# Patient Record
Sex: FEMALE | Race: WHITE | NOT HISPANIC OR LATINO | Employment: OTHER | URBAN - METROPOLITAN AREA
[De-identification: names, ages, dates, MRNs, and addresses within clinical notes are randomized per-mention and may not be internally consistent; named-entity substitution may affect disease eponyms.]

---

## 2017-01-06 ENCOUNTER — OFFICE VISIT (OUTPATIENT)
Dept: FAMILY MEDICINE | Facility: CLINIC | Age: 77
End: 2017-01-06
Payer: COMMERCIAL

## 2017-01-06 VITALS
BODY MASS INDEX: 27.14 KG/M2 | HEART RATE: 75 BPM | OXYGEN SATURATION: 97 % | DIASTOLIC BLOOD PRESSURE: 62 MMHG | TEMPERATURE: 97.6 F | HEIGHT: 67 IN | WEIGHT: 172.9 LBS | SYSTOLIC BLOOD PRESSURE: 128 MMHG

## 2017-01-06 DIAGNOSIS — R53.83 OTHER FATIGUE: ICD-10-CM

## 2017-01-06 DIAGNOSIS — M25.512 BILATERAL SHOULDER PAIN, UNSPECIFIED CHRONICITY: Primary | ICD-10-CM

## 2017-01-06 DIAGNOSIS — Z23 NEED FOR PROPHYLACTIC VACCINATION AND INOCULATION AGAINST INFLUENZA: ICD-10-CM

## 2017-01-06 DIAGNOSIS — M25.511 BILATERAL SHOULDER PAIN, UNSPECIFIED CHRONICITY: Primary | ICD-10-CM

## 2017-01-06 DIAGNOSIS — D64.9 ANEMIA, UNSPECIFIED TYPE: ICD-10-CM

## 2017-01-06 LAB
ALBUMIN SERPL-MCNC: 3.6 G/DL (ref 3.4–5)
ALP SERPL-CCNC: 139 U/L (ref 40–150)
ALT SERPL W P-5'-P-CCNC: 22 U/L (ref 0–50)
ANION GAP SERPL CALCULATED.3IONS-SCNC: 8 MMOL/L (ref 3–14)
AST SERPL W P-5'-P-CCNC: 15 U/L (ref 0–45)
BASOPHILS # BLD AUTO: 0.1 10E9/L (ref 0–0.2)
BASOPHILS NFR BLD AUTO: 0.7 %
BILIRUB SERPL-MCNC: 0.4 MG/DL (ref 0.2–1.3)
BUN SERPL-MCNC: 18 MG/DL (ref 7–30)
CALCIUM SERPL-MCNC: 9.1 MG/DL (ref 8.5–10.1)
CHLORIDE SERPL-SCNC: 107 MMOL/L (ref 94–109)
CO2 SERPL-SCNC: 29 MMOL/L (ref 20–32)
CREAT SERPL-MCNC: 0.72 MG/DL (ref 0.52–1.04)
CRP SERPL-MCNC: 7.3 MG/L (ref 0–8)
DIFFERENTIAL METHOD BLD: NORMAL
EOSINOPHIL # BLD AUTO: 0.2 10E9/L (ref 0–0.7)
EOSINOPHIL NFR BLD AUTO: 3.2 %
ERYTHROCYTE [DISTWIDTH] IN BLOOD BY AUTOMATED COUNT: 13.8 % (ref 10–15)
ERYTHROCYTE [SEDIMENTATION RATE] IN BLOOD BY WESTERGREN METHOD: 13 MM/H (ref 0–30)
GFR SERPL CREATININE-BSD FRML MDRD: 79 ML/MIN/1.7M2
GLUCOSE SERPL-MCNC: 73 MG/DL (ref 70–99)
HCT VFR BLD AUTO: 42.2 % (ref 35–47)
HGB BLD-MCNC: 13.3 G/DL (ref 11.7–15.7)
LYMPHOCYTES # BLD AUTO: 1.4 10E9/L (ref 0.8–5.3)
LYMPHOCYTES NFR BLD AUTO: 20.6 %
MCH RBC QN AUTO: 29.9 PG (ref 26.5–33)
MCHC RBC AUTO-ENTMCNC: 31.5 G/DL (ref 31.5–36.5)
MCV RBC AUTO: 95 FL (ref 78–100)
MONOCYTES # BLD AUTO: 0.7 10E9/L (ref 0–1.3)
MONOCYTES NFR BLD AUTO: 9.9 %
NEUTROPHILS # BLD AUTO: 4.5 10E9/L (ref 1.6–8.3)
NEUTROPHILS NFR BLD AUTO: 65.6 %
PLATELET # BLD AUTO: 281 10E9/L (ref 150–450)
POTASSIUM SERPL-SCNC: 4.3 MMOL/L (ref 3.4–5.3)
PROT SERPL-MCNC: 7.5 G/DL (ref 6.8–8.8)
RBC # BLD AUTO: 4.45 10E12/L (ref 3.8–5.2)
SODIUM SERPL-SCNC: 144 MMOL/L (ref 133–144)
TSH SERPL DL<=0.005 MIU/L-ACNC: 1.89 MU/L (ref 0.4–4)
WBC # BLD AUTO: 6.9 10E9/L (ref 4–11)

## 2017-01-06 PROCEDURE — 85025 COMPLETE CBC W/AUTO DIFF WBC: CPT | Performed by: INTERNAL MEDICINE

## 2017-01-06 PROCEDURE — 99214 OFFICE O/P EST MOD 30 MIN: CPT | Mod: 25 | Performed by: INTERNAL MEDICINE

## 2017-01-06 PROCEDURE — 80053 COMPREHEN METABOLIC PANEL: CPT | Performed by: INTERNAL MEDICINE

## 2017-01-06 PROCEDURE — 84443 ASSAY THYROID STIM HORMONE: CPT | Performed by: INTERNAL MEDICINE

## 2017-01-06 PROCEDURE — 86140 C-REACTIVE PROTEIN: CPT | Mod: 90 | Performed by: INTERNAL MEDICINE

## 2017-01-06 PROCEDURE — 85652 RBC SED RATE AUTOMATED: CPT | Performed by: INTERNAL MEDICINE

## 2017-01-06 PROCEDURE — 36415 COLL VENOUS BLD VENIPUNCTURE: CPT | Performed by: INTERNAL MEDICINE

## 2017-01-06 PROCEDURE — 99000 SPECIMEN HANDLING OFFICE-LAB: CPT | Performed by: INTERNAL MEDICINE

## 2017-01-06 PROCEDURE — 90662 IIV NO PRSV INCREASED AG IM: CPT | Performed by: INTERNAL MEDICINE

## 2017-01-06 PROCEDURE — G0008 ADMIN INFLUENZA VIRUS VAC: HCPCS | Performed by: INTERNAL MEDICINE

## 2017-01-06 NOTE — MR AVS SNAPSHOT
After Visit Summary   1/6/2017    Lizzette Chanel    MRN: [id number]           Patient Information     Date Of Birth          1940        Visit Information        Provider Department      1/6/2017 9:00 AM Guzman Bruner MD [facility]        Today's Diagnoses     Bilateral shoulder pain, unspecified chronicity    -  1     Anemia, unspecified type         Other fatigue           Care Instructions    I will let you the labs once back.      Understanding Polymyalgia Rheumatica  Polymyalgia rheumatica (PMR) is an inflammatory condition that can cause aching and stiffness. It tends to affect the neck, shoulders, and hips. The aching and stiffness are usually worse in the morning.  PMR can come on suddenly. For some it seems to occur overnight. For others it can take days or weeks to develop. PMR affects only older adults. It becomes more common with age. PMR occurs most often between the ages of 70 and 80. It is more common in women than in men, and it seems to run in some families.  What causes polymyalgia rheumatica?  Researchers are working to understand the causes of PMR. Because it can happen quickly and tends to occur at certain times of year, some think that an infection may cause it. Genes may be part of the cause. PMR can run in some families.  Symptoms of polymyalgia rheumatica  The main symptoms of PMR are aching and stiffness of the shoulders, neck, and hips. The aching can extend to the upper arms and thighs. PMR tends to affect both sides of the body equally. Symptoms are often worse in the morning or after long periods of no activity. Movement can make the pain worse.  The symptoms of PMR usually affect the shoulders the most. You may have trouble raising your arms above the level of your shoulders. This can make it hard to get dressed. You may have trouble rolling over in bed, getting out of bed, and getting up from sitting. You may also have trouble sleeping because  of your symptoms.  Other symptoms can occur, such as:    Swelling of the hands, wrists, feet, and ankles    Numbness, tingling, or pain in the hand, wrist, or forearm    Feeling of weakness    General feeling of being unwell    Feeling tired    Loss of appetite    Weight loss    Low-grade fever  Diagnosing polymyalgia rheumatica  Your healthcare provider will ask about your health history and your symptoms. He or she will give you a physical exam. The exam will check your range of motion, strength, and painful areas.  Diagnosing PMR can be difficult. Your healthcare provider will need to make sure you have PMR. Many conditions can cause aching and stiffness. These include rheumatoid arthritis and fibromyalgia. You may need tests, such as:    Blood tests to look for signs of inflammation, blood count problems, and muscle damage    Muscle biopsy to check for damage    Biopsy of a blood vessel in your temple    X-rays to look at your joints    MRI for detailed pictures of your joints and tissues    Ultrasound to look most closely at the soft tissues around your joints  Your healthcare provider may also diagnose you by giving you medicine. PMR often responds quickly to steroid medicine. This can help show if you have PMR. You may also be referred to a rheumatologist for diagnosis.    6926-2167 [employer]. [address] [place] [address]. All rights reserved. This information is not intended as a substitute for professional medical care. Always follow your healthcare professional's instructions.              Follow-ups after your visit        Who to contact     If you have questions or need follow up information about today's clinic visit or your schedule please contact [facility] directly at [phone].  Normal or non-critical lab and imaging results will be communicated to you by MyChart, letter or phone within 4 business days after the clinic has received the results. If you do  "not hear from us within 7 days, please contact the clinic through [employer] or phone. If you have a critical or abnormal lab result, we will notify you by phone as soon as possible.  Submit refill requests through [employer] or call your pharmacy and they will forward the refill request to us. Please allow 3 business days for your refill to be completed.          Additional Information About Your Visit        [employer] Information     [employer] lets you send messages to your doctor, view your test results, renew your prescriptions, schedule appointments and more. To sign up, go to [email or web address] . Click on \"Log in\" on the left side of the screen, which will take you to the Welcome page. Then click on \"Sign up Now\" on the right side of the page.     You will be asked to enter the access code listed below, as well as some personal information. Please follow the directions to create your username and password.     Your access code is: [email or web address]  Expires: 2017  9:41 AM     Your access code will  in 90 days. If you need help or a new code, please call your [place] clinic or [phone].        Care EveryWhere ID     This is your Care EveryWhere ID. This could be used by other organizations to access your [place] medical records  [id number]        Your Vitals Were     Pulse Temperature Height BMI (Body Mass Index) Pulse Oximetry Breastfeeding?    75 97.6  F (36.4  C) (Oral) 5' 7\" (1.702 m) 27.07 kg/m2 97% No       Blood Pressure from Last 3 Encounters:   17 128/62   04/15/16 137/80   01/15/16 132/64    Weight from Last 3 Encounters:   17 172 lb 14.4 oz (78.427 kg)   04/15/16 172 lb (78.019 kg)   16 167 lb 11.2 oz (76.068 kg)              We Performed the Following     CBC with platelets differential     Comprehensive metabolic panel     CRP, inflammation     ESR: Erythrocyte sedimentation rate     JUST IN CASE     TSH with free T4 reflex          Today's Medication Changes        "   These changes are accurate as of: 1/6/17  9:41 AM.  If you have any questions, ask your nurse or doctor.               Stop taking these medicines if you haven't already. Please contact your care team if you have questions.     methylphenidate 5 MG tablet   Commonly known as:  RITALIN   Stopped by:  Guzman Bruner MD                    Primary Care Provider Office Phone # Fax #    Guzman Bruner [phone]       [facility] [address] [facility] [address] [facility] [address]  [facility] [address]        Thank you!     Thank you for choosing [facility]  for your care. Our goal is always to provide you with excellent care. Hearing back from our patients is one way we can continue to improve our services. Please take a few minutes to complete the written survey that you may receive in the mail after your visit with us. Thank you!             Your Updated Medication List - Protect others around you: Learn how to safely use, store and throw away your medicines at www.disposemymeds.org.          This list is accurate as of: 1/6/17  9:41 AM.  Always use your most recent med list.                   Brand Name Dispense Instructions for use    CALCIUM PO      Take by mouth daily Takes liquid form       COQ10 PO      Take 100 mg by mouth.       JUICE PLUS FIBRE PO      Take by mouth 2 times daily       order for DME      Use your CPAP device as directed by your provider.       red yeast rice 600 MG Caps      Take 600 mg by mouth 2 times daily

## 2017-01-06 NOTE — PROGRESS NOTES
Lizzette Chanel is a 76 year old female who presents for few symptoms.      Atraumatic left shoulder pain as of 4/16, then to rnp at ortho and injected, did not help, then physical therapy but did not help, then seen by Dr. Mckinney and ?due to post polio.  Cont discomfort, then as of last few weeks some on right, not as bad, no trauma.  Some rest pain, no weakness, but also having some neck pain, not rad, no hip pain, no f,c,s or weight loss, some occasionally jaw discomfort 3 weeks, no jabier.  No ha or neuro changes, no h/o pmr.      Tried to donate blood and hemoglobin 11 or so, no recent donations, no h/o anemia.    She has had some fatigue.    She notes no chest pain or shortness of breath, no gi c/o, no gu c/o, no f,cs.  No other significant jt pains or other heent c/o.    Past Medical History   Diagnosis Date     Intermittent asthma 1995     Chest pain 2003     neg est thallium     Hx of colonoscopy 2003     incomplete, be nl     Peripheral neuropathy (H)      Dr. Jeffery     Abdominal pain 2011     ct abd and pelvis uterine fiborid, renal cysts and tics     Hx of colonoscopy [place] 3, 2011     nl     Hypercholesteremia 2011     aches with zocor     CAITLYN (obstructive sleep apnea) 12/12     done [place], using dental device, added cpap 2014      Osteopenia 2013     Dr. Brandon Sparks 1956     Statin intolerance      zocor and one other caused aches     Subdural hematoma (H) 1/16     traumatic, fu ct 3/16 resolved     Chest pain 4/16     nl est echo and cxr     Past Surgical History   Procedure Laterality Date     Shoulder surgery  1990's     twice     Tonsillectomy  child     Appendectomy  60's     Phacoemulsification clear cornea with standard intraocular lens implant  3/21/2012     Procedure:PHACOEMULSIFICATION CLEAR CORNEA WITH STANDARD INTRAOCULAR LENS IMPLANT; RIGHT PHACOEMULSIFICATION CLEAR CORNEA WITH STANDARD INTRAOCULAR LENS IMPLANT ; Surgeon:CHANDRAKANT HERNANDEZ; Location:[facility]     Phacoemulsification clear  "cornea with standard intraocular lens implant  3/28/2012     Procedure:PHACOEMULSIFICATION CLEAR CORNEA WITH STANDARD INTRAOCULAR LENS IMPLANT; LEFT PHACOEMULSIFICATION CLEAR CORNEA WITH STANDARD INTRAOCULAR LENS IMPLANT ; Surgeon:CHANDRAKANT HERNANDEZ; Location:[facility]     Social History     Social History     Marital Status: Single     Spouse Name: N/A     Number of Children: 0     Years of Education: N/A     Occupational History     physical therapy Retired     Social History Main Topics     Smoking status: Former Smoker -- 1.50 packs/day for 12 years     Types: Cigarettes     Quit date: 03/17/1974     Smokeless tobacco: Never Used     Alcohol Use: 0.0 oz/week     0 Standard drinks or equivalent per week      Comment: 1-2/day     Drug Use: No     Sexual Activity: No     Other Topics Concern     Not on file     Social History Narrative     Current Outpatient Prescriptions   Medication Sig Dispense Refill     CALCIUM PO Take by mouth daily Takes liquid form       red yeast rice 600 MG CAPS Take 600 mg by mouth 2 times daily       Coenzyme Q10 (COQ10 PO) Take 100 mg by mouth.       ORDER FOR DME Use your CPAP device as directed by your provider.       Nutritional Supplements (JUICE PLUS FIBRE PO) Take by mouth 2 times daily        No Known Allergies  FAMILY HISTORY NOTED AND REVIEWED    REVIEW OF SYSTEMS: above    PHYSICAL EXAM    /62 mmHg  Pulse 75  Temp(Src) 97.6  F (36.4  C) (Oral)  Ht 5' 7\" (1.702 m)  Wt 172 lb 14.4 oz (78.427 kg)  BMI 27.07 kg/m2  SpO2 97%  Breastfeeding? No    Patient appears non toxic  Mouth - tongue midline and within normal limits, mucous membranes and posterior pharynx within normal limits, no lesions seen.  Neck - no masses, lesions or tenderness  Nodes - no supraclavicular, cervical or axially adenopathy .  Lungs - clear, normal flow  Cardiovascular - regular rate and rhythm, no murmer, rub or gallop, no jvp or edema, carotids within normal limits, no bruits.  Abdomen - normal " active bowel sounds, soft, non tender, no masses, guarding or rebound, no hepatosplenomegaly  No temporal artery tend or masses  Hands, digits no warm, tender    Labs sent    ASSESSMENT:  1. Shoulder pain bilat, may be mechanical but need to consider pmr, doubt infectious, vascular, other cause  2. Anemia, ?real, ?related to shoulders, neg exam, will start withlabs  3. Fatigue, ?due to above    PLAN:  Labs today  Need to consider pmr, but also need to consider separate causes of anemia and shoulder pains.  Will check labs and go from there  Flu shot today    Guzman Bruner M.D.

## 2017-01-06 NOTE — Clinical Note
[address]  [place] [address]  [phone]                                                                                               Date: 1/9/2017    Lizzette Chanel                                                                               [address]  [place] [address]              Dear Lizzette,    Your labs are all completely normal including the inflammation tests and your hemoglobin and thyroid.  I do to believe you have PMR, more likely mechanical joint issues and I would consider either physical therapy or seeing Dr. Mckinney for a possible injection.  Enclosed is a copy of your results.      It was a pleasure to see you at your last appointment. If you have any questions, please feel free to call myself or my nurse at [phone].          Sincerely,    Guzman Bruner MD/ [facility]N CMA  Results for orders placed or performed in visit on 01/06/17   CBC with platelets differential   Result Value Ref Range    WBC 6.9 4.0 - 11.0 10e9/L    RBC Count 4.45 3.8 - 5.2 10e12/L    Hemoglobin 13.3 11.7 - 15.7 g/dL    Hematocrit 42.2 35.0 - 47.0 %    MCV 95 78 - 100 fl    MCH 29.9 26.5 - 33.0 pg    MCHC 31.5 31.5 - 36.5 g/dL    RDW 13.8 10.0 - 15.0 %    Platelet Count 281 150 - 450 10e9/L    Diff Method Automated Method     % Neutrophils 65.6 %    % Lymphocytes 20.6 %    % Monocytes 9.9 %    % Eosinophils 3.2 %    % Basophils 0.7 %    Absolute Neutrophil 4.5 1.6 - 8.3 10e9/L    Absolute Lymphocytes 1.4 0.8 - 5.3 10e9/L    Absolute Monocytes 0.7 0.0 - 1.3 10e9/L    Absolute Eosinophils 0.2 0.0 - 0.7 10e9/L    Absolute Basophils 0.1 0.0 - 0.2 10e9/L   TSH with free T4 reflex   Result Value Ref Range    TSH 1.89 0.40 - 4.00 mU/L   Comprehensive metabolic panel   Result Value Ref Range    Sodium 144 133 - 144 mmol/L    Potassium 4.3 3.4 - 5.3 mmol/L    Chloride 107 94 - 109 mmol/L    Carbon Dioxide 29 20 - 32 mmol/L    Anion Gap 8 3 - 14 mmol/L    Glucose 73 70 -  99 mg/dL    Urea Nitrogen 18 7 - 30 mg/dL    Creatinine 0.72 0.52 - 1.04 mg/dL    GFR Estimate 79 >60 mL/min/1.7m2    GFR Estimate If Black >90   GFR Calc   >60 mL/min/1.7m2    Calcium 9.1 8.5 - 10.1 mg/dL    Bilirubin Total 0.4 0.2 - 1.3 mg/dL    Albumin 3.6 3.4 - 5.0 g/dL    Protein Total 7.5 6.8 - 8.8 g/dL    Alkaline Phosphatase 139 40 - 150 U/L    ALT 22 0 - 50 U/L    AST 15 0 - 45 U/L   ESR: Erythrocyte sedimentation rate   Result Value Ref Range    Sed Rate 13 0 - 30 mm/h   CRP, inflammation   Result Value Ref Range    CRP Inflammation 7.3 0.0 - 8.0 mg/L     '

## 2017-01-06 NOTE — PROGRESS NOTES
Injectable Influenza Immunization Documentation    1.  Is the person to be vaccinated sick today?  No    2. Does the person to be vaccinated have an allergy to eggs or to a component of the vaccine?  No    3. Has the person to be vaccinated today ever had a serious reaction to influenza vaccine in the past?  No    4. Has the person to be vaccinated ever had Guillain-Kincaid syndrome?  No     Form completed by Faina Ness CMA

## 2017-01-06 NOTE — NURSING NOTE
"Chief Complaint   Patient presents with     RECHECK     low hgb     Shoulder Pain     started in April, surgery on both in the past, had injection, pt, Dr. Alonzo Mckinney       Initial /77 mmHg  Pulse 75  Temp(Src) 97.6  F (36.4  C) (Oral)  Ht 5' 7\" (1.702 m)  Wt 172 lb 14.4 oz (78.427 kg)  BMI 27.07 kg/m2  SpO2 97%  Breastfeeding? No Estimated body mass index is 27.07 kg/(m^2) as calculated from the following:    Height as of this encounter: 5' 7\" (1.702 m).    Weight as of this encounter: 172 lb 14.4 oz (78.427 kg).  BP completed using cuff size: regular, right arm  Faina Ness CMA    "

## 2017-01-06 NOTE — PATIENT INSTRUCTIONS
I will let you the labs once back.      Understanding Polymyalgia Rheumatica  Polymyalgia rheumatica (PMR) is an inflammatory condition that can cause aching and stiffness. It tends to affect the neck, shoulders, and hips. The aching and stiffness are usually worse in the morning.  PMR can come on suddenly. For some it seems to occur overnight. For others it can take days or weeks to develop. PMR affects only older adults. It becomes more common with age. PMR occurs most often between the ages of 70 and 80. It is more common in women than in men, and it seems to run in some families.  What causes polymyalgia rheumatica?  Researchers are working to understand the causes of PMR. Because it can happen quickly and tends to occur at certain times of year, some think that an infection may cause it. Genes may be part of the cause. PMR can run in some families.  Symptoms of polymyalgia rheumatica  The main symptoms of PMR are aching and stiffness of the shoulders, neck, and hips. The aching can extend to the upper arms and thighs. PMR tends to affect both sides of the body equally. Symptoms are often worse in the morning or after long periods of no activity. Movement can make the pain worse.  The symptoms of PMR usually affect the shoulders the most. You may have trouble raising your arms above the level of your shoulders. This can make it hard to get dressed. You may have trouble rolling over in bed, getting out of bed, and getting up from sitting. You may also have trouble sleeping because of your symptoms.  Other symptoms can occur, such as:    Swelling of the hands, wrists, feet, and ankles    Numbness, tingling, or pain in the hand, wrist, or forearm    Feeling of weakness    General feeling of being unwell    Feeling tired    Loss of appetite    Weight loss    Low-grade fever  Diagnosing polymyalgia rheumatica  Your healthcare provider will ask about your health history and your symptoms. He or she will give you a  physical exam. The exam will check your range of motion, strength, and painful areas.  Diagnosing PMR can be difficult. Your healthcare provider will need to make sure you have PMR. Many conditions can cause aching and stiffness. These include rheumatoid arthritis and fibromyalgia. You may need tests, such as:    Blood tests to look for signs of inflammation, blood count problems, and muscle damage    Muscle biopsy to check for damage    Biopsy of a blood vessel in your temple    X-rays to look at your joints    MRI for detailed pictures of your joints and tissues    Ultrasound to look most closely at the soft tissues around your joints  Your healthcare provider may also diagnose you by giving you medicine. PMR often responds quickly to steroid medicine. This can help show if you have PMR. You may also be referred to a rheumatologist for diagnosis.    [id number] [employer]. [address] [place] [address], [place] [address]. All rights reserved. This information is not intended as a substitute for professional medical care. Always follow your healthcare professional's instructions.

## 2017-01-07 ASSESSMENT — ASTHMA QUESTIONNAIRES: ACT_TOTALSCORE: 25

## 2017-01-08 ENCOUNTER — TELEPHONE (OUTPATIENT)
Dept: FAMILY MEDICINE | Facility: CLINIC | Age: 77
End: 2017-01-08

## 2017-01-08 NOTE — TELEPHONE ENCOUNTER
Please call patient, make sure she got my message that labs all normal and let me know if questions.    Thanks    Guzman Bruner M.D.

## 2017-03-21 ENCOUNTER — TRANSFERRED RECORDS (OUTPATIENT)
Dept: HEALTH INFORMATION MANAGEMENT | Facility: CLINIC | Age: 77
End: 2017-03-21

## 2017-03-29 ENCOUNTER — HOSPITAL ENCOUNTER (OUTPATIENT)
Dept: MAMMOGRAPHY | Facility: CLINIC | Age: 77
Discharge: HOME | End: 2017-03-29
Attending: OBSTETRICS & GYNECOLOGY | Admitting: OBSTETRICS & GYNECOLOGY
Payer: MEDICARE

## 2017-03-29 DIAGNOSIS — Z12.31 VISIT FOR SCREENING MAMMOGRAM: ICD-10-CM

## 2017-03-29 PROCEDURE — 77063 BREAST TOMOSYNTHESIS BI: CPT

## 2017-04-04 ENCOUNTER — TRANSFERRED RECORDS (OUTPATIENT)
Dept: HEALTH INFORMATION MANAGEMENT | Facility: CLINIC | Age: 77
End: 2017-04-04

## 2017-04-10 ENCOUNTER — THERAPY VISIT (OUTPATIENT)
Dept: PHYSICAL THERAPY | Facility: CLINIC | Age: 77
End: 2017-04-10
Payer: COMMERCIAL

## 2017-04-10 DIAGNOSIS — M48.02 SPINAL STENOSIS IN CERVICAL REGION: Primary | ICD-10-CM

## 2017-04-10 PROCEDURE — 97161 PT EVAL LOW COMPLEX 20 MIN: CPT | Mod: GP | Performed by: PHYSICAL THERAPIST

## 2017-04-10 PROCEDURE — 97110 THERAPEUTIC EXERCISES: CPT | Mod: GP | Performed by: PHYSICAL THERAPIST

## 2017-04-10 NOTE — PROGRESS NOTES
Subjective:    HPI Comments: C/C:  Intermittent stiffness pain in bilateral neck that will radiate into head (2/10).  Has been trying to stretch neck and is noting significant cracks and creaking.  HA is dayily.  Sleep is OK.  Pain is worse with manily rotation.  Better with rest/heat.    Hx:  April, 2016-started to note bilateral upper arm aching and feeling that arms were getting weaker, L>R.  Saw Dr. Mckinney who had done RCR to both shoulder in the past.  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  Was referred for strengthening to shoulders.  When problem persisted, patient went to see Dr. Browne who ordered an MRI that revealed significant changes in cervical spine.  Underwent an epidural that has brought significant relief within 2 days of epidural.    PMH:  Polio in 1956.  Hx of bilateral RCR.  Did suffer a concussion in a fall 1/2016. Slipped on ice and fell on back of head.  Suffered a brain bleed. Uses a CPAP.   General health-Good->excellent.  Pt is a retired PT.  Very active-likes to sail, golf, do yard work, walks dog daily.                      Objective:    Standing Alignment:    Cervical/Thoracic:  Forward head and thoracic kyphosis increased  Shoulder/UE:  Rounded shoulders                                  Cervical/Thoracic Evaluation    AROM:  AROM Cervical:    Flexion:            WNL  Extension:       70% (+/-)  Rotation:         Left: 70% (+ for right upper pain)     Right: 60% (+ for left upper cervical pain)  Side Bend:      Left: 25%     Right:  50%      Headaches: cervical  Cervical Myotomes:  Cervical myotomes: Left thumb weakness due to old polio involvement.      C4 (shrug):  Left: 5    Right: 5  C5 (Deltoid):  Left: 5    Right: 5  C6 (Biceps):  Left: 5    Right: 5  C7 (Triceps):  Left: 5-    Right: 5  C8 (Thumb Ext): Left: 4-    Right: 5  T1 (Intrinsics): Left: 5    Right: 5  DTR's:    C5  (Biceps):  Left:  2  Right:  2     C6 (Brachioradialis):  Left:  2  Right:  2     C7 (Triceps):  Left:  2  Right:  2    Cervical Dermatomes:  normal                            Cord Sign:      Cord sign negative for:  Dozier left; Dozier right; Scapularthoracic left or Scapularthoracic right                                                                           HENT:   Head:               ROS    Assessment/Plan:      Patient is a 76 year old female with cervical complaints.    Patient has the following significant findings with corresponding treatment plan.                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    Comorbidity factors that impact the plan of care are:      Asthma, Cancer, Concussion, Migraines/headaches, Osteoarthritis and Sleep disorder/apnea.     Medications impacting care: None.  2) Examination of Body Systems comprised of:   Body structures and functions that impact the plan of care:      Cervical spine.   Activity limitations that impact the plan of care are:      Driving.  3) Clinical presentation characteristics are:   Stable/Uncomplicated.  4) Decision-Making    Low complexity using standardized patient assessment instrument and/or measureable assessment of functional outcome.  Cumulative Therapy Evaluation is: Low complexity.    Previous and current functional limitations:  (See Goal Flow Sheet for this information)    Short term and Long term goals: (See Goal Flow Sheet for  this information)     Communication ability:  Patient appears to be able to clearly communicate and understand verbal and written communication and follow directions correctly.  Treatment Explanation - The following has been discussed with the patient:   RX ordered/plan of care  Anticipated outcomes  Possible risks and side effects  This patient would benefit from PT intervention to resume normal activities.   Rehab potential is excellent.    Frequency:  1 X week, once daily  Duration:  for 8 weeks  Discharge Plan:  Achieve all LTG.  Independent in home treatment program.  Reach maximal therapeutic benefit.    Please refer to the daily flowsheet for treatment today, total treatment time and time spent performing 1:1 timed codes.

## 2017-04-10 NOTE — LETTER
[facility] ATHLETIC [facility] [place] PHYSICAL THERAPY  [address] [place] [address] [address]  [facility] [address]  [phone]    2017  Re: Lizzette Chanel   :   1940  MRN:  [id number]   REFERRING PHYSICIAN:   Felxi Browne    [facility] ATHLETIC [facility] [place] PHYSICAL [facility]  Date of Initial Evaluation:  4/10/2017  Visits: 1 Rxs Used: 1  Reason for Referral:  Spinal stenosis in cervical region    [facility] Initial Evaluation  Subjective:  HPI Comments: C/C:  Intermittent stiffness pain in bilateral neck that will radiate into head (2/10).  Has been trying to stretch neck and is noting significant cracks and creaking.  HA is dayily.  Sleep is OK.  Pain is worse with manily rotation.  Better with rest/heat.    Hx:  -started to note bilateral upper arm aching and feeling that arms were getting weaker, L>R.  Saw Dr. Mckinney who had done RCR to both shoulder in the past.  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  Was referred for strengthening to shoulders.  When problem persisted, patient went to see Dr. Browne who ordered an MRI that revealed significant changes in cervical spine.  Underwent an epidural that has brought significant relief within 2 days of epidural.    PMH:  Polio in 1956.  Hx of bilateral RCR.  Did suffer a concussion in a fall 2016. Slipped on ice and fell on back of head.  Suffered a brain bleed. Uses a CPAP.   General health-Good->excellent.  Pt is a retired PT.  Very active-likes to sail, golf, do yard work, walks dog daily. Pertinent medical history includes:  Osteoarthritis, cancer, asthma, smoking, sleep disorder/apnea, migraines and other (Polio).  Medical allergies: no.  Other surgeries include:  Cancer surgery and orthopedic surgery (Basal cell removed, B rotator cuff).  Current medications:  None as reported by patient.  Current  occupation is retired.    Primary job tasks include:  Lifting and other (pushing/pulling).                Objective:  Standing Alignment:    Cervical/Thoracic:  Forward head and thoracic kyphosis increased  Shoulder/UE:  Rounded shoulders  Cervical/Thoracic Evaluation  AROM:  AROM Cervical:  Flexion:            WNL  Extension:       70% (+/-)  Rotation:         Left: 70% (+ for right upper pain)     Right: 60% (+ for left upper cervical pain)  Side Bend:      Left: 25%     Right:  50%  Headaches: cervical  Re: Lizzette Chanel   :   1940    Cervical Myotomes:  Cervical myotomes: Left thumb weakness due to old polio involvement.  C4 (shrug):  Left: 5    Right: 5  C5 (Deltoid):  Left: 5    Right: 5  C6 (Biceps):  Left: 5    Right: 5  C7 (Triceps):  Left: 5-    Right: 5  C8 (Thumb Ext): Left: 4-    Right: 5  T1 (Intrinsics): Left: 5    Right: 5  DTR's:    C5 (Biceps):  Left:  2  Right:  2     C6 (Brachioradialis):  Left:  2  Right:  2     C7 (Triceps):  Left:  2  Right:  2  Cervical Dermatomes:  normal  Cord Sign:    Cord sign negative for:  Dozier left; Dozier right; Scapularthoracic left or Scapularthoracic right                        HENT:   Head:             Assessment/Plan:    Patient is a 76 year old female with cervical complaints.    Patient has the following significant findings with corresponding treatment plan.                Diagnosis 1:  Cervical C5-6 stenosis  Pain -  manual therapy, self management, education and home program  Decreased ROM/flexibility - manual therapy and therapeutic exercise  Decreased joint mobility - manual therapy and therapeutic exercise  Decreased strength - therapeutic exercise and therapeutic activities  Impaired muscle performance - neuro re-education  Decreased function - therapeutic activities  Impaired posture - neuro re-education  Therapy Evaluation Codes:   1) History comprised of:  Re: Lizzette Chanel   :   1940      Personal factors that impact the plan  of care:      None.    Comorbidity factors that impact the plan of care are:      Asthma, Cancer, Concussion, Migraines/headaches, Osteoarthritis and Sleep disorder/apnea.     Medications impacting care: None.  2) Examination of Body Systems comprised of:   Body structures and functions that impact the plan of care:      Cervical spine.   Activity limitations that impact the plan of care are:      Driving.  3) Clinical presentation characteristics are:   Stable/Uncomplicated.  4) Decision-Making    Low complexity using standardized patient assessment instrument and/or measureable assessment of functional outcome.  Cumulative Therapy Evaluation is: Low complexity.  Previous and current functional limitations:  (See Goal Flow Sheet for this information)    Short term and Long term goals: (See Goal Flow Sheet for this information)   Communication ability:  Patient appears to be able to clearly communicate and understand verbal and written communication and follow directions correctly.  Treatment Explanation - The following has been discussed with the patient:   RX ordered/plan of care  Anticipated outcomes  Possible risks and side effects  This patient would benefit from PT intervention to resume normal activities.   Rehab potential is excellent.  Frequency:  1 X week, once daily  Duration:  for 8 weeks  Discharge Plan:  Achieve all LTG.  Independent in home treatment program.  Reach maximal therapeutic benefit.      Thank you for your referral.    INQUIRIES  Therapist: Gretta Diaz, PT, ScD, [facility]  [facility] [place] PH[facility]ICAL [facility]ERAP[facility]  [address] [place] [address] [facility]  [facility] [address]  Phone: [phone]  Fax: [phone]

## 2017-04-11 NOTE — PROGRESS NOTES
Subjective:                                       Pertinent medical history includes:  Osteoarthritis, cancer, asthma, smoking, sleep disorder/apnea, migraines and other (Polio).  Medical allergies: no.  Other surgeries include:  Cancer surgery and orthopedic surgery (Basal cell removed, B rotator cuff).  Current medications:  None as reported by patient.  Current occupation is retired.    Primary job tasks include:  Lifting and other (pushing/pulling).                                Objective:    System    Physical Exam    General     ROS    Assessment/Plan:

## 2017-04-13 ENCOUNTER — THERAPY VISIT (OUTPATIENT)
Dept: PHYSICAL THERAPY | Facility: CLINIC | Age: 77
End: 2017-04-13
Payer: COMMERCIAL

## 2017-04-13 DIAGNOSIS — M48.02 SPINAL STENOSIS IN CERVICAL REGION: ICD-10-CM

## 2017-04-13 PROCEDURE — 97110 THERAPEUTIC EXERCISES: CPT | Mod: GP | Performed by: PHYSICAL THERAPIST

## 2017-04-13 PROCEDURE — 97112 NEUROMUSCULAR REEDUCATION: CPT | Mod: GP | Performed by: PHYSICAL THERAPIST

## 2017-04-17 ENCOUNTER — THERAPY VISIT (OUTPATIENT)
Dept: PHYSICAL THERAPY | Facility: CLINIC | Age: 77
End: 2017-04-17
Payer: COMMERCIAL

## 2017-04-17 DIAGNOSIS — M48.02 SPINAL STENOSIS IN CERVICAL REGION: ICD-10-CM

## 2017-04-17 PROCEDURE — 97140 MANUAL THERAPY 1/> REGIONS: CPT | Mod: GP | Performed by: PHYSICAL THERAPIST

## 2017-04-17 PROCEDURE — 97110 THERAPEUTIC EXERCISES: CPT | Mod: GP | Performed by: PHYSICAL THERAPIST

## 2017-04-17 PROCEDURE — 97112 NEUROMUSCULAR REEDUCATION: CPT | Mod: GP | Performed by: PHYSICAL THERAPIST

## 2017-04-21 ENCOUNTER — THERAPY VISIT (OUTPATIENT)
Dept: PHYSICAL THERAPY | Facility: CLINIC | Age: 77
End: 2017-04-21
Payer: COMMERCIAL

## 2017-04-21 DIAGNOSIS — M48.02 SPINAL STENOSIS IN CERVICAL REGION: ICD-10-CM

## 2017-04-21 PROCEDURE — 97110 THERAPEUTIC EXERCISES: CPT | Mod: GP | Performed by: PHYSICAL THERAPIST

## 2017-04-21 PROCEDURE — 97140 MANUAL THERAPY 1/> REGIONS: CPT | Mod: GP | Performed by: PHYSICAL THERAPIST

## 2017-04-27 ENCOUNTER — OFFICE VISIT (OUTPATIENT)
Dept: SLEEP MEDICINE | Facility: CLINIC | Age: 77
End: 2017-04-27
Attending: INTERNAL MEDICINE
Payer: MEDICARE

## 2017-04-27 VITALS
HEIGHT: 67 IN | BODY MASS INDEX: 26.98 KG/M2 | SYSTOLIC BLOOD PRESSURE: 145 MMHG | OXYGEN SATURATION: 97 % | HEART RATE: 73 BPM | DIASTOLIC BLOOD PRESSURE: 69 MMHG | WEIGHT: 171.9 LBS | RESPIRATION RATE: 20 BRPM

## 2017-04-27 DIAGNOSIS — G47.33 OSA (OBSTRUCTIVE SLEEP APNEA): Primary | ICD-10-CM

## 2017-04-27 DIAGNOSIS — F51.04 PSYCHOPHYSIOLOGICAL INSOMNIA: ICD-10-CM

## 2017-04-27 DIAGNOSIS — Z87.891 HISTORY OF SMOKING 10-25 PACK YEARS: ICD-10-CM

## 2017-04-27 DIAGNOSIS — G47.10 HYPERSOMNIA: ICD-10-CM

## 2017-04-27 PROCEDURE — 99211 OFF/OP EST MAY X REQ PHY/QHP: CPT | Mod: ZF

## 2017-04-27 NOTE — PATIENT INSTRUCTIONS
Try to get p at the same time every morning 7 AM  Do not go to bed until 11-11:30 AND are sleep.  No naps  Bright light in AM  Avoid electronics before bed    [facility]      Treating Insomnia  Good sleeping habits are a key part of treatment. If needed, some medications may help you sleep better at first. Making healthy lifestyle changes and learning to relax can improve your sleep. Treating insomnia takes commitment, but trust that your efforts will pay off. Talk to your doctor before taking any medication.    Healthy Lifestyle  Your lifestyle affects your health and your sleep. Here are some healthy habits:    Keep a regular sleep schedule. Go to bed and get up at the same time each day.    Exercise regularly. It may help you reduce stress. Avoid strenuous exercise for two to four hours before bedtime.    Avoid or limit naps.    Use your bed only for sleep and sex.    Don t spend too much time in bed trying to fall asleep. If you can t fall asleep, get up and do something until you become tired and drowsy.    Avoid or limit caffeine and nicotine. They can keep you awake at night. Also avoid alcohol. It may help you fall asleep at first, but your sleep will not be restful.    Before Bedtime  To sleep better every night, try these tips:    Have a bedtime routine to let your body and mind know when it s time to sleep.    Going to bed should be relaxing so try to do only relaxing things around bedtime. Sleep will come sooner.    If your worries don t let you sleep, write them down in a diary. Then close it, and go to bed.    Make sure the room is not too hot or too cold. If it s not dark enough, an eye mask can help. If it s noisy, try using earplugs.    Learn to Relax  Stress, anxiety, and body tension may keep you awake at night. To unwind before bedtime, try reading a book, meditation, or yoga. Also, try the following:    Deep breathing. Sit or lie back in a chair. Take a slow, deep breath. Hold it  for 5 counts. Then breathe out slowly through your mouth. Keep doing this until you feel relaxed.    Imagery. Think of the last fun trip you took. In your mind, walk through the trip from start to finish. Put as much detail into the memory as you can remember. It will help you relax.    Cognitive Behavioral Treatment (CBT)  CBT is the most effective treatment for long-term insomnia. It tries to address the underlying causes of your sleep problems, including your habits and how you think about sleep.      Individual Therapy   Zaid Ramirez         Online Programs     www.[facility] (pronounced shut eye). There is a fee for this program. Enter the code  [place]  if you decide to enroll in this program.      www.sleepIO.com (pronounced sleep ee oh). There is a fee for this program. Enter the code  [place]  if you decide to enroll in this program.     Suggested Resources  Insomnia Treatment Books     Overcoming Insomnia by Isacc Marquez and Manda Guillermo (2008)    No More Sleepless Nights by Naveed Muñiz and Chitra Medina (1996)    Say Steven to Insomnia by Adis Myers (2009)    The Insomnia Workbook by Nakita Rodriguez and Ronny Broussard (2009)    The Insomnia Answer by Guzman Telles and Chandra Sneed (2006)      Stress Management and Relaxation Books    The Relaxation and Stress Reduction Workbook by Ann Horn, Amy Roman and Leif Cooper (2008)    Stress Management Workbook: Techniques and Self-Assessment Procedures by Faina Gale and Maldonado Freeman (1997)    A Mindfulness-Based Stress Reduction Workbook by Orlando Briggs and Orly Euceda (2010)    The Complete Stress Management Workbook by J Luis Estrada and Rian Mckay (1996)    Assert Yourself by Leia Torres and Shahzad Torres (1977)    Relaxation Resources for Computer Download   These websites offer resources to help you relax. This list is for information only. [facility] is not responsible  for the quality of services or the actions of any person or organization.  Progressive Muscle Relaxation (PMR):     http://www.[employer]ic[employer]ic International/progressive-muscle-relaxation-exercise.html     http://studentsupport.[facility]/counseling/resources/self-help/relaxation-and-stress-management/   Deep Breathing Exercises:    http://www.Dig[employer] Media.Kovio/breathing-awareness.html     Meditation:     wwwBusiness Monitor International    www.Liligo.comguidedLookmashmeditation-site.Kovio You may have to pay for some of these resources.    Guided Imagery:    http://www.Mediclinic International/guided-imagery-scripts.html     http://Tyto Life/library/jimgawzpvr-axirhk-rjyxwoo/     Counseling / Behavioral Health  [facility]  Visit [email or web address] or call [phone] to find a clinic close to you.      This is not a prescription and these resources are optional. You must pay for any costs when using these resources. Please ask your insurance carrier if you can be reimbursed for these resources. If so, you are responsible for sending the needed details to your insurance carrier. These resources may also be tax deductible as medical expenses. Check with your .     These programs and publications are not affiliated in any way with [place].

## 2017-04-27 NOTE — MR AVS SNAPSHOT
After Visit Summary   4/27/2017    Lizzette Chanel    MRN: [id number]           Patient Information     Date Of Birth          1940        Visit Information        Provider Department      4/27/2017 11:30 AM Rhonda Perez MD [facility] [place], Sleep Study        Today's Diagnoses     CAITLYN (obstructive sleep apnea)  CPAP pressures set at:  LPL of 5 and UPL of 14    -  1    Psychophysiological insomnia        Hypersomnia          Care Instructions    Try to get p at the same time every morning 7 AM  Do not go to bed until 11-11:30 AND are sleep.  No naps  Bright light in AM  Avoid electronics before bed    [facility]omnia [facility]      Treating Insomnia  Good sleeping habits are a key part of treatment. If needed, some medications may help you sleep better at first. Making healthy lifestyle changes and learning to relax can improve your sleep. Treating insomnia takes commitment, but trust that your efforts will pay off. Talk to your doctor before taking any medication.    Healthy Lifestyle  Your lifestyle affects your health and your sleep. Here are some healthy habits:    Keep a regular sleep schedule. Go to bed and get up at the same time each day.    Exercise regularly. It may help you reduce stress. Avoid strenuous exercise for two to four hours before bedtime.    Avoid or limit naps.    Use your bed only for sleep and sex.    Don t spend too much time in bed trying to fall asleep. If you can t fall asleep, get up and do something until you become tired and drowsy.    Avoid or limit caffeine and nicotine. They can keep you awake at night. Also avoid alcohol. It may help you fall asleep at first, but your sleep will not be restful.    Before Bedtime  To sleep better every night, try these tips:    Have a bedtime routine to let your body and mind know when it s time to sleep.    Going to bed should be relaxing so try to do only relaxing things around bedtime. Sleep will come sooner.    If  your worries don t let you sleep, write them down in a diary. Then close it, and go to bed.    Make sure the room is not too hot or too cold. If it s not dark enough, an eye mask can help. If it s noisy, try using earplugs.    Learn to Relax  Stress, anxiety, and body tension may keep you awake at night. To unwind before bedtime, try reading a book, meditation, or yoga. Also, try the following:    Deep breathing. Sit or lie back in a chair. Take a slow, deep breath. Hold it for 5 counts. Then breathe out slowly through your mouth. Keep doing this until you feel relaxed.    Imagery. Think of the last fun trip you took. In your mind, walk through the trip from start to finish. Put as much detail into the memory as you can remember. It will help you relax.    Cognitive Behavioral Treatment (CBT)  CBT is the most effective treatment for long-term insomnia. It tries to address the underlying causes of your sleep problems, including your habits and how you think about sleep.      Individual Therapy   Zaid Ramirez         Online Programs     www.dELiAs (pronounced shut eye). There is a fee for this program. Enter the code  [place]  if you decide to enroll in this program.      www.sleepIO.com (pronounced sleep ee oh). There is a fee for this program. Enter the code  [place]  if you decide to enroll in this program.     Suggested Resources  Insomnia Treatment Books     Overcoming Insomnia by Isacc Marquez and Manda Guillermo (2008)    No More Sleepless Nights by Naveed Muñiz and Chitra Medina (1996)    Say Steven to Insomnia by Adis Myers (2009)    The Insomnia Workbook by Nakita Rodriguez and Ronny Broussard (2009)    The Insomnia Answer by Guzman Telles and Chandra Sneed (2006)      Stress Management and Relaxation Books    The Relaxation and Stress Reduction Workbook by Ann Horn, Amy Roman and Leif Cooper (2008)    Stress Management Workbook: Techniques and Self-Assessment  Procedures by Faina Gale and Maldonado Freeman (1997)    A Mindfulness-Based Stress Reduction Workbook by Orlando Briggs and Orly Euceda (2010)    The Complete Stress Management Workbook by Darvin Rick, J Luis Blanchard and Rian Mckay (1996)    Assert Yourself by Leia Torres and Shahzad Torres (1977)    Relaxation Resources for Computer Download   These websites offer resources to help you relax. This list is for information only. [facility] is not responsible for the quality of services or the actions of any person or organization.  Progressive Muscle Relaxation (PMR):     http://www.Hidden Radio/progressive-muscle-relaxation-exercise.html     http://studentsupport.[facility]/counseling/resources/self-help/relaxation-and-stress-management/   Deep Breathing Exercises:    http://www.Hidden Radio/breathing-awareness.html     Meditation:     wwwSalorix    www.New Planet TechnologiesguidedNEXTA Mediameditation-site.com You may have to pay for some of these resources.    Guided Imagery:    http://www.Hidden Radio/guided-imagery-scripts.html     http://Zuppler/library/cdkuvmbuus-whgtoh-jlbocrr/     Counseling / Behavioral Health  [facility]  Visit [email or web address] or call [phone] to find a clinic close to you.      This is not a prescription and these resources are optional. You must pay for any costs when using these resources. Please ask your insurance carrier if you can be reimbursed for these resources. If so, you are responsible for sending the needed details to your insurance carrier. These resources may also be tax deductible as medical expenses. Check with your .     These programs and publications are not affiliated in any way with [facility].        Follow-ups after your visit        Additional Services     SLEEP PSYCHOLOGY REFERRAL       Referral Urgency:non urgent    Dr. Zaid Ramirez is at the following clinics on the  "following days:  [facility] - [address] [facility], [place]        Thursday and Friday (PLEASE CALL [phone] to schedule an appointment).  [facility] - [address] [place]       Wednesdays   (PLEASE CALL [phone] to schedule an appointment).     Please be aware that coverage of these services is subject to the terms and limitations of your health insurance plan. Call member services at your health plan with any benefit or coverage questions.     Please bring the following to your appointment:  >> List of current medications   >> This referral request   >> Any documents/labs given to you for this referral                  Who to contact     If you have questions or need follow up information about today's clinic visit or your schedule please contact [facility] directly at [phone].  Normal or non-critical lab and imaging results will be communicated to you by MyChart, letter or phone within 4 business days after the clinic has received the results. If you do not hear from us within 7 days, please contact the clinic through TripChamphart or phone. If you have a critical or abnormal lab result, we will notify you by phone as soon as possible.  Submit refill requests through ZarthCode or call your pharmacy and they will forward the refill request to us. Please allow 3 business days for your refill to be completed.          Additional Information About Your Visit        TripChampharPavlov Media Information     ZarthCode lets you send messages to your doctor, view your test results, renew your prescriptions, schedule appointments and more. To sign up, go to [email or web address] . Click on \"Log in\" on the left side of the screen, which will take you to the Welcome page. Then click on \"Sign up Now\" on the right side of the page.     You will be asked to enter the access code listed below, as well as some personal information. Please follow the directions to create your username and " "password.     Your access code is: [email or web address]  Expires: 2017 12:57 PM     Your access code will  in 90 days. If you need help or a new code, please call your [facility] clinic or [phone].        Care EveryWhere ID     This is your Care EveryWhere ID. This could be used by other organizations to access your [place] medical records  [id number]        Your Vitals Were     Pulse Respirations Height Pulse Oximetry BMI (Body Mass Index)       73 20 1.702 m (5' 7\") 97% 26.92 kg/m2        Blood Pressure from Last 3 Encounters:   17 145/69   17 128/62   04/15/16 137/80    Weight from Last 3 Encounters:   17 78 kg (171 lb 14.4 oz)   17 78.4 kg (172 lb 14.4 oz)   04/15/16 78 kg (172 lb)              We Performed the Following     Comprehensive DME     SLEEP PSYCHOLOGY REFERRAL        Primary Care Provider Office Phone # Fax #    Guzman Bruner [phone]       [facility] [address]  [facility] [address]        Thank you!     Thank you for choosing [facility]  for your care. Our goal is always to provide you with excellent care. Hearing back from our patients is one way we can continue to improve our services. Please take a few minutes to complete the written survey that you may receive in the mail after your visit with us. Thank you!             Your Updated Medication List - Protect others around you: Learn how to safely use, store and throw away your medicines at www.disposemymeds.org.          This list is accurate as of: 17 12:57 PM.  Always use your most recent med list.                   Brand Name Dispense Instructions for use    CALCIUM PO      Take by mouth daily Takes liquid form       COQ10 PO      Take 100 mg by mouth.       JUICE PLUS FIBRE PO      Take by mouth 2 times daily       order for DME      Use your CPAP device as directed by your provider.       red yeast rice 600 MG Caps      Take 600 mg by " mouth 2 times daily

## 2017-04-28 NOTE — PROGRESS NOTES
DATE OF SERVICE:  04/27/2017.      CHIEF COMPLAINT:  Followup of sleep apnea and CPAP therapy.      HISTORY OF PRESENT ILLNESS:  Lizzette Chanel was diagnosed with obstructive sleep apnea in 2012 at [facility] when she presented with symptoms of sleepiness.  She reported sleepiness behind the wheel at that time.  She had a sleep study done on 12/02/2012, which revealed REM predominant obstructive sleep apnea.  Overall apnea-hypopnea index was 7.3, however, REM apnea-hypopnea index was 24.7.  She had oxygen desaturations to 70%.  Total time with oxygen saturations at or below 88% was not reported, however, hypoxemia was related to REM.  She used CPAP for awhile, then had oximetry performed with the dental device in place and still showed some hypoxemia in REM.  The patient is currently on CPAP.  She still feels somewhat tired.  She reports issues with insomnia.  Her [place], however, is normal at approximately 4.  She is not sure if CPAP adds a lot to her sleep quality compared with the dental appliance.  She feels restricted that she needs to sleep on her back, more in order to decrease leak issues.  She is using a nasal mask without any supplemental oxygen.  She has concerns about whether her sleep apnea is adequately treated, particularly the hypoxemia.  She states that her mother had dementia and she thinks that hypoxemia may have contributed to her mother's dementia.  She uses the humidifier in her machine.  She feels that it is working well.  She denies problems tolerating the pressure.  Typical bedtime is between 11:00 and 11:30, rise time around 7:00.  Sometimes she tries to go to bed earlier.  She is not taking any sleep aids.  She is not taking intentional naps.  She drinks 0-1 caffeinated beverages a day and 1-3 glasses of wine in the evening.      Download of data from her device from the last 30 days shows excellent compliance with percent of days used greater than 4 hours of 97%, average use 7 hours  and 15 minutes.  Min pressure is 13, max is 17.  The 95th percentile for pressure is 16.3.  Overall AHI is optimally treated at 1.3.  There does appear to be some leak on the individual detail graphs.  She reports today that she has an appointment with the DME provider to check mask fit.      PAST MEDICAL HISTORY:   1.  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   2.  History of intermittent asthma with no current symptoms.   3.  History of tobacco use, probably less than 15-pack-year history of smoking.   4.  History of traumatic subdural hematoma, resolved.   5.  Peripheral neuropathy.   6.  Osteopenia.   7.  Hypercholesterolemia.   8.  She had leg cramps with statin.      ALLERGIES AND MEDICATIONS:  Were reviewed.      PHYSICAL EXAMINATION:   GENERAL:  Pleasant female in no distress.   VITAL SIGNS:  Blood pressure elevated at 145/69, pulse is 73, respirations 20, and O2 saturation is 97%.  Weight is 171 pounds for a body mass index of 26.9.      Reviewed previous oximetries that were performed.  Most recent was done on CPAP and showed no significant hypoxemia.  This coincided with a download that showed optimally treated sleep apnea.  Previous oximetries were done with the oral appliance in place and showed persistent desaturations and hypoxemia in a pattern consistent with REM.  There is no pulmonary function testing in the system.  Most recent chest x-ray was from 2016, is personally reviewed and is essentially normal.      ASSESSMENT:  This is a 76-year-old female with overall mild but severe REM predominant obstructive sleep apnea with associated hypoxemia.  No history of significant lung disease, no symptoms during the day.  Hypoxemia may simply be associated with REM, however, cannot completely exclude some mild emphysema given her history of smoking.  However, most recent oximetry and downloads strongly suggest ongoing excellent treatment.  However, leak issues persist, also with difficulties  initiating and maintaining sleep, longstanding issue.  The patient has not formally addressed this with cognitive behavioral therapy for insomnia and is interested in this.      PLAN:  Script generated for her to keep her supplies up-to-date.  We had an extensive discussion regarding whether or not to pursue further evaluation of her hypoxemia.  I do not think she needs confirmation testing with oximetry at this time, given the most recent one was showing adequately treated sleep apnea.  However, patient has a lot of worries.  The patient is going to hold off and will reconsider at a future date.  If she does reconsider, would probably pursue pulmonary function testing given her smoking history and postpolio, however, again patient seems rather asymptomatic.  Also a referral generated for cognitive behavioral therapy for insomnia.  The patient already is doing a reasonably good job with sleep restriction.  I think she could benefit for specific treatment for the insomnia.      Over 40 minutes spent with the patient, greater than 50% was spent in counseling and coordinating care.         HOANG HAGAN MD             D: 2017 14:24   T: 2017 03:02   MT: cheo      Name:     LATHA SAAVEDRA   MRN:      [id number]        Account:      [id number]   :      1940           Visit Date:   2017      Document: [id number]

## 2017-05-30 ENCOUNTER — TRANSFERRED RECORDS (OUTPATIENT)
Dept: HEALTH INFORMATION MANAGEMENT | Facility: CLINIC | Age: 77
End: 2017-05-30

## 2017-05-31 ENCOUNTER — TRANSFERRED RECORDS (OUTPATIENT)
Dept: FAMILY MEDICINE | Facility: CLINIC | Age: 77
End: 2017-05-31

## 2017-07-26 ENCOUNTER — TRANSFERRED RECORDS (OUTPATIENT)
Dept: HEALTH INFORMATION MANAGEMENT | Facility: CLINIC | Age: 77
End: 2017-07-26

## 2017-08-22 ENCOUNTER — TRANSFERRED RECORDS (OUTPATIENT)
Dept: HEALTH INFORMATION MANAGEMENT | Facility: CLINIC | Age: 77
End: 2017-08-22

## 2017-08-22 ENCOUNTER — DOCUMENTATION ONLY (OUTPATIENT)
Dept: SLEEP MEDICINE | Facility: CLINIC | Age: 77
End: 2017-08-22

## 2017-08-22 NOTE — PROGRESS NOTES
LATHA CAME TO [facility] TO PURCHASE SO CLEAN MACHINE. GARCÍA YEN AND INSTRUCTED PATIENT ON HOW TO PROPERLY USE SO CLEAN.  LATHA ASLO TO CHECK THE FIR OF HER MASK/HEADGEAR. AFTER TRYING ON AIRFIT N20 HER WAS NOT EXPERIENCING ANY LEAKS, THE HEADGEAR WAS NOT STRETCHED OUT AND FITTING HER PROPERLY.  I WENT OVER SUPPLY REPLACEMENT SCHEDULE WITH LATHA AND SHE SAID SHE WILL PURCHASE SUPPLIES WHEN SHE FEELS THEY ARE NEEDED.

## 2018-01-18 ENCOUNTER — OFFICE VISIT (OUTPATIENT)
Dept: FAMILY MEDICINE | Facility: CLINIC | Age: 78
End: 2018-01-18
Payer: COMMERCIAL

## 2018-01-18 VITALS
HEART RATE: 100 BPM | HEIGHT: 67 IN | SYSTOLIC BLOOD PRESSURE: 127 MMHG | TEMPERATURE: 98 F | BODY MASS INDEX: 25.31 KG/M2 | DIASTOLIC BLOOD PRESSURE: 76 MMHG | OXYGEN SATURATION: 95 % | WEIGHT: 161.3 LBS

## 2018-01-18 DIAGNOSIS — I87.2 VENOUS (PERIPHERAL) INSUFFICIENCY: ICD-10-CM

## 2018-01-18 DIAGNOSIS — G14 POST-POLIO SYNDROME (H): Primary | ICD-10-CM

## 2018-01-18 DIAGNOSIS — J30.0 CHRONIC VASOMOTOR RHINITIS: ICD-10-CM

## 2018-01-18 DIAGNOSIS — E78.5 HYPERLIPIDEMIA LDL GOAL <130: ICD-10-CM

## 2018-01-18 DIAGNOSIS — Z23 NEED FOR PROPHYLACTIC VACCINATION AND INOCULATION AGAINST INFLUENZA: ICD-10-CM

## 2018-01-18 DIAGNOSIS — R63.4 UNINTENDED WEIGHT LOSS: ICD-10-CM

## 2018-01-18 PROCEDURE — G0008 ADMIN INFLUENZA VIRUS VAC: HCPCS | Performed by: PHYSICIAN ASSISTANT

## 2018-01-18 PROCEDURE — 99214 OFFICE O/P EST MOD 30 MIN: CPT | Mod: 25 | Performed by: PHYSICIAN ASSISTANT

## 2018-01-18 PROCEDURE — 90662 IIV NO PRSV INCREASED AG IM: CPT | Performed by: PHYSICIAN ASSISTANT

## 2018-01-18 RX ORDER — FLUTICASONE PROPIONATE 50 MCG
1-2 SPRAY, SUSPENSION (ML) NASAL DAILY
Qty: 1 BOTTLE | Refills: 11 | Status: SHIPPED | OUTPATIENT
Start: 2018-01-18 | End: 2019-05-23

## 2018-01-18 NOTE — PROGRESS NOTES
HPI: 78 yo female here to give us some updates regarding her shoulder pain  She did see Dr. Lyles in neuro in August and had an EMG and felt her sxs related to post polio  She is hoping to see a specialist in that area  Has f/u with Dr. Lyles on 2/6/18  She is fatigued and frustrated by not being able to be as active as before  She has lost about 10lbs and not sure why but thinks losing muscle  Appetite is okay  She has some discomfort gopi in the mornings when she wakes up  She is taking tylenol and finds that does help  She was able to go [place] [employer] last year but had trouble paddling and couldn't portage    She is taking red yeast rice and Co Q10 for lipids and would like her chol checked today    [place]  When she sneezes she sneezes several times in a row  Mucinex does help her, but still has some random episodes of runny nose and congestion  This occurs once or twice per day this time of year.  She wears oxygen at night.  She has hx of asthma when she was younger so wonders if this due to that or allergies.    L leg brownish and swollen at time so wants that checked.      Past Medical History:   Diagnosis Date     Abdominal pain 2011    ct abd and pelvis uterine fiborid, renal cysts and tics     Chest pain 2003    neg est thallium     Chest pain 4/16    nl est echo and cxr     Hx of colonoscopy 2003    incomplete, be nl     Hx of colonoscopy [facility] 3, 2011    nl     Hypercholesteremia 2011    aches with zocor     Intermittent asthma 1995     CAITLYN (obstructive sleep apnea) 12/12    done [place], using dental device, added cpap 2014      Osteopenia 2013    Dr. Taylor     Peripheral neuropathy     Dr. Latia Sparks 1956     Statin intolerance     zocor and one other caused aches     Subdural hematoma (H) 1/16    traumatic, fu ct 3/16 resolved     Past Surgical History:   Procedure Laterality Date     APPENDECTOMY  60's      "PHACOEMULSIFICATION CLEAR CORNEA WITH STANDARD INTRAOCULAR LENS IMPLANT  3/21/2012    Procedure:PHACOEMULSIFICATION CLEAR CORNEA WITH STANDARD INTRAOCULAR LENS IMPLANT; RIGHT PHACOEMULSIFICATION CLEAR CORNEA WITH STANDARD INTRAOCULAR LENS IMPLANT ; Surgeon:CHANDRAKANT HERNANDEZ; Location:[facility]     PHACOEMULSIFICATION CLEAR CORNEA WITH STANDARD INTRAOCULAR LENS IMPLANT  3/28/2012    Procedure:PHACOEMULSIFICATION CLEAR CORNEA WITH STANDARD INTRAOCULAR LENS IMPLANT; LEFT PHACOEMULSIFICATION CLEAR CORNEA WITH STANDARD INTRAOCULAR LENS IMPLANT ; Surgeon:CHANDRAKANT HERNANDEZ; Location:[facility]     SHOULDER SURGERY  1990's    twice     TONSILLECTOMY  child     Social History   Substance Use Topics     Smoking status: Former Smoker     Packs/day: 1.50     Years: 12.00     Types: Cigarettes     Quit date: 3/17/1974     Smokeless tobacco: Never Used     Alcohol use 0.0 oz/week     0 Standard drinks or equivalent per week      Comment: 1-2/day     Current Outpatient Prescriptions   Medication Sig Dispense Refill     CALCIUM PO Take by mouth daily Takes liquid form       red yeast rice 600 MG CAPS Take 600 mg by mouth 2 times daily       Coenzyme Q10 (COQ10 PO) Take 100 mg by mouth.       ORDER FOR DME Use your CPAP device as directed by your provider.       Nutritional Supplements (JUICE PLUS FIBRE PO) Take by mouth 2 times daily        No Known Allergies  FAMILY HISTORY NOTED AND REVIEWED    PHYSICAL EXAM:    /76  Pulse 100  Temp 98  F (36.7  C) (Oral)  Ht 5' 7\" (1.702 m)  Wt 161 lb 4.8 oz (73.2 kg)  SpO2 95%  Breastfeeding? No  BMI 25.26 kg/m2    Patient appears non toxic  L leg with varicosities and hyperpigmentation related to venous insuff  Trace edema.  No weeping or ulceration    Assessment and Plan:     (G14) Post-polio syndrome  (primary encounter diagnosis)  Comment:   Plan: she was diagnosed by Dr. Lyles in neurology and has f/u appt with him next month.    (R63.4) Unintended weight loss  Comment:   Plan: TSH " with free T4 reflex            (E78.5) Hyperlipidemia LDL goal <130  Comment:   Plan: Lipid Profile            (I87.2) Venous (peripheral) insufficiency  Comment:   Plan: reassurance.  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      Jennifer Jose PA-C

## 2018-01-18 NOTE — NURSING NOTE
"Chief Complaint   Patient presents with     Follow Up For       Initial /76  Pulse 100  Temp 98  F (36.7  C) (Oral)  Ht 5' 7\" (1.702 m)  Wt 161 lb 4.8 oz (73.2 kg)  SpO2 95%  Breastfeeding? No  BMI 25.26 kg/m2 Estimated body mass index is 26.92 kg/(m^2) as calculated from the following:    Height as of 4/27/17: 5' 7\" (1.702 m).    Weight as of 4/27/17: 171 lb 14.4 oz (78 kg).  Medication Reconciliation: complete    "

## 2018-01-18 NOTE — PROGRESS NOTES
"  SUBJECTIVE:   Lizzette Chanel is a 77 year old female who presents to clinic today for the following health issues:  {Provider please address medication reconciliation discrepancies--rooming staff please delete if no med/rec issues}    {Superlists:840578}    {additional problems for provider to add:733983}    Problem list and histories reviewed & adjusted, as indicated.  Additional history: {NONE - AS DOCUMENTED:003160::\"as documented\"}    {HIST REVIEW/ LINKS 2:183749}    Reviewed and updated as needed this visit by clinical staff       Reviewed and updated as needed this visit by Provider         {PROVIDER CHARTING PREFERENCE:197613}    "

## 2018-01-18 NOTE — MR AVS SNAPSHOT
"              After Visit Summary   1/18/2018    Lizzette Chanel    MRN: [id number]           Patient Information     Date Of Birth          1940        Visit Information        Provider Department      1/18/2018 10:30 AM Jennifer Jose PA-C [facility]        Today's Diagnoses     Post-polio syndrome    -  1    Unintended weight loss        Hyperlipidemia LDL goal <130        Venous (peripheral) insufficiency        Chronic vasomotor rhinitis           Follow-ups after your visit        Future tests that were ordered for you today     Open Future Orders        Priority Expected Expires Ordered    TSH with free T4 reflex Routine 1/19/2018 1/18/2019 1/18/2018    Lipid Profile Routine 1/19/2018 1/18/2019 1/18/2018            Who to contact     If you have questions or need follow up information about today's clinic visit or your schedule please contact [facility] directly at [phone].  Normal or non-critical lab and imaging results will be communicated to you by MyChart, letter or phone within 4 business days after the clinic has received the results. If you do not hear from us within 7 days, please contact the clinic through cooala - your brandshart or phone. If you have a critical or abnormal lab result, we will notify you by phone as soon as possible.  Submit refill requests through [employer] or call your pharmacy and they will forward the refill request to us. Please allow 3 business days for your refill to be completed.          Additional Information About Your Visit        MyChart Information     [employer] lets you send messages to your doctor, view your test results, renew your prescriptions, schedule appointments and more. To sign up, go to [email or web address] . Click on \"Log in\" on the left side of the screen, which will take you to the Welcome page. Then click on \"Sign up Now\" on the right side of the page.     You will be asked to enter the access code listed below, as well as some " "personal information. Please follow the directions to create your username and password.     Your access code is: [email or web address]  Expires: 2018 11:16 AM     Your access code will  in 90 days. If you need help or a new code, please call your [facility] clinic or [phone].        Care EveryWhere ID     This is your Care EveryWhere ID. This could be used by other organizations to access your [facility] medical records  [id number]        Your Vitals Were     Pulse Temperature Height Pulse Oximetry Breastfeeding? BMI (Body Mass Index)    100 98  F (36.7  C) (Oral) 5' 7\" (1.702 m) 95% No 25.26 kg/m2       Blood Pressure from Last 3 Encounters:   18 127/76   17 145/69   17 128/62    Weight from Last 3 Encounters:   18 161 lb 4.8 oz (73.2 kg)   17 171 lb 14.4 oz (78 kg)   17 172 lb 14.4 oz (78.4 kg)                 Today's Medication Changes          These changes are accurate as of: 18 11:16 AM.  If you have any questions, ask your nurse or doctor.               Start taking these medicines.        Dose/Directions    fluticasone 50 MCG/ACT spray   Commonly known as:  FLONASE   Used for:  Chronic vasomotor rhinitis   Started by:  Jennifer Jose PA-C        Dose:  1-2 spray   Spray 1-2 sprays into both nostrils daily   Quantity:  1 Bottle   Refills:  11            Where to get your medicines      These medications were sent to [facility] - [address]  [address], [place] [address]     Phone:  [phone]     fluticasone 50 MCG/ACT spray                Primary Care Provider Office Phone # Fax #    Guzman Bruner [phone] [address]  [place] [address]        Equal Access to Services     RONEY HALL AH: Hadii aad fazal Puentes, waaxda luqadaha, qaybta kaaldoron rich. [facility] [phone].    ATENCIÓN: Si iraj abdi a erazo " disposición servicios gratuitos de asistencia lingüística. Patti swan [phone].    We comply with applicable federal civil rights laws and [place] laws. We do not discriminate on the basis of race, color, national origin, age, disability, sex, sexual orientation, or gender identity.            Thank you!     Thank you for choosing [facility]  for your care. Our goal is always to provide you with excellent care. Hearing back from our patients is one way we can continue to improve our services. Please take a few minutes to complete the written survey that you may receive in the mail after your visit with us. Thank you!             Your Updated Medication List - Protect others around you: Learn how to safely use, store and throw away your medicines at www.disposemymeds.org.          This list is accurate as of: 1/18/18 11:16 AM.  Always use your most recent med list.                   Brand Name Dispense Instructions for use Diagnosis    CALCIUM PO      Take by mouth daily Takes liquid form        COQ10 PO      Take 100 mg by mouth.        fluticasone 50 MCG/ACT spray    FLONASE    1 Bottle    Spray 1-2 sprays into both nostrils daily    Chronic vasomotor rhinitis       JUICE PLUS FIBRE PO      Take by mouth 2 times daily        order for DME      Use your CPAP device as directed by your provider.        red yeast rice 600 MG Caps      Take 600 mg by mouth 2 times daily

## 2018-01-18 NOTE — NURSING NOTE
Lizzette Chanel      1.  Has the patient received the information for the influenza vaccine? YES    2.  Does the patient have any of the following contraindications?     Allergy to eggs? No     Allergic reaction to previous influenza vaccines? No     Any other problems to previous influenza vaccines? No     Paralyzed by Guillain-Vadito syndrome? No     Currently pregnant? NO     Current moderate or severe illness? No     Allergy to contact lens solution? No    3.  The vaccine has been administered in the usual fashion and the patient was instructed to wait 20 minutes before leaving the building in the event of an allergic reaction: YES    Vaccination given by ,Galen Contreras CMA.  Recorded by Aroldo Contreras    Prior to injection verified patient identity using patient's name and date of birth.  Due to injection administration, patient instructed to remain in clinic for 15 minutes  afterwards, and to report any adverse reaction to me immediately.

## 2018-01-22 DIAGNOSIS — R63.4 UNINTENDED WEIGHT LOSS: ICD-10-CM

## 2018-01-22 DIAGNOSIS — E78.5 HYPERLIPIDEMIA LDL GOAL <130: ICD-10-CM

## 2018-01-22 LAB
CHOLEST SERPL-MCNC: 213 MG/DL
HDLC SERPL-MCNC: 71 MG/DL
LDLC SERPL CALC-MCNC: 124 MG/DL
NONHDLC SERPL-MCNC: 142 MG/DL
TRIGL SERPL-MCNC: 91 MG/DL
TSH SERPL DL<=0.005 MIU/L-ACNC: 2.04 MU/L (ref 0.4–4)

## 2018-01-22 PROCEDURE — 80061 LIPID PANEL: CPT | Performed by: PHYSICIAN ASSISTANT

## 2018-01-22 PROCEDURE — 36415 COLL VENOUS BLD VENIPUNCTURE: CPT | Performed by: PHYSICIAN ASSISTANT

## 2018-01-22 PROCEDURE — 84443 ASSAY THYROID STIM HORMONE: CPT | Performed by: PHYSICIAN ASSISTANT

## 2018-01-22 NOTE — LETTER
"[facility]  [address] [facility]  [address]  [place]  [address]  Tel: [phone]    January 23, 2018    Lizzette Chanel  [address]  [place] [address]        Dear Ms. Chanel,    Your TSH (thyroid test) was normal. We checked this due to your weight loss.  Your cholesterol profile has improved compared to 2 years ago so please continue the red yeast rice  The total cholesterol went from 235 to 213.   The LDL or \"bad\" cholesterol went from 145 to 124.      Please let me know if you have any questions.        Sincerely,    Jennifer Jose PA-C/ELZBIETA          Enclosure: Lab Results  Results for orders placed or performed in visit on 01/22/18   TSH with free T4 reflex   Result Value Ref Range    TSH 2.04 0.40 - 4.00 mU/L   Lipid Profile   Result Value Ref Range    Cholesterol 213 (H) <200 mg/dL    Triglycerides 91 <150 mg/dL    HDL Cholesterol 71 >49 mg/dL    LDL Cholesterol Calculated 124 (H) <100 mg/dL    Non HDL Cholesterol 142 (H) <130 mg/dL         "

## 2018-01-23 NOTE — PROGRESS NOTES
"Jessika,    Your TSH (thyroid test) was normal. We checked this due to your weight loss.  Your cholesterol profile has improved compared to 2 years ago so please continue the red yeast rice  The total cholesterol went from 235 to 213.   The LDL or \"bad\" cholesterol went from 145 to 124.      Please let me know if you have any questions.    Jennifer Jose PA-C  "

## 2018-01-31 ENCOUNTER — HOSPITAL ENCOUNTER (EMERGENCY)
Facility: CLINIC | Age: 78
Discharge: HOME | End: 2018-02-01
Attending: EMERGENCY MEDICINE | Admitting: EMERGENCY MEDICINE
Payer: MEDICARE

## 2018-01-31 ENCOUNTER — APPOINTMENT (OUTPATIENT)
Dept: CT IMAGING | Facility: CLINIC | Age: 78
End: 2018-01-31
Attending: EMERGENCY MEDICINE
Payer: MEDICARE

## 2018-01-31 VITALS
SYSTOLIC BLOOD PRESSURE: 148 MMHG | DIASTOLIC BLOOD PRESSURE: 80 MMHG | TEMPERATURE: 98.4 F | HEIGHT: 68 IN | WEIGHT: 161 LBS | OXYGEN SATURATION: 95 % | BODY MASS INDEX: 24.4 KG/M2 | RESPIRATION RATE: 14 BRPM

## 2018-01-31 DIAGNOSIS — S06.0X0A CONCUSSION WITHOUT LOSS OF CONSCIOUSNESS, INITIAL ENCOUNTER: ICD-10-CM

## 2018-01-31 DIAGNOSIS — S01.01XA LACERATION OF SCALP, INITIAL ENCOUNTER: ICD-10-CM

## 2018-01-31 PROCEDURE — 25000132 ZZH RX MED GY IP 250 OP 250 PS 637: Performed by: EMERGENCY MEDICINE

## 2018-01-31 PROCEDURE — 12002 RPR S/N/AX/GEN/TRNK2.6-7.5CM: CPT

## 2018-01-31 PROCEDURE — 99284 EMERGENCY DEPT VISIT MOD MDM: CPT | Mod: 25

## 2018-01-31 PROCEDURE — 70450 CT HEAD/BRAIN W/O DYE: CPT

## 2018-01-31 RX ORDER — ACETAMINOPHEN 325 MG/1
975 TABLET ORAL ONCE
Status: COMPLETED | OUTPATIENT
Start: 2018-01-31 | End: 2018-01-31

## 2018-01-31 RX ADMIN — ACETAMINOPHEN 975 MG: 325 TABLET, FILM COATED ORAL at 21:06

## 2018-01-31 ASSESSMENT — ENCOUNTER SYMPTOMS
WOUND: 1
VOMITING: 0
NAUSEA: 0
NECK PAIN: 0

## 2018-01-31 NOTE — ED AVS SNAPSHOT
Emergency Department    [address] [facility] [address]    Phone:  [phone]    Fax:  [phone]                                       Lizzette Chanel   MRN: [id number]    Department:   Emergency Department   Date of Visit:  1/31/2018           Patient Information     Date Of Birth          1940        Your diagnoses for this visit were:     Laceration of scalp, initial encounter     Concussion without loss of consciousness, initial encounter        You were seen by Bushra Tate MD.      Follow-up Information     Follow up with Guzman Bruner MD.    Specialty:  Internal Medicine    Contact information:    [address] [facility] [address]  [facility] [id number] [phone]          Follow up with  Emergency Department.    Specialty:  EMERGENCY MEDICINE    Why:  As needed, If symptoms worsen    Contact information:    [address] [facility] [id number] [phone]        Discharge Instructions       Discharge Instructions  Head Injury    You have been seen today for a head injury. You were checked for serious problems, like bleeding on the brain, but these problems cannot always be found right away.  Due to this risk, you should not be alone for 24 hours after your injury.  Follow up with your regular physician in 1-2 days. If you are taking a blood thinner, such as aspirin, Pradaxa  (dabigatran), Coumadin  (warfarin), or Plavix  (clopidogrel), you are at especially high risk for immediate or delayed bleeding, and need to re-check with a physician in 24 hours, or sooner if any of the symptoms below happen.     Return to the Emergency Department if:    You are confused, have amnesia, or you are not acting right.    Your headache gets worse or you start to have a really bad headache even with your recommended treatment plan.    You vomit more than once.    You have a convulsion or seizure.    You have trouble walking.    You have weakness or paralysis in an arm or a  leg.    You have blood or fluid coming from your ears or nose.    You have new symptoms or anything that worries you.    Sleeping:  It is okay for you to sleep, but someone should wake you up as instructed by your doctor, and someone should check on you at your usual time to wake up.     Activity:    Do not drive for at least 24 hours.    Do not drive if you have dizzy spells or trouble concentrating, or remembering things.    Do not return to any contact sports until cleared by your regular doctor.     Follow-up:  It is very important that you make an appointment with your clinic and go to the appointment.  If you do not follow-up with your regular doctor, it may result in missing an important development which could result in permanent injury or disability and/or lasting pain.  If there is any problem keeping your appointment, call your doctor or return to the Emergency Department.    MORE INFORMATION:    Concussion:  A concussion is a minor head injury that may cause temporary problems with the way your brain works.  Some symptoms include:  confusion, amnesia, nausea and vomiting, dizziness, fatigue, memory or concentration problems, irritability and sleep problems.    CT Scans: Your evaluation today may have included a CT scan (CAT scan) to look for things like bleeding or a skull fracture (break).  CT scans involve radiation and too many CT scans can cause serious health problems like cancer, especially in children.  Because of this, your doctor may not have ordered a CT scan today if they think you are at low risk for a serious or life threatening problem.    If you were given a prescription for medicine here today, be sure to read all of the information (including the package insert) that comes with your prescription.  This will include important information about the medicine, its side effects, and any warnings that you need to know about.  The pharmacist who fills the prescription can provide more  information and answer questions you may have about the medicine.  If you have questions or concerns that the pharmacist cannot address, please call or return to the Emergency Department.     Opioid Medication Information    Pain medications are among the most commonly prescribed medicines, so we are including this information for all our patients. If you did not receive pain medication or get a prescription for pain medicine, you can ignore it.     You may have been given a prescription for an opioid (narcotic) pain medicine and/or have received a pain medicine while here in the Emergency Department. These medicines can make you drowsy or impaired. You must not drive, operate dangerous equipment, or engage in any other dangerous activities while taking these medications. If you drive while taking these medications, you could be arrested for DUI, or driving under the influence. Do not drink any alcohol while you are taking these medications.     Opioid pain medications can cause addiction. If you have a history of chemical dependency of any type, you are at a higher risk of becoming addicted to pain medications.  Only take these prescribed medications to treat your pain when all other options have been tried. Take it for as short a time and as few doses as possible. Store your pain pills in a secure place, as they are frequently stolen and provide a dangerous opportunity for children or visitors in your house to start abusing these powerful medications. We will not replace any lost or stolen medicine.  As soon as your pain is better, you should flush all your remaining medication.     Many prescription pain medications contain Tylenol  (acetaminophen), including Vicodin , Tylenol #3 , Norco , Lortab , and Percocet .  You should not take any extra pills of Tylenol  if you are using these prescription medications or you can get very sick.  Do not ever take more than 3000 mg of acetaminophen in any 24 hour  period.    All opioids tend to cause constipation. Drink plenty of water and eat foods that have a lot of fiber, such as fruits, vegetables, prune juice, apple juice and high fiber cereal.  Take a laxative if you don t move your bowels at least every other day. Miralax , Milk of Magnesia, Colace , or Senna  can be used to keep you regular.      Remember that you can always come back to the Emergency Department if you are not able to see your regular doctor in the amount of time listed above, if you get any new symptoms, or if there is anything that worries you.        Discharge Instructions  Laceration (Cut)    You were seen today for a laceration (cut).  Your doctor examined your laceration for any problems such a buried foreign body (like glass, a splinter, or gravel), or injury to blood vessels, tendons, and nerves.  Your doctor may have also rinsed and/or scrubbed your laceration to help prevent an infection.  Your laceration may have been closed with glue, staples or sutures (stitches).      It may not be possible to find all problems with your laceration on the first visit, and we can't always prevent infections.  Antibiotics are only given when the benefit is more than the risk, and don't prevent all infections. Some lacerations are too high risk to close, and are left open to heal.  All lacerations, no matter how expertly repaired, will cause scarring.    Return to the Emergency Department right away if:    You have more redness, swelling, pain, drainage (pus), a bad smell, or red streaking from your laceration.      You have a fever of 101oF or more.    You have bleeding that you can t stop at home. If your cut starts to bleed, hold pressure on the bleeding area with a clean cloth or put pressure over the bandage.  If the bleeding doesn t stop after using constant pressure for 30 minutes, you should return to the Emergency Department for further treatment.    An area past the laceration is cool, pale, or  blue compared with the other side, or has a slower return of color when squeezed.    Your dressing seems too tight or starts to get uncomfortable or painful.    You have loss of normal function or use of an area, such as being unable to straighten or bend a finger normally.    You have a numb area past the laceration.    Return to the Emergency Department or see your regular doctor if:    The laceration starts to come open.     You have something coming out of the cut or a feeling that there is something in the laceration.    Your wound will not heal, or keeps breaking open. There can always be glass, wood, dirt or other things in any wound.  They won t always show up, even on x-rays.  If a wound doesn t heal, this may be why, and it is important to follow-up with your regular doctor.    Home Care:    Take your dressing off in 12 hours, or as instructed by your doctor, to check your laceration. Remove the dressing sooner if it seems too tight or painful, or if it is getting numb, tingly, or pale past the dressing.    Gently wash your laceration 2 times a day with clean cloth and soap.     It is okay to shower, but do not let the laceration soak in water.      If your laceration was closed with wound adhesive or strips: pat it dry and leave it open to the air.     For all other repairs: after you wash your laceration, or at least 2 times a day, apply bacitracin or other antibiotic ointment to the laceration, then cover it with a Band-Aid  or gauze.    Keep the laceration clean. Wear gloves or other protective clothing if you are around dirt.    Follow-up:    You need to follow-up with your regular doctor in 1-3 days.    Your sutures or staples need to be removed in 12 days. Schedule an appointment with your regular doctor to have this done.    Scars:  To help minimize scarring:    Wear sunscreen over the healed laceration when out in the sun.    Massage the area regularly.    You may use Vitamin E oil.    Wait a  "year.  Most scars will start to fade within a year.    Probiotics: If you have been given an antibiotic, you may want to also take a probiotic pill or eat yogurt with live cultures. Probiotics have \"good bacteria\" to help your intestines stay healthy. Studies have shown that probiotics help prevent diarrhea and other intestine problems (including C. diff infection) when you take antibiotics. You can buy these without a prescription in the pharmacy section of the store.     If you were given a prescription for medicine here today, be sure to read all of the information (including the package insert) that comes with your prescription.  This will include important information about the medicine, its side effects, and any warnings that you need to know about.  The pharmacist who fills the prescription can provide more information and answer questions you may have about the medicine.  If you have questions or concerns that the pharmacist cannot address, please call or return to the Emergency Department.     Opioid Medication Information    Pain medications are among the most commonly prescribed medicines, so we are including this information for all our patients. If you did not receive pain medication or get a prescription for pain medicine, you can ignore it.     You may have been given a prescription for an opioid (narcotic) pain medicine and/or have received a pain medicine while here in the Emergency Department. These medicines can make you drowsy or impaired. You must not drive, operate dangerous equipment, or engage in any other dangerous activities while taking these medications. If you drive while taking these medications, you could be arrested for DUI, or driving under the influence. Do not drink any alcohol while you are taking these medications.     Opioid pain medications can cause addiction. If you have a history of chemical dependency of any type, you are at a higher risk of becoming addicted to pain " medications.  Only take these prescribed medications to treat your pain when all other options have been tried. Take it for as short a time and as few doses as possible. Store your pain pills in a secure place, as they are frequently stolen and provide a dangerous opportunity for children or visitors in your house to start abusing these powerful medications. We will not replace any lost or stolen medicine.  As soon as your pain is better, you should flush all your remaining medication.     Many prescription pain medications contain Tylenol  (acetaminophen), including Vicodin , Tylenol #3 , Norco , Lortab , and Percocet .  You should not take any extra pills of Tylenol  if you are using these prescription medications or you can get very sick.  Do not ever take more than 3000 mg of acetaminophen in any 24 hour period.    All opioids tend to cause constipation. Drink plenty of water and eat foods that have a lot of fiber, such as fruits, vegetables, prune juice, apple juice and high fiber cereal.  Take a laxative if you don t move your bowels at least every other day. Miralax , Milk of Magnesia, Colace , or Senna  can be used to keep you regular.      Remember that you can always come back to the Emergency Department if you are not able to see your regular doctor in the amount of time listed above, if you get any new symptoms, or if there is anything that worries you.          24 Hour Appointment Hotline       To make an appointment at any [facility], call [phone] ([phone]). If you don't have a family doctor or clinic, we will help you find one. [facility] are conveniently located to serve the needs of you and your family.             Review of your medicines      Our records show that you are taking the medicines listed below. If these are incorrect, please call your family doctor or clinic.        Dose / Directions Last dose taken    CALCIUM PO        Take by mouth daily Takes liquid form    Refills:  0        COQ10 PO   Dose:  100 mg        Take 100 mg by mouth.   Refills:  0        fluticasone 50 MCG/ACT spray   Commonly known as:  FLONASE   Dose:  1-2 spray   Quantity:  1 Bottle        Spray 1-2 sprays into both nostrils daily   Refills:  11        JUICE PLUS FIBRE PO        Take by mouth 2 times daily   Refills:  0        order for DME        Use your CPAP device as directed by your provider.   Refills:  0        red yeast rice 600 MG Caps   Dose:  600 mg        Take 600 mg by mouth 2 times daily   Refills:  0                Procedures and tests performed during your visit     Head CT w/o contrast      Orders Needing Specimen Collection     None      Pending Results     No orders found for last 3 day(s).            Pending Culture Results     No orders found for last 3 day(s).            Pending Results Instructions     If you had any lab results that were not finalized at the time of your Discharge, you can call the ED Lab Result RN at [phone]. You will be contacted by this team for any positive Lab results or changes in treatment. The nurses are available 7 days a week from 10A to 6:30P.  You can leave a message 24 hours per day and they will return your call.        Test Results From Your Hospital Stay        1/31/2018 11:01 PM      Narrative     CT SCAN OF THE HEAD WITHOUT CONTRAST   1/31/2018 10:48 PM     HISTORY: Fall.     TECHNIQUE:  Axial images of the head and coronal reformations without  IV contrast material. Radiation dose for this scan was reduced using  automated exposure control, adjustment of the mA and/or kV according  to patient size, or iterative reconstruction technique.    COMPARISON: Head CT 2/2/2016.    FINDINGS: There is no evidence of intracranial hemorrhage, mass, acute  infarct or anomaly. There is generalized atrophy of the brain. There  is low attenuation in the white matter of the cerebral hemispheres  consistent with sequelae of small vessel ischemic disease.      The visualized portions of the sinuses and mastoids appear normal. The  bony calvarium and bones of the skull base appear intact. Bilateral  pseudophakia.        Impression     IMPRESSION:     1. No evidence of acute intracranial hemorrhage, mass, or herniation.  2. There is generalized atrophy of the brain. White matter changes are  present in the cerebral hemispheres that are consistent with small  vessel ischemic disease in this age patient.      HARRY MARSHALL MD                Clinical Quality Measure: Blood Pressure Screening     Your blood pressure was checked while you were in the emergency department today. The last reading we obtained was  BP: 148/80 . Please read the guidelines below about what these numbers mean and what you should do about them.  If your systolic blood pressure (the top number) is less than 120 and your diastolic blood pressure (the bottom number) is less than 80, then your blood pressure is normal. There is nothing more that you need to do about it.  If your systolic blood pressure (the top number) is 120-139 or your diastolic blood pressure (the bottom number) is 80-89, your blood pressure may be higher than it should be. You should have your blood pressure rechecked within a year by a primary care provider.  If your systolic blood pressure (the top number) is 140 or greater or your diastolic blood pressure (the bottom number) is 90 or greater, you may have high blood pressure. High blood pressure is treatable, but if left untreated over time it can put you at risk for heart attack, stroke, or kidney failure. You should have your blood pressure rechecked by a primary care provider within the next 4 weeks.  If your provider in the emergency department today gave you specific instructions to follow-up with your doctor or provider even sooner than that, you should follow that instruction and not wait for up to 4 weeks for your follow-up visit.        Thank you for choosing [facility]   "     Thank you for choosing [facility] for your care. Our goal is always to provide you with excellent care. Hearing back from our patients is one way we can continue to improve our services. Please take a few minutes to complete the written survey that you may receive in the mail after you visit with us. Thank you!        [employer] Information     [facility] lets you send messages to your doctor, view your test results, renew your prescriptions, schedule appointments and more. To sign up, go to [email or web address] . Click on \"Log in\" on the left side of the screen, which will take you to the Welcome page. Then click on \"Sign up Now\" on the right side of the page.     You will be asked to enter the access code listed below, as well as some personal information. Please follow the directions to create your username and password.     Your access code is: [email or web address]  Expires: 2018 11:16 AM     Your access code will  in 90 days. If you need help or a new code, please call your [facility] clinic or [phone].        Care EveryWhere ID     This is your Care EveryWhere ID. This could be used by other organizations to access your [facility] medical records  [id number]        Equal Access to Services     JACKELYN HALL : Milvia Puentes, wacheri singh, qaybta kaalmada taras, doron gonzalez. So [facility] [phone].    ATENCIÓN: Si habla español, tiene a erazo disposición servicios gratuitos de asistencia lingüística. Llame al [phone].    We comply with applicable federal civil rights laws and [place] laws. We do not discriminate on the basis of race, color, national origin, age, disability, sex, sexual orientation, or gender identity.            After Visit Summary       This is your record. Keep this with you and show to your community pharmacist(s) and doctor(s) at your next visit.                  "

## 2018-01-31 NOTE — ED AVS SNAPSHOT
Emergency Department    [address] [place] [id number]    Phone:  [phone]    Fax:  [phone]                                       Lizzette Chanel   MRN: [id number]    Department:   Emergency Department   Date of Visit:  1/31/2018           After Visit Summary Signature Page     I have received my discharge instructions, and my questions have been answered. I have discussed any challenges I see with this plan with the nurse or doctor.    ..........................................................................................................................................  Patient/Patient Representative Signature      ..........................................................................................................................................  Patient Representative Print Name and Relationship to Patient    ..................................................               ................................................  Date                                            Time    ..........................................................................................................................................  Reviewed by Signature/Title    ...................................................              ..............................................  Date                                                            Time

## 2018-02-01 PROCEDURE — 25000132 ZZH RX MED GY IP 250 OP 250 PS 637: Mod: GY | Performed by: EMERGENCY MEDICINE

## 2018-02-01 PROCEDURE — A9270 NON-COVERED ITEM OR SERVICE: HCPCS | Mod: GY | Performed by: EMERGENCY MEDICINE

## 2018-02-01 RX ORDER — HYDROCODONE BITARTRATE AND ACETAMINOPHEN 5; 325 MG/1; MG/1
1 TABLET ORAL ONCE
Status: COMPLETED | OUTPATIENT
Start: 2018-02-01 | End: 2018-02-01

## 2018-02-01 RX ADMIN — HYDROCODONE BITARTRATE AND ACETAMINOPHEN 1 TABLET: 5; 325 TABLET ORAL at 00:28

## 2018-02-01 NOTE — ED PROVIDER NOTES
"  History     Chief Complaint:  Fall and Head Injury    HPI   Lizzette Chanel is a 77 year old female with a history of hyperlipidemia and subdural hematoma who presents to the ED for evaluation of a head injury. The patient reports that she was walking her dog and slipped on the ice, falling backwards and hitting her head. The patient obtained a laceration to the back of her head secondary to the fall. She denies any loss of consciousness, neck pain, nausea, or vomiting.    Allergies:  NKDA    Medications:    Flonase    Past Medical History:    Hyperlipidemia  Asthma  CAITLYN  Osteopenia  Peripheral neuropathy  Polio  Statin intolerance  Subdural hematoma  Spinal stenosis    Past Surgical History:    Appendectomy  Phacoemulsification  Shoulder surgery  Tonsillectomy    Family History:    CAD  Heart disease  Alzheimer's disease    Social History:  Marital Status:  Single [1]  Former Smoker  Alcohol use: 1-2 per day    Review of Systems   Gastrointestinal: Negative for nausea and vomiting.   Musculoskeletal: Negative for neck pain.   Skin: Positive for wound.     Physical Exam     Patient Vitals for the past 24 hrs:   BP Temp Temp src Heart Rate Resp SpO2 Height Weight   01/31/18 2208 148/80 - - 85 14 95 % - -   01/31/18 2101 (!) 207/100 98.4  F (36.9  C) Temporal 100 16 96 % 1.727 m (5' 8\") 73 kg (161 lb)     Physical Exam  General: Resting on the gurney, appears comfortable  Head:  The scalp, face, and head appear normal  Neck:  No midline tenderness to palpation. Minimal right sided neck tenderness.  Mouth/Throat: Mucus membranes are moist  CV:  Regular rate    Normal S1 and S2  No pathological murmur   Resp:  Breath sounds clear and equal bilaterally    Non-labored, no retractions or accessory muscle use    No coarseness    No wheezing   GI:  Abdomen is soft, no rigidity    No tenderness to palpation    Palpable hematoma to the posterior scalp. 6 cm laceration to the posterior scalp.  MS:  Normal motor assessment of " all extremities.    Good capillary refill noted.  Skin:  No rash or lesions noted.  Neuro:  Speech is normal and fluent. No apparent deficit. Cranial nerves 2-12 intact. Sensation intact X4. Strength intact X4.  Psych: Awake. Alert.  Normal affect.      Appropriate interactions.    Emergency Department Course     Imaging:  Radiographic findings were communicated with the patient who voiced understanding of the findings.  CT Head without contrast:   1. No evidence of acute intracranial hemorrhage, mass, or herniation.  2. There is generalized atrophy of the brain. White matter changes are present in the cerebral hemispheres that are consistent with small vessel ischemic disease in this age patient, as per radiology.    Procedures:    Narrative: Procedure: Laceration Repair        LACERATION:  A simple clean 6 cm laceration.      LOCATION:  Posterior scalp      ANESTHESIA:  Local using lidocaine      PREPARATION:  Irrigation and Scrubbing with Normal Saline and Shur Clens      DEBRIDEMENT:  no debridement      CLOSURE:  Wound was closed with Staples    Interventions:  2106 Acetaminophen 975 mg PO  0028 Norco 5-325 mg per tablet, 1 tablet PO    Emergency Department Course:  Nursing notes and vitals reviewed. (2210) I performed an exam of the patient as documented above.     Medicine administered as documented above.    The patient was sent for a Head CT while in the emergency department, findings above.     (2323) I rechecked the patient and discussed the results of her workup thus far. I performed a laceration repair, as noted above. There were no complications.    Findings and plan explained to the Patient. Patient discharged home with instructions regarding supportive care, medications, and reasons to return. The importance of close follow-up was reviewed.    I personally reviewed the laboratory results with the Patient and answered all related questions prior to discharge.     Impression & Plan      Medical  Decision Making:  Lizzette Chanel is a 77 year old female who presents for evaluation following a fall.  The fall was clearly mechanical in nature based on description, and no workup was undertaken for etiology, based on the patient's history.    Full examination revealed a large posterior scalp laceration and hematoma.Head CT without acute intracranial pathology.  Wounds were treated as above.    The patient was given return precautions and follow up instructions, they state understanding of these and ability to comply.    With reasonable clinical certainty, I believe the patient is safe for discharge and can be safely managed as an outpatient.        Diagnosis:    ICD-10-CM   1. Laceration of scalp, initial encounter S01.01XA   2. Concussion without loss of consciousness, initial encounter S06.0X0A     Disposition:  discharged to home    Scribe Disclosure:  I, Flora Encinas, am serving as a scribe on 1/31/2018 at 11:15 PM to personally document services performed by Bushra Tate MD based on my observations and the provider's statements to me.     Flora Encinas  1/31/2018    EMERGENCY DEPARTMENT       Bushra Tate MD  02/03/18 [id number]

## 2018-02-01 NOTE — ED NOTES
Pt was walking her dog slipped on the ice fell back and hit her head. Pt has lac to back of head. No LOC.  Denies neck pain

## 2018-02-01 NOTE — DISCHARGE INSTRUCTIONS
Discharge Instructions  Head Injury    You have been seen today for a head injury. You were checked for serious problems, like bleeding on the brain, but these problems cannot always be found right away.  Due to this risk, you should not be alone for 24 hours after your injury.  Follow up with your regular physician in 1-2 days. If you are taking a blood thinner, such as aspirin, Pradaxa  (dabigatran), Coumadin  (warfarin), or Plavix  (clopidogrel), you are at especially high risk for immediate or delayed bleeding, and need to re-check with a physician in 24 hours, or sooner if any of the symptoms below happen.     Return to the Emergency Department if:    You are confused, have amnesia, or you are not acting right.    Your headache gets worse or you start to have a really bad headache even with your recommended treatment plan.    You vomit more than once.    You have a convulsion or seizure.    You have trouble walking.    You have weakness or paralysis in an arm or a leg.    You have blood or fluid coming from your ears or nose.    You have new symptoms or anything that worries you.    Sleeping:  It is okay for you to sleep, but someone should wake you up as instructed by your doctor, and someone should check on you at your usual time to wake up.     Activity:    Do not drive for at least 24 hours.    Do not drive if you have dizzy spells or trouble concentrating, or remembering things.    Do not return to any contact sports until cleared by your regular doctor.     Follow-up:  It is very important that you make an appointment with your clinic and go to the appointment.  If you do not follow-up with your regular doctor, it may result in missing an important development which could result in permanent injury or disability and/or lasting pain.  If there is any problem keeping your appointment, call your doctor or return to the Emergency Department.    MORE INFORMATION:    Concussion:  A concussion is a minor head  injury that may cause temporary problems with the way your brain works.  Some symptoms include:  confusion, amnesia, nausea and vomiting, dizziness, fatigue, memory or concentration problems, irritability and sleep problems.    CT Scans: Your evaluation today may have included a CT scan (CAT scan) to look for things like bleeding or a skull fracture (break).  CT scans involve radiation and too many CT scans can cause serious health problems like cancer, especially in children.  Because of this, your doctor may not have ordered a CT scan today if they think you are at low risk for a serious or life threatening problem.    If you were given a prescription for medicine here today, be sure to read all of the information (including the package insert) that comes with your prescription.  This will include important information about the medicine, its side effects, and any warnings that you need to know about.  The pharmacist who fills the prescription can provide more information and answer questions you may have about the medicine.  If you have questions or concerns that the pharmacist cannot address, please call or return to the Emergency Department.     Opioid Medication Information    Pain medications are among the most commonly prescribed medicines, so we are including this information for all our patients. If you did not receive pain medication or get a prescription for pain medicine, you can ignore it.     You may have been given a prescription for an opioid (narcotic) pain medicine and/or have received a pain medicine while here in the Emergency Department. These medicines can make you drowsy or impaired. You must not drive, operate dangerous equipment, or engage in any other dangerous activities while taking these medications. If you drive while taking these medications, you could be arrested for DUI, or driving under the influence. Do not drink any alcohol while you are taking these medications.     Opioid pain  medications can cause addiction. If you have a history of chemical dependency of any type, you are at a higher risk of becoming addicted to pain medications.  Only take these prescribed medications to treat your pain when all other options have been tried. Take it for as short a time and as few doses as possible. Store your pain pills in a secure place, as they are frequently stolen and provide a dangerous opportunity for children or visitors in your house to start abusing these powerful medications. We will not replace any lost or stolen medicine.  As soon as your pain is better, you should flush all your remaining medication.     Many prescription pain medications contain Tylenol  (acetaminophen), including Vicodin , Tylenol #3 , Norco , Lortab , and Percocet .  You should not take any extra pills of Tylenol  if you are using these prescription medications or you can get very sick.  Do not ever take more than 3000 mg of acetaminophen in any 24 hour period.    All opioids tend to cause constipation. Drink plenty of water and eat foods that have a lot of fiber, such as fruits, vegetables, prune juice, apple juice and high fiber cereal.  Take a laxative if you don t move your bowels at least every other day. Miralax , Milk of Magnesia, Colace , or Senna  can be used to keep you regular.      Remember that you can always come back to the Emergency Department if you are not able to see your regular doctor in the amount of time listed above, if you get any new symptoms, or if there is anything that worries you.        Discharge Instructions  Laceration (Cut)    You were seen today for a laceration (cut).  Your doctor examined your laceration for any problems such a buried foreign body (like glass, a splinter, or gravel), or injury to blood vessels, tendons, and nerves.  Your doctor may have also rinsed and/or scrubbed your laceration to help prevent an infection.  Your laceration may have been closed with glue, staples  or sutures (stitches).      It may not be possible to find all problems with your laceration on the first visit, and we can't always prevent infections.  Antibiotics are only given when the benefit is more than the risk, and don't prevent all infections. Some lacerations are too high risk to close, and are left open to heal.  All lacerations, no matter how expertly repaired, will cause scarring.    Return to the Emergency Department right away if:    You have more redness, swelling, pain, drainage (pus), a bad smell, or red streaking from your laceration.      You have a fever of 101oF or more.    You have bleeding that you can t stop at home. If your cut starts to bleed, hold pressure on the bleeding area with a clean cloth or put pressure over the bandage.  If the bleeding doesn t stop after using constant pressure for 30 minutes, you should return to the Emergency Department for further treatment.    An area past the laceration is cool, pale, or blue compared with the other side, or has a slower return of color when squeezed.    Your dressing seems too tight or starts to get uncomfortable or painful.    You have loss of normal function or use of an area, such as being unable to straighten or bend a finger normally.    You have a numb area past the laceration.    Return to the Emergency Department or see your regular doctor if:    The laceration starts to come open.     You have something coming out of the cut or a feeling that there is something in the laceration.    Your wound will not heal, or keeps breaking open. There can always be glass, wood, dirt or other things in any wound.  They won t always show up, even on x-rays.  If a wound doesn t heal, this may be why, and it is important to follow-up with your regular doctor.    Home Care:    Take your dressing off in 12 hours, or as instructed by your doctor, to check your laceration. Remove the dressing sooner if it seems too tight or painful, or if it is  "getting numb, tingly, or pale past the dressing.    Gently wash your laceration 2 times a day with clean cloth and soap.     It is okay to shower, but do not let the laceration soak in water.      If your laceration was closed with wound adhesive or strips: pat it dry and leave it open to the air.     For all other repairs: after you wash your laceration, or at least 2 times a day, apply bacitracin or other antibiotic ointment to the laceration, then cover it with a Band-Aid  or gauze.    Keep the laceration clean. Wear gloves or other protective clothing if you are around dirt.    Follow-up:    You need to follow-up with your regular doctor in 1-3 days.    Your sutures or staples need to be removed in 12 days. Schedule an appointment with your regular doctor to have this done.    Scars:  To help minimize scarring:    Wear sunscreen over the healed laceration when out in the sun.    Massage the area regularly.    You may use Vitamin E oil.    Wait a year.  Most scars will start to fade within a year.    Probiotics: If you have been given an antibiotic, you may want to also take a probiotic pill or eat yogurt with live cultures. Probiotics have \"good bacteria\" to help your intestines stay healthy. Studies have shown that probiotics help prevent diarrhea and other intestine problems (including C. diff infection) when you take antibiotics. You can buy these without a prescription in the pharmacy section of the store.     If you were given a prescription for medicine here today, be sure to read all of the information (including the package insert) that comes with your prescription.  This will include important information about the medicine, its side effects, and any warnings that you need to know about.  The pharmacist who fills the prescription can provide more information and answer questions you may have about the medicine.  If you have questions or concerns that the pharmacist cannot address, please call or return " to the Emergency Department.     Opioid Medication Information    Pain medications are among the most commonly prescribed medicines, so we are including this information for all our patients. If you did not receive pain medication or get a prescription for pain medicine, you can ignore it.     You may have been given a prescription for an opioid (narcotic) pain medicine and/or have received a pain medicine while here in the Emergency Department. These medicines can make you drowsy or impaired. You must not drive, operate dangerous equipment, or engage in any other dangerous activities while taking these medications. If you drive while taking these medications, you could be arrested for DUI, or driving under the influence. Do not drink any alcohol while you are taking these medications.     Opioid pain medications can cause addiction. If you have a history of chemical dependency of any type, you are at a higher risk of becoming addicted to pain medications.  Only take these prescribed medications to treat your pain when all other options have been tried. Take it for as short a time and as few doses as possible. Store your pain pills in a secure place, as they are frequently stolen and provide a dangerous opportunity for children or visitors in your house to start abusing these powerful medications. We will not replace any lost or stolen medicine.  As soon as your pain is better, you should flush all your remaining medication.     Many prescription pain medications contain Tylenol  (acetaminophen), including Vicodin , Tylenol #3 , Norco , Lortab , and Percocet .  You should not take any extra pills of Tylenol  if you are using these prescription medications or you can get very sick.  Do not ever take more than 3000 mg of acetaminophen in any 24 hour period.    All opioids tend to cause constipation. Drink plenty of water and eat foods that have a lot of fiber, such as fruits, vegetables, prune juice, apple juice and  high fiber cereal.  Take a laxative if you don t move your bowels at least every other day. Miralax , Milk of Magnesia, Colace , or Senna  can be used to keep you regular.      Remember that you can always come back to the Emergency Department if you are not able to see your regular doctor in the amount of time listed above, if you get any new symptoms, or if there is anything that worries you.

## 2018-02-02 ENCOUNTER — OFFICE VISIT (OUTPATIENT)
Dept: FAMILY MEDICINE | Facility: CLINIC | Age: 78
End: 2018-02-02
Payer: COMMERCIAL

## 2018-02-02 VITALS
SYSTOLIC BLOOD PRESSURE: 124 MMHG | OXYGEN SATURATION: 95 % | TEMPERATURE: 98 F | WEIGHT: 162.4 LBS | HEART RATE: 105 BPM | BODY MASS INDEX: 24.61 KG/M2 | DIASTOLIC BLOOD PRESSURE: 56 MMHG | HEIGHT: 68 IN

## 2018-02-02 DIAGNOSIS — S09.90XA CLOSED HEAD INJURY, INITIAL ENCOUNTER: Primary | ICD-10-CM

## 2018-02-02 DIAGNOSIS — S01.01XA LACERATION OF SCALP WITHOUT FOREIGN BODY, INITIAL ENCOUNTER: ICD-10-CM

## 2018-02-02 PROCEDURE — 99213 OFFICE O/P EST LOW 20 MIN: CPT | Performed by: NURSE PRACTITIONER

## 2018-02-02 NOTE — MR AVS SNAPSHOT
"              After Visit Summary   2/2/2018    Lizzette Chanel    MRN: [id number]           Patient Information     Date Of Birth          1940        Visit Information        Provider Department      2/2/2018 10:30 AM Virginia Vallecillo APRN CNP [facility]        Today's Diagnoses     Closed head injury, initial encounter    -  1    Laceration of scalp without foreign body, initial encounter           Follow-ups after your visit        Your next 10 appointments already scheduled     Feb 12, 2018  1:30 PM CST   Nurse Only with CS RA[facility]   [facility]    [address]  [place] [id number] [phone]              Who to contact     If you have questions or need follow up information about today's clinic visit or your schedule please contact [facility] directly at [phone].  Normal or non-critical lab and imaging results will be communicated to you by MyChart, letter or phone within 4 business days after the clinic has received the results. If you do not hear from us within 7 days, please contact the clinic through MyChart or phone. If you have a critical or abnormal lab result, we will notify you by phone as soon as possible.  Submit refill requests through [employer] or call your pharmacy and they will forward the refill request to us. Please allow 3 business days for your refill to be completed.          Additional Information About Your Visit        MyChart Information     [employer] lets you send messages to your doctor, view your test results, renew your prescriptions, schedule appointments and more. To sign up, go to [email or web address] . Click on \"Log in\" on the left side of the screen, which will take you to the Welcome page. Then click on \"Sign up Now\" on the right side of the page.     You will be asked to enter the access code listed below, as well as some personal information. Please follow the directions to create your " "username and password.     Your access code is: [email or web address]  Expires: 2018 11:16 AM     Your access code will  in 90 days. If you need help or a new code, please call your [facility] clinic or [phone].        Care EveryWhere ID     This is your Care EveryWhere ID. This could be used by other organizations to access your [facility] medical records  [id number]        Your Vitals Were     Pulse Temperature Height Pulse Oximetry Breastfeeding? BMI (Body Mass Index)    105 98  F (36.7  C) (Oral) 5' 8\" (1.727 m) 95% No 24.69 kg/m2       Blood Pressure from Last 3 Encounters:   18 124/56   18 148/80   18 127/76    Weight from Last 3 Encounters:   18 162 lb 6.4 oz (73.7 kg)   18 161 lb (73 kg)   18 161 lb 4.8 oz (73.2 kg)              Today, you had the following     No orders found for display       Primary Care Provider Office Phone # Fax #    Guzman Bruner [phone] [address] [facility] [address]  [facility] [address]        Equal Access to Services     [facility] : Hadii aad ku hadasho Soomaali, waaxda luqadaha, qaybta kaalmada adeegyada, waxay idiin hayenedelia bonilla . So [facility] [phone].    ATENCIÓN: Si habla español, tiene a erazo disposición servicios gratuitos de asistencia lingüística. Lltopher al [phone].    We comply with applicable federal civil rights laws and [place] laws. We do not discriminate on the basis of race, color, national origin, age, disability, sex, sexual orientation, or gender identity.            Thank you!     Thank you for choosing [facility]  for your care. Our goal is always to provide you with excellent care. Hearing back from our patients is one way we can continue to improve our services. Please take a few minutes to complete the written survey that you may receive in the mail after your visit with us. Thank you!             Your Updated Medication List - Protect others around you: Learn " how to safely use, store and throw away your medicines at www.disposemymeds.org.          This list is accurate as of 2/2/18 11:03 AM.  Always use your most recent med list.                   Brand Name Dispense Instructions for use Diagnosis    CALCIUM PO      Take by mouth daily Takes liquid form        COQ10 PO      Take 100 mg by mouth.        fluticasone 50 MCG/ACT spray    FLONASE    1 Bottle    Spray 1-2 sprays into both nostrils daily    Chronic vasomotor rhinitis       JUICE PLUS FIBRE PO      Take by mouth 2 times daily        order for DME      Use your CPAP device as directed by your provider.        red yeast rice 600 MG Caps      Take 600 mg by mouth 2 times daily

## 2018-02-02 NOTE — PROGRESS NOTES
HPI    SUBJECTIVE:   Lizzette Chanel is a 77 year old female who presents to clinic today for the following health issues:      ED/UC Followup:    Facility:  [facility]  Date of visit: 1/31/18  Reason for visit: head injury w laceration from fall on icy sidewalk  Current Status: stable, concussion protocol, laceration stapled for 12d       Seen in ED 3 days ago resulting in lac and staples   No nausea, dizzy, sleeping difficulty  Head is tender but otherwise no HA    Feels her balance isn't perfect but not interfering with her walking   She overall is feeling well       Past Medical History:   Diagnosis Date     Abdominal pain 2011    ct abd and pelvis uterine fiborid, renal cysts and tics     Chest pain 2003    neg est thallium     Chest pain 4/16    nl est echo and cxr     Hx of colonoscopy 2003    incomplete, be nl     Hx of colonoscopy Francheska 3, 2011    nl     Hypercholesteremia 2011    aches with zocor     Intermittent asthma 1995     CAITLYN (obstructive sleep apnea) 12/12    done [place], using dental device, added cpap 2014      Osteopenia 2013    Dr. Taylor     Peripheral neuropathy     Dr. Latia Sparks 1956     Statin intolerance     zocor and one other caused aches     Subdural hematoma (H) 1/16    traumatic, fu ct 3/16 resolved     Past Surgical History:   Procedure Laterality Date     APPENDECTOMY  60's     PHACOEMULSIFICATION CLEAR CORNEA WITH STANDARD INTRAOCULAR LENS IMPLANT  3/21/2012    Procedure:PHACOEMULSIFICATION CLEAR CORNEA WITH STANDARD INTRAOCULAR LENS IMPLANT; RIGHT PHACOEMULSIFICATION CLEAR CORNEA WITH STANDARD INTRAOCULAR LENS IMPLANT ; Surgeon:CHANDRAKANT HERNANDEZ; Location:[facility]     PHACOEMULSIFICATION CLEAR CORNEA WITH STANDARD INTRAOCULAR LENS IMPLANT  3/28/2012    Procedure:PHACOEMULSIFICATION CLEAR CORNEA WITH STANDARD INTRAOCULAR LENS IMPLANT; LEFT PHACOEMULSIFICATION CLEAR CORNEA WITH STANDARD INTRAOCULAR LENS IMPLANT ; Surgeon:CHANDRAKANT HERNANDEZ; Location:[facility]     SHOULDER SURGERY  1990's  "   twice     TONSILLECTOMY  child     Social History   Substance Use Topics     Smoking status: Former Smoker     Packs/day: 1.50     Years: 12.00     Types: Cigarettes     Quit date: 3/17/1974     Smokeless tobacco: Never Used     Alcohol use 0.0 oz/week     0 Standard drinks or equivalent per week      Comment: 1-2/day     Current Outpatient Prescriptions   Medication Sig Dispense Refill     fluticasone (FLONASE) 50 MCG/ACT spray Spray 1-2 sprays into both nostrils daily 1 Bottle 11     CALCIUM PO Take by mouth daily Takes liquid form       red yeast rice 600 MG CAPS Take 600 mg by mouth 2 times daily       Coenzyme Q10 (COQ10 PO) Take 100 mg by mouth.       ORDER FOR DME Use your CPAP device as directed by your provider.       Nutritional Supplements (JUICE PLUS FIBRE PO) Take by mouth 2 times daily        No Known Allergies    Reviewed and updated as needed this visit by clinical staff and provider      ROS  Detailed as above       /56  Pulse 105  Temp 98  F (36.7  C) (Oral)  Ht 5' 8\" (1.727 m)  Wt 162 lb 6.4 oz (73.7 kg)  SpO2 95%  Breastfeeding? No  BMI 24.69 kg/m2    Physical Exam   Constitutional: She is well-developed, well-nourished, and in no distress.   HENT:   Head: Normocephalic.   8 staples back of head with well-approximated lac. Scant amt of surrounding bruising    Eyes: Conjunctivae are normal.   Neck: Normal range of motion.   Pulmonary/Chest: Effort normal.   Neurological: She is alert.   Skin: Skin is warm and dry.   Psychiatric: Mood and affect normal.   Vitals reviewed.      Assessment and Plan:       ICD-10-CM    1. Closed head injury, initial encounter S09.90XA    2. Laceration of scalp without foreign body, initial encounter S01.01XA        Doing well since fall 3 days ago  Staples in place on head and look good   No signs of concussion today or concern for ICH   We discussed reasons to f/u       Virginia Vallecillo APRN, CNP  [facility]      "

## 2018-02-07 ENCOUNTER — TRANSFERRED RECORDS (OUTPATIENT)
Dept: HEALTH INFORMATION MANAGEMENT | Facility: CLINIC | Age: 78
End: 2018-02-07

## 2018-02-09 ENCOUNTER — TRANSFERRED RECORDS (OUTPATIENT)
Dept: HEALTH INFORMATION MANAGEMENT | Facility: CLINIC | Age: 78
End: 2018-02-09

## 2018-02-12 ENCOUNTER — ALLIED HEALTH/NURSE VISIT (OUTPATIENT)
Dept: NURSING | Facility: CLINIC | Age: 78
End: 2018-02-12
Payer: COMMERCIAL

## 2018-02-12 ENCOUNTER — TRANSFERRED RECORDS (OUTPATIENT)
Dept: HEALTH INFORMATION MANAGEMENT | Facility: CLINIC | Age: 78
End: 2018-02-12

## 2018-02-12 DIAGNOSIS — Z48.02 REMOVAL OF STAPLES: Primary | ICD-10-CM

## 2018-02-12 PROCEDURE — 99207 ZZC NO CHARGE NURSE ONLY: CPT

## 2018-02-12 NOTE — MR AVS SNAPSHOT
"              After Visit Summary   2018    Lizzette Chanel    MRN: [id number]           Patient Information     Date Of Birth          1940        Visit Information        Provider Department      2018 1:00 PM JACK ACEVEDO NURSE [facility]        Today's Diagnoses     Removal of staples    -  1       Follow-ups after your visit        Who to contact     If you have questions or need follow up information about today's clinic visit or your schedule please contact [facility] directly at [phone].  Normal or non-critical lab and imaging results will be communicated to you by [employer]ive[employer], letter or phone within 4 business days after the clinic has received the results. If you do not hear from us within 7 days, please contact the clinic through [employer] or phone. If you have a critical or abnormal lab result, we will notify you by phone as soon as possible.  Submit refill requests through [employer] or call your pharmacy and they will forward the refill request to us. Please allow 3 business days for your refill to be completed.          Additional Information About Your Visit        MyChart Information     [employer]ia lets you send messages to your doctor, view your test results, renew your prescriptions, schedule appointments and more. To sign up, go to [email or web address] . Click on \"Log in\" on the left side of the screen, which will take you to the Welcome page. Then click on \"Sign up Now\" on the right side of the page.     You will be asked to enter the access code listed below, as well as some personal information. Please follow the directions to create your username and password.     Your access code is: [email or web address]  Expires: 2018 11:16 AM     Your access code will  in 90 days. If you need help or a new code, please call your [facility] or [phone].        Care EveryWhere ID     This is your Care EveryWhere ID. This could be used by other organizations to " access your [place] medical records  [id number]         Blood Pressure from Last 3 Encounters:   02/02/18 124/56   01/31/18 148/80   01/18/18 127/76    Weight from Last 3 Encounters:   02/02/18 162 lb 6.4 oz (73.7 kg)   01/31/18 161 lb (73 kg)   01/18/18 161 lb 4.8 oz (73.2 kg)              Today, you had the following     No orders found for display       Primary Care Provider Office Phone # Fax #    Guzman Bruner [phone] [address] [facility] [address]  [facility] [address]        Equal Access to Services     [facility] HospitalCHRIS : Hadii memo Puentes, wacheri singh, arias kaalmada taras, doron gonzalez. So [facility] [phone].    ATENCIÓN: Si habla español, tiene a erazo disposición servicios gratuitos de asistencia lingüística. Llame al [phone].    We comply with applicable federal civil rights laws and [place] laws. We do not discriminate on the basis of race, color, national origin, age, disability, sex, sexual orientation, or gender identity.            Thank you!     Thank you for choosing [facility]  for your care. Our goal is always to provide you with excellent care. Hearing back from our patients is one way we can continue to improve our services. Please take a few minutes to complete the written survey that you may receive in the mail after your visit with us. Thank you!             Your Updated Medication List - Protect others around you: Learn how to safely use, store and throw away your medicines at www.disposemymeds.org.          This list is accurate as of 2/12/18  1:23 PM.  Always use your most recent med list.                   Brand Name Dispense Instructions for use Diagnosis    CALCIUM PO      Take by mouth daily Takes liquid form        COQ10 PO      Take 100 mg by mouth.        fluticasone 50 MCG/ACT spray    FLONASE    1 Bottle    Spray 1-2 sprays into both nostrils daily    Chronic vasomotor rhinitis       JUICE  PLUS FIBRE PO      Take by mouth 2 times daily        order for DME      Use your CPAP device as directed by your provider.        red yeast rice 600 MG Caps      Take 600 mg by mouth 2 times daily

## 2018-02-12 NOTE — NURSING NOTE
Lizzette presents to the clinic today for  removal of staples.  The patient has had the staples in place for 12 days.    There has been no history of infection or drainage.    O: 8 staples are seen located on the Posterior Scalp.  The wound is healing well with no signs of infection.    Tetanus status is up to date.    A:  Staple removal.    P:  All staples were easily removed today.  Routine wound care discussed.  The patient will follow up as needed.    Augustina DOAN RN

## 2018-03-27 ENCOUNTER — OFFICE VISIT (OUTPATIENT)
Dept: FAMILY MEDICINE | Facility: CLINIC | Age: 78
End: 2018-03-27
Payer: COMMERCIAL

## 2018-03-27 VITALS
WEIGHT: 163 LBS | TEMPERATURE: 98 F | DIASTOLIC BLOOD PRESSURE: 74 MMHG | BODY MASS INDEX: 24.71 KG/M2 | SYSTOLIC BLOOD PRESSURE: 130 MMHG | HEIGHT: 68 IN | HEART RATE: 61 BPM | OXYGEN SATURATION: 96 %

## 2018-03-27 DIAGNOSIS — R51.9 FRONTAL HEADACHE: ICD-10-CM

## 2018-03-27 DIAGNOSIS — S06.5XAA SUBDURAL HEMATOMA (H): ICD-10-CM

## 2018-03-27 DIAGNOSIS — E78.5 HYPERLIPIDEMIA LDL GOAL <130: ICD-10-CM

## 2018-03-27 DIAGNOSIS — R79.82 ELEVATED C-REACTIVE PROTEIN (CRP): Primary | ICD-10-CM

## 2018-03-27 PROCEDURE — 86141 C-REACTIVE PROTEIN HS: CPT | Performed by: PHYSICIAN ASSISTANT

## 2018-03-27 PROCEDURE — 99214 OFFICE O/P EST MOD 30 MIN: CPT | Performed by: PHYSICIAN ASSISTANT

## 2018-03-27 PROCEDURE — 36415 COLL VENOUS BLD VENIPUNCTURE: CPT | Performed by: PHYSICIAN ASSISTANT

## 2018-03-27 NOTE — MR AVS SNAPSHOT
"              After Visit Summary   3/27/2018    Lizzette Chanel    MRN: [id number]           Patient Information     Date Of Birth          1940        Visit Information        Provider Department      3/27/2018 11:00 AM Jennifer Jose PA-C [facility]        Today's Diagnoses     Elevated C-reactive protein (CRP)    -  1    Hyperlipidemia LDL goal <130        Frontal headache        Subdural hematoma (H)           Follow-ups after your visit        Your next 10 appointments already scheduled     Mar 30, 2018 10:30 AM CDT   (Arrive by 10:15 AM)   MA SCREENING BILATERAL W/ CHERELLE with [id number]   [facility] ([facility])    [address], [address]  [place] [facility] [address] [phone]           Three-dimensional (3D) mammograms are available at [place] locations in [facility], [place], [place], [facility], [place], [place], and [place]. [facility] locations include [place] and [facility] in [place]. Benefits of 3D mammograms include: - Improved rate of cancer detection - Decreases your chance of having to go back for more tests, which means fewer: - \"False-positive\" results (This means that there is an abnormal area but it isn't cancer.) - Invasive testing procedures, such as a biopsy or surgery - Can provide clearer images of the breast if you have dense breast tissue. 3D mammography is an optional exam that anyone can have with a 2D mammogram. It doesn't replace or take the place of a 2D mammogram. 2D mammograms remain an effective screening test for all women.  Not all insurance companies cover the cost of a 3D mammogram. Check with your insurance.              Who to contact     If you have questions or need follow up information about today's clinic visit or your schedule please contact [facility] directly at [phone].  Normal or non-critical lab and imaging results will be communicated to you by " "MyChart, letter or phone within 4 business days after the clinic has received the results. If you do not hear from us within 7 days, please contact the clinic through Appiteratehart or phone. If you have a critical or abnormal lab result, we will notify you by phone as soon as possible.  Submit refill requests through myOrder or call your pharmacy and they will forward the refill request to us. Please allow 3 business days for your refill to be completed.          Additional Information About Your Visit        AppiterateharTerraWi Information     myOrder lets you send messages to your doctor, view your test results, renew your prescriptions, schedule appointments and more. To sign up, go to [email or web address] . Click on \"Log in\" on the left side of the screen, which will take you to the Welcome page. Then click on \"Sign up Now\" on the right side of the page.     You will be asked to enter the access code listed below, as well as some personal information. Please follow the directions to create your username and password.     Your access code is: [email or web address]  Expires: 2018 12:16 PM     Your access code will  in 90 days. If you need help or a new code, please call your [place] clinic or [phone].        Care EveryWhere ID     This is your Care EveryWhere ID. This could be used by other organizations to access your [place] medical records  [id number]        Your Vitals Were     Pulse Temperature Height Pulse Oximetry BMI (Body Mass Index)       61 98  F (36.7  C) (Tympanic) 5' 8\" (1.727 m) 96% 24.78 kg/m2        Blood Pressure from Last 3 Encounters:   18 130/74   18 124/56   18 148/80    Weight from Last 3 Encounters:   18 163 lb (73.9 kg)   18 162 lb 6.4 oz (73.7 kg)   18 161 lb (73 kg)              We Performed the Following     CARDIO CRP        Primary Care Provider Office Phone # Fax #    Guzman Bruner [phone] [address]  [facility] [address]        Equal Access to Services     JACKELYN HALL : Hadii aad ku hadmatthewcesilia Jayroali, waclairda luqadaha, qaybta mellisadeclandoron mckeon. So [facility] [phone].    ATENCIÓN: Si habla español, tiene a erazo disposición servicios gratuitos de asistencia lingüística. Llame al [phone].    We comply with applicable federal civil rights laws and [place] laws. We do not discriminate on the basis of race, color, national origin, age, disability, sex, sexual orientation, or gender identity.            Thank you!     Thank you for choosing [facility]  for your care. Our goal is always to provide you with excellent care. Hearing back from our patients is one way we can continue to improve our services. Please take a few minutes to complete the written survey that you may receive in the mail after your visit with us. Thank you!             Your Updated Medication List - Protect others around you: Learn how to safely use, store and throw away your medicines at www.disposemymeds.org.          This list is accurate as of 3/27/18 12:07 PM.  Always use your most recent med list.                   Brand Name Dispense Instructions for use Diagnosis    CALCIUM PO      Take by mouth daily Takes liquid form        COQ10 PO      Take 100 mg by mouth.        fluticasone 50 MCG/ACT spray    FLONASE    1 Bottle    Spray 1-2 sprays into both nostrils daily    Chronic vasomotor rhinitis       JUICE PLUS FIBRE PO      Take by mouth 2 times daily        order for DME      Use your CPAP device as directed by your provider.        red yeast rice 600 MG Caps      Take 600 mg by mouth 2 times daily

## 2018-03-27 NOTE — PROGRESS NOTES
HPI: 76 yo female here to follow up on her lifeline screening test  She had a CRP elevated at 6.1 and was told to make appt to be seen regarding this.  She had normal ekg, URBANO and BMI  She has hx of statin myopathy (simvastatin) and would prefer to avoid taking statins  Currently her lipids at goal with red yeast rice.  She has FH of CAD in brother and father    2. She fell on the ice on 1/31/18 and had laceration and concussion  Needed staples placed  Has hx of subdural hematoma in 2016 from a fall  Now having more frontal HAs starting 5 weeks ago   She sleeps well and no HA at night  Ha located mostly over the L eye  Has new glasses so will f/u with her eye doctor first    She has some occas nasal congestion but doesn't feel sick  Denies any skin rash  Denies speech or vision changes.  Not pain anywhere else  Has some allergies and gets more congestion and tearing.  She uses flonase periodically for that.    Past Medical History:   Diagnosis Date     Abdominal pain 2011    ct abd and pelvis uterine fiborid, renal cysts and tics     Chest pain 2003    neg est thallium     Chest pain 4/16    nl est echo and cxr     Hx of colonoscopy 2003    incomplete, be nl     Hx of colonoscopy Francheska 3, 2011    nl     Hypercholesteremia 2011    aches with zocor     Intermittent asthma 1995     CAITLYN (obstructive sleep apnea) 12/12    done [place], using dental device, added cpap 2014      Osteopenia 2013    Dr. Taylor     Peripheral neuropathy     Dr. Latia Sparks 1956     Statin intolerance     zocor and one other caused aches     Subdural hematoma (H) 1/16    traumatic, fu ct 3/16 resolved     Past Surgical History:   Procedure Laterality Date     APPENDECTOMY  60's     PHACOEMULSIFICATION CLEAR CORNEA WITH STANDARD INTRAOCULAR LENS IMPLANT  3/21/2012    Procedure:PHACOEMULSIFICATION CLEAR CORNEA WITH STANDARD INTRAOCULAR LENS IMPLANT; RIGHT PHACOEMULSIFICATION CLEAR CORNEA WITH STANDARD INTRAOCULAR LENS IMPLANT ;  "Surgeon:CHANDRAKANT HERNANDEZ; Location:[facility]     PHACOEMULSIFICATION CLEAR CORNEA WITH STANDARD INTRAOCULAR LENS IMPLANT  3/28/2012    Procedure:PHACOEMULSIFICATION CLEAR CORNEA WITH STANDARD INTRAOCULAR LENS IMPLANT; LEFT PHACOEMULSIFICATION CLEAR CORNEA WITH STANDARD INTRAOCULAR LENS IMPLANT ; Surgeon:CHANDRAKANT HERNANDEZ; Location:[facility]     SHOULDER SURGERY  1990's    twice     TONSILLECTOMY  child     Social History   Substance Use Topics     Smoking status: Former Smoker     Packs/day: 1.50     Years: 12.00     Types: Cigarettes     Quit date: 3/17/1974     Smokeless tobacco: Never Used     Alcohol use 0.0 oz/week     0 Standard drinks or equivalent per week      Comment: 1-2/day     Current Outpatient Prescriptions   Medication Sig Dispense Refill     fluticasone (FLONASE) 50 MCG/ACT spray Spray 1-2 sprays into both nostrils daily 1 Bottle 11     CALCIUM PO Take by mouth daily Takes liquid form       red yeast rice 600 MG CAPS Take 600 mg by mouth 2 times daily       Coenzyme Q10 (COQ10 PO) Take 100 mg by mouth.       ORDER FOR DME Use your CPAP device as directed by your provider.       Nutritional Supplements (JUICE PLUS FIBRE PO) Take by mouth 2 times daily        No Known Allergies  FAMILY HISTORY NOTED AND REVIEWED    PHYSICAL EXAM:    /74  Pulse 61  Temp 98  F (36.7  C) (Tympanic)  Ht 5' 8\" (1.727 m)  Wt 163 lb (73.9 kg)  SpO2 96%  BMI 24.78 kg/m2    Patient appears non toxic  Skin: no rash on face  Neuro: normal facial symmetry, normal speech and gait  EOMI    Assessment and Plan:     (R79.82) Elevated C-reactive protein (CRP)  (primary encounter diagnosis)  Comment: repeat cardio crp and discuss options which include trying a different statin since she had SE from zocor  Plan: CARDIO CRP            (E78.5) Hyperlipidemia LDL goal <130  Comment:   Plan: CARDIO CRP            (R51) Frontal headache  Comment:   Plan: recd she get back on flonase regularly and see if HA resolves. Doubt this related to " the fall she took in Jan    (I62.00) Subdural hematoma (H)  Comment:   Plan: occurred with a fall in 2016, doubt recurrent bleed at this time as her sxs intermittent and mild      Jennifer Jose PA-C

## 2018-03-27 NOTE — NURSING NOTE
"Chief Complaint   Patient presents with     Follow up     labs; CRP lab showed high risk during lifeline screening test; also f/u on concussion, having headaches off and on but not where she was hit       Initial /70 (BP Location: Right arm, Cuff Size: Adult Large)  Pulse 61  Temp 98  F (36.7  C) (Tympanic)  Ht 5' 8\" (1.727 m)  Wt 163 lb (73.9 kg)  SpO2 96%  BMI 24.78 kg/m2 Estimated body mass index is 24.78 kg/(m^2) as calculated from the following:    Height as of this encounter: 5' 8\" (1.727 m).    Weight as of this encounter: 163 lb (73.9 kg).  Medication Reconciliation: complete     Alanis Khan MA    "

## 2018-03-28 ENCOUNTER — TELEPHONE (OUTPATIENT)
Dept: FAMILY MEDICINE | Facility: CLINIC | Age: 78
End: 2018-03-28

## 2018-03-28 LAB — CRP SERPL HS-MCNC: 5.1 MG/L

## 2018-03-28 RX ORDER — ROSUVASTATIN CALCIUM 5 MG/1
5 TABLET, COATED ORAL DAILY
Qty: 30 TABLET | Refills: 3 | Status: SHIPPED | OUTPATIENT
Start: 2018-03-28 | End: 2018-04-29

## 2018-03-28 NOTE — TELEPHONE ENCOUNTER
Tried to reach patient, unable to leave V. Mail. Try again.    Per Jennifer Jose: Call pt   Her cardio crp is elevated and in the high risk range at 5.1   I would suggest a trial of crestor in place of red yeast rice   I will send in rx and she should follow up in 3 months.     Lidia Page MA

## 2018-03-28 NOTE — PROGRESS NOTES
Call pt  Her cardio crp is elevated and in the high risk range at 5.1  I would suggest a trial of crestor in place of red yeast rice  I will send in rx and she should follow up in 3 months.

## 2018-03-30 ENCOUNTER — HOSPITAL ENCOUNTER (OUTPATIENT)
Dept: MAMMOGRAPHY | Facility: CLINIC | Age: 78
Discharge: HOME | End: 2018-03-30
Attending: OBSTETRICS & GYNECOLOGY | Admitting: OBSTETRICS & GYNECOLOGY
Payer: MEDICARE

## 2018-03-30 DIAGNOSIS — Z12.31 VISIT FOR SCREENING MAMMOGRAM: ICD-10-CM

## 2018-03-30 PROCEDURE — 77063 BREAST TOMOSYNTHESIS BI: CPT

## 2018-04-03 NOTE — TELEPHONE ENCOUNTER
Tried to reach patient, unable to leave message.  Tried calling her friend listed in her emergency contact but her message states unavailable, mailbox full.    Send letter? Please advise    Lidia Page MA

## 2018-04-27 ENCOUNTER — TELEPHONE (OUTPATIENT)
Dept: FAMILY MEDICINE | Facility: CLINIC | Age: 78
End: 2018-04-27

## 2018-04-27 DIAGNOSIS — R79.82 ELEVATED C-REACTIVE PROTEIN (CRP): ICD-10-CM

## 2018-04-27 DIAGNOSIS — E78.5 HYPERLIPIDEMIA LDL GOAL <130: ICD-10-CM

## 2018-04-29 RX ORDER — ROSUVASTATIN CALCIUM 5 MG/1
5 TABLET, COATED ORAL DAILY
Qty: 90 TABLET | Refills: 2 | Status: SHIPPED | OUTPATIENT
Start: 2018-04-29 | End: 2019-02-19

## 2018-05-29 ENCOUNTER — OFFICE VISIT (OUTPATIENT)
Dept: FAMILY MEDICINE | Facility: CLINIC | Age: 78
End: 2018-05-29
Payer: COMMERCIAL

## 2018-05-29 VITALS
HEIGHT: 68 IN | DIASTOLIC BLOOD PRESSURE: 76 MMHG | HEART RATE: 87 BPM | WEIGHT: 162 LBS | TEMPERATURE: 97.9 F | SYSTOLIC BLOOD PRESSURE: 146 MMHG | BODY MASS INDEX: 24.55 KG/M2 | OXYGEN SATURATION: 95 %

## 2018-05-29 DIAGNOSIS — R53.83 OTHER FATIGUE: Primary | ICD-10-CM

## 2018-05-29 DIAGNOSIS — R79.82 ELEVATED C-REACTIVE PROTEIN (CRP): ICD-10-CM

## 2018-05-29 DIAGNOSIS — E78.5 HYPERLIPIDEMIA LDL GOAL <130: ICD-10-CM

## 2018-05-29 DIAGNOSIS — G14 POST-POLIO SYNDROME (H): ICD-10-CM

## 2018-05-29 DIAGNOSIS — D64.9 ANEMIA, UNSPECIFIED TYPE: ICD-10-CM

## 2018-05-29 PROCEDURE — 99213 OFFICE O/P EST LOW 20 MIN: CPT | Performed by: PHYSICIAN ASSISTANT

## 2018-05-29 PROCEDURE — 82607 VITAMIN B-12: CPT | Performed by: PHYSICIAN ASSISTANT

## 2018-05-29 PROCEDURE — 82746 ASSAY OF FOLIC ACID SERUM: CPT | Performed by: PHYSICIAN ASSISTANT

## 2018-05-29 PROCEDURE — 82728 ASSAY OF FERRITIN: CPT | Performed by: PHYSICIAN ASSISTANT

## 2018-05-29 PROCEDURE — 82306 VITAMIN D 25 HYDROXY: CPT | Performed by: PHYSICIAN ASSISTANT

## 2018-05-29 PROCEDURE — 83550 IRON BINDING TEST: CPT | Performed by: PHYSICIAN ASSISTANT

## 2018-05-29 PROCEDURE — 85027 COMPLETE CBC AUTOMATED: CPT | Performed by: PHYSICIAN ASSISTANT

## 2018-05-29 PROCEDURE — 36415 COLL VENOUS BLD VENIPUNCTURE: CPT | Performed by: PHYSICIAN ASSISTANT

## 2018-05-29 PROCEDURE — 83540 ASSAY OF IRON: CPT | Performed by: PHYSICIAN ASSISTANT

## 2018-05-29 RX ORDER — ROSUVASTATIN CALCIUM 5 MG/1
5 TABLET, COATED ORAL DAILY
Qty: 90 TABLET | Refills: 2 | Status: CANCELLED | OUTPATIENT
Start: 2018-05-29

## 2018-05-29 NOTE — MR AVS SNAPSHOT
"              After Visit Summary   5/29/2018    Lizzette Chanel    MRN: [id number]           Patient Information     Date Of Birth          1940        Visit Information        Provider Department      5/29/2018 3:00 PM Jennifer Jose PA-C [facility]        Today's Diagnoses     Other fatigue    -  1    Anemia, unspecified type        Post-polio syndrome        Hyperlipidemia LDL goal <130        Elevated C-reactive protein (CRP)           Follow-ups after your visit        Future tests that were ordered for you today     Open Future Orders        Priority Expected Expires Ordered    Lipid Profile Routine 7/18/2018 5/29/2019 5/29/2018            Who to contact     If you have questions or need follow up information about today's clinic visit or your schedule please contact [facility] directly at [phone].  Normal or non-critical lab and imaging results will be communicated to you by MyChart, letter or phone within 4 business days after the clinic has received the results. If you do not hear from us within 7 days, please contact the clinic through MyChart or phone. If you have a critical or abnormal lab result, we will notify you by phone as soon as possible.  Submit refill requests through [employer] or call your pharmacy and they will forward the refill request to us. Please allow 3 business days for your refill to be completed.          Additional Information About Your Visit        Care EveryWhere ID     This is your Care EveryWhere ID. This could be used by other organizations to access your [place] medical records  [id number]        Your Vitals Were     Pulse Temperature Height Pulse Oximetry BMI (Body Mass Index)       87 97.9  F (36.6  C) (Oral) 5' 8\" (1.727 m) 95% 24.63 kg/m2        Blood Pressure from Last 3 Encounters:   05/29/18 146/76   03/27/18 130/74   02/02/18 124/56    Weight from Last 3 Encounters:   05/29/18 162 lb (73.5 kg)   03/27/18 163 lb (73.9 kg) "   02/02/18 162 lb 6.4 oz (73.7 kg)              We Performed the Following     CBC with platelets     Ferritin     Folate     Iron and iron binding capacity     Vitamin B12     Vitamin D Deficiency          Today's Medication Changes          These changes are accurate as of 5/29/18  3:37 PM.  If you have any questions, ask your nurse or doctor.               Stop taking these medicines if you haven't already. Please contact your care team if you have questions.     red yeast rice 600 MG Caps                    Primary Care Provider Office Phone # Fax #    Guzman Segundo Bruner [phone] [address] [facility] [address]  [facility] [address]        Equal Access to Services     [facility]: Hadii memo diehl hadvalerie Puentes, waaxda francisco, qaybta kaalmada taras, doron bonilla . So [facility] [phone].    ATENCIÓN: Si habla español, tiene a erazo disposición servicios gratuitos de asistencia lingüística. [facility] [phone].    We comply with applicable federal civil rights laws and [place] laws. We do not discriminate on the basis of race, color, national origin, age, disability, sex, sexual orientation, or gender identity.            Thank you!     Thank you for choosing [facility]  for your care. Our goal is always to provide you with excellent care. Hearing back from our patients is one way we can continue to improve our services. Please take a few minutes to complete the written survey that you may receive in the mail after your visit with us. Thank you!             Your Updated Medication List - Protect others around you: Learn how to safely use, store and throw away your medicines at www.disposemymeds.org.          This list is accurate as of 5/29/18  3:37 PM.  Always use your most recent med list.                   Brand Name Dispense Instructions for use Diagnosis    CALCIUM PO      Take by mouth daily Takes liquid form        COQ10 PO      Take 100 mg by  mouth.        fluticasone 50 MCG/ACT spray    FLONASE    1 Bottle    Spray 1-2 sprays into both nostrils daily    Chronic vasomotor rhinitis       JUICE PLUS FIBRE PO      Take by mouth 2 times daily        order for DME      Use your CPAP device as directed by your provider.        rosuvastatin 5 MG tablet    CRESTOR    90 tablet    Take 1 tablet (5 mg) by mouth daily    Hyperlipidemia LDL goal <130, Elevated C-reactive protein (CRP)

## 2018-05-30 DIAGNOSIS — E78.5 HYPERLIPIDEMIA LDL GOAL <130: ICD-10-CM

## 2018-05-30 LAB
CHOLEST SERPL-MCNC: 191 MG/DL
ERYTHROCYTE [DISTWIDTH] IN BLOOD BY AUTOMATED COUNT: 15.5 % (ref 10–15)
FERRITIN SERPL-MCNC: 35 NG/ML (ref 8–252)
FOLATE SERPL-MCNC: 34.6 NG/ML
HCT VFR BLD AUTO: 40.2 % (ref 35–47)
HDLC SERPL-MCNC: 67 MG/DL
HGB BLD-MCNC: 12.8 G/DL (ref 11.7–15.7)
IRON SATN MFR SERPL: 21 % (ref 15–46)
IRON SERPL-MCNC: 68 UG/DL (ref 35–180)
LDLC SERPL CALC-MCNC: 102 MG/DL
MCH RBC QN AUTO: 30.7 PG (ref 26.5–33)
MCHC RBC AUTO-ENTMCNC: 31.8 G/DL (ref 31.5–36.5)
MCV RBC AUTO: 96 FL (ref 78–100)
NONHDLC SERPL-MCNC: 124 MG/DL
PLATELET # BLD AUTO: 229 10E9/L (ref 150–450)
RBC # BLD AUTO: 4.17 10E12/L (ref 3.8–5.2)
TIBC SERPL-MCNC: 322 UG/DL (ref 240–430)
TRIGL SERPL-MCNC: 108 MG/DL
VIT B12 SERPL-MCNC: 228 PG/ML (ref 193–986)
WBC # BLD AUTO: 4.8 10E9/L (ref 4–11)

## 2018-05-30 PROCEDURE — 80061 LIPID PANEL: CPT | Performed by: PHYSICIAN ASSISTANT

## 2018-05-30 PROCEDURE — 36415 COLL VENOUS BLD VENIPUNCTURE: CPT | Performed by: PHYSICIAN ASSISTANT

## 2018-05-31 LAB — DEPRECATED CALCIDIOL+CALCIFEROL SERPL-MC: 29 UG/L (ref 20–75)

## 2018-05-31 NOTE — PROGRESS NOTES
Jessika,    Your vitamin B12 level WAS LOW. Replacing that may make you feel better so  over the counter sublingual vitamin B12 1000mcg/day. We can recheck the level in 3 months.      Your vitamin D level was also low so start taking vitamin D3 2000U/day with food.    Otherwise your labs look fine.  Hopefully you perk up with the vitamins you are lacking.    Jennifer Jose PA-C

## 2018-05-31 NOTE — PROGRESS NOTES
Jessika,    Your cholesterol profile looks great and is improved compared to 4 months ago.    Jennifer Jose PA-C

## 2018-08-03 DIAGNOSIS — G47.33 OSA (OBSTRUCTIVE SLEEP APNEA): Primary | ICD-10-CM

## 2018-09-05 ENCOUNTER — TELEPHONE (OUTPATIENT)
Dept: FAMILY MEDICINE | Facility: CLINIC | Age: 78
End: 2018-09-05

## 2018-09-05 NOTE — TELEPHONE ENCOUNTER
Reason for Call: Request for an order or referral:    Order or referral being requested: Shingles Vaccination    Date needed: at your convenience    Has the patient been seen by the PCP for this problem? YES    Additional comments: Pt inquiring about the Shingles vaccination.  Would this be recommended for her?  Please call pt back with recommendation    Phone number Patient can be reached at:  Home number on file [phone] (home)    Best Time:  Any - ok to send message via [employer]     Can we leave a detailed message on this number?  YES    Call taken on 9/5/2018 at 3:27 PM by Franca Mercado

## 2018-09-07 NOTE — TELEPHONE ENCOUNTER
Sent patient message VIA [employer] notified her to check with insurance and of the cost out of pocket.  Linda [facility]iel [facility]

## 2019-04-10 ENCOUNTER — TRANSFERRED RECORDS (OUTPATIENT)
Dept: HEALTH INFORMATION MANAGEMENT | Facility: CLINIC | Age: 79
End: 2019-04-10

## 2019-05-22 NOTE — PROGRESS NOTES
SUBJECTIVE:   Lizzette Chanel is a 78 year old female who presents for Preventive Visit.    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 She is followed regularly by Dr. Ly.  She used to see Dr. Taylor in gyn but she retired so not sure if they will go back there.  She had first shingrex in Dec and second shot is due now.  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  She doesn't think she is depressed but admits that is a possibility.  She has lost weight and thinks that due to emotional stress  TSH   Date Value Ref Range Status   01/22/2018 2.04 0.40 - 4.00 mU/L Final     Are you in the first 12 months of your Medicare coverage?  No    Healthy Habits:     In general, how would you rate your overall health?  Very good    Frequency of exercise:  6-7 days/week    Duration of exercise:  15-30 minutes    Taking medications regularly:  Yes    Barriers to taking medications:  None    Medication side effects:  None    Ability to successfully perform activities of daily living:  No assistance needed    Home Safety:  No safety concerns identified    Hearing Impairment:  No hearing concerns    In the past 6 months, have you been bothered by leaking of urine?  No    In general, how would you rate your overall mental or emotional health?  Very good      PHQ-2 Total Score: 1    Additional concerns today:  No    Do you feel safe in your environment? Yes    Do you have a Health Care Directive? Yes: Advance Directive has been received and scanned.      Fall risk  Fallen 2 or more times in the past year?: No  Any fall with injury in the past year?: No    Cognitive Screening   1) Repeat 3 items (Leader, Season, Table)    2) Clock draw: NORMAL  3) 3 item recall: Recalls 3 objects  Results: 3 items recalled: COGNITIVE IMPAIRMENT LESS  LIKELY    Mini-CogTM Copyright ALYSSIA Webb. Licensed by the author for use in [facility]; reprinted with permission (jeanne@[facility]). All rights reserved.      Do you have sleep apnea, excessive snoring or daytime drowsiness?: yes    Reviewed and updated as needed this visit by clinical staff  Tobacco  Allergies  Meds         Reviewed and updated as needed this visit by Provider        Social History     Tobacco Use     Smoking status: Former Smoker     Packs/day: 1.50     Years: 12.00     Pack years: 18.00     Types: Cigarettes     Last attempt to quit: 3/17/1974     Years since quittin.2     Smokeless tobacco: Never Used   Substance Use Topics     Alcohol use: Yes     Alcohol/week: 0.0 oz     Comment: 1-2/day         Alcohol Use 2015   Prescreen: >3 drinks/day or >7 drinks/week? The patient does not drink >3 drinks per day nor >7 drinks per week.       Current providers sharing in care for this patient include:   Patient Care Team:  Guzman Bruner MD as PCP - General (Internal Medicine)  Jennifer Jose PA-C as Assigned PCP    The following health maintenance items are reviewed in Epic and correct as of today:  Health Maintenance   Topic Date Due     DEXA  1940     ASTHMA ACTION PLAN  1940     ASTHMA CONTROL TEST  1940     LIPID  10/07/1983     MEDICARE ANNUAL WELLNESS VISIT  2016     ADVANCED DIRECTIVE PLANNING  2018     PHQ-2  2019     ZOSTER IMMUNIZATION (3 of 3) 2019     FALL RISK ASSESSMENT  2019     DTAP/TDAP/TD IMMUNIZATION (3 - Td) 01/15/2023     INFLUENZA VACCINE  Completed     PNEUMOCOCCAL IMMUNIZATION 65+ LOW/MEDIUM RISK  Completed     IPV IMMUNIZATION  Aged Out     MENINGITIS IMMUNIZATION  Aged Out     Past Medical History:   Diagnosis Date     Abdominal pain     ct abd and pelvis uterine fiborid, renal cysts and tics     Chest pain     neg est thallium     Chest pain     nl est echo and cxr     Hx of colonoscopy  2003    incomplete, be nl     Hx of colonoscopy Francheska 3, 2011    nl     Hypercholesteremia 2011    aches with zocor     Intermittent asthma 1995     CAITLYN (obstructive sleep apnea) 12/12    done [place], using dental device, added cpap 2014      Osteopenia 2013    Dr. Taylor     Peripheral neuropathy     Dr. Latia Sparks 1956     Statin intolerance     zocor and one other caused aches     Subdural hematoma (H) 1/16    traumatic, fu ct 3/16 resolved     Past Surgical History:   Procedure Laterality Date     APPENDECTOMY  60's     PHACOEMULSIFICATION CLEAR CORNEA WITH STANDARD INTRAOCULAR LENS IMPLANT  3/21/2012    Procedure:PHACOEMULSIFICATION CLEAR CORNEA WITH STANDARD INTRAOCULAR LENS IMPLANT; RIGHT PHACOEMULSIFICATION CLEAR CORNEA WITH STANDARD INTRAOCULAR LENS IMPLANT ; Surgeon:CHANDRAKANT HERNANDEZ; Location:[facility]     PHACOEMULSIFICATION CLEAR CORNEA WITH STANDARD INTRAOCULAR LENS IMPLANT  3/28/2012    Procedure:PHACOEMULSIFICATION CLEAR CORNEA WITH STANDARD INTRAOCULAR LENS IMPLANT; LEFT PHACOEMULSIFICATION CLEAR CORNEA WITH STANDARD INTRAOCULAR LENS IMPLANT ; Surgeon:CHANDRAKANT HERNANDEZ; Location:[facility]     SHOULDER SURGERY  1990's    twice     TONSILLECTOMY  child       Current Outpatient Medications   Medication Sig Dispense Refill     CALCIUM PO Take by mouth daily Takes liquid form       Coenzyme Q10 (COQ10 PO) Take 100 mg by mouth.       Nutritional Supplements (JUICE PLUS FIBRE PO) Take by mouth 2 times daily        ORDER FOR DME Use your CPAP device as directed by your provider.       rosuvastatin (CRESTOR) 5 MG tablet Take 1 tablet (5 mg) by mouth daily 90 tablet 0       Review of Systems  Constitutional, HEENT, cardiovascular, pulmonary, GI, , musculoskeletal, neuro, skin, endocrine and psych systems are negative, except as otherwise noted.    OBJECTIVE:   /73 (BP Location: Right arm, Patient Position: Chair, Cuff Size: Adult Regular)   Pulse 90   Temp 99  F (37.2  C) (Tympanic)   Ht 1.727 m (5'  "8\")   Wt 69.4 kg (153 lb)   SpO2 95%   BMI 23.26 kg/m   Estimated body mass index is 23.26 kg/m  as calculated from the following:    Height as of this encounter: 1.727 m (5' 8\").    Weight as of this encounter: 69.4 kg (153 lb).  Physical Exam  GENERAL APPEARANCE: healthy, alert and no distress  EYES: Eyes grossly normal to inspection, PERRL and conjunctivae and sclerae normal  HENT: ear canals and TM's normal, nose and mouth without ulcers or lesions, oropharynx clear and oral mucous membranes moist  NECK: no adenopathy, no asymmetry, masses, or scars and thyroid normal to palpation  RESP: lungs clear to auscultation - no rales, rhonchi or wheezes  BREAST: normal without masses, tenderness or nipple discharge and no palpable axillary masses or adenopathy  CV: regular rate and rhythm, normal S1 S2, no S3 or S4, no murmur, click or rub, no peripheral edema and peripheral pulses strong  ABDOMEN: soft, nontender, no hepatosplenomegaly, no masses and bowel sounds normal  : no vulvar lesions. Bimanual exam neg for adnexal masses.  MS: no musculoskeletal defects are noted and gait is age appropriate without ataxia  SKIN: no suspicious lesions or rashes  NEURO: Normal strength and tone, sensory exam grossly normal, mentation intact and speech normal  PSYCH: mentation appears normal and affect normal/bright        ASSESSMENT / PLAN:   Assessment and Plan:     (Z00.00) Encounter for Medicare annual wellness exam  (primary encounter diagnosis)  Comment: not fasting but will do labs anyway. Recd annual mammogram.. Due for 2nd shingrex now.  Plan: Comprehensive metabolic panel, CBC with         platelets            (E78.5) Hyperlipidemia LDL goal <130  Comment:   Plan: Lipid Profile, rosuvastatin (CRESTOR) 5 MG         tablet            (G14) Post-polio syndrome  Comment:   Plan: followed by Mohinder Lyles and may transfer to [place] once he retires.    (E53.8) Vitamin B12 deficiency (non anemic)  Comment:   Plan: Vitamin " "B12            (E55.9) Vitamin D deficiency  Comment:   Plan: Vitamin D Deficiency              End of Life Planning:  Patient currently has an advanced directive: Yes.  Practitioner is supportive of decision.    COUNSELING:  Reviewed preventive health counseling, as reflected in patient instructions    Estimated body mass index is 23.26 kg/m  as calculated from the following:    Height as of this encounter: 1.727 m (5' 8\").    Weight as of this encounter: 69.4 kg (153 lb).         reports that she quit smoking about 45 years ago. Her smoking use included cigarettes. She has a 18.00 pack-year smoking history. She has never used smokeless tobacco.      Appropriate preventive services were discussed with this patient, including applicable screening as appropriate for cardiovascular disease, diabetes, osteopenia/osteoporosis, and glaucoma.  As appropriate for age/gender, discussed screening for colorectal cancer, prostate cancer, breast cancer, and cervical cancer. Checklist reviewing preventive services available has been given to the patient.    Reviewed patients plan of care and provided an AVS. The Basic Care Plan (routine screening as documented in Health Maintenance) for Lizzette meets the Care Plan requirement. This Care Plan has been established and reviewed with the Patient.    Counseling Resources:  ATP IV Guidelines  Pooled Cohorts Equation Calculator  Breast Cancer Risk Calculator  FRAX Risk Assessment  ICSI Preventive Guidelines  Dietary Guidelines for Americans, 2010  USDA's MyPlate  ASA Prophylaxis  Lung CA Screening    Jennifer Jose PA-C  [facility]    Identified Health Risks:  "

## 2019-05-23 ENCOUNTER — OFFICE VISIT (OUTPATIENT)
Dept: FAMILY MEDICINE | Facility: CLINIC | Age: 79
End: 2019-05-23
Payer: COMMERCIAL

## 2019-05-23 VITALS
TEMPERATURE: 99 F | WEIGHT: 153 LBS | HEART RATE: 90 BPM | DIASTOLIC BLOOD PRESSURE: 73 MMHG | OXYGEN SATURATION: 95 % | HEIGHT: 68 IN | BODY MASS INDEX: 23.19 KG/M2 | SYSTOLIC BLOOD PRESSURE: 138 MMHG

## 2019-05-23 DIAGNOSIS — E55.9 VITAMIN D DEFICIENCY: ICD-10-CM

## 2019-05-23 DIAGNOSIS — E78.5 HYPERLIPIDEMIA LDL GOAL <130: ICD-10-CM

## 2019-05-23 DIAGNOSIS — E53.8 VITAMIN B12 DEFICIENCY (NON ANEMIC): ICD-10-CM

## 2019-05-23 DIAGNOSIS — Z00.00 ENCOUNTER FOR MEDICARE ANNUAL WELLNESS EXAM: Primary | ICD-10-CM

## 2019-05-23 DIAGNOSIS — G14 POST-POLIO SYNDROME (H): ICD-10-CM

## 2019-05-23 LAB
ERYTHROCYTE [DISTWIDTH] IN BLOOD BY AUTOMATED COUNT: 15.4 % (ref 10–15)
HCT VFR BLD AUTO: 39.9 % (ref 35–47)
HGB BLD-MCNC: 12.8 G/DL (ref 11.7–15.7)
MCH RBC QN AUTO: 29.8 PG (ref 26.5–33)
MCHC RBC AUTO-ENTMCNC: 32.1 G/DL (ref 31.5–36.5)
MCV RBC AUTO: 93 FL (ref 78–100)
PLATELET # BLD AUTO: 310 10E9/L (ref 150–450)
RBC # BLD AUTO: 4.29 10E12/L (ref 3.8–5.2)
WBC # BLD AUTO: 8.3 10E9/L (ref 4–11)

## 2019-05-23 PROCEDURE — 80061 LIPID PANEL: CPT | Performed by: PHYSICIAN ASSISTANT

## 2019-05-23 PROCEDURE — 36415 COLL VENOUS BLD VENIPUNCTURE: CPT | Performed by: PHYSICIAN ASSISTANT

## 2019-05-23 PROCEDURE — 85027 COMPLETE CBC AUTOMATED: CPT | Performed by: PHYSICIAN ASSISTANT

## 2019-05-23 PROCEDURE — 80053 COMPREHEN METABOLIC PANEL: CPT | Performed by: PHYSICIAN ASSISTANT

## 2019-05-23 PROCEDURE — 99397 PER PM REEVAL EST PAT 65+ YR: CPT | Performed by: PHYSICIAN ASSISTANT

## 2019-05-23 PROCEDURE — 82306 VITAMIN D 25 HYDROXY: CPT | Performed by: PHYSICIAN ASSISTANT

## 2019-05-23 PROCEDURE — 82607 VITAMIN B-12: CPT | Performed by: PHYSICIAN ASSISTANT

## 2019-05-23 RX ORDER — ROSUVASTATIN CALCIUM 5 MG/1
5 TABLET, COATED ORAL DAILY
Qty: 90 TABLET | Refills: 3 | Status: SHIPPED | OUTPATIENT
Start: 2019-05-23 | End: 2020-05-15

## 2019-05-23 ASSESSMENT — MIFFLIN-ST. JEOR: SCORE: 1212.5

## 2019-05-23 ASSESSMENT — ACTIVITIES OF DAILY LIVING (ADL): CURRENT_FUNCTION: NO ASSISTANCE NEEDED

## 2019-05-23 NOTE — PATIENT INSTRUCTIONS
Patient Education   Personalized Prevention Plan  You are due for the preventive services outlined below.  Your care team is available to assist you in scheduling these services.  If you have already completed any of these items, please share that information with your care team to update in your medical record.  Health Maintenance Due   Topic Date Due     Osteoporosis Screening  1940     Asthma Action Plan  1940     Asthma Control Test  1940     Cholesterol Lab  10/07/1983     Annual Wellness Visit  12/11/2016     Discuss Advance Directive Planning  07/22/2018     PHQ-2  01/01/2019     Zoster (Shingles) Vaccine (3 of 3) 02/11/2019     FALL RISK ASSESSMENT  03/27/2019         Mammogram [address]  [phone]

## 2019-05-24 LAB
ALBUMIN SERPL-MCNC: 3.8 G/DL (ref 3.4–5)
ALP SERPL-CCNC: 140 U/L (ref 40–150)
ALT SERPL W P-5'-P-CCNC: 22 U/L (ref 0–50)
ANION GAP SERPL CALCULATED.3IONS-SCNC: 4 MMOL/L (ref 3–14)
AST SERPL W P-5'-P-CCNC: 23 U/L (ref 0–45)
BILIRUB SERPL-MCNC: 0.2 MG/DL (ref 0.2–1.3)
BUN SERPL-MCNC: 16 MG/DL (ref 7–30)
CALCIUM SERPL-MCNC: 9.4 MG/DL (ref 8.5–10.1)
CHLORIDE SERPL-SCNC: 106 MMOL/L (ref 94–109)
CHOLEST SERPL-MCNC: 185 MG/DL
CO2 SERPL-SCNC: 28 MMOL/L (ref 20–32)
CREAT SERPL-MCNC: 0.79 MG/DL (ref 0.52–1.04)
DEPRECATED CALCIDIOL+CALCIFEROL SERPL-MC: 45 UG/L (ref 20–75)
GFR SERPL CREATININE-BSD FRML MDRD: 70 ML/MIN/{1.73_M2}
GLUCOSE SERPL-MCNC: 85 MG/DL (ref 70–99)
HDLC SERPL-MCNC: 69 MG/DL
LDLC SERPL CALC-MCNC: 79 MG/DL
NONHDLC SERPL-MCNC: 116 MG/DL
POTASSIUM SERPL-SCNC: 4.1 MMOL/L (ref 3.4–5.3)
PROT SERPL-MCNC: 8.4 G/DL (ref 6.8–8.8)
SODIUM SERPL-SCNC: 138 MMOL/L (ref 133–144)
TRIGL SERPL-MCNC: 185 MG/DL
VIT B12 SERPL-MCNC: 1185 PG/ML (ref 193–986)

## 2019-05-28 NOTE — RESULT ENCOUNTER NOTE
It was a pleasure seeing you for your physical examination.  I wanted to get back to you with your test results.  I have enclosed a copy for your review.      I am happy to report that your CBC or complete blood count is normal with no signs of anemia, leukemia or platelet abnormalities. Your chemistry panel shows no signs of diabetes.  Your blood salts, kidney tests, liver tests, and proteins are all fine.    Your vitamin D and B12 levels are normal.    Your total cholesterol is 185 with the normal range being below 200.  Your HDL or good cholesterol is 69 with the normal range being above 50.  Your LDL or bad cholesterol is 79 with the normal range being below 130.      Let me know if you have any questions.    Jennifer Jose PA-C

## 2019-06-11 ENCOUNTER — HOSPITAL ENCOUNTER (OUTPATIENT)
Dept: MAMMOGRAPHY | Facility: CLINIC | Age: 79
Discharge: HOME | End: 2019-06-11
Attending: PHYSICIAN ASSISTANT | Admitting: PHYSICIAN ASSISTANT
Payer: COMMERCIAL

## 2019-06-11 DIAGNOSIS — Z12.31 VISIT FOR SCREENING MAMMOGRAM: ICD-10-CM

## 2019-06-11 PROCEDURE — 77063 BREAST TOMOSYNTHESIS BI: CPT

## 2019-11-13 ENCOUNTER — TRANSFERRED RECORDS (OUTPATIENT)
Dept: HEALTH INFORMATION MANAGEMENT | Facility: CLINIC | Age: 79
End: 2019-11-13

## 2019-11-19 ENCOUNTER — TRANSFERRED RECORDS (OUTPATIENT)
Dept: FAMILY MEDICINE | Facility: CLINIC | Age: 79
End: 2019-11-19

## 2019-11-26 ENCOUNTER — TRANSFERRED RECORDS (OUTPATIENT)
Dept: FAMILY MEDICINE | Facility: CLINIC | Age: 79
End: 2019-11-26

## 2019-12-02 ENCOUNTER — TRANSFERRED RECORDS (OUTPATIENT)
Dept: HEALTH INFORMATION MANAGEMENT | Facility: CLINIC | Age: 79
End: 2019-12-02

## 2020-01-17 DIAGNOSIS — G47.33 OBSTRUCTIVE SLEEP APNEA (ADULT) (PEDIATRIC): Primary | ICD-10-CM

## 2020-01-21 ENCOUNTER — DOCUMENTATION ONLY (OUTPATIENT)
Dept: SLEEP MEDICINE | Facility: CLINIC | Age: 80
End: 2020-01-21

## 2020-01-21 NOTE — PROGRESS NOTES
Lizzette came into the [place] location for help with her so clean machine. Pt wanted to know if it was time to replace her filter on her So clean machine. Let the pt know that it will alert when it needs to be replace. Also showed the pt in her manual where to locate the information regarding the So clean filter. Also ran a manual download for the pt insurance to receive a new pa for supplies.

## 2020-01-28 ENCOUNTER — TELEPHONE (OUTPATIENT)
Dept: SLEEP MEDICINE | Facility: CLINIC | Age: 80
End: 2020-01-28

## 2020-01-28 NOTE — TELEPHONE ENCOUNTER
Pt called regarding letter clinic rev'd from [facility] stating a 30 day cpap compliance download was needed to renew supplies.    Pt states that [facility] is already aware and this has been taken care of.    Due to pt's last appointment being 2 years ago pt has scheduled follow up appointment.    SUSI [place]

## 2020-06-15 DIAGNOSIS — E78.5 HYPERLIPIDEMIA LDL GOAL <130: ICD-10-CM

## 2020-06-16 ENCOUNTER — TELEPHONE (OUTPATIENT)
Dept: FAMILY MEDICINE | Facility: CLINIC | Age: 80
End: 2020-06-16

## 2020-06-16 ENCOUNTER — HOSPITAL ENCOUNTER (OUTPATIENT)
Dept: BONE DENSITY | Facility: CLINIC | Age: 80
End: 2020-06-16
Attending: PHYSICIAN ASSISTANT
Payer: COMMERCIAL

## 2020-06-16 ENCOUNTER — HOSPITAL ENCOUNTER (OUTPATIENT)
Dept: MAMMOGRAPHY | Facility: CLINIC | Age: 80
End: 2020-06-16
Attending: INTERNAL MEDICINE
Payer: COMMERCIAL

## 2020-06-16 DIAGNOSIS — Z12.31 VISIT FOR SCREENING MAMMOGRAM: ICD-10-CM

## 2020-06-16 DIAGNOSIS — Z78.0 POSTMENOPAUSAL STATUS: Primary | ICD-10-CM

## 2020-06-16 DIAGNOSIS — Z78.0 POSTMENOPAUSAL STATUS: ICD-10-CM

## 2020-06-16 PROCEDURE — 77067 SCR MAMMO BI INCL CAD: CPT

## 2020-06-16 PROCEDURE — 77080 DXA BONE DENSITY AXIAL: CPT

## 2020-06-16 RX ORDER — ROSUVASTATIN CALCIUM 5 MG/1
TABLET, COATED ORAL
Qty: 90 TABLET | Refills: 0 | Status: SHIPPED | OUTPATIENT
Start: 2020-06-16 | End: 2020-07-01

## 2020-06-16 NOTE — TELEPHONE ENCOUNTER
She is scheduled for virtual visit.  Was in clinic today for another reason. Asking for Crestor refill so she can stop on her way home tonight.    Done.  Linda Watts RN on 6/16/2020 at 5:01 PM

## 2020-06-16 NOTE — TELEPHONE ENCOUNTER
I entered dexa order. She can call [phone] to schedule if it has been at least 2 years since last dexa

## 2020-06-16 NOTE — TELEPHONE ENCOUNTER
Reason for Call: Request for an order or referral:    Order or referral being requested: Order for bone density test.  She most recently sees Jennifer Jose.     Date needed: at your convenience    Has the patient been seen by the PCP for this problem? Not Applicable    Additional comments: patient has gotten dexa scans in the past through another office.      Phone number Patient can be reached at:  Home number on file [phone] (home)    Best Time:  any    Can we leave a detailed message on this number?  YES    Call taken on 6/16/2020 at 1:09 PM by Afshan Oneil

## 2020-06-22 NOTE — RESULT ENCOUNTER NOTE
Ruth Crocker,    This is to inform you regarding your test result.     I am covering for Jennifer Jose while she is out of the office    Bone density shows osteopenia with high fracture risk.  Make sure that you take adequate calcium and vit D and do weight bearing exercises .  But due to high fracture risk you need medication like bisphosphonate  Please make appointment with Jennifer at your earliest convenience to discuss this further.  Virtual visit is ok      Sincerely,      Dr.Nasima Amaury MD,FACP

## 2020-07-01 ENCOUNTER — VIRTUAL VISIT (OUTPATIENT)
Dept: FAMILY MEDICINE | Facility: CLINIC | Age: 80
End: 2020-07-01
Payer: COMMERCIAL

## 2020-07-01 DIAGNOSIS — M81.0 AGE RELATED OSTEOPOROSIS, UNSPECIFIED PATHOLOGICAL FRACTURE PRESENCE: ICD-10-CM

## 2020-07-01 DIAGNOSIS — E78.5 HYPERLIPIDEMIA LDL GOAL <130: Primary | ICD-10-CM

## 2020-07-01 PROCEDURE — 99214 OFFICE O/P EST MOD 30 MIN: CPT | Mod: 95 | Performed by: PHYSICIAN ASSISTANT

## 2020-07-01 RX ORDER — ROSUVASTATIN CALCIUM 5 MG/1
TABLET, COATED ORAL
Qty: 90 TABLET | Refills: 0 | Status: SHIPPED | OUTPATIENT
Start: 2020-07-01 | End: 2020-09-14

## 2020-07-01 RX ORDER — ALENDRONATE SODIUM 70 MG/1
70 TABLET ORAL
Qty: 12 TABLET | Refills: 3 | Status: SHIPPED | OUTPATIENT
Start: 2020-07-01 | End: 2021-07-05

## 2020-07-01 NOTE — PROGRESS NOTES
"Lizzette Chanel is a 79 year old female who is being evaluated via a billable telephone visit.      The patient has been notified of following:     \"This telephone visit will be conducted via a call between you and your physician/provider. We have found that certain health care needs can be provided without the need for a physical exam.  This service lets us provide the care you need with a short phone conversation.  If a prescription is necessary we can send it directly to your pharmacy.  If lab work is needed we can place an order for that and you can then stop by our lab to have the test done at a later time.    Telephone visits are billed at different rates depending on your insurance coverage. During this emergency period, for some insurers they may be billed the same as an in-person visit.  Please reach out to your insurance provider with any questions.    If during the course of the call the physician/provider feels a telephone visit is not appropriate, you will not be charged for this service.\"    Patient has given verbal consent for Telephone visit?  Yes    What phone number would you like to be contacted at?  [phone]    How would you like to obtain your AVS? MyChart    Subjective     Lizzette Chanel is a 79 year old female who presents via phone visit today for the following health issues:    HPI    PT needing refill of crestor  Feels well with some fatigue that she attributes to post polio    Reviewed and updated as needed this visit by Provider         Review of Systems   Constitutional, HEENT, cardiovascular, pulmonary, GI, , musculoskeletal, neuro, skin, endocrine and psych systems are negative, except as otherwise noted.       Objective   Reported vitals:  There were no vitals taken for this visit.   healthy, alert and no distress  PSYCH: Alert and oriented times 3; coherent speech, normal   rate and volume, able to articulate logical thoughts, able   to abstract reason, no tangential thoughts, " no hallucinations   or delusions  Her affect is normal  RESP: No cough, no audible wheezing, able to talk in full sentences  Remainder of exam unable to be completed due to telephone visits    The L1-L2 T-score is -3.5 and I suspect false elevation at the other  levels. The femoral neck T-scores are -2.3 on the left and -1.8 on the  right. The total proximal femur T-scores are -2.2 on the left and -1.9  on the right. The FRAX 10-year probability is 32.4% for major  osteoporotic fracture and 22.7% for hip fracture.  All treatment  decisions require clinical judgment and consideration of individual  patient factors which may not be captured in the FRAX model and the  risk of fracture may be over- or under-estimated by FRAX.                                                                       IMPRESSION:  1. Moderate-severe osteoporosis in the upper lumbar spine.  2. Prominent osteopenia in the left femoral neck.  3. Moderate osteopenia in the right proximal femur.           Assessment and Plan:     (E78.5) Hyperlipidemia LDL goal <130  (primary encounter diagnosis)  Comment:   Plan: Lipid panel reflex to direct LDL Fasting, ALT,         rosuvastatin (CRESTOR) 5 MG tablet            (M81.0) Age related osteoporosis, unspecified pathological fracture presence  Comment: cont weight bearing exercise and vit D  Plan: start Fosamax 70mg qweek.  Repeat DXA in 2 years.      Jennifer Jose PA-C          Phone call duration: 15 minutes

## 2020-07-15 DIAGNOSIS — E78.5 HYPERLIPIDEMIA LDL GOAL <130: ICD-10-CM

## 2020-07-15 LAB
ALT SERPL W P-5'-P-CCNC: 22 U/L (ref 0–50)
CHOLEST SERPL-MCNC: 157 MG/DL
HDLC SERPL-MCNC: 78 MG/DL
LDLC SERPL CALC-MCNC: 66 MG/DL
NONHDLC SERPL-MCNC: 79 MG/DL
TRIGL SERPL-MCNC: 66 MG/DL

## 2020-07-15 PROCEDURE — 36415 COLL VENOUS BLD VENIPUNCTURE: CPT | Performed by: PHYSICIAN ASSISTANT

## 2020-07-15 PROCEDURE — 80061 LIPID PANEL: CPT | Performed by: PHYSICIAN ASSISTANT

## 2020-07-15 PROCEDURE — 84460 ALANINE AMINO (ALT) (SGPT): CPT | Performed by: PHYSICIAN ASSISTANT

## 2020-07-16 NOTE — RESULT ENCOUNTER NOTE
Jessika,    Your cholesterol profile is EXCELLENT!  Please continue the crestor.    Jennifer Jose PA-C

## 2020-09-02 ENCOUNTER — VIRTUAL VISIT (OUTPATIENT)
Dept: SLEEP MEDICINE | Facility: CLINIC | Age: 80
End: 2020-09-02
Payer: COMMERCIAL

## 2020-09-02 VITALS — WEIGHT: 152 LBS | BODY MASS INDEX: 23.04 KG/M2 | HEIGHT: 68 IN

## 2020-09-02 DIAGNOSIS — G47.33 OSA (OBSTRUCTIVE SLEEP APNEA): Primary | ICD-10-CM

## 2020-09-02 PROCEDURE — 99203 OFFICE O/P NEW LOW 30 MIN: CPT | Mod: 95 | Performed by: INTERNAL MEDICINE

## 2020-09-02 ASSESSMENT — MIFFLIN-ST. JEOR: SCORE: 1202.97

## 2020-09-02 NOTE — PROGRESS NOTES
"Lizzette Chanel is a 79 year old female who is being evaluated via a billable telephone visit.      The patient has been notified of following:     \"This telephone visit will be conducted via a call between you and your physician/provider. We have found that certain health care needs can be provided without the need for a physical exam.  This service lets us provide the care you need with a short phone conversation.  If a prescription is necessary we can send it directly to your pharmacy.  If lab work is needed we can place an order for that and you can then stop by our lab to have the test done at a later time.    Telephone visits are billed at different rates depending on your insurance coverage. During this emergency period, for some insurers they may be billed the same as an in-person visit.  Please reach out to your insurance provider with any questions.    If during the course of the call the physician/provider feels a telephone visit is not appropriate, you will not be charged for this service.\"    Patient has given verbal consent for Telephone visit?  Yes    What phone number would you like to be contacted at? [phone]    How would you like to obtain your AVS? 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  She was originally treated with an oral appliance but now currently uses CPAP.  Spent a few years since she has been seen in clinic and she feels that she should probably get a good checkup.  She had hypoxemia associated with sleep apnea and wants to make sure that that is adequately treated.  She continues to use the CPAP nightly.  However, she did go a week without it when she was at the [facility] recently.  In general CPAP helps.  She is certainly not as sleepy as she had been prior to therapy.  " Previously she had been sleepy behind the wheel.  For the most part she sleeps well.  She admits that sometimes her mind that can keep her from falling asleep or returning to sleep.  Politics and thinking about projects can be an issue.  She is been using a commercially available CPAP cleaning device for the last year and a half.  Since starting that she stopped cleaning her supplies.  She uses a nasal mask and regular tubing.  She uses CPAP nightly and does not take naps.    She denies new sleep concerns such as restless legs, sleepwalking, sleep talking or dream enactment behavior.  Weight has been stable.    Total score - [place]: 10 (9/2/2020  9:00 AM) (Less than 10 normal)    GISSELLE Total Score: 7 (normal 0-7, mild 8-14, moderate 15-21, severe 22-28)        Past Medical History:   Diagnosis Date     Abdominal pain 2011    ct abd and pelvis uterine fiborid, renal cysts and tics     Chest pain 2003    neg est thallium     Chest pain 4/16    nl est echo and cxr     Hx of colonoscopy 2003    incomplete, be nl     Hx of colonoscopy [place] 3, 2011    nl     Hypercholesteremia 2011    aches with zocor     Intermittent asthma 1995     CAITLYN (obstructive sleep apnea) 12/12    done [place], using dental device, added cpap 2014      Osteopenia 2013    Dr. Taylor     Peripheral neuropathy     Dr. Latia Sparks 1956     Statin intolerance     zocor and one other caused aches     Subdural hematoma (H) 1/16    traumatic, fu ct 3/16 resolved       No Known Allergies    Current Outpatient Medications   Medication     alendronate (FOSAMAX) 70 MG tablet     CALCIUM PO     Coenzyme Q10 (COQ10 PO)     Nutritional Supplements (JUICE PLUS FIBRE PO)     ORDER FOR DME     rosuvastatin (CRESTOR) 5 MG tablet     No current facility-administered medications for this visit.        Social History     Socioeconomic History     Marital status: Single     Spouse name: Not on file     Number of children: 0     Years of education: Not on file      Highest education level: Not on file   Occupational History     Occupation: physical therapy     Employer: RETIRED   Social Needs     Financial resource strain: Not on file     Food insecurity     Worry: Not on file     Inability: Not on file     Transportation needs     Medical: Not on file     Non-medical: Not on file   Tobacco Use     Smoking status: Former Smoker     Packs/day: 1.50     Years: 12.00     Pack years: 18.00     Types: Cigarettes     Last attempt to quit: 3/17/1974     Years since quittin.4     Smokeless tobacco: Never Used   Substance and Sexual Activity     Alcohol use: Yes     Alcohol/week: 0.0 standard drinks     Comment: 1-2/day     Drug use: No     Sexual activity: Never   Lifestyle     Physical activity     Days per week: Not on file     Minutes per session: Not on file     Stress: Not on file   Relationships     Social connections     Talks on phone: Not on file     Gets together: Not on file     Attends [employer] service: Not on file     Active member of club or organization: Not on file     Attends meetings of clubs or organizations: Not on file     Relationship status: Not on file     Intimate partner violence     Fear of current or ex partner: Not on file     Emotionally abused: Not on file     Physically abused: Not on file     Forced sexual activity: Not on file   Other Topics Concern     Parent/sibling w/ CABG, MI or angioplasty before 65F 55M? Not Asked   Social History Narrative    Single, no kids    Has a cabin    Has godchildren that can help out       Family History   Problem Relation Age of Onset     C.A.D. Father      Heart Disease Mother 97     Alzheimer Disease Mother      C.A.D. 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    EXAM: Alert no distress  Ht 1.727 m (5' 8\")   Wt 68.9 kg (152 lb)   BMI 23.11 kg/m    Pulmonary: Speaking full sentences l  Neurologic: No gross focal deficits    PSG 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  She is had multiple oximetries in the past done while she has had normal AHI on download.  These have shown resolution of hypoxemia.  Above download also shows normal AHI so suspect no hypoxemia.  She is showing some excessive leak which she is shown in the past.  She is not aware of this.    PLAN:  I counseled patient that she still needs to clean her supplies with soap in order to remove oils and films.  Her commercially available cleaning device is an extra sanitation step.  Urged her to work with her DME provider to get updated supplies and to fix leak.  She may benefit from a mask fitting when COVID-19 conditions permit.  Counseled her to contact me should she develop any problems with her machine that she might need a replacement however currently she has no major problems.  I counseled her to avoid engaging in reading about her watching politics after 7 PM.  She will follow-up with me in 1 year earlier if new issues arise.    Rhonda Perez M.D.  Pulmonary/Critical Care/Sleep Medicine    [facility]   Floor 1, [address]   [address] [place], [place] [address] [address]   Appointments: " [phone]    The above note was dictated using voice recognition software and may include typographical errors. Please contact the author for any clarifications.

## 2020-09-02 NOTE — PATIENT INSTRUCTIONS
Get updated supplies-consider mask fitting when   For general sleep health questions: http://sleepeducation.org    For tips about PAP and COVID -19:  https://www.thoracic.org/patients/patient-resources/resources/covid-19-and-home-pap-therapy.pdf        Continue PAP therapy every night, for all hours that you are sleeping.  If you nap use CPAP.  As always, try to get at least 8 hours of sleep or more each day, keep a regular sleep schedule, and avoid sleep deprivation. Avoid alcohol.    Reasons that you might need a change to your pressure therapy would be weight gain or loss, waking having inadvertently removed your PAP overnight, having previously felt refreshed by sleep with CPAP use and now waking un-refreshed, and return of daytime sleepiness. Also, the development of new medical problems  (such as heart failure, stroke, medications such as narcotics) can sometimes affect breathing at night and change your PAP therapy needs.    Please bring PAP with you if you are hospitalized.  If anticipating surgery be sure to discuss with your surgeon that you have sleep apnea and use PAP therapy.      Maintain your equipment as recommended which includes routine cleaning and replacement of supplies.      Call DME for any questions regarding supplies or maintenance.    [facility], [facility] [phone]      Do not drive on engage in potentially dangerous activities if feeling sleepy.    Please follow up in sleep clinic again in 12 months and bring your machine and card to your follow-up visit.          Tips for your PAP use-    Mask fitting tips  Mask fitting exercise:    To improve your mask seal and your mobility at night, put mask on and secure in place.  Lie down in bed with full pressure and roll to one side, adjust headgear while in that position to eliminate any leaks. Repeat process rolling to other side.     The mask seal does not have to be perfect:   CPAP machines are  designed to make up for small leaks. However, you will not tolerate leaks blowing in your eyes so you will need to adjust.   Any leak should only be near or at the bottom of the mask.  We expect your mask to leak slightly at night.    Do not over-tighten the headgear straps, tighter IS NOT better, we expect minimal leak.    First try re-positioning the mask or headgear before tightening the headgear straps.  Mask leaks are expected due to changing sleeping positions. Try pulling the mask away from your skin allowing the cushion to re-inflate will minimize the leak.  If you struggle for a good fit, try turning the CPAP off and then readjust the mask by pulling it away from your face and then turning back on the CPAP.        Humidifier tips  Humidifiers can be adjusted to increase or decrease the amount of moisture according to your comfort level. You may need to adjust this frequently at first, but then might only change it with seasonal weather changes.     Try INCREASING the humidity if:  You experience a dry, irritated nasal passage or throat.  You have a runny, drippy nose or sneezing fits after using CPAP.  You experience nasal congestion during or after CPAP use.    Try DECREASING the humidity if:  You have excessive condensation or  rain out  in the tubing or mask.  Otherwise keep the tubing warm during the night by running it underneath the blankets or pillow.      Clinic visit after initial PAP set-up   Bring your equipment with you to your 4 week follow up clinic visit.  We will be extracting your data from the machine.        Travel  Always take your equipment with you.  If you fly with your equipment bring it on with you as a carry on.  Medical equipment does not count as a carry on.    If you travel international the machines take 110-240.  The only adapter needed is the adapter that will fit into the receptacle (outlet).    You may also want to bring an extension cord as many hotel rooms have limited  outlets at the bedside.  Do not travel with water in your humidifier chamber.     Cleaning and Maintenance Guidelines    Equipment Frequency Cleaning Method   Mask First Day    Daily      Weekly Soak mask in hot soapy water for 30 minutes, rinse and air dry.  Wipe nasal cushion with a hot soapy (Ivory, baby shampoo) cloth and rinse.  Baby wipes may also be used.  Do not use anti-bacterial soaps,Joya  liquid soap, rubbing alcohol, bleach or ammonia.  Wash frame in hot soapy water (Ivory, baby shampoo) rinse and let air dry   Headgear Biweekly Wash in hot soapy water, rinse and air dry   Reusable Gray Filter Weekly Wash in hot soapy water, rinse, put in towel squeeze moisture out, let air dry   Disposable White Filter Check Weekly Replace when brown or gray in color; at least every 2 to 3 months   Humidifier Chamber Daily    Weekly Empty distilled water from humidifier and let air dry    Hand wash in hot soapy water, rinse and air dry   Tubing Weekly Wash in hot soapy water, rinse and let air dry   Mask, Tubing and Humidifier Chamber As needed Disinfect: Soak in 1 part distilled white vinegar to 3 parts hot water for 30 minutes, rinse well and air dry  Not the material headgear        MASK AND SUPPLY REORDERING and EQUIPMENT NEEDS through your DME and per your insurance  Reminder: Most insurance companies will allow for a new mask, headgear, tubing, and reusable gray filter every six months.  Disposable white ultra-fine filters are covered monthly.      HOME AND SAFETY INSTRUCTIONS    Do not use frayed or cracked electrical cords, multi plug adaptors, or switched receptacles    Do not immerse electrical equipment into water    Assure that electrical cords do not become a tripping hazard      Your BMI is Body mass index is 23.11 kg/m .  Weight management is a personal decision.  If you are interested in exploring weight loss strategies, the following discussion covers the approaches that may be successful. Body mass  index (BMI) is one way to tell whether you are at a healthy weight, overweight, or obese. It measures your weight in relation to your height.  A BMI of 18.5 to 24.9 is in the healthy range. A person with a BMI of 25 to 29.9 is considered overweight, and someone with a BMI of 30 or greater is considered obese. More than two-thirds of American adults are considered overweight or obese.  Being overweight or obese increases the risk for further weight gain. Excess weight may lead to heart disease and diabetes.  Creating and following plans for healthy eating and physical activity may help you improve your health.  Weight control is part of healthy lifestyle and includes exercise, emotional health, and healthy eating habits. Careful eating habits lifelong are the mainstay of weight control. Though there are significant health benefits from weight loss, long-term weight loss with diet alone may be very difficult to achieve- studies show long-term success with dietary management in less than 10% of people. Attaining a healthy weight may be especially difficult to achieve in those with severe obesity. In some cases, medications, devices and surgical management might be considered.  What can you do?  If you are overweight or obese and are interested in methods for weight loss, you should discuss this with your provider.     Consider reducing daily calorie intake by 500 calories.     Keep a food journal.     Avoiding skipping meals, consider cutting portions instead.    Diet combined with exercise helps maintain muscle while optimizing fat loss. Strength training is particularly important for building and maintaining muscle mass. Exercise helps reduce stress, increase energy, and improves fitness. Increasing exercise without diet control, however, may not burn enough calories to loose weight.       Start walking three days a week 10-20 minutes at a time    Work towards walking thirty minutes five days a week     Eventually,  increase the speed of your walking for 1-2 minutes at time    In addition, we recommend that you review healthy lifestyles and methods for weight loss available through the National Institutes of Health patient information sites:  http://win.niddk.nih.gov/publications/index.htm    And look into health and wellness programs that may be available through your health insurance provider, employer, local community center, or livier club.

## 2020-09-11 DIAGNOSIS — E78.5 HYPERLIPIDEMIA LDL GOAL <130: ICD-10-CM

## 2020-09-14 RX ORDER — ROSUVASTATIN CALCIUM 5 MG/1
TABLET, COATED ORAL
Qty: 90 TABLET | Refills: 3 | Status: SHIPPED | OUTPATIENT
Start: 2020-09-14 | End: 2021-09-24

## 2020-09-15 ENCOUNTER — OFFICE VISIT (OUTPATIENT)
Dept: FAMILY MEDICINE | Facility: CLINIC | Age: 80
End: 2020-09-15
Payer: COMMERCIAL

## 2020-09-15 VITALS
BODY MASS INDEX: 23.64 KG/M2 | OXYGEN SATURATION: 95 % | HEIGHT: 68 IN | SYSTOLIC BLOOD PRESSURE: 143 MMHG | WEIGHT: 156 LBS | TEMPERATURE: 99.1 F | HEART RATE: 85 BPM | DIASTOLIC BLOOD PRESSURE: 73 MMHG

## 2020-09-15 DIAGNOSIS — G14 POST-POLIO SYNDROME (H): ICD-10-CM

## 2020-09-15 DIAGNOSIS — R09.89 CHRONIC THROAT CLEARING: ICD-10-CM

## 2020-09-15 DIAGNOSIS — R53.82 CHRONIC FATIGUE: ICD-10-CM

## 2020-09-15 DIAGNOSIS — R09.89 CHRONIC THROAT CLEARING: Primary | ICD-10-CM

## 2020-09-15 DIAGNOSIS — R49.0 HOARSENESS: ICD-10-CM

## 2020-09-15 DIAGNOSIS — Z23 NEED FOR PROPHYLACTIC VACCINATION AND INOCULATION AGAINST INFLUENZA: ICD-10-CM

## 2020-09-15 PROCEDURE — 99213 OFFICE O/P EST LOW 20 MIN: CPT | Mod: 25 | Performed by: PHYSICIAN ASSISTANT

## 2020-09-15 PROCEDURE — 90662 IIV NO PRSV INCREASED AG IM: CPT | Performed by: PHYSICIAN ASSISTANT

## 2020-09-15 PROCEDURE — G0008 ADMIN INFLUENZA VIRUS VAC: HCPCS | Performed by: PHYSICIAN ASSISTANT

## 2020-09-15 RX ORDER — FLUTICASONE PROPIONATE 50 MCG
1 SPRAY, SUSPENSION (ML) NASAL DAILY
Qty: 16 G | Refills: 11 | Status: SHIPPED | OUTPATIENT
Start: 2020-09-15 | End: 2020-09-16

## 2020-09-15 RX ORDER — FAMOTIDINE 40 MG/1
40 TABLET, FILM COATED ORAL DAILY
Qty: 30 TABLET | Refills: 3 | Status: SHIPPED | OUTPATIENT
Start: 2020-09-15 | End: 2020-09-16

## 2020-09-15 ASSESSMENT — MIFFLIN-ST. JEOR: SCORE: 1221.11

## 2020-09-15 NOTE — PROGRESS NOTES
Subjective     Lizzette Chanel is a 79 year old female who presents to clinic today for the following health issues:    HPI     Pt has freq throat clearing for years and here because she wants to make sure this is nothing to worry about.  This can be worse with talking gopi if she is stressed  Thinks she did see ENT over 30 years ago who told her she is fine  Denies sore throat or cold sxs  She has some post nasal drip gopi first thing in the morning  She notes her voice is hoarse at times.  She does also have periodic heartburn (once or twice per month) so takes tums prn which helps  She is not on any inhalers  Denies runny nose   occas has itchy/watery eyes.    Pt has chronic fatigue that she attributes to her post polio  She tries to rest more when she is raking etc  She denies feeling depressed but is seeing a counselor every few weeks due to issues related to a relationship change.  Same complaint last year    Past Medical History:   Diagnosis Date     Abdominal pain 2011    ct abd and pelvis uterine fiborid, renal cysts and tics     Chest pain 2003    neg est thallium     Chest pain 4/16    nl est echo and cxr     Hx of colonoscopy 2003    incomplete, be nl     Hx of colonoscopy [place] 3, 2011    nl     Hypercholesteremia 2011    aches with zocor     Intermittent asthma 1995     CAITLYN (obstructive sleep apnea) 12/12    done [place], using dental device, added cpap 2014      Osteopenia 2013    Dr. Taylor     Peripheral neuropathy     Dr. Latia Sparks 1956     Statin intolerance     zocor and one other caused aches     Subdural hematoma (H) 1/16    traumatic, fu ct 3/16 resolved     Past Surgical History:   Procedure Laterality Date     APPENDECTOMY  60's     PHACOEMULSIFICATION CLEAR CORNEA WITH STANDARD INTRAOCULAR LENS IMPLANT  3/21/2012    Procedure:PHACOEMULSIFICATION CLEAR CORNEA WITH STANDARD INTRAOCULAR LENS IMPLANT; RIGHT PHACOEMULSIFICATION CLEAR CORNEA WITH STANDARD INTRAOCULAR LENS IMPLANT ;  "Surgeon:CHANDRAKANT HERNANDEZ; Location:[facility]     PHACOEMULSIFICATION CLEAR CORNEA WITH STANDARD INTRAOCULAR LENS IMPLANT  3/28/2012    Procedure:PHACOEMULSIFICATION CLEAR CORNEA WITH STANDARD INTRAOCULAR LENS IMPLANT; LEFT PHACOEMULSIFICATION CLEAR CORNEA WITH STANDARD INTRAOCULAR LENS IMPLANT ; Surgeon:CHANDRAKANT HERNANDEZ; Location:[facility]     SHOULDER SURGERY      twice     TONSILLECTOMY  child     Social History     Tobacco Use     Smoking status: Former Smoker     Packs/day: 1.50     Years: 12.00     Pack years: 18.00     Types: Cigarettes     Last attempt to quit: 3/17/1974     Years since quittin.5     Smokeless tobacco: Never Used   Substance Use Topics     Alcohol use: Yes     Alcohol/week: 0.0 standard drinks     Comment: 1-2/day     Current Outpatient Medications   Medication Sig Dispense Refill     alendronate (FOSAMAX) 70 MG tablet Take 1 tablet (70 mg) by mouth every 7 days 12 tablet 3     CALCIUM PO Take by mouth daily Takes liquid form       cholecalciferol (VITAMIN D3) 25 mcg (1000 units) capsule Take 1 capsule by mouth daily       Coenzyme Q10 (COQ10 PO) Take 100 mg by mouth.       famotidine (PEPCID) 40 MG tablet Take 1 tablet (40 mg) by mouth daily 30 tablet 3     fluticasone (FLONASE) 50 MCG/ACT nasal spray Spray 1 spray into both nostrils daily 16 g 11     Nutritional Supplements (JUICE PLUS FIBRE PO) Take by mouth 2 times daily        ORDER FOR DME Use your CPAP device as directed by your provider.       rosuvastatin (CRESTOR) 5 MG tablet TAKE 1 TABLET(5 MG) BY MOUTH DAILY 90 tablet 3     vitamin B-12 (CYANOCOBALAMIN) 1000 MCG tablet Take 1,000 mcg by mouth daily       No Known Allergies  FAMILY HISTORY NOTED AND REVIEWED      PHYSICAL EXAM:    BP (!) 143/73 (BP Location: Left arm, Patient Position: Sitting)   Pulse 85   Temp 99.1  F (37.3  C) (Tympanic)   Ht 1.727 m (5' 8\")   Wt 70.8 kg (156 lb)   SpO2 95%   BMI 23.72 kg/m      Patient appears non toxic  Voice sounds normal; not " raspy  Throat: no erythema or exudates  Neck: supple without masses or LA or thyromegaly    Assessment and Plan:     (R68.89) Chronic throat clearing  (primary encounter diagnosis)  Comment:   Plan: OTOLARYNGOLOGY REFERRAL, fluticasone (FLONASE)         50 MCG/ACT nasal spray, famotidine (PEPCID) 40         MG tablet          Patient Instructions   Take on mucinex per day to thin secretion  Drinking water also helps  This plus flonase and pepcid should alleviate your symptoms of throat clear and hoarseness in 3-4 weeks.  If not, see ENT      (R49.0) Hoarseness  Comment:   Plan: OTOLARYNGOLOGY REFERRAL, fluticasone (FLONASE)         50 MCG/ACT nasal spray, famotidine (PEPCID) 40         MG tablet            (R53.82) Chronic fatigue  Comment:   Plan: same concern last year which is reassuring nothing worrisome and mostly likely related to age and post polio    (G14) Post-polio syndrome  Comment:   Plan: pt trying to take it easy when doing yard work etc    (Z23) Need for prophylactic vaccination and inoculation against influenza  Comment:   Plan: FLUZONE HIGH DOSE 65+  [60567], Vaccine         Administration, Initial [50105]              Jennifer Jose PA-C

## 2020-09-15 NOTE — PATIENT INSTRUCTIONS
Take on mucinex per day to thin secretion  Drinking water also helps  This plus flonase and pepcid should alleviate your symptoms of throat clear and hoarseness in 3-4 weeks.  If not, see ENT

## 2020-09-16 RX ORDER — FAMOTIDINE 40 MG/1
TABLET, FILM COATED ORAL
Qty: 90 TABLET | Refills: 3 | Status: SHIPPED | OUTPATIENT
Start: 2020-09-16 | End: 2021-09-16

## 2020-09-16 RX ORDER — FLUTICASONE PROPIONATE 50 MCG
SPRAY, SUSPENSION (ML) NASAL
Qty: 48 G | Refills: 3 | Status: SHIPPED | OUTPATIENT
Start: 2020-09-16 | End: 2021-02-25

## 2020-09-16 NOTE — TELEPHONE ENCOUNTER
"Resent Rxs in 90 day quantities rather than 30 as sent by PCP   Xochitl AHN RN      Requested Prescriptions   Pending Prescriptions Disp Refills     fluticasone (FLONASE) 50 MCG/ACT nasal spray [Pharmacy Med Name: FLUTICASONE 50MCG NASAL SP (120) RX] 48 g      Sig: SHAKE LIQUID AND USE 1 SPRAY IN EACH NOSTRIL DAILY       Nasal Allergy Protocol Passed - 9/15/2020 12:11 PM        Passed - Patient is age 12 or older        Passed - Recent (12 mo) or future (30 days) visit within the authorizing provider's specialty     Patient has had an office visit with the authorizing provider or a provider within the authorizing providers department within the previous 12 mos or has a future within next 30 days. See \"Patient Info\" tab in inbasket, or \"Choose Columns\" in Meds & Orders section of the refill encounter.              Passed - Medication is active on med list           famotidine (PEPCID) 40 MG tablet [Pharmacy Med Name: FAMOTIDINE 40MG TABLETS] 90 tablet      Sig: TAKE 1 TABLET(40 MG) BY MOUTH DAILY       H2 Blockers Protocol Passed - 9/15/2020 12:11 PM        Passed - Patient is age 12 or older        Passed - Recent (12 mo) or future (30 days) visit within the authorizing provider's specialty     Patient has had an office visit with the authorizing provider or a provider within the authorizing providers department within the previous 12 mos or has a future within next 30 days. See \"Patient Info\" tab in inbasket, or \"Choose Columns\" in Meds & Orders section of the refill encounter.              Passed - Medication is active on med list             "

## 2020-11-19 ENCOUNTER — TRANSFERRED RECORDS (OUTPATIENT)
Dept: HEALTH INFORMATION MANAGEMENT | Facility: CLINIC | Age: 80
End: 2020-11-19

## 2020-12-02 NOTE — TELEPHONE ENCOUNTER
rosuvastatin (CRESTOR) 5 MG tablet 90 tablet 3 9/14/2020  No   Sig: TAKE 1 TABLET(5 MG) BY MOUTH DAILY   Sent to pharmacy as: Rosuvastatin Calcium 5 MG Oral Tablet (CRESTOR)   Class: E-Prescribe   Order: [id number]   E-Prescribing Status: Receipt confirmed by pharmacy (9/14/2020 11:21 AM CDT)   Printout Tracking    External Result Report   Medication Administration Instructions    TAKE 1 TABLET(5 MG) BY MOUTH DAILY   Pharmacy    [id number] [address] AT [facility] [address]     Fax from pharmacy - seeking refill of rosuvastatin    Rx sent 9- good for one year    Fax sent back to pharmacy indicating such    RT Salvador (R)

## 2021-01-15 ENCOUNTER — HEALTH MAINTENANCE LETTER (OUTPATIENT)
Age: 81
End: 2021-01-15

## 2021-02-08 ENCOUNTER — TELEPHONE (OUTPATIENT)
Dept: SLEEP MEDICINE | Facility: CLINIC | Age: 81
End: 2021-02-08

## 2021-02-08 NOTE — TELEPHONE ENCOUNTER
After we hung up I put in a PA for [employer] and [employer] us needing download on the machine. I called her back at 4:36 pm no answer left message for her to bring her machine in with her on 02/12/21 so I can get a manual download. I left her my direct line to call me back and let me know if she got my message or not

## 2021-02-08 NOTE — TELEPHONE ENCOUNTER
I called patient back today 02/08/21 at 4:16 pm regarding getting a new mask. She said her Dr is concern that the mask is putting too much pressure on her nose bridge but she is ok with using the same mask. She would like to come in and see us for the mask consult. She requested to meet with me and I let her know I'm here in [facility] and that she can come here or go to [place]. She choose to come here in [facility] to meet with me on 02/12/21 at 1:30 pm.

## 2021-02-12 ENCOUNTER — DOCUMENTATION ONLY (OUTPATIENT)
Dept: SLEEP MEDICINE | Facility: CLINIC | Age: 81
End: 2021-02-12

## 2021-02-12 DIAGNOSIS — G47.33 OSA (OBSTRUCTIVE SLEEP APNEA): Primary | ICD-10-CM

## 2021-02-12 NOTE — PROGRESS NOTES
DATE: 02/12/21  LOCATION OF MASK FITTING: [facility]  REASON FOR MASK FITTING: WANTED TO TRY DIFFERENT MASK  DISCUSSED/VIEWED THE FOLLOWING MASKS: AITFIT N20 AIRTOUCH NASAL MASK, ESON 2, AIRFIT P10 PILLOW MASK, AND AIRFIT P30i PILLOW MASK    MASK AND SIZE SELECTED: AIRFIT P10 PILLOW SIZE MD PILLOW  2ND CHOICE: AIRFIT N20 AIRTOUCH NASAL MASK SIZE MD CUSHION

## 2021-02-12 NOTE — PROGRESS NOTES
Scan of cpap download obtained at [facility] today.  Patient requested routed to review by Dr. Perez.  Patient was seen at [facility] for mask fit, [facility] pended order for supplies.

## 2021-02-24 ASSESSMENT — ACTIVITIES OF DAILY LIVING (ADL): CURRENT_FUNCTION: NO ASSISTANCE NEEDED

## 2021-02-25 ENCOUNTER — OFFICE VISIT (OUTPATIENT)
Dept: FAMILY MEDICINE | Facility: CLINIC | Age: 81
End: 2021-02-25
Payer: COMMERCIAL

## 2021-02-25 VITALS
DIASTOLIC BLOOD PRESSURE: 58 MMHG | SYSTOLIC BLOOD PRESSURE: 144 MMHG | HEART RATE: 87 BPM | OXYGEN SATURATION: 96 % | TEMPERATURE: 98 F | WEIGHT: 155 LBS | BODY MASS INDEX: 23.49 KG/M2 | HEIGHT: 68 IN

## 2021-02-25 DIAGNOSIS — R53.83 OTHER FATIGUE: ICD-10-CM

## 2021-02-25 DIAGNOSIS — E78.5 HYPERLIPIDEMIA LDL GOAL <130: ICD-10-CM

## 2021-02-25 DIAGNOSIS — Z00.00 ENCOUNTER FOR MEDICARE ANNUAL WELLNESS EXAM: Primary | ICD-10-CM

## 2021-02-25 DIAGNOSIS — Z86.2 HISTORY OF ANEMIA: ICD-10-CM

## 2021-02-25 DIAGNOSIS — L72.3 INFECTED SEBACEOUS CYST: ICD-10-CM

## 2021-02-25 DIAGNOSIS — L08.9 INFECTED SEBACEOUS CYST: ICD-10-CM

## 2021-02-25 DIAGNOSIS — G14 POST-POLIO SYNDROME (H): ICD-10-CM

## 2021-02-25 LAB
ERYTHROCYTE [DISTWIDTH] IN BLOOD BY AUTOMATED COUNT: 15.3 % (ref 10–15)
HCT VFR BLD AUTO: 38.1 % (ref 35–47)
HGB BLD-MCNC: 11.9 G/DL (ref 11.7–15.7)
MCH RBC QN AUTO: 28.7 PG (ref 26.5–33)
MCHC RBC AUTO-ENTMCNC: 31.2 G/DL (ref 31.5–36.5)
MCV RBC AUTO: 92 FL (ref 78–100)
PLATELET # BLD AUTO: 242 10E9/L (ref 150–450)
RBC # BLD AUTO: 4.15 10E12/L (ref 3.8–5.2)
WBC # BLD AUTO: 6.2 10E9/L (ref 4–11)

## 2021-02-25 PROCEDURE — 80053 COMPREHEN METABOLIC PANEL: CPT | Performed by: PHYSICIAN ASSISTANT

## 2021-02-25 PROCEDURE — 99213 OFFICE O/P EST LOW 20 MIN: CPT | Mod: 25 | Performed by: PHYSICIAN ASSISTANT

## 2021-02-25 PROCEDURE — 80061 LIPID PANEL: CPT | Performed by: PHYSICIAN ASSISTANT

## 2021-02-25 PROCEDURE — 99397 PER PM REEVAL EST PAT 65+ YR: CPT | Performed by: PHYSICIAN ASSISTANT

## 2021-02-25 PROCEDURE — 82728 ASSAY OF FERRITIN: CPT | Performed by: PHYSICIAN ASSISTANT

## 2021-02-25 PROCEDURE — 36415 COLL VENOUS BLD VENIPUNCTURE: CPT | Performed by: PHYSICIAN ASSISTANT

## 2021-02-25 PROCEDURE — 85027 COMPLETE CBC AUTOMATED: CPT | Performed by: PHYSICIAN ASSISTANT

## 2021-02-25 RX ORDER — CEPHALEXIN 500 MG/1
500 CAPSULE ORAL 3 TIMES DAILY
Qty: 21 CAPSULE | Refills: 0 | Status: SHIPPED | OUTPATIENT
Start: 2021-02-25 | End: 2021-04-26

## 2021-02-25 ASSESSMENT — MIFFLIN-ST. JEOR: SCORE: 1211.58

## 2021-02-25 ASSESSMENT — ACTIVITIES OF DAILY LIVING (ADL): CURRENT_FUNCTION: NO ASSISTANCE NEEDED

## 2021-02-25 NOTE — PROGRESS NOTES
"SUBJECTIVE:   Lizzette Chanel is a 80 year old female who presents for Preventive Visit.    She has an infected seb cyst on her back that presented a few days ago  She is going to [place] for the weekend    Mammogram and bone density up to date    She tried flonase but this didn't help with her throat clearing    She was told iron low when donated blood   Has fatigue.      Patient has been advised of split billing requirements and indicates understanding: Yes thru MY CHART  Are you in the first 12 months of your Medicare coverage?  No    Healthy Habits:     In general, how would you rate your overall health?  Good    Frequency of exercise:  6-7 days/week    Duration of exercise:  30-45 minutes    Do you usually eat at least 4 servings of fruit and vegetables a day, include whole grains    & fiber and avoid regularly eating high fat or \"junk\" foods?  No    Taking medications regularly:  Yes    Medication side effects:  None    Ability to successfully perform activities of daily living:  No assistance needed    Home Safety:  No safety concerns identified    Hearing Impairment:  Difficulty understanding soft or whispered speech    In the past 6 months, have you been bothered by leaking of urine? Yes    In general, how would you rate your overall mental or emotional health?  Good      PHQ-2 Total Score: 1    Additional concerns today:  Yes    Do you feel safe in your environment? Yes    Have you ever done Advance Care Planning? (For example, a Health Directive, POLST, or a discussion with a medical provider or your loved ones about your wishes): Yes, advance care planning is on file.  Last on file 2013, she will renew do a new one thru .       Fall risk  Fallen 2 or more times in the past year?: No  Any fall with injury in the past year?: No    Cognitive Screening   1) Repeat 3 items (Leader, Season, Table)    2) Clock draw: NORMAL  3) 3 item recall: Recalls 3 objects  Results: 3 items recalled: COGNITIVE IMPAIRMENT " LESS LIKELY    Mini-CogTM Copyright ALYSSIA Webb. Licensed by the author for use in [facility]; reprinted with permission (jeanne@.[facility]). All rights reserved.      Do you have sleep apnea, excessive snoring or daytime drowsiness?: yes  Uses CPAP  Reviewed and updated as needed this visit by clinical staff  Tobacco  Allergies  Meds  Problems             Reviewed and updated as needed this visit by Provider  Tobacco   Meds  Problems            Social History     Tobacco Use     Smoking status: Former Smoker     Packs/day: 1.50     Years: 12.00     Pack years: 18.00     Types: Cigarettes     Start date: 1966     Quit date: 3/17/1974     Years since quittin.9     Smokeless tobacco: Never Used     Tobacco comment: enjoyed it!   Substance Use Topics     Alcohol use: Yes     Alcohol/week: 0.0 standard drinks     Comment: 1-2/day     If you drink alcohol do you typically have >3 drinks per day or >7 drinks per week? Yes        AUDIT - Alcohol Use Disorders Identification Test - Reproduced from the World Health Organization Audit 2001 (Second Edition) 2021   1.  How often do you have a drink containing alcohol? 4 or more times a week   2.  How many drinks containing alcohol do you have on a typical day when you are drinking? 1 or 2   3.  How often do you have five or more drinks on one occasion? Never   4.  How often during the last year have you found that you were not able to stop drinking once you had started? Never   5.  How often during the last year have you failed to do what was normally expected of you because of drinking? Never   6.  How often during the last year have you needed a first drink in the morning to get yourself going after a heavy drinking session? Never   7.  How often during the last year have you had a feeling of guilt or remorse after drinking? Never   8.  How often during the last year have you been unable to remember what happened the night before because of your  drinking? Never   9.  Have you or someone else been injured because of your drinking? No   10. Has a relative, friend, doctor or other health care worker been concerned about your drinking or suggested you cut down? No   TOTAL SCORE 4       Current providers sharing in care for this patient include:   Patient Care Team:  Guzman Bruner MD as PCP - General (Internal Medicine)  Jennifer Jose PA-C as Assigned PCP  Rhonda Perez MD as Assigned Pulmonology Provider    The following health maintenance items are reviewed in Epic and correct as of today:  Health Maintenance   Topic Date Due     MEDICARE ANNUAL WELLNESS VISIT  02/25/2022     FALL RISK ASSESSMENT  02/25/2022     DTAP/TDAP/TD IMMUNIZATION (3 - Td) 01/15/2023     ADVANCE CARE PLANNING  02/25/2026     DEXA  06/16/2035     PHQ-2  Completed     INFLUENZA VACCINE  Completed     Pneumococcal Vaccine: 65+ Years  Completed     ZOSTER IMMUNIZATION  Completed     COVID-19 Vaccine  Completed     Pneumococcal Vaccine: Pediatrics (0 to 5 Years) and At-Risk Patients (6 to 64 Years)  Aged Out     IPV IMMUNIZATION  Aged Out     MENINGITIS IMMUNIZATION  Aged Out     HEPATITIS B IMMUNIZATION  Aged Out     Past Medical History:   Diagnosis Date     Abdominal pain 2011    ct abd and pelvis uterine fiborid, renal cysts and tics     Arthritis mild in knees, shoulders     Cancer (H) skin--Basil Cell, Squamas     Chest pain 2003    neg est thallium     Chest pain 04/2016    nl est echo and cxr     Hx of colonoscopy 2003    incomplete, be nl     Hx of colonoscopy 06/03/2011    nl     Hypercholesteremia 2011    aches with zocor     CAITLYN (obstructive sleep apnea) 12/2012    done [place], using dental device, added cpap 2014      Osteopenia 2013    Dr. Taylor     Peripheral neuropathy     Dr. Latia Sparks 1956     Statin intolerance     zocor and one other caused aches     Subdural hematoma (H) 01/2016    traumatic, fu ct 3/16 resolved     Past Surgical History:    Procedure Laterality Date     ABDOMEN SURGERY  1960     APPENDECTOMY  60's     BIOPSY  2010    inside upper lip     PHACOEMULSIFICATION CLEAR CORNEA WITH STANDARD INTRAOCULAR LENS IMPLANT  3/21/2012    Procedure:PHACOEMULSIFICATION CLEAR CORNEA WITH STANDARD INTRAOCULAR LENS IMPLANT; RIGHT PHACOEMULSIFICATION CLEAR CORNEA WITH STANDARD INTRAOCULAR LENS IMPLANT ; Surgeon:CHANDRAKANT HERNANDEZ; Location:[facility]     PHACOEMULSIFICATION CLEAR CORNEA WITH STANDARD INTRAOCULAR LENS IMPLANT  3/28/2012    Procedure:PHACOEMULSIFICATION CLEAR CORNEA WITH STANDARD INTRAOCULAR LENS IMPLANT; LEFT PHACOEMULSIFICATION CLEAR CORNEA WITH STANDARD INTRAOCULAR LENS IMPLANT ; Surgeon:CHANDRAKANT HERNANDEZ; Location:[facility]     SHOULDER SURGERY  1990's    twice     SOFT TISSUE SURGERY  R&L Rotator cuff repairs     TONSILLECTOMY  child     Current Outpatient Medications   Medication Sig Dispense Refill     alendronate (FOSAMAX) 70 MG tablet Take 1 tablet (70 mg) by mouth every 7 days 12 tablet 3     Apoaequorin 10 MG CAPS Take 1 capsule by mouth daily Prevagen       Calcium Carbonate-Vitamin D (CALCIUM-D PO) Take by mouth daily Liquid  Calcium 1200 mg  & Vit D 800 iU       cephALEXin (KEFLEX) 500 MG capsule Take 1 capsule (500 mg) by mouth 3 times daily 21 capsule 0     Coenzyme Q10 (COQ10 PO) Take 100 mg by mouth.       famotidine (PEPCID) 40 MG tablet TAKE 1 TABLET(40 MG) BY MOUTH DAILY 90 tablet 3     Nutritional Supplements (JUICE PLUS FIBRE PO) Take by mouth 2 times daily Vitamins A, C, E, Folate       ORDER FOR DME Use your CPAP device as directed by your provider.       rosuvastatin (CRESTOR) 5 MG tablet TAKE 1 TABLET(5 MG) BY MOUTH DAILY 90 tablet 3     vitamin B-12 (CYANOCOBALAMIN) 1000 MCG tablet Take 1,000 mcg by mouth daily       CALCIUM PO Take by mouth daily Takes liquid form         Review of Systems  Constitutional, HEENT, cardiovascular, pulmonary, GI, , musculoskeletal, neuro, skin, endocrine and psych systems are negative,  "except as otherwise noted.    OBJECTIVE:   BP (!) 144/58   Pulse 87   Temp 98  F (36.7  C) (Temporal)   Ht 1.727 m (5' 8\")   Wt 70.3 kg (155 lb)   SpO2 96%   BMI 23.57 kg/m   Estimated body mass index is 23.57 kg/m  as calculated from the following:    Height as of this encounter: 1.727 m (5' 8\").    Weight as of this encounter: 70.3 kg (155 lb).  Physical Exam  GENERAL APPEARANCE: healthy, alert and no distress  EYES: Eyes grossly normal to inspection, PERRL and conjunctivae and sclerae normal  HENT: ear canals and TM's normal, nose and mouth without ulcers or lesions, oropharynx clear and oral mucous membranes moist  NECK: no adenopathy, no asymmetry, masses, or scars and thyroid normal to palpation  RESP: lungs clear to auscultation - no rales, rhonchi or wheezes  BREAST: normal without masses, tenderness or nipple discharge and no palpable axillary masses or adenopathy  CV: regular rate and rhythm, normal S1 S2, no S3 or S4, no murmur, click or rub, no peripheral edema and peripheral pulses strong  ABDOMEN: soft, nontender, no hepatosplenomegaly, no masses and bowel sounds normal  MS: no musculoskeletal defects are noted and gait is age appropriate without ataxia  SKIN: large tender seb cyst on back, not fluctuate enough today for lancing  NEURO: Normal strength and tone, sensory exam grossly normal, mentation intact and speech normal  PSYCH: mentation appears normal and affect normal/bright    Results for orders placed or performed in visit on 02/25/21   Lipid panel reflex to direct LDL Fasting     Status: None   Result Value Ref Range    Cholesterol 180 <200 mg/dL    Triglycerides 68 <150 mg/dL    HDL Cholesterol 85 >49 mg/dL    LDL Cholesterol Calculated 81 <100 mg/dL    Non HDL Cholesterol 95 <130 mg/dL   CBC with platelets     Status: Abnormal   Result Value Ref Range    WBC 6.2 4.0 - 11.0 10e9/L    RBC Count 4.15 3.8 - 5.2 10e12/L    Hemoglobin 11.9 11.7 - 15.7 g/dL    Hematocrit 38.1 35.0 - 47.0 % "    MCV 92 78 - 100 fl    MCH 28.7 26.5 - 33.0 pg    MCHC 31.2 (L) 31.5 - 36.5 g/dL    RDW 15.3 (H) 10.0 - 15.0 %    Platelet Count 242 150 - 450 10e9/L   Comprehensive metabolic panel     Status: Abnormal   Result Value Ref Range    Sodium 140 133 - 144 mmol/L    Potassium 4.4 3.4 - 5.3 mmol/L    Chloride 107 94 - 109 mmol/L    Carbon Dioxide 31 20 - 32 mmol/L    Anion Gap 2 (L) 3 - 14 mmol/L    Glucose 84 70 - 99 mg/dL    Urea Nitrogen 19 7 - 30 mg/dL    Creatinine 0.72 0.52 - 1.04 mg/dL    GFR Estimate 78 >60 mL/min/[1.73_m2]    GFR Estimate If Black 90 >60 mL/min/[1.73_m2]    Calcium 9.0 8.5 - 10.1 mg/dL    Bilirubin Total 0.4 0.2 - 1.3 mg/dL    Albumin 3.8 3.4 - 5.0 g/dL    Protein Total 7.4 6.8 - 8.8 g/dL    Alkaline Phosphatase 83 40 - 150 U/L    ALT 20 0 - 50 U/L    AST 16 0 - 45 U/L   Ferritin     Status: None   Result Value Ref Range    Ferritin 13 8 - 252 ng/mL         ASSESSMENT / PLAN:   Assessment and Plan:     (Z00.00) Encounter for Medicare annual wellness exam  (primary encounter diagnosis)  Comment: mammogram and bone density up to date.   Plan: CBC with platelets, Comprehensive metabolic         panel            (L72.3,  L08.9) Infected sebaceous cyst  Comment: not fluctuant enough to brooke today. Return next week for recheck. She is going out of town this w/e so started on her oral abx for this.  Plan: cephALEXin (KEFLEX) 500 MG capsule        Tid x 7d.    (E78.5) Hyperlipidemia LDL goal <130  Comment:   Plan: Lipid panel reflex to direct LDL Fasting            (G14) Post-polio syndrome  Comment:   Plan:     (R53.83) Other fatigue  Comment:   Plan: will check labs    (Z86.2) History of anemia  Comment:   Plan: Ferritin is low. Note sent to pt to start iron and vit C and f/u for recheck in a few months              Patient has been advised of split billing requirements and indicates understanding: Yes  COUNSELING:  Reviewed preventive health counseling, as reflected in patient  "instructions    Estimated body mass index is 23.57 kg/m  as calculated from the following:    Height as of this encounter: 1.727 m (5' 8\").    Weight as of this encounter: 70.3 kg (155 lb).        She reports that she quit smoking about 46 years ago. Her smoking use included cigarettes. She started smoking about 55 years ago. She has a 18.00 pack-year smoking history. She has never used smokeless tobacco.      Appropriate preventive services were discussed with this patient, including applicable screening as appropriate for cardiovascular disease, diabetes, osteopenia/osteoporosis, and glaucoma.  As appropriate for age/gender, discussed screening for colorectal cancer, prostate cancer, breast cancer, and cervical cancer. Checklist reviewing preventive services available has been given to the patient.    Reviewed patients plan of care and provided an AVS. The Basic Care Plan (routine screening as documented in Health Maintenance) for Lizzette meets the Care Plan requirement. This Care Plan has been established and reviewed with the Patient.    Counseling Resources:  ATP IV Guidelines  Pooled Cohorts Equation Calculator  Breast Cancer Risk Calculator  Breast Cancer: Medication to Reduce Risk  FRAX Risk Assessment  ICSI Preventive Guidelines  Dietary Guidelines for Americans, 2010  USDA's MyPlate  ASA Prophylaxis  Lung CA Screening    [facility] [place]    Identified Health Risks:  "

## 2021-02-26 LAB
ALBUMIN SERPL-MCNC: 3.8 G/DL (ref 3.4–5)
ALP SERPL-CCNC: 83 U/L (ref 40–150)
ALT SERPL W P-5'-P-CCNC: 20 U/L (ref 0–50)
ANION GAP SERPL CALCULATED.3IONS-SCNC: 2 MMOL/L (ref 3–14)
AST SERPL W P-5'-P-CCNC: 16 U/L (ref 0–45)
BILIRUB SERPL-MCNC: 0.4 MG/DL (ref 0.2–1.3)
BUN SERPL-MCNC: 19 MG/DL (ref 7–30)
CALCIUM SERPL-MCNC: 9 MG/DL (ref 8.5–10.1)
CHLORIDE SERPL-SCNC: 107 MMOL/L (ref 94–109)
CHOLEST SERPL-MCNC: 180 MG/DL
CO2 SERPL-SCNC: 31 MMOL/L (ref 20–32)
CREAT SERPL-MCNC: 0.72 MG/DL (ref 0.52–1.04)
FERRITIN SERPL-MCNC: 13 NG/ML (ref 8–252)
GFR SERPL CREATININE-BSD FRML MDRD: 78 ML/MIN/{1.73_M2}
GLUCOSE SERPL-MCNC: 84 MG/DL (ref 70–99)
HDLC SERPL-MCNC: 85 MG/DL
LDLC SERPL CALC-MCNC: 81 MG/DL
NONHDLC SERPL-MCNC: 95 MG/DL
POTASSIUM SERPL-SCNC: 4.4 MMOL/L (ref 3.4–5.3)
PROT SERPL-MCNC: 7.4 G/DL (ref 6.8–8.8)
SODIUM SERPL-SCNC: 140 MMOL/L (ref 133–144)
TRIGL SERPL-MCNC: 68 MG/DL

## 2021-02-26 NOTE — RESULT ENCOUNTER NOTE
It was a pleasure seeing you for your physical examination.  I wanted to get back to you with your test results.  I have enclosed a copy for your review.      I am happy to report that your CBC or complete blood count is normal with no signs of anemia, leukemia or platelet abnormalities. Your chemistry panel shows no signs of diabetes.  Your blood salts, kidney tests, liver tests, and proteins are all fine.    Your ferritin (stores of iron) is low at 13.  Start an over the counter iron tablet of 325mg daily and take that in combination with vitamin C 500mg. I think that will help with the fatigue.  Follow up in 3 months and we can recheck your level.    Your total cholesterol is 180 with the normal range being below 200.  Your HDL or good cholesterol is 85 with the normal range being above 50.  Your LDL or bad cholesterol is 81 with the normal range being below 130.      Jennifer Jose PA-C

## 2021-03-04 ENCOUNTER — TRANSFERRED RECORDS (OUTPATIENT)
Dept: HEALTH INFORMATION MANAGEMENT | Facility: CLINIC | Age: 81
End: 2021-03-04

## 2021-04-15 ENCOUNTER — TRANSFERRED RECORDS (OUTPATIENT)
Dept: HEALTH INFORMATION MANAGEMENT | Facility: CLINIC | Age: 81
End: 2021-04-15

## 2021-04-16 ENCOUNTER — TRANSFERRED RECORDS (OUTPATIENT)
Dept: HEALTH INFORMATION MANAGEMENT | Facility: CLINIC | Age: 81
End: 2021-04-16

## 2021-04-26 ENCOUNTER — OFFICE VISIT (OUTPATIENT)
Dept: FAMILY MEDICINE | Facility: CLINIC | Age: 81
End: 2021-04-26
Payer: COMMERCIAL

## 2021-04-26 VITALS
OXYGEN SATURATION: 97 % | WEIGHT: 155 LBS | TEMPERATURE: 97 F | DIASTOLIC BLOOD PRESSURE: 81 MMHG | BODY MASS INDEX: 23.49 KG/M2 | SYSTOLIC BLOOD PRESSURE: 150 MMHG | HEART RATE: 76 BPM | HEIGHT: 68 IN

## 2021-04-26 DIAGNOSIS — Z01.818 PRE-OP EXAMINATION: Primary | ICD-10-CM

## 2021-04-26 DIAGNOSIS — N93.9 VAGINAL BLEEDING: ICD-10-CM

## 2021-04-26 LAB — HGB BLD-MCNC: 12.8 G/DL (ref 11.7–15.7)

## 2021-04-26 PROCEDURE — 36415 COLL VENOUS BLD VENIPUNCTURE: CPT | Performed by: INTERNAL MEDICINE

## 2021-04-26 PROCEDURE — 83550 IRON BINDING TEST: CPT | Performed by: INTERNAL MEDICINE

## 2021-04-26 PROCEDURE — 82728 ASSAY OF FERRITIN: CPT | Performed by: INTERNAL MEDICINE

## 2021-04-26 PROCEDURE — 99214 OFFICE O/P EST MOD 30 MIN: CPT | Performed by: INTERNAL MEDICINE

## 2021-04-26 PROCEDURE — 83540 ASSAY OF IRON: CPT | Performed by: INTERNAL MEDICINE

## 2021-04-26 PROCEDURE — 85018 HEMOGLOBIN: CPT | Performed by: INTERNAL MEDICINE

## 2021-04-26 ASSESSMENT — MIFFLIN-ST. JEOR: SCORE: 1211.58

## 2021-04-26 NOTE — H&P (VIEW-ONLY)
[address] [address]  Phone: [phone]  Primary Provider: Jennifer Jose        PREOPERATIVE EVALUATION:  Today's date: 4/26/2021    Lizzette Chanel is a 80 year old female who presents for a preoperative evaluation.    Surgical Information:  Surgery/Procedure: D and C  Surgery Location: [facility]  Surgeon: kitty  Surgery Date: 5/25/21  Time of Surgery: am  Where patient plans to recover: At home with family  Fax number for surgical facility:     Type of Anesthesia Anticipated: to be determined        Subjective     HPI related to upcoming procedure: the patient is having a d and c due to vag bleeding.  She feels fine otherwise and can do 4 mets.    She has chronic rhinorrhea and wants to see ent for this.    She has prior low iron, on it for 2 months now.                Past Medical History:      Past Medical History:   Diagnosis Date     Abdominal pain 2011    ct abd and pelvis uterine fiborid, renal cysts and tics     Arthritis mild in knees, shoulders     Cancer (H) skin--Basil Cell, [place]     Chest pain 2003    neg est thallium     Chest pain 04/2016    nl est echo and cxr     Hx of colonoscopy 2003    incomplete, be nl     Hx of colonoscopy 06/03/2011    nl     Hypercholesteremia 2011    aches with zocor     CAITLYN (obstructive sleep apnea) 12/2012    done [place], using dental device, added cpap 2014      Osteopenia 2013    Dr. Taylor     Peripheral neuropathy     Dr. Latia Sparks 1956     Statin intolerance     zocor and one other caused aches     Subdural hematoma (H) 01/2016    traumatic, fu ct 3/16 resolved             Past Surgical History:      Past Surgical History:   Procedure Laterality Date     ABDOMEN SURGERY  1960     APPENDECTOMY  60's     BIOPSY  2010    inside upper lip     PHACOEMULSIFICATION CLEAR CORNEA WITH STANDARD INTRAOCULAR LENS IMPLANT  3/21/2012    Procedure:PHACOEMULSIFICATION CLEAR CORNEA WITH STANDARD INTRAOCULAR LENS  IMPLANT; RIGHT PHACOEMULSIFICATION CLEAR CORNEA WITH STANDARD INTRAOCULAR LENS IMPLANT ; Surgeon:CHANDRAKANT HERNANDEZ; Location:[facility]     PHACOEMULSIFICATION CLEAR CORNEA WITH STANDARD INTRAOCULAR LENS IMPLANT  3/28/2012    Procedure:PHACOEMULSIFICATION CLEAR CORNEA WITH STANDARD INTRAOCULAR LENS IMPLANT; LEFT PHACOEMULSIFICATION CLEAR CORNEA WITH STANDARD INTRAOCULAR LENS IMPLANT ; Surgeon:CHANDRAKANT HERNANDEZ; Location:[facility]     SHOULDER SURGERY      twice     SOFT TISSUE SURGERY  R&L Rotator cuff repairs     TONSILLECTOMY  child             Social History:     Social History     Tobacco Use     Smoking status: Former Smoker     Packs/day: 1.50     Years: 12.00     Pack years: 18.00     Types: Cigarettes     Start date: 1966     Quit date: 3/17/1974     Years since quittin.1     Smokeless tobacco: Never Used     Tobacco comment: enjoyed it!   Substance Use Topics     Alcohol use: Yes     Alcohol/week: 0.0 standard drinks     Comment: 1-2/day             Family History:   No family history of bleeding difficulty, anesthesia problems or blood clots.         Allergies:   No Known Allergies          Medications:     Current Outpatient Medications   Medication Sig Dispense Refill     alendronate (FOSAMAX) 70 MG tablet Take 1 tablet (70 mg) by mouth every 7 days 12 tablet 3     Apoaequorin 10 MG CAPS Take 1 capsule by mouth daily Prevagen       Calcium Carbonate-Vitamin D (CALCIUM-D PO) Take by mouth daily Liquid  Calcium 1200 mg  & Vit D 800 iU       Coenzyme Q10 (COQ10 PO) Take 100 mg by mouth.       famotidine (PEPCID) 40 MG tablet TAKE 1 TABLET(40 MG) BY MOUTH DAILY 90 tablet 3     Nutritional Supplements (JUICE PLUS FIBRE PO) Take by mouth 2 times daily Vitamins A, C, E, Folate       ORDER FOR DME Use your CPAP device as directed by your provider.       rosuvastatin (CRESTOR) 5 MG tablet TAKE 1 TABLET(5 MG) BY MOUTH DAILY 90 tablet 3     vitamin B-12 (CYANOCOBALAMIN) 1000 MCG tablet Take 1,000 mcg by mouth  "daily                 Review of Systems:   No history of bleeding difficulty, anesthesia problems or blood clots.  The 10 point Review of Systems is negative other than noted in the HPI           Physical Exam:   Blood pressure (!) 150/81, pulse 76, temperature 97  F (36.1  C), temperature source Oral, height 1.727 m (5' 8\"), weight 70.3 kg (155 lb), SpO2 97 %, not currently breastfeeding.    Constitutional: healthy appearing, alert and in no distress  Heent: Normocephalic. Head without obvious masses or lesions. PERRLDC, EOMI. Mouth exam within normal limits: tongue, mucous membranes, posterior pharynx all normal, no lesions or abnormalities seen.  Tm's and canals within normal limits bilaterally. Neck supple, no nuchal rigidity or masses. No supraclavicular, or cervical adenopathy. Thyroid symmetric, no masses.  Cardiovascular: Regular rate and rhythm, no murmer, rub or gallops.  JVP not elevated, no edema.  Carotids within normal limits bilaterally, no bruits.  Respiratory: Normal respiratory effort.  Lungs clear, normal flow, no wheezing or crackles.  Gastrointestinal: Normal active bowel sounds.   Soft, not tender, no masses, guarding or rebound.  No hepatosplenomegaly.   Musculoskeletal: extremities normal, no gross deformities noted.  Skin: no suspicious lesions or rashes   Neurologic: Mental status within normal limits.  Speech fluent.  No gross motor abnormalities and gait intact.  Psychiatric: mentation appears normal and affect normal.         Data:   Labs sent        Assessment:   1. Normal pre op exam, this patient should be low risk for the procedure  2. Low iron, suspect from vag bleeding  3. Rhinorrhea, to ent         Plan:   The patient is ok for the procedure  Follow up labs now      Guzman Bruner M.D.      "

## 2021-04-26 NOTE — PROGRESS NOTES
[address] [address]  Phone: [phone]  Primary Provider: Jennifer Jose        PREOPERATIVE EVALUATION:  Today's date: 4/26/2021    Lizzette Chanel is a 80 year old female who presents for a preoperative evaluation.    Surgical Information:  Surgery/Procedure: D and C  Surgery Location: [facility]  Surgeon: kitty  Surgery Date: 5/25/21  Time of Surgery: am  Where patient plans to recover: At home with family  Fax number for surgical facility:     Type of Anesthesia Anticipated: to be determined        Subjective     HPI related to upcoming procedure: the patient is having a d and c due to vag bleeding.  She feels fine otherwise and can do 4 mets.    She has chronic rhinorrhea and wants to see ent for this.    She has prior low iron, on it for 2 months now.                Past Medical History:      Past Medical History:   Diagnosis Date     Abdominal pain 2011    ct abd and pelvis uterine fiborid, renal cysts and tics     Arthritis mild in knees, shoulders     Cancer (H) skin--Basil Cell, [place]     Chest pain 2003    neg est thallium     Chest pain 04/2016    nl est echo and cxr     Hx of colonoscopy 2003    incomplete, be nl     Hx of colonoscopy 06/03/2011    nl     Hypercholesteremia 2011    aches with zocor     CAITLYN (obstructive sleep apnea) 12/2012    done [place], using dental device, added cpap 2014      Osteopenia 2013    Dr. Taylor     Peripheral neuropathy     Dr. Latia Sparks 1956     Statin intolerance     zocor and one other caused aches     Subdural hematoma (H) 01/2016    traumatic, fu ct 3/16 resolved             Past Surgical History:      Past Surgical History:   Procedure Laterality Date     ABDOMEN SURGERY  1960     APPENDECTOMY  60's     BIOPSY  2010    inside upper lip     PHACOEMULSIFICATION CLEAR CORNEA WITH STANDARD INTRAOCULAR LENS IMPLANT  3/21/2012    Procedure:PHACOEMULSIFICATION CLEAR CORNEA WITH STANDARD INTRAOCULAR LENS  IMPLANT; RIGHT PHACOEMULSIFICATION CLEAR CORNEA WITH STANDARD INTRAOCULAR LENS IMPLANT ; Surgeon:CHANDRAKANT HERNANDEZ; Location:[facility]     PHACOEMULSIFICATION CLEAR CORNEA WITH STANDARD INTRAOCULAR LENS IMPLANT  3/28/2012    Procedure:PHACOEMULSIFICATION CLEAR CORNEA WITH STANDARD INTRAOCULAR LENS IMPLANT; LEFT PHACOEMULSIFICATION CLEAR CORNEA WITH STANDARD INTRAOCULAR LENS IMPLANT ; Surgeon:CHANDRAKANT HERNANDEZ; Location:[facility]     SHOULDER SURGERY      twice     SOFT TISSUE SURGERY  R&L Rotator cuff repairs     TONSILLECTOMY  child             Social History:     Social History     Tobacco Use     Smoking status: Former Smoker     Packs/day: 1.50     Years: 12.00     Pack years: 18.00     Types: Cigarettes     Start date: 1966     Quit date: 3/17/1974     Years since quittin.1     Smokeless tobacco: Never Used     Tobacco comment: enjoyed it!   Substance Use Topics     Alcohol use: Yes     Alcohol/week: 0.0 standard drinks     Comment: 1-2/day             Family History:   No family history of bleeding difficulty, anesthesia problems or blood clots.         Allergies:   No Known Allergies          Medications:     Current Outpatient Medications   Medication Sig Dispense Refill     alendronate (FOSAMAX) 70 MG tablet Take 1 tablet (70 mg) by mouth every 7 days 12 tablet 3     Apoaequorin 10 MG CAPS Take 1 capsule by mouth daily Prevagen       Calcium Carbonate-Vitamin D (CALCIUM-D PO) Take by mouth daily Liquid  Calcium 1200 mg  & Vit D 800 iU       Coenzyme Q10 (COQ10 PO) Take 100 mg by mouth.       famotidine (PEPCID) 40 MG tablet TAKE 1 TABLET(40 MG) BY MOUTH DAILY 90 tablet 3     Nutritional Supplements (JUICE PLUS FIBRE PO) Take by mouth 2 times daily Vitamins A, C, E, Folate       ORDER FOR DME Use your CPAP device as directed by your provider.       rosuvastatin (CRESTOR) 5 MG tablet TAKE 1 TABLET(5 MG) BY MOUTH DAILY 90 tablet 3     vitamin B-12 (CYANOCOBALAMIN) 1000 MCG tablet Take 1,000 mcg by mouth  "daily                 Review of Systems:   No history of bleeding difficulty, anesthesia problems or blood clots.  The 10 point Review of Systems is negative other than noted in the HPI           Physical Exam:   Blood pressure (!) 150/81, pulse 76, temperature 97  F (36.1  C), temperature source Oral, height 1.727 m (5' 8\"), weight 70.3 kg (155 lb), SpO2 97 %, not currently breastfeeding.    Constitutional: healthy appearing, alert and in no distress  Heent: Normocephalic. Head without obvious masses or lesions. PERRLDC, EOMI. Mouth exam within normal limits: tongue, mucous membranes, posterior pharynx all normal, no lesions or abnormalities seen.  Tm's and canals within normal limits bilaterally. Neck supple, no nuchal rigidity or masses. No supraclavicular, or cervical adenopathy. Thyroid symmetric, no masses.  Cardiovascular: Regular rate and rhythm, no murmer, rub or gallops.  JVP not elevated, no edema.  Carotids within normal limits bilaterally, no bruits.  Respiratory: Normal respiratory effort.  Lungs clear, normal flow, no wheezing or crackles.  Gastrointestinal: Normal active bowel sounds.   Soft, not tender, no masses, guarding or rebound.  No hepatosplenomegaly.   Musculoskeletal: extremities normal, no gross deformities noted.  Skin: no suspicious lesions or rashes   Neurologic: Mental status within normal limits.  Speech fluent.  No gross motor abnormalities and gait intact.  Psychiatric: mentation appears normal and affect normal.         Data:   Labs sent        Assessment:   1. Normal pre op exam, this patient should be low risk for the procedure  2. Low iron, suspect from vag bleeding  3. Rhinorrhea, to ent         Plan:   The patient is ok for the procedure  Follow up labs now      Guzman Bruner M.D.      "

## 2021-04-27 DIAGNOSIS — Z11.59 ENCOUNTER FOR SCREENING FOR OTHER VIRAL DISEASES: ICD-10-CM

## 2021-04-27 LAB
FERRITIN SERPL-MCNC: 43 NG/ML (ref 8–252)
IRON SATN MFR SERPL: 16 % (ref 15–46)
IRON SERPL-MCNC: 49 UG/DL (ref 35–180)
TIBC SERPL-MCNC: 310 UG/DL (ref 240–430)

## 2021-04-27 NOTE — RESULT ENCOUNTER NOTE
It was a please seeing you.  You should be able to view your labs.    Your hemoglobin is better as is the iron.  I would simply repeat it in 3 months.    Good luck with surgery.    Guzman Bruner M.D.

## 2021-05-07 DIAGNOSIS — Z11.59 ENCOUNTER FOR SCREENING FOR OTHER VIRAL DISEASES: ICD-10-CM

## 2021-05-07 LAB
LABORATORY COMMENT REPORT: NORMAL
SARS-COV-2 RNA RESP QL NAA+PROBE: NEGATIVE
SARS-COV-2 RNA RESP QL NAA+PROBE: NORMAL
SPECIMEN SOURCE: NORMAL
SPECIMEN SOURCE: NORMAL

## 2021-05-07 PROCEDURE — U0003 INFECTIOUS AGENT DETECTION BY NUCLEIC ACID (DNA OR RNA); SEVERE ACUTE RESPIRATORY SYNDROME CORONAVIRUS 2 (SARS-COV-2) (CORONAVIRUS DISEASE [COVID-19]), AMPLIFIED PROBE TECHNIQUE, MAKING USE OF HIGH THROUGHPUT TECHNOLOGIES AS DESCRIBED BY CMS-2020-01-R: HCPCS | Performed by: OBSTETRICS & GYNECOLOGY

## 2021-05-07 PROCEDURE — U0005 INFEC AGEN DETEC AMPLI PROBE: HCPCS | Performed by: OBSTETRICS & GYNECOLOGY

## 2021-05-10 ENCOUNTER — ANESTHESIA EVENT (OUTPATIENT)
Dept: SURGERY | Facility: CLINIC | Age: 81
End: 2021-05-10
Payer: COMMERCIAL

## 2021-05-10 NOTE — OR NURSING
Unable to reach patient. Surgeon office has the same number. Left message with emergency contact to call me with a phone number or have Jessika call me.

## 2021-05-11 ENCOUNTER — HOSPITAL ENCOUNTER (OUTPATIENT)
Facility: CLINIC | Age: 81
Discharge: HOME | End: 2021-05-11
Attending: OBSTETRICS & GYNECOLOGY | Admitting: OBSTETRICS & GYNECOLOGY
Payer: COMMERCIAL

## 2021-05-11 ENCOUNTER — HOSPITAL ENCOUNTER (OUTPATIENT)
Dept: ULTRASOUND IMAGING | Facility: CLINIC | Age: 81
End: 2021-05-11
Attending: OBSTETRICS & GYNECOLOGY | Admitting: OBSTETRICS & GYNECOLOGY
Payer: COMMERCIAL

## 2021-05-11 ENCOUNTER — ANESTHESIA (OUTPATIENT)
Dept: SURGERY | Facility: CLINIC | Age: 81
End: 2021-05-11
Payer: COMMERCIAL

## 2021-05-11 VITALS
RESPIRATION RATE: 14 BRPM | HEART RATE: 68 BPM | OXYGEN SATURATION: 95 % | TEMPERATURE: 97 F | HEIGHT: 68 IN | WEIGHT: 156.9 LBS | SYSTOLIC BLOOD PRESSURE: 150 MMHG | BODY MASS INDEX: 23.78 KG/M2 | DIASTOLIC BLOOD PRESSURE: 64 MMHG

## 2021-05-11 DIAGNOSIS — N95.0 POSTMENOPAUSAL BLEEDING: ICD-10-CM

## 2021-05-11 PROCEDURE — 999N000063 US INTRAOPERATIVE: Mod: TC

## 2021-05-11 PROCEDURE — 250N000025 HC SEVOFLURANE, PER MIN: Performed by: OBSTETRICS & GYNECOLOGY

## 2021-05-11 PROCEDURE — 88341 IMHCHEM/IMCYTCHM EA ADD ANTB: CPT | Mod: 26 | Performed by: PATHOLOGY

## 2021-05-11 PROCEDURE — 88305 TISSUE EXAM BY PATHOLOGIST: CPT | Mod: TC | Performed by: OBSTETRICS & GYNECOLOGY

## 2021-05-11 PROCEDURE — 88360 TUMOR IMMUNOHISTOCHEM/MANUAL: CPT | Mod: TC | Performed by: OBSTETRICS & GYNECOLOGY

## 2021-05-11 PROCEDURE — 710N000009 HC RECOVERY PHASE 1, LEVEL 1, PER MIN: Performed by: OBSTETRICS & GYNECOLOGY

## 2021-05-11 PROCEDURE — 258N000003 HC RX IP 258 OP 636: Performed by: NURSE ANESTHETIST, CERTIFIED REGISTERED

## 2021-05-11 PROCEDURE — 250N000011 HC RX IP 250 OP 636: Performed by: NURSE ANESTHETIST, CERTIFIED REGISTERED

## 2021-05-11 PROCEDURE — 250N000009 HC RX 250: Performed by: NURSE ANESTHETIST, CERTIFIED REGISTERED

## 2021-05-11 PROCEDURE — 360N000076 HC SURGERY LEVEL 3, PER MIN: Performed by: OBSTETRICS & GYNECOLOGY

## 2021-05-11 PROCEDURE — 370N000017 HC ANESTHESIA TECHNICAL FEE, PER MIN: Performed by: OBSTETRICS & GYNECOLOGY

## 2021-05-11 PROCEDURE — 999N000054 HC STATISTIC EKG NON-CHARGEABLE

## 2021-05-11 PROCEDURE — 250N000011 HC RX IP 250 OP 636: Performed by: ANESTHESIOLOGY

## 2021-05-11 PROCEDURE — 88305 TISSUE EXAM BY PATHOLOGIST: CPT | Mod: 26 | Performed by: PATHOLOGY

## 2021-05-11 PROCEDURE — 999N000141 HC STATISTIC PRE-PROCEDURE NURSING ASSESSMENT: Performed by: OBSTETRICS & GYNECOLOGY

## 2021-05-11 PROCEDURE — 88342 IMHCHEM/IMCYTCHM 1ST ANTB: CPT | Mod: 26 | Performed by: PATHOLOGY

## 2021-05-11 PROCEDURE — 272N000001 HC OR GENERAL SUPPLY STERILE: Performed by: OBSTETRICS & GYNECOLOGY

## 2021-05-11 PROCEDURE — 710N000012 HC RECOVERY PHASE 2, PER MINUTE: Performed by: OBSTETRICS & GYNECOLOGY

## 2021-05-11 PROCEDURE — 93005 ELECTROCARDIOGRAM TRACING: CPT

## 2021-05-11 PROCEDURE — 88341 IMHCHEM/IMCYTCHM EA ADD ANTB: CPT | Mod: TC | Performed by: OBSTETRICS & GYNECOLOGY

## 2021-05-11 PROCEDURE — 88342 IMHCHEM/IMCYTCHM 1ST ANTB: CPT | Mod: TC,XU | Performed by: OBSTETRICS & GYNECOLOGY

## 2021-05-11 RX ORDER — ONDANSETRON 4 MG/1
4 TABLET, ORALLY DISINTEGRATING ORAL EVERY 30 MIN PRN
Status: DISCONTINUED | OUTPATIENT
Start: 2021-05-11 | End: 2021-05-11 | Stop reason: HOSPADM

## 2021-05-11 RX ORDER — NALOXONE HYDROCHLORIDE 0.4 MG/ML
0.2 INJECTION, SOLUTION INTRAMUSCULAR; INTRAVENOUS; SUBCUTANEOUS
Status: DISCONTINUED | OUTPATIENT
Start: 2021-05-11 | End: 2021-05-11 | Stop reason: HOSPADM

## 2021-05-11 RX ORDER — HYDROCODONE BITARTRATE AND ACETAMINOPHEN 5; 325 MG/1; MG/1
1 TABLET ORAL ONCE
Status: DISCONTINUED | OUTPATIENT
Start: 2021-05-11 | End: 2021-05-11 | Stop reason: HOSPADM

## 2021-05-11 RX ORDER — LABETALOL HYDROCHLORIDE 5 MG/ML
10 INJECTION, SOLUTION INTRAVENOUS
Status: DISCONTINUED | OUTPATIENT
Start: 2021-05-11 | End: 2021-05-11 | Stop reason: HOSPADM

## 2021-05-11 RX ORDER — DEXAMETHASONE SODIUM PHOSPHATE 4 MG/ML
INJECTION, SOLUTION INTRA-ARTICULAR; INTRALESIONAL; INTRAMUSCULAR; INTRAVENOUS; SOFT TISSUE PRN
Status: DISCONTINUED | OUTPATIENT
Start: 2021-05-11 | End: 2021-05-11

## 2021-05-11 RX ORDER — SODIUM CHLORIDE, SODIUM LACTATE, POTASSIUM CHLORIDE, CALCIUM CHLORIDE 600; 310; 30; 20 MG/100ML; MG/100ML; MG/100ML; MG/100ML
INJECTION, SOLUTION INTRAVENOUS CONTINUOUS
Status: DISCONTINUED | OUTPATIENT
Start: 2021-05-11 | End: 2021-05-11 | Stop reason: HOSPADM

## 2021-05-11 RX ORDER — ALBUTEROL SULFATE 0.83 MG/ML
2.5 SOLUTION RESPIRATORY (INHALATION) EVERY 4 HOURS PRN
Status: DISCONTINUED | OUTPATIENT
Start: 2021-05-11 | End: 2021-05-11 | Stop reason: HOSPADM

## 2021-05-11 RX ORDER — NALOXONE HYDROCHLORIDE 0.4 MG/ML
0.4 INJECTION, SOLUTION INTRAMUSCULAR; INTRAVENOUS; SUBCUTANEOUS
Status: DISCONTINUED | OUTPATIENT
Start: 2021-05-11 | End: 2021-05-11 | Stop reason: HOSPADM

## 2021-05-11 RX ORDER — FENTANYL CITRATE 50 UG/ML
25-50 INJECTION, SOLUTION INTRAMUSCULAR; INTRAVENOUS
Status: DISCONTINUED | OUTPATIENT
Start: 2021-05-11 | End: 2021-05-11 | Stop reason: HOSPADM

## 2021-05-11 RX ORDER — IBUPROFEN 600 MG/1
600 TABLET, FILM COATED ORAL ONCE
Status: DISCONTINUED | OUTPATIENT
Start: 2021-05-11 | End: 2021-05-11 | Stop reason: HOSPADM

## 2021-05-11 RX ORDER — HYDRALAZINE HYDROCHLORIDE 20 MG/ML
2.5-5 INJECTION INTRAMUSCULAR; INTRAVENOUS EVERY 10 MIN PRN
Status: DISCONTINUED | OUTPATIENT
Start: 2021-05-11 | End: 2021-05-11 | Stop reason: HOSPADM

## 2021-05-11 RX ORDER — DEXAMETHASONE SODIUM PHOSPHATE 4 MG/ML
4 INJECTION, SOLUTION INTRA-ARTICULAR; INTRALESIONAL; INTRAMUSCULAR; INTRAVENOUS; SOFT TISSUE
Status: DISCONTINUED | OUTPATIENT
Start: 2021-05-11 | End: 2021-05-11 | Stop reason: HOSPADM

## 2021-05-11 RX ORDER — FENTANYL CITRATE 50 UG/ML
INJECTION, SOLUTION INTRAMUSCULAR; INTRAVENOUS PRN
Status: DISCONTINUED | OUTPATIENT
Start: 2021-05-11 | End: 2021-05-11

## 2021-05-11 RX ORDER — LIDOCAINE HYDROCHLORIDE 20 MG/ML
INJECTION, SOLUTION INFILTRATION; PERINEURAL PRN
Status: DISCONTINUED | OUTPATIENT
Start: 2021-05-11 | End: 2021-05-11

## 2021-05-11 RX ORDER — HYDROMORPHONE HYDROCHLORIDE 1 MG/ML
.3-.5 INJECTION, SOLUTION INTRAMUSCULAR; INTRAVENOUS; SUBCUTANEOUS EVERY 10 MIN PRN
Status: DISCONTINUED | OUTPATIENT
Start: 2021-05-11 | End: 2021-05-11 | Stop reason: HOSPADM

## 2021-05-11 RX ORDER — PROPOFOL 10 MG/ML
INJECTION, EMULSION INTRAVENOUS PRN
Status: DISCONTINUED | OUTPATIENT
Start: 2021-05-11 | End: 2021-05-11

## 2021-05-11 RX ORDER — SODIUM CHLORIDE, SODIUM LACTATE, POTASSIUM CHLORIDE, CALCIUM CHLORIDE 600; 310; 30; 20 MG/100ML; MG/100ML; MG/100ML; MG/100ML
INJECTION, SOLUTION INTRAVENOUS CONTINUOUS PRN
Status: DISCONTINUED | OUTPATIENT
Start: 2021-05-11 | End: 2021-05-11

## 2021-05-11 RX ORDER — ONDANSETRON 2 MG/ML
4 INJECTION INTRAMUSCULAR; INTRAVENOUS EVERY 30 MIN PRN
Status: DISCONTINUED | OUTPATIENT
Start: 2021-05-11 | End: 2021-05-11 | Stop reason: HOSPADM

## 2021-05-11 RX ORDER — MEPERIDINE HYDROCHLORIDE 25 MG/ML
12.5 INJECTION INTRAMUSCULAR; INTRAVENOUS; SUBCUTANEOUS
Status: DISCONTINUED | OUTPATIENT
Start: 2021-05-11 | End: 2021-05-11 | Stop reason: HOSPADM

## 2021-05-11 RX ORDER — ACETAMINOPHEN 325 MG/1
975 TABLET ORAL ONCE
Status: DISCONTINUED | OUTPATIENT
Start: 2021-05-11 | End: 2021-05-11 | Stop reason: HOSPADM

## 2021-05-11 RX ORDER — ONDANSETRON 2 MG/ML
INJECTION INTRAMUSCULAR; INTRAVENOUS PRN
Status: DISCONTINUED | OUTPATIENT
Start: 2021-05-11 | End: 2021-05-11

## 2021-05-11 RX ORDER — LABETALOL HYDROCHLORIDE 5 MG/ML
10 INJECTION, SOLUTION INTRAVENOUS ONCE
Status: COMPLETED | OUTPATIENT
Start: 2021-05-11 | End: 2021-05-11

## 2021-05-11 RX ADMIN — DEXAMETHASONE SODIUM PHOSPHATE 4 MG: 4 INJECTION, SOLUTION INTRA-ARTICULAR; INTRALESIONAL; INTRAMUSCULAR; INTRAVENOUS; SOFT TISSUE at 14:06

## 2021-05-11 RX ADMIN — FENTANYL CITRATE 25 MCG: 50 INJECTION, SOLUTION INTRAMUSCULAR; INTRAVENOUS at 14:07

## 2021-05-11 RX ADMIN — ONDANSETRON 4 MG: 2 INJECTION INTRAMUSCULAR; INTRAVENOUS at 14:06

## 2021-05-11 RX ADMIN — LABETALOL HYDROCHLORIDE 10 MG: 5 INJECTION, SOLUTION INTRAVENOUS at 17:31

## 2021-05-11 RX ADMIN — FENTANYL CITRATE 25 MCG: 50 INJECTION, SOLUTION INTRAMUSCULAR; INTRAVENOUS at 14:19

## 2021-05-11 RX ADMIN — LIDOCAINE HYDROCHLORIDE 100 MG: 20 INJECTION, SOLUTION INFILTRATION; PERINEURAL at 13:58

## 2021-05-11 RX ADMIN — FENTANYL CITRATE 25 MCG: 50 INJECTION, SOLUTION INTRAMUSCULAR; INTRAVENOUS at 13:58

## 2021-05-11 RX ADMIN — SODIUM CHLORIDE, POTASSIUM CHLORIDE, SODIUM LACTATE AND CALCIUM CHLORIDE: 600; 310; 30; 20 INJECTION, SOLUTION INTRAVENOUS at 13:54

## 2021-05-11 RX ADMIN — HYDROMORPHONE HYDROCHLORIDE 0.25 MG: 1 INJECTION, SOLUTION INTRAMUSCULAR; INTRAVENOUS; SUBCUTANEOUS at 14:54

## 2021-05-11 RX ADMIN — PROPOFOL 170 MG: 10 INJECTION, EMULSION INTRAVENOUS at 13:58

## 2021-05-11 RX ADMIN — FENTANYL CITRATE 25 MCG: 50 INJECTION, SOLUTION INTRAMUSCULAR; INTRAVENOUS at 14:22

## 2021-05-11 ASSESSMENT — MIFFLIN-ST. JEOR: SCORE: 1220.19

## 2021-05-11 ASSESSMENT — LIFESTYLE VARIABLES: TOBACCO_USE: 1

## 2021-05-11 NOTE — BRIEF OP NOTE
[facility]    Brief Operative Note    Pre-operative diagnosis: Post-menopausal bleeding [N95.0], cervical stenosis, hematometra  Post-operative diagnosis Same as pre-operative diagnosis    Procedure: Procedure(s):  HYSTEROSCOPY UNDER ULTRASOUND GUIDANCE, DILATION AND CURETTAGE OF UTERUS  MYOSURE MORCELLATOR  Surgeon: Surgeon(s) and Role:     * Isabel Ferro MD - Primary  Anesthesia: General   Estimated blood loss: 5cc  Drains: None  Specimens:   ID Type Source Tests Collected by Time Destination   A : endometerial shavings Tissue Endometrium SURGICAL PATHOLOGY EXAM Isabel Ferro MD 5/11/2021  3:03 PM      Findings:   cervical stenosis, endometrial cavity distended with bloody mucus with very thin myometrium and several intracavitary, pale, irregular nodules very concerning for malignancy.  Complications: None.  Implants: * No implants in log *

## 2021-05-11 NOTE — DISCHARGE INSTRUCTIONS
Same Day Surgery Discharge Instructions for  Sedation and General Anesthesia       It's not unusual to feel dizzy, light-headed or faint for up to 24 hours after surgery or while taking pain medication.  If you have these symptoms: sit for a few minutes before standing and have someone assist you when you get up to walk or use the bathroom.      You should rest and relax for the next 24 hours. We recommend you make arrangements to have an adult stay with you for at least 24 hours after your discharge.  Avoid hazardous and strenuous activity.      DO NOT DRIVE any vehicle or operate mechanical equipment for 24 hours following the end of your surgery.  Even though you may feel normal, your reactions may be affected by the medication you have received.      Do not drink alcoholic beverages for 24 hours following surgery.       Slowly progress to your regular diet as you feel able. It's not unusual to feel nauseated and/or vomit after receiving anesthesia.  If you develop these symptoms, drink clear liquids (apple juice, ginger ale, broth, 7-up, etc. ) until you feel better.  If your nausea and vomiting persists for 24 hours, please notify your surgeon.        All narcotic pain medications, along with inactivity and anesthesia, can cause constipation. Drinking plenty of liquids and increasing fiber intake will help.      For any questions of a medical nature, call your surgeon.      Do not make important decisions for 24 hours.      If you had general anesthesia, you may have a sore throat for a couple of days related to the breathing tube used during surgery.  You may use Cepacol lozenges to help with this discomfort.  If it worsens or if you develop a fever, contact your surgeon.       If you feel your pain is not well managed with the pain medications prescribed by your surgeon, please contact your surgeon's office to let them know so they can address your concerns.       CoVid 19 Information    We want to give you  information regarding Covid. Please consult your primary care provider with any questions you might have.     Patient who have symptoms (cough, fever, or shortness of breath), need to isolate for 7 days from when symptoms started OR 72 hours after fever resolves (without fever reducing medications) AND improvement of respiratory symptoms (whichever is longer).      Isolate yourself at home (in own room/own bathroom if possible)    Do Not allow any visitors    Do Not go to work or school    Do Not go to [employer],  centers, shopping, or other public places.    Do Not shake hands.    Avoid close and intimate contact with others (hugging, kissing).    Follow CDC recommendations for household cleaning of frequently touched services.     After the initial 7 days, continue to isolate yourself from household members as much as possible. To continue decrease the risk of community spread and exposure, you and any members of your household should limit activities in public for 14 days after starting home isolation.     You can reference the following CDC link for helpful home isolation/care tips:  https://www.cdc.gov/coronavirus/2019-ncov/downloads/10Things.pdf    Protect Others:    Cover Your Mouth and Nose with a mask, disposable tissue or wash cloth to avoid spreading germs to others.    Wash your hands and face frequently with soap and water    Call Your Primary Doctor If: Breathing difficulty develops or you become worse.    For more information about COVID19 and options for caring for yourself at home, please visit the CDC website at https://www.cdc.gov/coronavirus/2019-ncov/about/steps-when-sick.html  For more options for care at [facility], please visit our website at [email or web address]      **Because you had anesthesia today and your history of sleep apnea, it is extremely important that you use your CPAP machine for the next 24 hours while napping or  sleeping.**        HOME CARE FOLLOWING HYSTEROSCOPY    Diet  You have no restrictions on your diet.  During the evening following surgery, drink plenty of fluids and eat a light supper.    Nausea  The anesthesia may produce some nausea.  If you feel nauseated, stay in bed and try drinking fluids such as 7-Up, tea, or soup.    Discomfort  The amount of discomfort you can expect is very unpredictable.  If you have pain that cannot be controlled with Tylenol or with the prescription you may have received, you should notify your physician.  Abdominal cramping or low backache is not uncommon and should not be a cause for concern.  You will be drowsy and weak the day of surgery and possibly the following day.  Fever  A low grade fever (not over 100 F) is usual after this procedure.  Do not hesitate to notify your physician if your fever seems excessive or persists.    Activity   You may resume your normal activity.  Avoid heavy lifting for one week.  You may bathe or shower.  Do not douche, use tampons, or resume intercourse until the bleeding has ceased.    Emergency Care  Contact your physician if you have any of these problems:   1.  A fever over 100 F   2.  A large amount of bleeding or drainage   3.  Severe pain             ** If you have questions or concerns about your procedure,   Call Dr. Ferro at [phone] **

## 2021-05-11 NOTE — ANESTHESIA POSTPROCEDURE EVALUATION
Patient: Lizzette Chanel    Procedure(s):  HYSTEROSCOPY UNDER ULTRASOUND GUIDANCE, DILATION AND CURETTAGE OF UTERUS  MYOSURE MORCELLATOR    Diagnosis:Post-menopausal bleeding [N95.0]  Diagnosis Additional Information: No value filed.    Anesthesia Type:  General    Note:  Disposition: Outpatient   Postop Pain Control: Uneventful            Sign Out: Well controlled pain   PONV:    Neuro/Psych: Uneventful            Sign Out: Acceptable/Baseline neuro status   Airway/Respiratory: Uneventful            Sign Out: Acceptable/Baseline resp. status   CV/Hemodynamics: Uneventful            Sign Out: Acceptable CV status; No obvious hypovolemia; No obvious fluid overload   Other NRE: NONE   DID A NON-ROUTINE EVENT OCCUR? No           Last vitals:  Vitals:    05/11/21 1516 05/11/21 1530 05/11/21 1545   BP: (!) 186/99 (!) 196/86 (!) 201/83   Pulse: 68 76 60   Resp: 18 17 11   Temp: 36.4  C (97.6  F) 36.3  C (97.3  F) 36.4  C (97.5  F)   SpO2: 100% 100% 100%       Last vitals prior to Anesthesia Care Transfer:  CRNA VITALS  5/11/2021 1444 - 5/11/2021 1544      5/11/2021             NIBP:  (!) 201/90    Pulse:  76    NIBP Mean:  115    SpO2:  97 %    Resp Rate (set):  10          Electronically Signed By: Windy Cormier MD  May 11, 2021  3:51 PM

## 2021-05-11 NOTE — ANESTHESIA PREPROCEDURE EVALUATION
Anesthesia Pre-Procedure Evaluation    Patient: Lizzette Chanel   MRN: [id number] : 1940        Preoperative Diagnosis: Post-menopausal bleeding [N95.0]   Procedure : Procedure(s):  HYSTEROSCOPY UNDER ULTRASOUND GUIDANCE, DILATION AND CURETTAGE OF UTERUS  MYOSURE MORCELLATOR     Past Medical History:   Diagnosis Date     Abdominal pain     ct abd and pelvis uterine fiborid, renal cysts and tics     Arthritis mild in knees, shoulders     Cancer (H) skin--Basil Cell, [place]     Chest pain     neg est thallium     Chest pain 2016    nl est echo and cxr     Hx of colonoscopy     incomplete, be nl     Hx of colonoscopy 2011    nl     Hypercholesteremia     aches with zocor     CAITLYN (obstructive sleep apnea) 2012    done [place], using dental device, added cpap       Osteopenia     Dr. Taylor     Peripheral neuropathy     Dr. Latia Sparks      Statin intolerance     zocor and one other caused aches     Subdural hematoma (H) 2016    traumatic, fu ct 3/16 resolved      Past Surgical History:   Procedure Laterality Date     ABDOMEN SURGERY       APPENDECTOMY  60's     BIOPSY      inside upper lip     PHACOEMULSIFICATION CLEAR CORNEA WITH STANDARD INTRAOCULAR LENS IMPLANT  3/21/2012    Procedure:PHACOEMULSIFICATION CLEAR CORNEA WITH STANDARD INTRAOCULAR LENS IMPLANT; RIGHT PHACOEMULSIFICATION CLEAR CORNEA WITH STANDARD INTRAOCULAR LENS IMPLANT ; Surgeon:CHANDRAKANT HERNANDEZ; Location:[facility]     PHACOEMULSIFICATION CLEAR CORNEA WITH STANDARD INTRAOCULAR LENS IMPLANT  3/28/2012    Procedure:PHACOEMULSIFICATION CLEAR CORNEA WITH STANDARD INTRAOCULAR LENS IMPLANT; LEFT PHACOEMULSIFICATION CLEAR CORNEA WITH STANDARD INTRAOCULAR LENS IMPLANT ; Surgeon:CHANDRAKANT HERNANDEZ; Location:[facility]     SHOULDER SURGERY      twice     SOFT TISSUE SURGERY  R&L Rotator cuff repairs     TONSILLECTOMY  child      No Known Allergies   Social History     Tobacco Use     Smoking status:  Former Smoker     Packs/day: 1.50     Years: 12.00     Pack years: 18.00     Types: Cigarettes     Start date: 1966     Quit date: 3/17/1974     Years since quittin.1     Smokeless tobacco: Never Used     Tobacco comment: enjoyed it!   Substance Use Topics     Alcohol use: Yes     Alcohol/week: 0.0 standard drinks     Comment: 1-2/day      Wt Readings from Last 1 Encounters:   21 70.3 kg (155 lb)        Anesthesia Evaluation   Pt has had prior anesthetic.     No history of anesthetic complications       ROS/MED HX  ENT/Pulmonary:     (+) sleep apnea, uses CPAP, tobacco use, Past use, Intermittent, asthma Last exacerbation: OVER 15 YEARS BACK,  Treatment: Inhaler prn,      Neurologic: Comment: Post polio syndrome    (+) peripheral neuropathy,     Cardiovascular:     (+) Dyslipidemia -----    METS/Exercise Tolerance:     Hematologic:       Musculoskeletal: Comment: Cervical spinal stenosis  (+) arthritis,     GI/Hepatic:  - neg GI/hepatic ROS  (-) GERD   Renal/Genitourinary:  - neg Renal ROS     Endo:  - neg endo ROS  (-) Type II DM   Psychiatric/Substance Use:       Infectious Disease:       Malignancy:       Other:            Physical Exam    Airway        Mallampati: II   TM distance: > 3 FB   Neck ROM: limited   Mouth opening: > 3 cm    Respiratory Devices and Support         Dental  no notable dental history         Cardiovascular   cardiovascular exam normal          Pulmonary   pulmonary exam normal                OUTSIDE LABS:  CBC:   Lab Results   Component Value Date    WBC 6.2 2021    WBC 8.3 2019    HGB 12.8 2021    HGB 11.9 2021    HCT 38.1 2021    HCT 39.9 2019     2021     2019     BMP:   Lab Results   Component Value Date     2021     2019    POTASSIUM 4.4 2021    POTASSIUM 4.1 2019    CHLORIDE 107 2021    CHLORIDE 106 2019    CO2 31 2021    CO2 28 2019    BUN 19  02/25/2021    BUN 16 05/23/2019    CR 0.72 02/25/2021    CR 0.79 05/23/2019    GLC 84 02/25/2021    GLC 85 05/23/2019     COAGS:   Lab Results   Component Value Date    INR 0.94 01/05/2016     POC: No results found for: BGM, HCG, HCGS  HEPATIC:   Lab Results   Component Value Date    ALBUMIN 3.8 02/25/2021    PROTTOTAL 7.4 02/25/2021    ALT 20 02/25/2021    AST 16 02/25/2021    ALKPHOS 83 02/25/2021    BILITOTAL 0.4 02/25/2021     OTHER:   Lab Results   Component Value Date    NHUNG 9.0 02/25/2021    TSH 2.04 01/22/2018    T4 7.6 12/02/2005    T3 147 12/02/2005    CRP 5.1 03/27/2018    SED 13 01/06/2017       Anesthesia Plan    ASA Status:  2   NPO Status:  NPO Appropriate    Anesthesia Type: General.     - Airway: LMA   Induction: Intravenous, Propofol.   Maintenance: Balanced.        Consents    Anesthesia Plan(s) and associated risks, benefits, and realistic alternatives discussed. Questions answered and patient/representative(s) expressed understanding.     - Discussed with:  Patient         Postoperative Care    Pain management: Multi-modal analgesia.   PONV prophylaxis: Ondansetron (or other 5HT-3), Dexamethasone or Solumedrol, Background Propofol Infusion     Comments:                Windy Cormier MD

## 2021-05-11 NOTE — OR NURSING
10 mg labetalol IV given at 17:31 pressure now 132/50. No pain. Patient instructed to follow up with her primary regarding B/P

## 2021-05-11 NOTE — OR NURSING
In Phase 2 /90's to left arm- recheck to right arm 206/99- Denies CP SOB or other c/o- pt had just voided-  nueros intact- placed on telemetry-  HR sinus 70's w R BBB- Dr Curry notified- orders to administer labetolol 10 mg IV X1- cares transfered to Sidra RICHTER RN- telemetry strip done before labetolol-

## 2021-05-11 NOTE — ANESTHESIA PROCEDURE NOTES
Airway       Patient location during procedure: OR  Staff -        Performed By: anesthesiologist and CRNA  Consent for Airway        Urgency: elective  Indications and Patient Condition       Indications for airway management: florinda-procedural       Induction type:intravenous       Mask difficulty assessment: 0 - not attempted    Final Airway Details       Final airway type: supraglottic airway    Supraglottic Airway Details        Type: LMA       Brand: Supreme       LMA size: 4    Post intubation assessment        Placement verified by: capnometry, equal breath sounds and chest rise        Number of attempts at approach: 1       Number of other approaches attempted: 0       Secured with: silk tape       Ease of procedure: easy       Dentition: Intact and Unchanged           No

## 2021-05-11 NOTE — ANESTHESIA CARE TRANSFER NOTE
Patient: Lizzette Chanel    Procedure(s):  HYSTEROSCOPY UNDER ULTRASOUND GUIDANCE, DILATION AND CURETTAGE OF UTERUS  MYOSURE MORCELLATOR    Diagnosis: Post-menopausal bleeding [N95.0]  Diagnosis Additional Information: No value filed.    Anesthesia Type:   General     Note:    Oropharynx: oropharynx clear of all foreign objects  Level of Consciousness: awake  Oxygen Supplementation: face mask  Level of Supplemental Oxygen (L/min / FiO2): 6  Independent Airway: airway patency satisfactory and stable  Dentition: dentition unchanged  Vital Signs Stable: post-procedure vital signs reviewed and stable  Report to RN Given: handoff report given  Patient transferred to: PACU    Handoff Report: Identifed the Patient, Identified the Reponsible Provider, Reviewed the pertinent medical history, Discussed the surgical course, Reviewed Intra-OP anesthesia mangement and issues during anesthesia, Set expectations for post-procedure period and Allowed opportunity for questions and acknowledgement of understanding      Vitals: (Last set prior to Anesthesia Care Transfer)  CRNA VITALS  5/11/2021 1444 - 5/11/2021 1518      5/11/2021             NIBP:  (!) 201/90    Pulse:  76    NIBP Mean:  115    SpO2:  97 %    Resp Rate (set):  10        Electronically Signed By: CARLEY Ramires CRNA  May 11, 2021  3:18 PM

## 2021-05-11 NOTE — OP NOTE
Procedure Date: 05/11/2021    PREOPERATIVE DIAGNOSES:  1.  Postmenopausal bleeding.  2.  Cervical stenosis.  3.  Hematometra.    POSTOPERATIVE DIAGNOSES:  1.  Postmenopausal bleeding.  2.  Cervical stenosis.  3.  Hematometra.    PROCEDURE PERFORMED:  Hysteroscopy under ultrasound guidance, dilatation and curettage of the uterus with MyoSure morcellator.    ANESTHESIA:  General.    ESTIMATED BLOOD LOSS:  5 mL.    SURGEON:  Isabel Ferro MD    COMPLICATIONS:  None apparent.    DRAINS:  None.    PREOP STATUS:  The patient is an 80-year-old nullipara who was evaluated in the office for postmenopausal bleeding.  She has had dark-brown blood per vagina for at least a month off and on.  No pain or cramps.  Ultrasound was performed and showed uterine fibroids, including a 3.8 anterior calcified fibroid which made visualization more difficult due to shadowing.  The patient has a known history of fibroids.  It appeared that the endometrium was distended to 45.9 mm with complex fluid and hyperechoic areas measuring 3.2 x 2.4 and 3.1 cm with minimal color flow.  It was difficult to see if the cervix,connected to this.  While this appeared to be a distended uterus filled with blood, we could not rule out a posterior adnexal mass.  The patient then underwent a pelvic MRI that showed the mass to indeed be a distended uterine cavity with nodular areas of soft tissue along the left fundal and posterior right walls concerning for endometrial malignancy.  Situation was discussed with the patient, and I recommended a hysteroscopy and D and C with sampling of the nodules under ultrasound guidance, as the myometrium was very thin due to the extreme cavity distention.  She also had cervical stenosis due to being nulliparous and the complex fluid not exiting.  Indication, risks and benefits were discussed, especially due to her age, but also due to the thin myometrium and cervical stenosis.  Specifically, risk of uterine perforation  with damage to surrounding organs, bleeding, infection, not being able to complete the procedure if we could not enter the endometrial cavity or if uterine perforation occurred.  She understood and consented.    OPERATIVE FINDINGS:  The cervix was small and nulliparous.  It was stenotic, requiring lacrimal dilators.  The endometrial cavity was distended with a large amount of old, bloody, stringy mucus, and the myometrium was very thin on ultrasound.  The endometrial cavity also had several intracavitary, pale, irregular nodules very concerning for malignancy.    OPERATIVE PROCEDURE:  Note that this operative procedure took more than twice as long as expected due to the difficulty entering the uterine cavity and difficulty in visualization, and entering the uterine cavity alone took all of 30 minutes.  The whole procedure took approximately 60 minutes and was very complex and difficult.  The patient was taken to the operating room, where general anesthesia was induced.  She was then carefully placed in the dorsal lithotomy position in Yellofin stirrups with pneumoboots on.  She was prepped and draped in the usual fashion.  Timeout was performed.  A speculum was placed in the vagina and cervix visualized.  A tenaculum was placed on the anterior lip.  Ultrasound guidance was used throughout the entire case due to the difficulty entering the endometrial cavity due to stenosis and the very thin myometrium.  The bladder was full enough to visualize the uterine cavity, which was also distended with fluid and easy to see.  There was a very thin myometrium, which was almost unidentifiable.  The cervix could be seen at the end of the distended cavity.  Under ultrasound guidance, the cervix was dilated initially using serial lacrimal dilators, followed by serial Hegar dilators up to Hegar 6-1/2.  Hysteroscope of 6.2 mm, the Omni scope, was then placed into the cavity under ultrasound guidance.  Note that with gradual  dilation, there was thick, mucousy, brown material extruding from the uterus and cervix, which was difficult to suction and made visualization of the cervix difficult.  At hysteroscopy, more of this thick fluid was removed, but because of the stringy mucus within the cavity, visualization was very difficult.  Up close, the nodules could be seen and did appear to be concerning for malignancy.  However, the entire cavity could not be visualized as a whole due to the complex material which could not fully be suctioned out.  As initially I did not feel I could see well enough to do the MyoSure, I decided to attempt a light curette to see if I could get enough tissue sampled in that manner.  The hysteroscope was removed, and under ultrasound guidance a light curette was performed, but I could not rub very hard due to the thin myometrium, and rubbing gently did not return anything but this mucus.  I used the Milo stone forceps again under ultrasound guidance, but was not able to extrude adequate tissue.  Therefore, the hysteroscope was then again replaced under ultrasound guidance, and while watching with the ultrasound to be sure I was in the center of the endometrium and also close up against the nodules which could then be visualized with the hysteroscope, I used  the MyoSure device,and nodules were sampled and 1 nodule mostly removed and the posterior nodule sampled generously.  The nodules were not removed in their entirety, as I could not go to the base without concern of the thin myometrium and could not see the entire cavity well enough.  The MyoSure was done against the nodules and being sure we were in the center of the endometrial cavity and not near the wall while watching with the ultrasound.  A significant amount of tissue was obtained.  The procedure was then terminated.  The hysteroscope and MyoSure device were removed.  The tenaculum was removed, and the site was hemostatic.  The patient went to  recovery in good condition.  All sponge counts were correct.    Isabel Ferro MD        D: 2021   T: 2021   MT: MAGGIE    Name:     LATHA SAAVEDRA  MRN:      [id number]        Account:        [id number]   :      1940           Procedure Date: 2021     Document: [id number]    cc:  Isabel Ferro MD

## 2021-05-13 LAB — INTERPRETATION ECG - MUSE: NORMAL

## 2021-05-26 LAB — COPATH REPORT: NORMAL

## 2021-06-01 ENCOUNTER — TRANSFERRED RECORDS (OUTPATIENT)
Dept: HEALTH INFORMATION MANAGEMENT | Facility: CLINIC | Age: 81
End: 2021-06-01

## 2021-06-02 PROBLEM — C54.1 ENDOMETRIAL CANCER (H): Status: ACTIVE | Noted: 2021-06-02

## 2021-06-04 DIAGNOSIS — Z11.59 ENCOUNTER FOR SCREENING FOR OTHER VIRAL DISEASES: ICD-10-CM

## 2021-06-21 NOTE — PROGRESS NOTES
[address], [address]  [facility] [address]  Phone: [phone]  Primary Provider: Jennifer Joes        PREOPERATIVE EVALUATION:  Today's date: 6/22/2021    Lizzette Chanel is a 80 year old female who presents for a preoperative evaluation.    Surgical Information:  Surgery/Procedure: ROBOTIC ASSISTED TOTAL LAPAROSCOPIC HYSTERECTOMY, BILATERAL SALPINGO-OOPHORECTOMY, WASHINGS, INTRA-OPERATIVE SENTINEL LYMPH NODE MAPPING AND BIOPSY, POSSIBLE PELVIC AND PARA-AORTIC LYMPHADENECTOMY, bilateral  Surgery Location:  OR  Surgeon: Dianne Garland MD  Surgery Date: 7/8/21  Time of Surgery: 8 am  Where patient plans to recover: At home with family  Fax number for surgical facility: [place] [facility]    Type of Anesthesia Anticipated: to be determined        Subjective     HPI related to upcoming procedure: the patient has endometrial ca and now scheduled for surgery as noted.  She feels fine and can do 4 mets.                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 Taylor     Peripheral neuropathy     Dr. Latia Sparks 1956     Statin intolerance     zocor and one other caused aches     Subdural hematoma (H) 01/2016    traumatic, fu ct 3/16 resolved     Uncomplicated asthma              Past Surgical History:      Past Surgical History:   Procedure Laterality Date     ABDOMEN SURGERY  1960      APPENDECTOMY  60's     BIOPSY  2010    inside upper lip     DILATION AND CURETTAGE, HYSTEROSCOPY WITH ULTRASOUND GUIDANCE N/A 2021    Procedure: HYSTEROSCOPY UNDER ULTRASOUND GUIDANCE, DILATION AND CURETTAGE OF UTERUS;  Surgeon: Isabel Ferro MD;  Location:  OR     OPERATIVE HYSTEROSCOPY WITH MORCELLATOR N/A 2021    Procedure: MYOSURE MORCELLATOR;  Surgeon: Isabel Ferro MD;  Location:  OR     PHACOEMULSIFICATION CLEAR CORNEA WITH STANDARD INTRAOCULAR LENS IMPLANT  3/21/2012    Procedure:PHACOEMULSIFICATION CLEAR CORNEA WITH STANDARD INTRAOCULAR LENS IMPLANT; RIGHT PHACOEMULSIFICATION CLEAR CORNEA WITH STANDARD INTRAOCULAR LENS IMPLANT ; Surgeon:CHANDRAKANT HERNANDEZ; Location: [facility]     PHACOEMULSIFICATION CLEAR CORNEA WITH STANDARD INTRAOCULAR LENS IMPLANT  3/28/2012    Procedure:PHACOEMULSIFICATION CLEAR CORNEA WITH STANDARD INTRAOCULAR LENS IMPLANT; LEFT PHACOEMULSIFICATION CLEAR CORNEA WITH STANDARD INTRAOCULAR LENS IMPLANT ; Surgeon:CHANDRAKANT HERNANDEZ; Location: [facility]     SHOULDER SURGERY      twice     SOFT TISSUE SURGERY  R&L Rotator cuff repairs     TONSILLECTOMY  child             Social History:     Social History     Tobacco Use     Smoking status: Former Smoker     Packs/day: 1.50     Years: 12.00     Pack years: 18.00     Types: Cigarettes     Start date: 1966     Quit date: 3/17/1974     Years since quittin.2     Smokeless tobacco: Never Used     Tobacco comment: enjoyed it!   Substance Use Topics     Alcohol use: Yes     Alcohol/week: 0.0 standard drinks     Comment: 1-2/day             Family History:   No family history of bleeding difficulty, anesthesia problems or blood clots.         Allergies:   No Known Allergies          Medications:     Current Outpatient Medications   Medication Sig Dispense Refill     alendronate (FOSAMAX) 70 MG tablet Take 1 tablet (70 mg) by mouth every 7 days 12 tablet 3     Apoaequorin 10 MG CAPS Take 1 capsule by mouth daily  "Prevagen       Calcium Carbonate-Vitamin D (CALCIUM-D PO) Take by mouth daily Liquid  Calcium 1200 mg  & Vit D 800 iU       Coenzyme Q10 (COQ10 PO) Take 100 mg by mouth.       famotidine (PEPCID) 40 MG tablet TAKE 1 TABLET(40 MG) BY MOUTH DAILY 90 tablet 3     ORDER FOR DME Use your CPAP device as directed by your provider.       rosuvastatin (CRESTOR) 5 MG tablet TAKE 1 TABLET(5 MG) BY MOUTH DAILY 90 tablet 3     vitamin B-12 (CYANOCOBALAMIN) 1000 MCG tablet Take 1,000 mcg by mouth daily                 Review of Systems:   No history of bleeding difficulty, anesthesia problems or blood clots.  The 10 point Review of Systems is negative other than noted in the HPI           Physical Exam:   Blood pressure (!) 152/77, pulse 73, temperature 97  F (36.1  C), temperature source Oral, height 1.727 m (5' 8\"), weight 68.9 kg (152 lb), SpO2 97 %, not currently breastfeeding.    Constitutional: healthy appearing, alert and in no distress  Heent: Normocephalic. Head without obvious masses or lesions. PERRLDC, EOMI. Mouth exam within normal limits: tongue, mucous membranes, posterior pharynx all normal, no lesions or abnormalities seen.  Tm's and canals within normal limits bilaterally. Neck supple, no nuchal rigidity or masses. No supraclavicular, or cervical adenopathy. Thyroid symmetric, no masses.  Cardiovascular: Regular rate and rhythm, no murmer, rub or gallops.  JVP not elevated, no edema.  Carotids within normal limits bilaterally, no bruits.  Respiratory: Normal respiratory effort.  Lungs clear, normal flow, no wheezing or crackles.  Gastrointestinal: Normal active bowel sounds.   Soft, not tender, no masses, guarding or rebound.  No hepatosplenomegaly.   Musculoskeletal: extremities normal, no gross deformities noted.  Skin: no suspicious lesions or rashes   Neurologic: Mental status within normal limits.  Speech fluent.  No gross motor abnormalities and gait intact.  Psychiatric: mentation appears normal and " affect normal.         Data:   none        Assessment:   1. Normal pre op exam, this patient should be low risk for the procedure  2. Elevated blood pressure, ?real         Plan:   The patient is ok for the procedure  She will check blood pressure and call us if up      Guzman Bruner M.D.

## 2021-06-21 NOTE — H&P (VIEW-ONLY)
[address], [address]  [facility] [address]  Phone: [phone]  Primary Provider: Jennifer Jose        PREOPERATIVE EVALUATION:  Today's date: 6/22/2021    Lizzette Chanel is a 80 year old female who presents for a preoperative evaluation.    Surgical Information:  Surgery/Procedure: ROBOTIC ASSISTED TOTAL LAPAROSCOPIC HYSTERECTOMY, BILATERAL SALPINGO-OOPHORECTOMY, WASHINGS, INTRA-OPERATIVE SENTINEL LYMPH NODE MAPPING AND BIOPSY, POSSIBLE PELVIC AND PARA-AORTIC LYMPHADENECTOMY, bilateral  Surgery Location:  OR  Surgeon: Dianne Garland MD  Surgery Date: 7/8/21  Time of Surgery: 8 am  Where patient plans to recover: At home with family  Fax number for surgical facility: [place] [facility]    Type of Anesthesia Anticipated: to be determined        Subjective     HPI related to upcoming procedure: the patient has endometrial ca and now scheduled for surgery as noted.  She feels fine and can do 4 mets.                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 Taylor     Peripheral neuropathy     Dr. Latia Sparks 1956     Statin intolerance     zocor and one other caused aches     Subdural hematoma (H) 01/2016    traumatic, fu ct 3/16 resolved     Uncomplicated asthma              Past Surgical History:      Past Surgical History:   Procedure Laterality Date     ABDOMEN SURGERY  1960      APPENDECTOMY  60's     BIOPSY  2010    inside upper lip     DILATION AND CURETTAGE, HYSTEROSCOPY WITH ULTRASOUND GUIDANCE N/A 2021    Procedure: HYSTEROSCOPY UNDER ULTRASOUND GUIDANCE, DILATION AND CURETTAGE OF UTERUS;  Surgeon: Isabel Ferro MD;  Location:  OR     OPERATIVE HYSTEROSCOPY WITH MORCELLATOR N/A 2021    Procedure: MYOSURE MORCELLATOR;  Surgeon: Isabel Ferro MD;  Location:  OR     PHACOEMULSIFICATION CLEAR CORNEA WITH STANDARD INTRAOCULAR LENS IMPLANT  3/21/2012    Procedure:PHACOEMULSIFICATION CLEAR CORNEA WITH STANDARD INTRAOCULAR LENS IMPLANT; RIGHT PHACOEMULSIFICATION CLEAR CORNEA WITH STANDARD INTRAOCULAR LENS IMPLANT ; Surgeon:CHANDRAKANT HERNANDEZ; Location: [facility]     PHACOEMULSIFICATION CLEAR CORNEA WITH STANDARD INTRAOCULAR LENS IMPLANT  3/28/2012    Procedure:PHACOEMULSIFICATION CLEAR CORNEA WITH STANDARD INTRAOCULAR LENS IMPLANT; LEFT PHACOEMULSIFICATION CLEAR CORNEA WITH STANDARD INTRAOCULAR LENS IMPLANT ; Surgeon:CHANDRAKANT HERNANDEZ; Location: [facility]     SHOULDER SURGERY      twice     SOFT TISSUE SURGERY  R&L Rotator cuff repairs     TONSILLECTOMY  child             Social History:     Social History     Tobacco Use     Smoking status: Former Smoker     Packs/day: 1.50     Years: 12.00     Pack years: 18.00     Types: Cigarettes     Start date: 1966     Quit date: 3/17/1974     Years since quittin.2     Smokeless tobacco: Never Used     Tobacco comment: enjoyed it!   Substance Use Topics     Alcohol use: Yes     Alcohol/week: 0.0 standard drinks     Comment: 1-2/day             Family History:   No family history of bleeding difficulty, anesthesia problems or blood clots.         Allergies:   No Known Allergies          Medications:     Current Outpatient Medications   Medication Sig Dispense Refill     alendronate (FOSAMAX) 70 MG tablet Take 1 tablet (70 mg) by mouth every 7 days 12 tablet 3     Apoaequorin 10 MG CAPS Take 1 capsule by mouth daily  "Prevagen       Calcium Carbonate-Vitamin D (CALCIUM-D PO) Take by mouth daily Liquid  Calcium 1200 mg  & Vit D 800 iU       Coenzyme Q10 (COQ10 PO) Take 100 mg by mouth.       famotidine (PEPCID) 40 MG tablet TAKE 1 TABLET(40 MG) BY MOUTH DAILY 90 tablet 3     ORDER FOR DME Use your CPAP device as directed by your provider.       rosuvastatin (CRESTOR) 5 MG tablet TAKE 1 TABLET(5 MG) BY MOUTH DAILY 90 tablet 3     vitamin B-12 (CYANOCOBALAMIN) 1000 MCG tablet Take 1,000 mcg by mouth daily                 Review of Systems:   No history of bleeding difficulty, anesthesia problems or blood clots.  The 10 point Review of Systems is negative other than noted in the HPI           Physical Exam:   Blood pressure (!) 152/77, pulse 73, temperature 97  F (36.1  C), temperature source Oral, height 1.727 m (5' 8\"), weight 68.9 kg (152 lb), SpO2 97 %, not currently breastfeeding.    Constitutional: healthy appearing, alert and in no distress  Heent: Normocephalic. Head without obvious masses or lesions. PERRLDC, EOMI. Mouth exam within normal limits: tongue, mucous membranes, posterior pharynx all normal, no lesions or abnormalities seen.  Tm's and canals within normal limits bilaterally. Neck supple, no nuchal rigidity or masses. No supraclavicular, or cervical adenopathy. Thyroid symmetric, no masses.  Cardiovascular: Regular rate and rhythm, no murmer, rub or gallops.  JVP not elevated, no edema.  Carotids within normal limits bilaterally, no bruits.  Respiratory: Normal respiratory effort.  Lungs clear, normal flow, no wheezing or crackles.  Gastrointestinal: Normal active bowel sounds.   Soft, not tender, no masses, guarding or rebound.  No hepatosplenomegaly.   Musculoskeletal: extremities normal, no gross deformities noted.  Skin: no suspicious lesions or rashes   Neurologic: Mental status within normal limits.  Speech fluent.  No gross motor abnormalities and gait intact.  Psychiatric: mentation appears normal and " affect normal.         Data:   none        Assessment:   1. Normal pre op exam, this patient should be low risk for the procedure  2. Elevated blood pressure, ?real         Plan:   The patient is ok for the procedure  She will check blood pressure and call us if up      Guzman Bruner M.D.

## 2021-06-22 ENCOUNTER — OFFICE VISIT (OUTPATIENT)
Dept: FAMILY MEDICINE | Facility: CLINIC | Age: 81
End: 2021-06-22
Payer: COMMERCIAL

## 2021-06-22 VITALS
TEMPERATURE: 97 F | HEIGHT: 68 IN | HEART RATE: 73 BPM | OXYGEN SATURATION: 97 % | SYSTOLIC BLOOD PRESSURE: 152 MMHG | BODY MASS INDEX: 23.04 KG/M2 | WEIGHT: 152 LBS | DIASTOLIC BLOOD PRESSURE: 77 MMHG

## 2021-06-22 DIAGNOSIS — Z01.818 PRE-OP EXAMINATION: Primary | ICD-10-CM

## 2021-06-22 DIAGNOSIS — C54.1 ENDOMETRIAL CANCER (H): ICD-10-CM

## 2021-06-22 PROCEDURE — 99214 OFFICE O/P EST MOD 30 MIN: CPT | Performed by: INTERNAL MEDICINE

## 2021-06-22 ASSESSMENT — MIFFLIN-ST. JEOR: SCORE: 1197.97

## 2021-06-22 NOTE — PATIENT INSTRUCTIONS
Check your blood pressure 3x a week for now after sitting for 5 minutes.  If it is not under 140/90 let us know.    Guzman Bruner M.D.

## 2021-07-03 DIAGNOSIS — M81.0 AGE RELATED OSTEOPOROSIS, UNSPECIFIED PATHOLOGICAL FRACTURE PRESENCE: ICD-10-CM

## 2021-07-05 RX ORDER — ALENDRONATE SODIUM 70 MG/1
70 TABLET ORAL
Qty: 12 TABLET | Refills: 3 | Status: SHIPPED | OUTPATIENT
Start: 2021-07-05 | End: 2022-07-25

## 2021-07-05 NOTE — TELEPHONE ENCOUNTER
Prescription approved per [facility] Refill Protocol.  Isabel Caro RN  [facility]

## 2021-07-06 DIAGNOSIS — Z11.59 ENCOUNTER FOR SCREENING FOR OTHER VIRAL DISEASES: ICD-10-CM

## 2021-07-06 LAB
SARS-COV-2 RNA RESP QL NAA+PROBE: NORMAL
SPECIMEN SOURCE: NORMAL

## 2021-07-06 PROCEDURE — U0003 INFECTIOUS AGENT DETECTION BY NUCLEIC ACID (DNA OR RNA); SEVERE ACUTE RESPIRATORY SYNDROME CORONAVIRUS 2 (SARS-COV-2) (CORONAVIRUS DISEASE [COVID-19]), AMPLIFIED PROBE TECHNIQUE, MAKING USE OF HIGH THROUGHPUT TECHNOLOGIES AS DESCRIBED BY CMS-2020-01-R: HCPCS | Performed by: OBSTETRICS & GYNECOLOGY

## 2021-07-06 PROCEDURE — U0005 INFEC AGEN DETEC AMPLI PROBE: HCPCS | Performed by: OBSTETRICS & GYNECOLOGY

## 2021-07-07 ENCOUNTER — ANESTHESIA EVENT (OUTPATIENT)
Dept: SURGERY | Facility: CLINIC | Age: 81
End: 2021-07-07
Payer: COMMERCIAL

## 2021-07-07 LAB
LABORATORY COMMENT REPORT: NORMAL
SARS-COV-2 RNA RESP QL NAA+PROBE: NEGATIVE
SPECIMEN SOURCE: NORMAL

## 2021-07-07 ASSESSMENT — LIFESTYLE VARIABLES: TOBACCO_USE: 1

## 2021-07-07 NOTE — ANESTHESIA PREPROCEDURE EVALUATION
Anesthesia Pre-Procedure Evaluation    Patient: Lizzette Chanel   MRN: [id number] : 1940        Preoperative Diagnosis: Endometrial carcinoma (H) [C54.1]   Procedure : Procedure(s):  ROBOTIC ASSISTED TOTAL LAPAROSCOPIC HYSTERECTOMY, BILATERAL SALPINGO-OOPHORECTOMY, WASHINGS, INTRA-OPERATIVE SENTINEL LYMPH NODE MAPPING AND BIOPSY, POSSIBLE PELVIC AND PARA-AORTIC LYMPHADENECTOMY     Past Medical History:   Diagnosis Date     Abdominal pain     ct abd and pelvis uterine fiborid, renal cysts and tics     Adenocarcinoma of endometrium (H) 2021    had vaginal bleeding     Arthritis mild in knees, shoulders     Cancer (H) skin--Basil Cell, [place]     Chest pain     neg est thallium     Chest pain 2016    nl est echo and cxr     Hx of colonoscopy     incomplete, be nl     Hx of colonoscopy 2011    nl     Hypercholesteremia     aches with zocor     CAITLYN (obstructive sleep apnea) 2012    done [place], using dental device, added cpap       Osteopenia     Dr. Taylor     Peripheral neuropathy     Dr. Latia Sparks      Statin intolerance     zocor and one other caused aches     Subdural hematoma (H) 2016    traumatic, fu ct 3/16 resolved     Uncomplicated asthma       Past Surgical History:   Procedure Laterality Date     ABDOMEN SURGERY  1960     APPENDECTOMY  60's     BIOPSY      inside upper lip     DILATION AND CURETTAGE, HYSTEROSCOPY WITH ULTRASOUND GUIDANCE N/A 2021    Procedure: HYSTEROSCOPY UNDER ULTRASOUND GUIDANCE, DILATION AND CURETTAGE OF UTERUS;  Surgeon: Isabel Ferro MD;  Location:  OR     OPERATIVE HYSTEROSCOPY WITH MORCELLATOR N/A 2021    Procedure: MYOSURE MORCELLATOR;  Surgeon: Isabel Ferro MD;  Location:  OR     PHACOEMULSIFICATION CLEAR CORNEA WITH STANDARD INTRAOCULAR LENS IMPLANT  3/21/2012    Procedure:PHACOEMULSIFICATION CLEAR CORNEA WITH STANDARD INTRAOCULAR LENS IMPLANT; RIGHT PHACOEMULSIFICATION CLEAR  CORNEA WITH STANDARD INTRAOCULAR LENS IMPLANT ; Surgeon:CHANDRAKANT HERNANDEZ; Location:[facility]     PHACOEMULSIFICATION CLEAR CORNEA WITH STANDARD INTRAOCULAR LENS IMPLANT  3/28/2012    Procedure:PHACOEMULSIFICATION CLEAR CORNEA WITH STANDARD INTRAOCULAR LENS IMPLANT; LEFT PHACOEMULSIFICATION CLEAR CORNEA WITH STANDARD INTRAOCULAR LENS IMPLANT ; Surgeon:CHANDRAKANT HERNANDEZ; Location: [facility]     SHOULDER SURGERY      twice     SOFT TISSUE SURGERY  R&L Rotator cuff repairs     TONSILLECTOMY  child      No Known Allergies   Social History     Tobacco Use     Smoking status: Former Smoker     Packs/day: 1.50     Years: 12.00     Pack years: 18.00     Types: Cigarettes     Start date: 1966     Quit date: 3/17/1974     Years since quittin.3     Smokeless tobacco: Never Used     Tobacco comment: enjoyed it!   Substance Use Topics     Alcohol use: Yes     Alcohol/week: 0.0 standard drinks     Comment: 1-2/day      Wt Readings from Last 1 Encounters:   21 68.9 kg (152 lb)        Anesthesia Evaluation   Pt has had prior anesthetic.         ROS/MED HX  ENT/Pulmonary:     (+) sleep apnea, uses CPAP, tobacco use, Past use, asthma     Neurologic: Comment: Post polio syndrome    (+) peripheral neuropathy,     Cardiovascular:     (+) Dyslipidemia -----    METS/Exercise Tolerance:     Hematologic:       Musculoskeletal: Comment: Cervical stenosis  (+) arthritis,     GI/Hepatic:       Renal/Genitourinary:       Endo:       Psychiatric/Substance Use:       Infectious Disease:       Malignancy:   (+) Malignancy,     Other:            Physical Exam    Airway        Mallampati: II   TM distance: > 3 FB   Neck ROM: full   Mouth opening: > 3 cm    Respiratory Devices and Support         Dental  no notable dental history         Cardiovascular   cardiovascular exam normal          Pulmonary   pulmonary exam normal                OUTSIDE LABS:  CBC:   Lab Results   Component Value Date    WBC 6.2 2021    WBC 8.3 2019     HGB 12.8 04/26/2021    HGB 11.9 02/25/2021    HCT 38.1 02/25/2021    HCT 39.9 05/23/2019     02/25/2021     05/23/2019     BMP:   Lab Results   Component Value Date     02/25/2021     05/23/2019    POTASSIUM 4.4 02/25/2021    POTASSIUM 4.1 05/23/2019    CHLORIDE 107 02/25/2021    CHLORIDE 106 05/23/2019    CO2 31 02/25/2021    CO2 28 05/23/2019    BUN 19 02/25/2021    BUN 16 05/23/2019    CR 0.72 02/25/2021    CR 0.79 05/23/2019    GLC 84 02/25/2021    GLC 85 05/23/2019     COAGS:   Lab Results   Component Value Date    INR 0.94 01/05/2016     POC: No results found for: BGM, HCG, HCGS  HEPATIC:   Lab Results   Component Value Date    ALBUMIN 3.8 02/25/2021    PROTTOTAL 7.4 02/25/2021    ALT 20 02/25/2021    AST 16 02/25/2021    ALKPHOS 83 02/25/2021    BILITOTAL 0.4 02/25/2021     OTHER:   Lab Results   Component Value Date    NHUNG 9.0 02/25/2021    TSH 2.04 01/22/2018    T4 7.6 12/02/2005    T3 147 12/02/2005    CRP 5.1 03/27/2018    SED 13 01/06/2017       Anesthesia Plan    ASA Status:  3      Anesthesia Type: General.     - Airway: ETT   Induction: Intravenous, Propofol.   Maintenance: Balanced.        Consents    Anesthesia Plan(s) and associated risks, benefits, and realistic alternatives discussed. Questions answered and patient/representative(s) expressed understanding.     - Discussed with:  Patient         Postoperative Care    Pain management: Multi-modal analgesia.   PONV prophylaxis: Ondansetron (or other 5HT-3), Background Propofol Infusion     Comments:                Windy Cormier MD

## 2021-07-08 ENCOUNTER — ANESTHESIA (OUTPATIENT)
Dept: SURGERY | Facility: CLINIC | Age: 81
End: 2021-07-08
Payer: COMMERCIAL

## 2021-07-08 ENCOUNTER — HOSPITAL ENCOUNTER (OUTPATIENT)
Facility: CLINIC | Age: 81
Discharge: HOME | End: 2021-07-08
Attending: OBSTETRICS & GYNECOLOGY | Admitting: OBSTETRICS & GYNECOLOGY
Payer: COMMERCIAL

## 2021-07-08 VITALS
RESPIRATION RATE: 16 BRPM | SYSTOLIC BLOOD PRESSURE: 157 MMHG | HEART RATE: 60 BPM | BODY MASS INDEX: 23.81 KG/M2 | DIASTOLIC BLOOD PRESSURE: 64 MMHG | TEMPERATURE: 97.8 F | OXYGEN SATURATION: 96 % | WEIGHT: 157.13 LBS | HEIGHT: 68 IN

## 2021-07-08 DIAGNOSIS — C54.1 ADENOCARCINOMA OF ENDOMETRIUM (H): Primary | ICD-10-CM

## 2021-07-08 LAB — HGB BLD-MCNC: 12.1 G/DL (ref 11.7–15.7)

## 2021-07-08 PROCEDURE — 36415 COLL VENOUS BLD VENIPUNCTURE: CPT | Performed by: ANESTHESIOLOGY

## 2021-07-08 PROCEDURE — 88112 CYTOPATH CELL ENHANCE TECH: CPT | Mod: TC | Performed by: OBSTETRICS & GYNECOLOGY

## 2021-07-08 PROCEDURE — 360N000080 HC SURGERY LEVEL 7, PER MIN: Performed by: OBSTETRICS & GYNECOLOGY

## 2021-07-08 PROCEDURE — 88112 CYTOPATH CELL ENHANCE TECH: CPT | Mod: 26 | Performed by: PATHOLOGY

## 2021-07-08 PROCEDURE — 250N000011 HC RX IP 250 OP 636: Performed by: NURSE ANESTHETIST, CERTIFIED REGISTERED

## 2021-07-08 PROCEDURE — 999N000141 HC STATISTIC PRE-PROCEDURE NURSING ASSESSMENT: Performed by: OBSTETRICS & GYNECOLOGY

## 2021-07-08 PROCEDURE — 250N000009 HC RX 250: Performed by: NURSE ANESTHETIST, CERTIFIED REGISTERED

## 2021-07-08 PROCEDURE — 250N000011 HC RX IP 250 OP 636: Performed by: OBSTETRICS & GYNECOLOGY

## 2021-07-08 PROCEDURE — 88309 TISSUE EXAM BY PATHOLOGIST: CPT | Mod: TC | Performed by: OBSTETRICS & GYNECOLOGY

## 2021-07-08 PROCEDURE — 258N000003 HC RX IP 258 OP 636: Performed by: ANESTHESIOLOGY

## 2021-07-08 PROCEDURE — 710N000009 HC RECOVERY PHASE 1, LEVEL 1, PER MIN: Performed by: OBSTETRICS & GYNECOLOGY

## 2021-07-08 PROCEDURE — 710N000012 HC RECOVERY PHASE 2, PER MINUTE: Performed by: OBSTETRICS & GYNECOLOGY

## 2021-07-08 PROCEDURE — 258N000001 HC RX 258: Performed by: OBSTETRICS & GYNECOLOGY

## 2021-07-08 PROCEDURE — 250N000013 HC RX MED GY IP 250 OP 250 PS 637: Performed by: OBSTETRICS & GYNECOLOGY

## 2021-07-08 PROCEDURE — 88331 PATH CONSLTJ SURG 1 BLK 1SPC: CPT | Mod: 26 | Performed by: PATHOLOGY

## 2021-07-08 PROCEDURE — 88305 TISSUE EXAM BY PATHOLOGIST: CPT | Mod: 26 | Performed by: PATHOLOGY

## 2021-07-08 PROCEDURE — 250N000011 HC RX IP 250 OP 636: Performed by: ANESTHESIOLOGY

## 2021-07-08 PROCEDURE — 88307 TISSUE EXAM BY PATHOLOGIST: CPT | Mod: 26 | Performed by: PATHOLOGY

## 2021-07-08 PROCEDURE — 250N000009 HC RX 250: Performed by: OBSTETRICS & GYNECOLOGY

## 2021-07-08 PROCEDURE — 88331 PATH CONSLTJ SURG 1 BLK 1SPC: CPT | Mod: TC | Performed by: OBSTETRICS & GYNECOLOGY

## 2021-07-08 PROCEDURE — 250N000025 HC SEVOFLURANE, PER MIN: Performed by: OBSTETRICS & GYNECOLOGY

## 2021-07-08 PROCEDURE — 370N000017 HC ANESTHESIA TECHNICAL FEE, PER MIN: Performed by: OBSTETRICS & GYNECOLOGY

## 2021-07-08 PROCEDURE — 88305 TISSUE EXAM BY PATHOLOGIST: CPT | Mod: TC | Performed by: OBSTETRICS & GYNECOLOGY

## 2021-07-08 PROCEDURE — 272N000001 HC OR GENERAL SUPPLY STERILE: Performed by: OBSTETRICS & GYNECOLOGY

## 2021-07-08 PROCEDURE — 999N001018 HC STATISTIC H-CELL BLOCK W/STAIN: Performed by: OBSTETRICS & GYNECOLOGY

## 2021-07-08 PROCEDURE — 88307 TISSUE EXAM BY PATHOLOGIST: CPT | Mod: TC | Performed by: OBSTETRICS & GYNECOLOGY

## 2021-07-08 PROCEDURE — 85018 HEMOGLOBIN: CPT | Performed by: ANESTHESIOLOGY

## 2021-07-08 PROCEDURE — 250N000009 HC RX 250: Performed by: ANESTHESIOLOGY

## 2021-07-08 PROCEDURE — 88309 TISSUE EXAM BY PATHOLOGIST: CPT | Mod: 26 | Performed by: PATHOLOGY

## 2021-07-08 RX ORDER — FENTANYL CITRATE 50 UG/ML
25-50 INJECTION, SOLUTION INTRAMUSCULAR; INTRAVENOUS
Status: DISCONTINUED | OUTPATIENT
Start: 2021-07-08 | End: 2021-07-08 | Stop reason: HOSPADM

## 2021-07-08 RX ORDER — ACETAMINOPHEN 325 MG/1
975 TABLET ORAL ONCE
Status: COMPLETED | OUTPATIENT
Start: 2021-07-08 | End: 2021-07-08

## 2021-07-08 RX ORDER — MEPERIDINE HYDROCHLORIDE 25 MG/ML
12.5 INJECTION INTRAMUSCULAR; INTRAVENOUS; SUBCUTANEOUS
Status: DISCONTINUED | OUTPATIENT
Start: 2021-07-08 | End: 2021-07-08 | Stop reason: HOSPADM

## 2021-07-08 RX ORDER — NALOXONE HYDROCHLORIDE 0.4 MG/ML
0.4 INJECTION, SOLUTION INTRAMUSCULAR; INTRAVENOUS; SUBCUTANEOUS
Status: DISCONTINUED | OUTPATIENT
Start: 2021-07-08 | End: 2021-07-08 | Stop reason: HOSPADM

## 2021-07-08 RX ORDER — OXYCODONE HYDROCHLORIDE 5 MG/1
5 TABLET ORAL
Status: DISCONTINUED | OUTPATIENT
Start: 2021-07-08 | End: 2021-07-08 | Stop reason: HOSPADM

## 2021-07-08 RX ORDER — SODIUM CHLORIDE, SODIUM LACTATE, POTASSIUM CHLORIDE, CALCIUM CHLORIDE 600; 310; 30; 20 MG/100ML; MG/100ML; MG/100ML; MG/100ML
INJECTION, SOLUTION INTRAVENOUS CONTINUOUS
Status: DISCONTINUED | OUTPATIENT
Start: 2021-07-08 | End: 2021-07-08 | Stop reason: HOSPADM

## 2021-07-08 RX ORDER — INDOCYANINE GREEN AND WATER 25 MG
KIT INJECTION PRN
Status: DISCONTINUED | OUTPATIENT
Start: 2021-07-08 | End: 2021-07-08 | Stop reason: HOSPADM

## 2021-07-08 RX ORDER — PROPOFOL 10 MG/ML
INJECTION, EMULSION INTRAVENOUS CONTINUOUS PRN
Status: DISCONTINUED | OUTPATIENT
Start: 2021-07-08 | End: 2021-07-08

## 2021-07-08 RX ORDER — NALOXONE HYDROCHLORIDE 0.4 MG/ML
0.2 INJECTION, SOLUTION INTRAMUSCULAR; INTRAVENOUS; SUBCUTANEOUS
Status: DISCONTINUED | OUTPATIENT
Start: 2021-07-08 | End: 2021-07-08 | Stop reason: HOSPADM

## 2021-07-08 RX ORDER — BUPIVACAINE HYDROCHLORIDE AND EPINEPHRINE 5; 5 MG/ML; UG/ML
INJECTION, SOLUTION PERINEURAL PRN
Status: DISCONTINUED | OUTPATIENT
Start: 2021-07-08 | End: 2021-07-08 | Stop reason: HOSPADM

## 2021-07-08 RX ORDER — HYDRALAZINE HYDROCHLORIDE 20 MG/ML
5 INJECTION INTRAMUSCULAR; INTRAVENOUS ONCE
Status: COMPLETED | OUTPATIENT
Start: 2021-07-08 | End: 2021-07-08

## 2021-07-08 RX ORDER — ONDANSETRON 2 MG/ML
4 INJECTION INTRAMUSCULAR; INTRAVENOUS EVERY 30 MIN PRN
Status: DISCONTINUED | OUTPATIENT
Start: 2021-07-08 | End: 2021-07-08 | Stop reason: HOSPADM

## 2021-07-08 RX ORDER — FENTANYL CITRATE 50 UG/ML
INJECTION, SOLUTION INTRAMUSCULAR; INTRAVENOUS PRN
Status: DISCONTINUED | OUTPATIENT
Start: 2021-07-08 | End: 2021-07-08

## 2021-07-08 RX ORDER — ACETAMINOPHEN 325 MG/1
975 TABLET ORAL ONCE
Status: DISCONTINUED | OUTPATIENT
Start: 2021-07-08 | End: 2021-07-08 | Stop reason: HOSPADM

## 2021-07-08 RX ORDER — CEFAZOLIN SODIUM 2 G/100ML
2 INJECTION, SOLUTION INTRAVENOUS SEE ADMIN INSTRUCTIONS
Status: DISCONTINUED | OUTPATIENT
Start: 2021-07-08 | End: 2021-07-08 | Stop reason: HOSPADM

## 2021-07-08 RX ORDER — LIDOCAINE HYDROCHLORIDE 20 MG/ML
INJECTION, SOLUTION INFILTRATION; PERINEURAL PRN
Status: DISCONTINUED | OUTPATIENT
Start: 2021-07-08 | End: 2021-07-08

## 2021-07-08 RX ORDER — DEXAMETHASONE SODIUM PHOSPHATE 4 MG/ML
INJECTION, SOLUTION INTRA-ARTICULAR; INTRALESIONAL; INTRAMUSCULAR; INTRAVENOUS; SOFT TISSUE PRN
Status: DISCONTINUED | OUTPATIENT
Start: 2021-07-08 | End: 2021-07-08

## 2021-07-08 RX ORDER — OXYCODONE HYDROCHLORIDE 5 MG/1
5 TABLET ORAL EVERY 6 HOURS PRN
Qty: 10 TABLET | Refills: 0 | Status: SHIPPED | OUTPATIENT
Start: 2021-07-08 | End: 2021-07-11

## 2021-07-08 RX ORDER — CEFAZOLIN SODIUM 2 G/100ML
2 INJECTION, SOLUTION INTRAVENOUS
Status: DISCONTINUED | OUTPATIENT
Start: 2021-07-08 | End: 2021-07-08 | Stop reason: HOSPADM

## 2021-07-08 RX ORDER — MAGNESIUM HYDROXIDE 1200 MG/15ML
LIQUID ORAL PRN
Status: DISCONTINUED | OUTPATIENT
Start: 2021-07-08 | End: 2021-07-08 | Stop reason: HOSPADM

## 2021-07-08 RX ORDER — HYDROMORPHONE HYDROCHLORIDE 1 MG/ML
.3-.5 INJECTION, SOLUTION INTRAMUSCULAR; INTRAVENOUS; SUBCUTANEOUS EVERY 10 MIN PRN
Status: DISCONTINUED | OUTPATIENT
Start: 2021-07-08 | End: 2021-07-08 | Stop reason: HOSPADM

## 2021-07-08 RX ORDER — SENNOSIDES A AND B 8.6 MG/1
1-3 TABLET, FILM COATED ORAL 2 TIMES DAILY PRN
Qty: 30 TABLET | Refills: 0 | Status: SHIPPED | OUTPATIENT
Start: 2021-07-08 | End: 2021-07-28

## 2021-07-08 RX ORDER — PROPOFOL 10 MG/ML
INJECTION, EMULSION INTRAVENOUS PRN
Status: DISCONTINUED | OUTPATIENT
Start: 2021-07-08 | End: 2021-07-08

## 2021-07-08 RX ORDER — ONDANSETRON 4 MG/1
4 TABLET, ORALLY DISINTEGRATING ORAL EVERY 30 MIN PRN
Status: DISCONTINUED | OUTPATIENT
Start: 2021-07-08 | End: 2021-07-08 | Stop reason: HOSPADM

## 2021-07-08 RX ORDER — ONDANSETRON 2 MG/ML
INJECTION INTRAMUSCULAR; INTRAVENOUS PRN
Status: DISCONTINUED | OUTPATIENT
Start: 2021-07-08 | End: 2021-07-08

## 2021-07-08 RX ADMIN — FENTANYL CITRATE 50 MCG: 50 INJECTION, SOLUTION INTRAMUSCULAR; INTRAVENOUS at 08:34

## 2021-07-08 RX ADMIN — HYDROMORPHONE HYDROCHLORIDE 0.25 MG: 1 INJECTION, SOLUTION INTRAMUSCULAR; INTRAVENOUS; SUBCUTANEOUS at 09:20

## 2021-07-08 RX ADMIN — ONDANSETRON 4 MG: 2 INJECTION INTRAMUSCULAR; INTRAVENOUS at 12:07

## 2021-07-08 RX ADMIN — ROCURONIUM BROMIDE 50 MG: 10 INJECTION INTRAVENOUS at 08:13

## 2021-07-08 RX ADMIN — SODIUM CHLORIDE, POTASSIUM CHLORIDE, SODIUM LACTATE AND CALCIUM CHLORIDE: 600; 310; 30; 20 INJECTION, SOLUTION INTRAVENOUS at 12:35

## 2021-07-08 RX ADMIN — LIDOCAINE HYDROCHLORIDE 1 ML: 10 INJECTION, SOLUTION EPIDURAL; INFILTRATION; INTRACAUDAL; PERINEURAL at 07:37

## 2021-07-08 RX ADMIN — PROPOFOL 130 MG: 10 INJECTION, EMULSION INTRAVENOUS at 08:13

## 2021-07-08 RX ADMIN — SUGAMMADEX 150 MG: 100 INJECTION, SOLUTION INTRAVENOUS at 10:12

## 2021-07-08 RX ADMIN — PROPOFOL 100 MCG/KG/MIN: 10 INJECTION, EMULSION INTRAVENOUS at 08:13

## 2021-07-08 RX ADMIN — DEXAMETHASONE SODIUM PHOSPHATE 4 MG: 4 INJECTION, SOLUTION INTRA-ARTICULAR; INTRALESIONAL; INTRAMUSCULAR; INTRAVENOUS; SOFT TISSUE at 08:26

## 2021-07-08 RX ADMIN — FENTANYL CITRATE 50 MCG: 50 INJECTION, SOLUTION INTRAMUSCULAR; INTRAVENOUS at 08:13

## 2021-07-08 RX ADMIN — ONDANSETRON 4 MG: 2 INJECTION INTRAMUSCULAR; INTRAVENOUS at 10:37

## 2021-07-08 RX ADMIN — CEFAZOLIN SODIUM 2 G: 2 INJECTION, SOLUTION INTRAVENOUS at 08:25

## 2021-07-08 RX ADMIN — ROCURONIUM BROMIDE 10 MG: 10 INJECTION INTRAVENOUS at 09:53

## 2021-07-08 RX ADMIN — HYDRALAZINE HYDROCHLORIDE 5 MG: 20 INJECTION INTRAMUSCULAR; INTRAVENOUS at 14:26

## 2021-07-08 RX ADMIN — LIDOCAINE HYDROCHLORIDE 60 MG: 20 INJECTION, SOLUTION INFILTRATION; PERINEURAL at 08:13

## 2021-07-08 RX ADMIN — ROCURONIUM BROMIDE 10 MG: 10 INJECTION INTRAVENOUS at 08:53

## 2021-07-08 RX ADMIN — HYDROMORPHONE HYDROCHLORIDE 0.25 MG: 1 INJECTION, SOLUTION INTRAMUSCULAR; INTRAVENOUS; SUBCUTANEOUS at 08:45

## 2021-07-08 RX ADMIN — MIDAZOLAM 1 MG: 1 INJECTION INTRAMUSCULAR; INTRAVENOUS at 08:07

## 2021-07-08 RX ADMIN — SODIUM CHLORIDE, POTASSIUM CHLORIDE, SODIUM LACTATE AND CALCIUM CHLORIDE: 600; 310; 30; 20 INJECTION, SOLUTION INTRAVENOUS at 07:36

## 2021-07-08 RX ADMIN — ACETAMINOPHEN 1000 MG: 500 TABLET, FILM COATED ORAL at 06:38

## 2021-07-08 ASSESSMENT — MIFFLIN-ST. JEOR: SCORE: 1221.21

## 2021-07-08 NOTE — ANESTHESIA POSTPROCEDURE EVALUATION
Patient: Lizzette Chanel    Procedure(s):  ROBOTIC ASSISTED TOTAL LAPAROSCOPIC HYSTERECTOMY, BILATERAL SALPINGO-OOPHORECTOMY, WASHINGS, INTRA-OPERATIVE SENTINEL LYMPH NODE MAPPING, BILATERAL PELVIC AND PARA-AORTIC LYMPHADENECTOMY, REPAIR VAGINAL TEAR    Diagnosis:Endometrial carcinoma (H) [C54.1]  Diagnosis Additional Information: No value filed.    Anesthesia Type:  General    Note:  Disposition: Outpatient   Postop Pain Control: Uneventful            Sign Out: Well controlled pain   PONV: No   Neuro/Psych: Uneventful            Sign Out: Acceptable/Baseline neuro status   Airway/Respiratory: Uneventful            Sign Out: Acceptable/Baseline resp. status   CV/Hemodynamics: Uneventful            Sign Out: Acceptable CV status; No obvious hypovolemia; No obvious fluid overload   Other NRE: NONE   DID A NON-ROUTINE EVENT OCCUR? No           Last vitals:  Vitals:    07/08/21 1215 07/08/21 1230 07/08/21 1245   BP: (!) 158/65 (!) 161/65 (!) 161/89   Pulse: 53 53 57   Resp: 19 13 11   Temp:      SpO2: 95% 96% 95%       Last vitals prior to Anesthesia Care Transfer:  CRNA VITALS  7/8/2021 1018 - 7/8/2021 1118      7/8/2021             Pulse:  57    SpO2:  99 %    Resp Rate (observed):  8    Resp Rate (set):  11          Electronically Signed By: Antonio Aguirre MD  July 8, 2021  1:08 PM

## 2021-07-08 NOTE — OR NURSING
PATIENT ASSISTED TO BATHROOM, VOIDED WITHOUT DIFFICULTY.  PATIENT MONITORS ATTACHED, MEDICATION GIVEN PER ORDERS.

## 2021-07-08 NOTE — OR NURSING
DR. SIMMONS AT BEDSIDE.  PATIENT DISCUSSING WITH MDA HER CONCERN ABOUT HER BLOOD PRESSURE.  DR. SIMMONS EXPLAINED TO PATIENT WHAT HE FELT WAS SAFE AND THAT HE WAS HAPPY WITH HER RESPONSE TO THE MEDICATION GIVEN AND FELT THAT SHE WAS SAFE TO DISCHARGE TO HOME.

## 2021-07-08 NOTE — OR NURSING
Patient with complaint of nausea. Zofran given with patient stating it gave no relief, but has taken 120 ml ginger ale since that time. Also complains of feeling groggy, but is able to stay awake and carry on a conversation. Patient being assisted to get dressed, to chair. Will re-assess based on how patient does with this.

## 2021-07-08 NOTE — DISCHARGE INSTRUCTIONS
Same Day Surgery Discharge Instructions for  Sedation and General Anesthesia       It's not unusual to feel dizzy, light-headed or faint for up to 24 hours after surgery or while taking pain medication.  If you have these symptoms: sit for a few minutes before standing and have someone assist you when you get up to walk or use the bathroom.      You should rest and relax for the next 24 hours. We recommend you make arrangements to have an adult stay with you for at least 24 hours after your discharge.  Avoid hazardous and strenuous activity.      DO NOT DRIVE any vehicle or operate mechanical equipment for 24 hours following the end of your surgery.  Even though you may feel normal, your reactions may be affected by the medication you have received.      Do not drink alcoholic beverages for 24 hours following surgery.       Slowly progress to your regular diet as you feel able. It's not unusual to feel nauseated and/or vomit after receiving anesthesia.  If you develop these symptoms, drink clear liquids (apple juice, ginger ale, broth, 7-up, etc. ) until you feel better.  If your nausea and vomiting persists for 24 hours, please notify your surgeon.        All narcotic pain medications, along with inactivity and anesthesia, can cause constipation. Drinking plenty of liquids and increasing fiber intake will help.      For any questions of a medical nature, call your surgeon.      Do not make important decisions for 24 hours.      If you had general anesthesia, you may have a sore throat for a couple of days related to the breathing tube used during surgery.  You may use Cepacol lozenges to help with this discomfort.  If it worsens or if you develop a fever, contact your surgeon.       If you feel your pain is not well managed with the pain medications prescribed by your surgeon, please contact your surgeon's office to let them know so they can address your concerns.       CoVid 19 Information    We want to give you  information regarding Covid. Please consult your primary care provider with any questions you might have.     Patient who have symptoms (cough, fever, or shortness of breath), need to isolate for 7 days from when symptoms started OR 72 hours after fever resolves (without fever reducing medications) AND improvement of respiratory symptoms (whichever is longer).      Isolate yourself at home (in own room/own bathroom if possible)    Do Not allow any visitors    Do Not go to work or school    Do Not go to Orthodox,  centers, shopping, or other public places.    Do Not shake hands.    Avoid close and intimate contact with others (hugging, kissing).    Follow CDC recommendations for household cleaning of frequently touched services.     After the initial 7 days, continue to isolate yourself from household members as much as possible. To continue decrease the risk of community spread and exposure, you and any members of your household should limit activities in public for 14 days after starting home isolation.     You can reference the following CDC link for helpful home isolation/care tips:  https://www.cdc.gov/coronavirus/2019-ncov/downloads/10Things.pdf    Protect Others:    Cover Your Mouth and Nose with a mask, disposable tissue or wash cloth to avoid spreading germs to others.    Wash your hands and face frequently with soap and water    Call Your Primary Doctor If: Breathing difficulty develops or you become worse.    For more information about COVID19 and options for caring for yourself at home, please visit the CDC website at https://www.cdc.gov/coronavirus/2019-ncov/about/steps-when-sick.html  For more options for care at [facility], please visit our website at [email or web address]    **If you have questions or concerns about your procedure,   call Dr. Garland [phone]**

## 2021-07-08 NOTE — ANESTHESIA PROCEDURE NOTES
Airway       Patient location during procedure: OR  Staff -        Performed By: CRNAIndications and Patient Condition       Indications for airway management: florinda-procedural       Induction type:intravenous       Mask difficulty assessment: 1 - vent by mask    Final Airway Details       Final airway type: endotracheal airway       Successful airway: ETT - single  Endotracheal Airway Details        ETT size (mm): 7.0       Cuffed: yes       Cuff volume (mL): 7       Successful intubation technique: direct laryngoscopy       DL Blade Type: Davalos 2       Grade View of Cords: 1       Adjucts: stylet       Position: Right       Measured from: gums/teeth       Secured at (cm): 22       Bite block used: Oral Airway (emergence)    Post intubation assessment        Placement verified by: capnometry, equal breath sounds and chest rise        Number of attempts at approach: 1       Number of other approaches attempted: 0       Secured with: pink tape       Ease of procedure: easy       Dentition: Intact and Unchanged    Medication(s) Administered   Medication Administration Time: 7/8/2021 8:16 AM

## 2021-07-08 NOTE — PROGRESS NOTES
SPIRITUAL HEALTH SERVICES  SPIRITUAL ASSESSMENT Progress Note  [facility] Pre-Op     REFERRAL SOURCE: Pt Request    I visited Jessika and her friend Janis at her bedside before her procedure. I provided emotional support and prayer.     PLAN: [facility] continues to remain available.     Neda Farris  Associate    Phone: [phone]  Pager: [phone]

## 2021-07-08 NOTE — ANESTHESIA CARE TRANSFER NOTE
Patient: Lizzette Chanel    Procedure(s):  ROBOTIC ASSISTED TOTAL LAPAROSCOPIC HYSTERECTOMY, BILATERAL SALPINGO-OOPHORECTOMY, WASHINGS, INTRA-OPERATIVE SENTINEL LYMPH NODE MAPPING, BILATERAL PELVIC AND PARA-AORTIC LYMPHADENECTOMY, REPAIR VAGINAL TEAR    Diagnosis: Endometrial carcinoma (H) [C54.1]  Diagnosis Additional Information: No value filed.    Anesthesia Type:   General     Note:    Oropharynx: oropharynx clear of all foreign objects  Level of Consciousness: awake  Oxygen Supplementation: face mask  Level of Supplemental Oxygen (L/min / FiO2): 6  Independent Airway: airway patency satisfactory and stable  Dentition: dentition unchanged  Vital Signs Stable: post-procedure vital signs reviewed and stable  Report to RN Given: handoff report given  Patient transferred to: PACU    Handoff Report: Identifed the Patient, Identified the Reponsible Provider, Reviewed the pertinent medical history, Discussed the surgical course, Reviewed Intra-OP anesthesia mangement and issues during anesthesia, Set expectations for post-procedure period and Allowed opportunity for questions and acknowledgement of understanding      Vitals: (Last set prior to Anesthesia Care Transfer)  CRNA VITALS  7/8/2021 1018 - 7/8/2021 1053      7/8/2021             Pulse:  57    SpO2:  99 %    Resp Rate (observed):  8    Resp Rate (set):  11        Electronically Signed By: CARLEY Thorpe CRNA  July 8, 2021  10:53 AM

## 2021-07-08 NOTE — OR NURSING
Dr. Aguirre notified of /74, patient very anxious also. Hydralazine ordered. Patient transferred back to PACU for monitoring.

## 2021-07-08 NOTE — OP NOTE
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    SURGEON:  Fabiola Garland MD.    ASSISTANT:  JOSH Porter.    ANESTHESIA:  General endotracheal anesthesia.    PROCEDURE:  Laparoscopic lysis of adhesions, robotic-assisted total laparoscopic hysterectomy, bilateral salpingo-oophorectomy, intraoperative sentinel lymph node mapping, washings, bilateral pelvic and paraaortic lymphadenectomy and repair of vaginal tears.    INDICATIONS FOR PROCEDURE:  The patient is an 80-year-old who on 04/20/2021, presented to Dr. Guzman Bruner with complaints of postmenopausal bleeding of 1 month's duration.  She was subsequently referred to Dr. Ferro.  A pelvic ultrasound was obtained and revealed an 8 x 5.1 x 4.2 cm uterus.  The endometrium measured 45.9 mm.  It had the appearance of a complex filled the endometrial lining, possible fibroid.  Ovaries were not identified.  There was a possibility of a hematometra.  She was evaluated by Dr. Ferro on 04/20/2021.  An MRI of the pelvis was obtained and revealed a fibroid uterus measuring 7 x 6.1 x 8 cm.  No obvious endometrial thickening, but marked fluid distention of the endometrial cavity and a complex fluid and a distended the endometrial cavity with nodular areas with low level enhancement but restricted diffusion, raising the concern for an endometrial cancer.  There were no enlarged lymph nodes.  On 05/04/2021, Dr. Ferro took her to the operating room and performed a hysteroscopy with ultrasound guidance, D and C with MyoSure.    FINDINGS: Were concerning for malignancy.  Pathology did confirm a FIGO grade 1 endometrioid adenocarcinoma with a background of complex hyperplasia with atypia.    The patient was seen in consultation in my office on 06/01/2021.  We discussed surgical staging of endometrial cancer.  She was  brought to the operating room for said procedure today.    FINDINGS:  The patient had some minor omental adhesions to the mid lower abdomen extending over to the right side.  She had some adhesions of the ileum and cecum to the pelvis.  She had adhesions of the sigmoid colon diffusely to the left pelvic sidewall as well as the posterior aspect of the uterus and to the ovary and fallopian tube on that side suggestive of a prior bout of diverticulitis.  She had no gross evidence of extrauterine spread.  The tumor was at 5.5 cm in did have some mild invasion, although was less than 50% based on representative for frozen section.  I did perform intraoperative sentinel lymph node mapping, but she mapped in either side and so we performed bilateral pelvic and paraaortic lymphadenectomy.  There was no grossly positive lymph nodes.  She did incur a vaginal tears, bilateral sulcus tears, bilateral periurethral tears and a posterior fourchette tear that were repaired at the end of the case.    PROCEDURE IN DETAIL:  The patient was taken to the operating room.  She was placed in the supine position on a pink pad on the operating table.  She had received broad spectrum antibiotic prophylaxis.  Knee high sequential compression devices were placed on her lower extremities.  From a supine position, general endotracheal anesthesia was administered in the usual fashion.  Once intubated, she was repositioned in low lithotomy position using padded Yellofin stirrups.  Her arms were padded and held at her side with draw sheets over her hands and arms.  Shoulder braces were applied to her shoulders.  A Dale was placed above her forehead.  A foam donut was placed over her face.  She was prepped and draped in the usual sterile fashion.  A timeout was conducted and everyone agreed upon the procedure.  I started by inserting a Preet speculum into the vagina and visualizing the cervix. It was grasped at 12 o'clock.  I had a diluted ICG  dye that had been diluted in 20 mL of sterile water.  This was in a 10 mL controlled release syringe and attached to a 22-gauge spinal needle.  The dye was injected at 3 and 9 o'clock at 1 cm into the stroma where we deposited 1 mL in each area and then 1 mL submucosally in each of these areas as well.  I also placed a mL of solution at 12 o'clock and at 6 o'clock and then removed the needle and syringe as well as the tenaculum from the vagina. At the end a Preet speculum, I placed a large EEA sizer into the vagina.  I had demarcated out the laparoscopic port sites after we had draped her. These were approximately 20 cm above the symphysis pubis in the midline above the umbilicus.  I infiltrated the supraumbilical area and made a small nick in the skin with a #15 scalpel blade.  I inserted a Veress needle into the peritoneal cavity.  Opening pressure was 2 mmHg.  The abdomen was insufflated with carbon dioxide to create a diffuse pneumoperitoneum.  Pressure limits were set and maintained at or below 15 mmHg throughout most of the case and then dropped to 12 mmHg for the duration of the case.  Once the pneumoperitoneum was established, the Veress needle was exchanged for an 8 mm da Ben trocar.  The camera was then introduced, confirming intraperitoneal position, but showing that we had adhesions right in front of the camera.  I had the camera directed to the left side.  I placed two 8 mm da Ben ports 7.5 and 15 cm to the left of the camera port.  I moved the camera over to the left lateral port and we could see that we had omental adhesions just below the umbilical port site extending down into the pelvis.  These were fairly filmy.  I did have Jammie Muller place her 8 mm da Ben trocar 7.5 cm to the right of the camera port in the upper abdomen.  I used a 5 mm LigaSure to take down the bulk of the omental adhesions from the anterior abdominal wall, leaving the ones in the right lower quadrant for a takedown  with the da Ben system.    Jammie Muller now was able to introduce the 8 mm AirSeal port above the right anterior superior iliac spine after placed in 33 degrees of steep Trendelenburg with gravitational displacement of bowel into the upper abdomen.  The robot was docked in the usual fashion.  Monopolar scissors were placed in the right upper robotic arm, a [place] bipolar in the medial left upper robotic arm and a ProGrasp in the lateral left robotic arm.  These instruments were advanced in the pelvis.  The adhesions of the omentum and cecum were taken down off the right lower quadrant as was the loop of ileum that was stuck over the ovarian vessels in this area.  I also took down the sigmoid adhesions on the left hand side.  The right round ligament was elevated with a ProGrasp and was cauterized with the [place] bipolar and divided with the monopolar scissors.  I then opened up the posterior broad ligament peritoneum lateral to the ovarian vessels, isolated the ovarian vessels and divided the peritoneum below them towards the uterus.  The ovarian vessels were cauterized with the Maryland bipolar at the pelvic brim and divided with the monopolar scissors.  I then opened up the pararectal and paravesical spaces.  I identified the ureter and I identified the uterine artery at its origin.  The uterine artery was cauterized with the Maryland bipolar I then turned on the Firefly and found no mapping to this side.  I then went to the left hand side.  The right and left and  round ligament was elevated with the ProGrasp.  I cauterized and divided the round ligament, opened up the posterior broad ligament peritoneum lateral to the ovarian vessels.  The ovarian vessels were isolated, the pelvic brim were cauterized with the Maryland bipolar and divided with the monopolar scissors.  I then again opened up the pararectal and paravesical spaces, identified the ureter and the uterine artery and the uterine artery was  cauterized at its origin.  On this side, the Firefly was turned on and there had been too much spread of dye and again we did not identify a sentinel lymph node on this side.  The vesicouterine peritoneum was divided and the bladder was carefully taken down off the anterior surface of the cervix and upper vagina.  Starting on the left hand side, the soft tissues at the isthmus were cauterized with the Maryland bipolar and divided with the monopolar scissors.  I then cauterized the main trunk of the uterine artery on this side.  I then went to the right hand side, cauterized and divided the soft tissues at the isthmus and then cauterized and subsequently divided the main trunk of the uterine artery.  I then went down the cardinal ligament on the right hand side, cauterizing and dividing the soft tissues free of the cervix down to and including the uterosacral ligament and then repeated this on the left hand side.  The EEA sizer was pushed up by our scrub tech [place] into the anterior fornix and the anterior colpotomy was made at the cervicovaginal junction.  At that juncture I went ahead and started the pelvic lymph node dissection.  I started on the right hand side and subsequently went to the left hand side.  The lymph bearing fatty tissue from over the external iliac artery and vein and distal common iliac artery dissected free of these vessels.  I then dissected out the obturator space of the lymph bearing fatty tissue, freeing up the lymph bearing fatty tissue from the undersurface of the external iliac vein, medial aspect of the psoas and obturator nerves below.  Each separate side was placed in an EndoCatch bag.  The right one was cinched down and the string was cut short and the left one when it was cinched down and was left long.  I then completed the colpotomy and circumferentially divided the cervix free of the vagina.  A 15 mm anchor bag was brought through the colpotomy and the uterus, cervix, tubes  and ovaries were laid within it.  I did have Jammie Muller place a Turk catheter at that point to decompress the bladder completely and she delivered the uterus in the bag and then replaced the EEA sizer.  At that point, I could see that there was a sulcus tear, fairly high in the vagina on the patient's left hand side.  Once Jammie was back in place at and scrubbed in, I had her exchange the monopolar scissors for a long da Ben needle .  I used a 2-0 Vicryl suture on a CT2 needle and closed the upper part of the sulcus tear in a running fashion.  I then used 0 PDS suture anchored at 3 and 9 o'clock and did a running full thickness anterior to posterior vaginal closure incorporating the cul-de-sac peritoneum in that closure.  The two sutures were tied together.  We then irrigated the pelvis.  I opened up the white line of Toldt along the descending colon and reflected the colon medially and dissected the lymph bearing fatty tissue that I could find from the common iliac artery.  I then went and divided the base of the mesentery over the right common iliac artery and aorta.  I dissected out the space going over to the left hand side and made sure the ureter was moved out of the way and dissected the lymph bearing fatty tissue from lateral to the common iliac artery and lower aortic area and this was passed off to spoon graspers and taken out through the accessory port site.  I then went to the right hand side, tenting up the peritoneum and dissecting out the ureter.  The lymph bearing fatty tissue over the vena cava and common iliac artery were dissected free of the side and also taken out with spoon grasper through the accessory port site.  At that point, we placed irrigation of the pelvis.  We reaspirated the saline and it was sent off as washings.  The robotic instruments were removed.  The robot was undocked.  The pneumoperitoneum was allowed to dissipate and the trocars were removed intact.  The incision  sites were closed with 4-0 Monocryl in an interrupted fashion to reapproximate the subcutaneous tissue and 4-0 Monocryl in a subcuticular fashion to reapproximate the skin.  Benzoin was placed around the incisions.  Steri-Strips laid over the incisions.  I then went into the vagina to evaluate it.  She had a fair amount of bleeding.  Once we removed the EEA sizer. I repaired bilateral sulcus tears with 2-0 Vicryl suture on an SH needle.  I then used 2 right angle retractors, anterior and posterior to evaluate the vagina and she did have bilateral sulcus tears.  These were repaired with a 2-0 Vicryl suture in a running fashion down the sulcus tear and then reinforced on the left hand side with the interrupted figure-of-eight sutures NU.  She had a small tear at the introitus.  This was repaired with a single figure-of-eight with 2-0 Vicryl suture.  At that point, all areas appeared hemostatic.  The retractors were removed.  Her anesthesia was reversed.  She was extubated and taken to recovery room in stable condition.  Estimated blood loss for the procedure 75 mL.    JOSH Porter was my primary assist.  She helped me with all aspects of the case, including insertion of the trocars, docking the robot, placement of the robotic instruments.  She did exchange the robotic instruments as needed throughout the case.  She assisted through the accessory port site, providing necessary countertraction, optimizing visualization and suctioning and irrigating when necessary.  She helped me with cuff closure with specimen retrieval, and eventual undocking the robot and port site closure, as well as a vaginal tear repair.    Dianne Garland MD        D: 2021   T: 2021   MT: bernie    Name:     LATHA SAAVEDRA  MRN:      [id number]        Account:        [id number]   :      1940           Procedure Date: 2021     Document: [id number]    cc:  MD Guzman Chadwick MD

## 2021-07-08 NOTE — BRIEF OP NOTE
[facility]    Brief Operative Note    Pre-operative diagnosis: Endometrial carcinoma (H) [C54.1]  Post-operative diagnosis Same as pre-operative diagnosis    Procedure: Procedure(s):  ROBOTIC ASSISTED TOTAL LAPAROSCOPIC HYSTERECTOMY, BILATERAL SALPINGO-OOPHORECTOMY, WASHINGS, INTRA-OPERATIVE SENTINEL LYMPH NODE MAPPING, BILATERAL PELVIC AND PARA-AORTIC LYMPHADENECTOMY, REPAIR VAGINAL TEAR  Surgeon: Surgeon(s) and Role:     * Dianne Garland MD - Primary  Anesthesia: General   Estimated blood loss: 75cc  Drains: None  Specimens:   ID Type Source Tests Collected by Time Destination   1 : PELVIC WASHINGS Washings Pelvis CYTOLOGY NON GYN Dianne Garland MD 7/8/2021  9:47 AM    A : UTERUS, CERVIX, BILATERAL FALLOPIAN TUBES AND OVARIES; FROZEN ON ENDOMETRIUM Tissue Uterus with Bilateral Ovaries and Fallopian Tubes SURGICAL PATHOLOGY EXAM Dianne Garland MD 7/8/2021  9:26 AM    B : RIGHT PELVIC LYMPH NODES Tissue Lymph Node, Right Pelvic SURGICAL PATHOLOGY EXAM Dianne Garland MD 7/8/2021  9:37 AM    C : LEFT PELVIC LYMPH NODES Tissue Lymph Node, Left Pelvic SURGICAL PATHOLOGY EXAM Dianne Garland MD 7/8/2021  9:38 AM    D : LEFT PARA-AORTIC LYMPH NODE Tissue Lymph Node, Left Para Aortic SURGICAL PATHOLOGY EXAM Dianne Garland MD 7/8/2021  9:49 AM    E : RIGHT PARA-AORTIC LYMPH NODE Tissue Lymph Node, Right Para Aortic SURGICAL PATHOLOGY EXAM Dianne Garland MD 7/8/2021  9:58 AM      Findings:   Large uterine fibroid, frozen demonstrates 5cm endometrial carcinoma, <50% myoinvasion.   Complications: None.  Implants: * No implants in log *

## 2021-07-14 LAB
COPATH REPORT: NORMAL
COPATH REPORT: NORMAL

## 2021-07-15 ENCOUNTER — TELEPHONE (OUTPATIENT)
Dept: FAMILY MEDICINE | Facility: CLINIC | Age: 81
End: 2021-07-15

## 2021-07-15 NOTE — TELEPHONE ENCOUNTER
Reason for Call: Continue to EST care with Dr. Bruner     Detailed comments: Pt wanted to know if the Doc can be her PCP from now one and not Jennifer.     Phone Number Patient can be reached at: Cell number on file:    Telephone Information:   Mobile [phone]       Best Time: anytime    Can we leave a detailed message on this number? YES    Call taken on 7/15/2021 at 10:51 AM by Brandie Rothman

## 2021-07-27 ASSESSMENT — MIFFLIN-ST. JEOR: SCORE: 1216.11

## 2021-07-28 ENCOUNTER — OFFICE VISIT (OUTPATIENT)
Dept: FAMILY MEDICINE | Facility: CLINIC | Age: 81
End: 2021-07-28
Payer: COMMERCIAL

## 2021-07-28 VITALS
SYSTOLIC BLOOD PRESSURE: 120 MMHG | TEMPERATURE: 98.6 F | HEIGHT: 68 IN | BODY MASS INDEX: 23.64 KG/M2 | OXYGEN SATURATION: 96 % | HEART RATE: 82 BPM | WEIGHT: 156 LBS | DIASTOLIC BLOOD PRESSURE: 58 MMHG

## 2021-07-28 DIAGNOSIS — R53.83 OTHER FATIGUE: Primary | ICD-10-CM

## 2021-07-28 DIAGNOSIS — R63.4 WEIGHT LOSS: ICD-10-CM

## 2021-07-28 DIAGNOSIS — I10 ESSENTIAL HYPERTENSION: ICD-10-CM

## 2021-07-28 LAB — HGB BLD-MCNC: 12.9 G/DL (ref 11.7–15.7)

## 2021-07-28 PROCEDURE — 84443 ASSAY THYROID STIM HORMONE: CPT | Performed by: PHYSICIAN ASSISTANT

## 2021-07-28 PROCEDURE — 99214 OFFICE O/P EST MOD 30 MIN: CPT | Performed by: PHYSICIAN ASSISTANT

## 2021-07-28 PROCEDURE — 36415 COLL VENOUS BLD VENIPUNCTURE: CPT | Performed by: PHYSICIAN ASSISTANT

## 2021-07-28 PROCEDURE — 85018 HEMOGLOBIN: CPT | Performed by: PHYSICIAN ASSISTANT

## 2021-07-28 RX ORDER — LOSARTAN POTASSIUM 50 MG/1
50 TABLET ORAL DAILY
Qty: 30 TABLET | Refills: 1 | Status: SHIPPED | OUTPATIENT
Start: 2021-07-28 | End: 2021-08-05

## 2021-07-28 NOTE — PROGRESS NOTES
"HPI: Jessika is here for follow-up elevated blood pressure at preop  She brings in a long list of BP readings from home and getting anywhere from 117-190 syst with many syst readings normal and many in the 150 range.  She is not having any sxs related to high BP  Readings tend to fluctuate quite a bit but seem higher in general first thing in the morning.  She was recently diagnosed with endometrial ca and is s/p Davinci hyst/bso, node dissection  At her post op her syst BP was up to 200 syst      Pt has been on fosamax for a year  She stopped this about a month ago as her jaw felt \"off\"  She thinks this a little better but decided to go back on this  Last DEXA was 6/16/20  Denies any GI SE    She has low energy, fatigue and weight loss for many months.  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    TSH   Date Value Ref Range Status   01/22/2018 2.04 0.40 - 4.00 mU/L Final           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    had vaginal bleeding     Arthritis mild in knees, shoulders     Cancer (H) skin--Basil Cell, [place]     Chest pain 2003    neg est thallium     Chest pain 04/2016    nl est echo and cxr     Complication of anesthesia     BP maddie to over 200 in PACU 1 month ago-after D&C     Hx of colonoscopy 2003    incomplete, be nl     Hx of colonoscopy 06/03/2011    nl     Hypercholesteremia 2011    aches with zocor     CAITLYN (obstructive sleep apnea) 12/2012    done [place], using dental device, added cpap 2014      Osteopenia 2013    Dr. Taylor     Peripheral neuropathy     Dr. 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     Statin intolerance     zocor and one other caused aches     Subdural hematoma (H) 01/2016    traumatic, fu ct 3/16 resolved     " Uncomplicated asthma      Past Surgical History:   Procedure Laterality Date     ABDOMEN SURGERY  1960     APPENDECTOMY  60's     BIOPSY  2010    inside upper lip     DAVINCI HYSTERECTOMY TOTAL, BILATERAL SALPINGO-OOPHORECTOMY, NODE DISSECTION, COMBINED Bilateral 2021    Procedure: ROBOTIC ASSISTED TOTAL LAPAROSCOPIC HYSTERECTOMY, BILATERAL SALPINGO-OOPHORECTOMY, WASHINGS, INTRA-OPERATIVE SENTINEL LYMPH NODE MAPPING, BILATERAL PELVIC AND PARA-AORTIC LYMPHADENECTOMY, REPAIR VAGINAL TEAR;  Surgeon: Dianne Garland MD;  Location:  OR     DILATION AND CURETTAGE, HYSTEROSCOPY WITH ULTRASOUND GUIDANCE N/A 2021    Procedure: HYSTEROSCOPY UNDER ULTRASOUND GUIDANCE, DILATION AND CURETTAGE OF UTERUS;  Surgeon: Isabel Ferro MD;  Location:  OR     OPERATIVE HYSTEROSCOPY WITH MORCELLATOR N/A 2021    Procedure: MYOSURE MORCELLATOR;  Surgeon: Isabel Ferro MD;  Location:  OR     PHACOEMULSIFICATION CLEAR CORNEA WITH STANDARD INTRAOCULAR LENS IMPLANT  3/21/2012    Procedure:PHACOEMULSIFICATION CLEAR CORNEA WITH STANDARD INTRAOCULAR LENS IMPLANT; RIGHT PHACOEMULSIFICATION CLEAR CORNEA WITH STANDARD INTRAOCULAR LENS IMPLANT ; Surgeon:CHANDRAKANT HERNANDEZ; Location: [facility]     PHACOEMULSIFICATION CLEAR CORNEA WITH STANDARD INTRAOCULAR LENS IMPLANT  3/28/2012    Procedure:PHACOEMULSIFICATION CLEAR CORNEA WITH STANDARD INTRAOCULAR LENS IMPLANT; LEFT PHACOEMULSIFICATION CLEAR CORNEA WITH STANDARD INTRAOCULAR LENS IMPLANT ; Surgeon:CHANDRAKANT HERNANDEZ; Location: [facility]     SHOULDER SURGERY      twice     SOFT TISSUE SURGERY  R&L Rotator cuff repairs     TONSILLECTOMY  child     Social History     Tobacco Use     Smoking status: Former Smoker     Packs/day: 1.50     Years: 12.00     Pack years: 18.00     Types: Cigarettes     Start date: 1966     Quit date: 3/17/1974     Years since quittin.3     Smokeless tobacco: Never Used     Tobacco comment: enjoyed it!   Substance Use Topics      "Alcohol use: Yes     Alcohol/week: 0.0 standard drinks     Comment: 1-2/day     Current Outpatient Medications   Medication Sig Dispense Refill     alendronate (FOSAMAX) 70 MG tablet Take 1 tablet (70 mg) by mouth every 7 days 12 tablet 3     Apoaequorin 10 MG CAPS Take 1 capsule by mouth daily Prevagen       Calcium Carbonate-Vitamin D (CALCIUM-D PO) Take by mouth daily Liquid  Calcium 1200 mg  & Vit D 800 iU       Coenzyme Q10 (COQ10 PO) Take 100 mg by mouth.       famotidine (PEPCID) 40 MG tablet TAKE 1 TABLET(40 MG) BY MOUTH DAILY 90 tablet 3     losartan (COZAAR) 50 MG tablet Take 1 tablet (50 mg) by mouth daily 30 tablet 1     ORDER FOR DME Use your CPAP device as directed by your provider.       rosuvastatin (CRESTOR) 5 MG tablet TAKE 1 TABLET(5 MG) BY MOUTH DAILY 90 tablet 3     senna (SENOKOT) 8.6 MG tablet Take 1-3 tablets by mouth 2 times daily as needed for constipation 30 tablet 0     vitamin B-12 (CYANOCOBALAMIN) 1000 MCG tablet Take 1,000 mcg by mouth daily       No Known Allergies  FAMILY HISTORY NOTED AND REVIEWED    PHYSICAL EXAM:    Pulse 82   Temp 98.6  F (37  C) (Temporal)   Ht 1.727 m (5' 8\")   Wt 70.8 kg (156 lb)   SpO2 96%   BMI 23.72 kg/m      Patient appears non toxic  158.77 initially and 120/58 on recheck    Assessment and Plan:     (R53.83) Other fatigue  (primary encounter diagnosis)  Comment: chronic, suspect age and post polio  Plan: Hemoglobin, TSH with free T4 reflex            (R63.4) Weight loss  Comment: stable recently  Plan: TSH with free T4 reflex            (I10) Essential hypertension  Comment:   Plan: Start  losartan (COZAAR) 50 MG tablet at bedtime  Follow up one month with home BP monitor and readings. Check BP 3 times in a row 2 min apart.  Call if develops SE from medication or BP syst <100            Spent 40 minutes FTF with patient and her nurse friend of which over 50% was spent discussing the coordination of care and management of their HTN and chronic " fatigue.        Jennifer Jose PA-C

## 2021-07-29 DIAGNOSIS — I10 ESSENTIAL HYPERTENSION: ICD-10-CM

## 2021-07-29 LAB — TSH SERPL DL<=0.005 MIU/L-ACNC: 1.42 MU/L (ref 0.4–4)

## 2021-07-29 RX ORDER — LOSARTAN POTASSIUM 50 MG/1
50 TABLET ORAL DAILY
Qty: 90 TABLET | Refills: 1 | Status: CANCELLED | OUTPATIENT
Start: 2021-07-29

## 2021-07-29 NOTE — RESULT ENCOUNTER NOTE
Jessika,    Your thyroid test is normal.  Your hemoglobin is 12.9 which is also normal.    Jennifer Jose PA-C

## 2021-07-29 NOTE — TELEPHONE ENCOUNTER
Jennifer,  please advise losartan script sent for 30 day supply and one refill. Would you like script re-sent for 90 day supply and one refill? Per OV note, you want patient to follow up in a month. Please refuse script if script from yesterday 7/28/21 or please sign pended script for 90 day supply and 1 refill.    Concetta Ty RN  [facility]

## 2021-07-29 NOTE — TELEPHONE ENCOUNTER
Rx for Losartan was sent to pharmacy 7/28/21 for #30 with 1 refill    This is a 90 day supply request, pharmacy requesting qty of 90 (not pended)

## 2021-08-04 ENCOUNTER — DOCUMENTATION ONLY (OUTPATIENT)
Dept: OTHER | Facility: CLINIC | Age: 81
End: 2021-08-04

## 2021-08-05 DIAGNOSIS — I10 ESSENTIAL HYPERTENSION: ICD-10-CM

## 2021-08-05 RX ORDER — LOSARTAN POTASSIUM 50 MG/1
25 TABLET ORAL DAILY
Qty: 30 TABLET | Refills: 1
Start: 2021-08-05 | End: 2021-08-05

## 2021-08-05 RX ORDER — LOSARTAN POTASSIUM 25 MG/1
25 TABLET ORAL DAILY
Qty: 90 TABLET | Refills: 1 | Status: SHIPPED | OUTPATIENT
Start: 2021-08-05 | End: 2021-08-25

## 2021-08-18 ENCOUNTER — NURSE TRIAGE (OUTPATIENT)
Dept: FAMILY MEDICINE | Facility: CLINIC | Age: 81
End: 2021-08-18

## 2021-08-18 ENCOUNTER — MYC MEDICAL ADVICE (OUTPATIENT)
Dept: FAMILY MEDICINE | Facility: CLINIC | Age: 81
End: 2021-08-18

## 2021-08-18 NOTE — TELEPHONE ENCOUNTER
Dr. Bruner, please see note listed below. Spoke to patient and her friend Belen who is a nurse. They are worried that Jessika' health has changed with low B/P in the afternoon and increased fatigue through out  the day.   Patient had SOB this afternoon with low B/P of 98/70.  I did advise the patient to go to the ED based on Triage protocal.   Patient is telling me once she has a rest, her SOB and fatigue improve. She is at her cabin and will travel back to [place] tomorrow to be near [facility].   If her SOB, fatigue and low B/P continue, she will go to the ED   Patient would like to come see you in clinic but you have no openings next week.     Please advise.     Reason for Disposition    Systolic BP >= 160 OR Diastolic >= 100, and any cardiac or neurologic symptoms (e.g., chest pain, difficulty breathing, unsteady gait, blurred vision)    Additional Information    Negative: Patient sounds very sick or weak to the triager    Protocols used: HIGH BLOOD PRESSURE-A-OH    Isabel Caro RN  -[facility]

## 2021-08-18 NOTE — TELEPHONE ENCOUNTER
Call attempted to pt patient following my chart message below.  Left voice message asking pt to call triage back or seek immidiate care at ER if any of the following:    Systolic BP >= 160 OR Diastolic >= 100, and any cardiac or neurologic symptoms (e.g., chest pain, difficulty breathing, unsteady gait, blurred vision)    My chart message was also sent with this advice.   Dr. Bruner,  I have an appointment with you on 9/9 but I am worried about several symptoms I am having.  I am taking 25 mg of Losartan every night and check my blood pressure several times a day.  It varies quite a lot. It is usually higher in the morning, around 150-160/80 and as the day progresses it goes down to 110/75.  Today it stated at 150/77 and this afternoon it is 98/70.  I have very little energy and today after walking my dog I was short of breath, which is new for me.  I had a fall two days ago when I tripped on the steps but I don't think I hurt my self.  I do have   significant family history of cardiac disease and I am worried that something is wrong.  My family is very concerned.  I am just not myself.  Should I see you sooner than 9/9?  Please let me know what you think.   Thank you.  Jessika

## 2021-08-18 NOTE — TELEPHONE ENCOUNTER
Left voicemail to see if patient would like phone call visit next week.     Isabel Caro RN  [facility]

## 2021-08-19 NOTE — TELEPHONE ENCOUNTER
Left voicemail to schedule visit at clinic. Will try later.     Isabel Caro RN  [facility]

## 2021-08-19 NOTE — TELEPHONE ENCOUNTER
146/62 blood pressure today. Denies SOB or weakness.  Appointment is made for her to  be seen by Dr. Baxter tomorrow for blood pressure evaluation.     Isabel Caro RN  -[facility]

## 2021-08-20 ENCOUNTER — OFFICE VISIT (OUTPATIENT)
Dept: FAMILY MEDICINE | Facility: CLINIC | Age: 81
End: 2021-08-20
Payer: COMMERCIAL

## 2021-08-20 VITALS
HEIGHT: 68 IN | TEMPERATURE: 97.7 F | WEIGHT: 152 LBS | BODY MASS INDEX: 23.04 KG/M2 | RESPIRATION RATE: 16 BRPM | SYSTOLIC BLOOD PRESSURE: 118 MMHG | OXYGEN SATURATION: 96 % | HEART RATE: 68 BPM | DIASTOLIC BLOOD PRESSURE: 61 MMHG

## 2021-08-20 DIAGNOSIS — R53.83 FATIGUE, UNSPECIFIED TYPE: ICD-10-CM

## 2021-08-20 DIAGNOSIS — R06.09 DOE (DYSPNEA ON EXERTION): ICD-10-CM

## 2021-08-20 DIAGNOSIS — R06.02 SHORTNESS OF BREATH: Primary | ICD-10-CM

## 2021-08-20 LAB
ALBUMIN UR-MCNC: NEGATIVE MG/DL
APPEARANCE UR: CLEAR
BACTERIA #/AREA URNS HPF: NORMAL /HPF
BASOPHILS # BLD AUTO: 0 10E3/UL (ref 0–0.2)
BASOPHILS NFR BLD AUTO: 1 %
BILIRUB UR QL STRIP: NEGATIVE
COLOR UR AUTO: YELLOW
CRP SERPL-MCNC: 3.6 MG/L (ref 0–8)
D DIMER PPP FEU-MCNC: 2.74 UG/ML FEU (ref 0–0.5)
EOSINOPHIL # BLD AUTO: 0.4 10E3/UL (ref 0–0.7)
EOSINOPHIL NFR BLD AUTO: 7 %
ERYTHROCYTE [DISTWIDTH] IN BLOOD BY AUTOMATED COUNT: 13.3 % (ref 10–15)
GLUCOSE UR STRIP-MCNC: NEGATIVE MG/DL
HCT VFR BLD AUTO: 37.6 % (ref 35–47)
HGB BLD-MCNC: 12.4 G/DL (ref 11.7–15.7)
HGB UR QL STRIP: NEGATIVE
KETONES UR STRIP-MCNC: NEGATIVE MG/DL
LEUKOCYTE ESTERASE UR QL STRIP: ABNORMAL
LYMPHOCYTES # BLD AUTO: 1.3 10E3/UL (ref 0.8–5.3)
LYMPHOCYTES NFR BLD AUTO: 19 %
MCH RBC QN AUTO: 32.1 PG (ref 26.5–33)
MCHC RBC AUTO-ENTMCNC: 33 G/DL (ref 31.5–36.5)
MCV RBC AUTO: 97 FL (ref 78–100)
MONOCYTES # BLD AUTO: 0.7 10E3/UL (ref 0–1.3)
MONOCYTES NFR BLD AUTO: 10 %
NEUTROPHILS # BLD AUTO: 4.3 10E3/UL (ref 1.6–8.3)
NEUTROPHILS NFR BLD AUTO: 64 %
NITRATE UR QL: NEGATIVE
NT-PROBNP SERPL-MCNC: 171 PG/ML (ref 0–450)
PH UR STRIP: 6.5 [PH] (ref 5–7)
PLATELET # BLD AUTO: 279 10E3/UL (ref 150–450)
RBC # BLD AUTO: 3.86 10E6/UL (ref 3.8–5.2)
RBC #/AREA URNS AUTO: NORMAL /HPF
SP GR UR STRIP: 1.02 (ref 1–1.03)
UROBILINOGEN UR STRIP-ACNC: 0.2 E.U./DL
WBC # BLD AUTO: 6.8 10E3/UL (ref 4–11)
WBC #/AREA URNS AUTO: NORMAL /HPF

## 2021-08-20 PROCEDURE — 36415 COLL VENOUS BLD VENIPUNCTURE: CPT | Performed by: INTERNAL MEDICINE

## 2021-08-20 PROCEDURE — 80053 COMPREHEN METABOLIC PANEL: CPT | Performed by: INTERNAL MEDICINE

## 2021-08-20 PROCEDURE — 83880 ASSAY OF NATRIURETIC PEPTIDE: CPT | Performed by: INTERNAL MEDICINE

## 2021-08-20 PROCEDURE — 84443 ASSAY THYROID STIM HORMONE: CPT | Performed by: INTERNAL MEDICINE

## 2021-08-20 PROCEDURE — 85025 COMPLETE CBC W/AUTO DIFF WBC: CPT | Performed by: INTERNAL MEDICINE

## 2021-08-20 PROCEDURE — 99215 OFFICE O/P EST HI 40 MIN: CPT | Performed by: INTERNAL MEDICINE

## 2021-08-20 PROCEDURE — 93000 ELECTROCARDIOGRAM COMPLETE: CPT | Performed by: INTERNAL MEDICINE

## 2021-08-20 PROCEDURE — 86140 C-REACTIVE PROTEIN: CPT | Performed by: INTERNAL MEDICINE

## 2021-08-20 PROCEDURE — 85379 FIBRIN DEGRADATION QUANT: CPT | Performed by: INTERNAL MEDICINE

## 2021-08-20 PROCEDURE — 81001 URINALYSIS AUTO W/SCOPE: CPT | Performed by: INTERNAL MEDICINE

## 2021-08-20 ASSESSMENT — PATIENT HEALTH QUESTIONNAIRE - PHQ9: SUM OF ALL RESPONSES TO PHQ QUESTIONS 1-9: 3

## 2021-08-20 ASSESSMENT — MIFFLIN-ST. JEOR: SCORE: 1197.97

## 2021-08-20 NOTE — PROGRESS NOTES
Assessment & Plan   Problem List Items Addressed This Visit        Respiratory    Dyspnea - Primary    Relevant Orders    CBC with platelets and differential (Completed)    BNP-N terminal pro (Completed)    Adult Cardiology Eval Referral       Other    Fatigue    Relevant Orders    CBC with platelets and differential (Completed)    Comprehensive metabolic panel (BMP + Alb, Alk Phos, ALT, AST, Total. Bili, TP) (Completed)    TSH with free T4 reflex (Completed)    CRP, inflammation (Completed)    UA with Microscopic reflex to Culture (Completed)    Urine Microscopic (Completed)      Other Visit Diagnoses     FOURNIER (dyspnea on exertion)        Relevant Orders    Comprehensive metabolic panel (BMP + Alb, Alk Phos, ALT, AST, Total. Bili, TP) (Completed)    D dimer, quantitative (Completed)    BNP-N terminal pro (Completed)    Adult Cardiology Eval Referral    EKG 12-lead complete w/read - Clinics (Completed)    CRP, inflammation (Completed)         We will check some basic labs today.  Blood pressure repeated twice was within normal.  Hemodynamics are stable; pulse ox 96%, lungs are clear to auscultation, heart sounds are within normal.  We will also advise patient follow-up with cardiology , recommend an echocardiogram.  Advised patient if labs are normal then we have no explanation of her fatigue, possible element of physical deconditioning.  She denies being depressed her PHQ-9 score was 3, I do not find a reason to start on SSRI at this point.  Continue CPAP daily.  Further recommendation pending lab results and cardiology evaluation.  Patient is not sick or septic looking.  Denies any urinary symptoms.  Denies any GI symptoms.  No focal weakness on exam; neurologic exam is normal for age.  Follow-up immediately if any worsening symptoms; lethargy or other systemic complaints.  She did have surgery in the recent  past and we will check a D-dimer to make sure there is no DVT/VTE; has no pleuritic chest pain. No leg  "edema.          CONSULTATION/REFERRAL to cardiology    See Patient Instructions    Return in about 1 day (around 8/21/2021), or if symptoms worsen or fail to improve, for As needed and if symptoms worsen.      Total time, was 40 minutes, including, review of records , exam and recommendations.  Rita Baxter MD  [facility] [place] is a 80 year old who presents for the following health issues   HPI     Blood pressure concern    Patient presenting for evaluation of shortness of breath and fatigue.  She has history of stress echo back in 2016 that showed normal LV ejection fraction.  Patient denies that she is under stressors, denies that she is anxious, she reports \"life is good\", she had the symptoms started after her D&C and hysterectomy.  She is maintained recently on losartan 25 mg for blood pressure.  Her blood pressure is normal today, she has given us a list of readings of blood pressure .  She uses CPAP daily.  She does feel anxious to some degree, though on further questioning, she used to walk her dog 3 times a day, last week she could not do that, she found herself short of breath.  She has some postnasal drip but no real cough especially when clearing her throat.  Denies any Covid exposure, denies any diarrhea, she had some decreased appetite and she is trying to lose weight as well.    Review of Systems   Constitutional, HEENT, cardiovascular, pulmonary, gi and gu systems are negative, except as otherwise noted.      Objective    /61   Pulse 68   Temp 97.7  F (36.5  C) (Temporal)   Resp 16   Ht 1.727 m (5' 8\")   Wt 68.9 kg (152 lb)   SpO2 96%   BMI 23.11 kg/m    Body mass index is 23.11 kg/m .  Physical Exam   GENERAL: healthy, alert and no distress  EYES: Eyes grossly normal to inspection and conjunctivae and sclerae normal  NECK: no adenopathy, no asymmetry, masses, or scars and thyroid normal to palpation  RESP: lungs clear to auscultation - no rales, " rhonchi or wheezes  CV: regular rate and rhythm, normal S1 S2, no S3 or S4, no murmur, click or rub, no peripheral edema and peripheral pulses strong  ABDOMEN: soft, nontender, no hepatosplenomegaly, no masses and bowel sounds normal  MS: no gross musculoskeletal defects noted, no edema  SKIN: no suspicious lesions or rashes  NEURO: Normal strength and tone, mentation intact and speech normal  PSYCH: mentation appears normal, affect normal/bright    Office Visit on 07/28/2021   Component Date Value Ref Range Status     TSH 07/28/2021 1.42  0.40 - 4.00 mU/L Final     Hemoglobin 07/28/2021 12.9  11.7 - 15.7 g/dL Final

## 2021-08-21 ENCOUNTER — APPOINTMENT (OUTPATIENT)
Dept: CT IMAGING | Facility: CLINIC | Age: 81
End: 2021-08-21
Attending: EMERGENCY MEDICINE
Payer: COMMERCIAL

## 2021-08-21 ENCOUNTER — HOSPITAL ENCOUNTER (EMERGENCY)
Facility: CLINIC | Age: 81
Discharge: HOME | End: 2021-08-21
Attending: EMERGENCY MEDICINE | Admitting: EMERGENCY MEDICINE
Payer: COMMERCIAL

## 2021-08-21 VITALS
OXYGEN SATURATION: 99 % | HEIGHT: 68 IN | DIASTOLIC BLOOD PRESSURE: 84 MMHG | HEART RATE: 82 BPM | WEIGHT: 152 LBS | TEMPERATURE: 98.2 F | SYSTOLIC BLOOD PRESSURE: 154 MMHG | BODY MASS INDEX: 23.04 KG/M2 | RESPIRATION RATE: 16 BRPM

## 2021-08-21 DIAGNOSIS — R06.02 SHORTNESS OF BREATH: ICD-10-CM

## 2021-08-21 LAB
ALBUMIN SERPL-MCNC: 3.7 G/DL (ref 3.4–5)
ALP SERPL-CCNC: 78 U/L (ref 40–150)
ALT SERPL W P-5'-P-CCNC: 20 U/L (ref 0–50)
ANION GAP SERPL CALCULATED.3IONS-SCNC: 3 MMOL/L (ref 3–14)
ANION GAP SERPL CALCULATED.3IONS-SCNC: 5 MMOL/L (ref 3–14)
AST SERPL W P-5'-P-CCNC: 13 U/L (ref 0–45)
BASOPHILS # BLD AUTO: 0.1 10E3/UL (ref 0–0.2)
BASOPHILS NFR BLD AUTO: 1 %
BILIRUB SERPL-MCNC: 0.3 MG/DL (ref 0.2–1.3)
BUN SERPL-MCNC: 17 MG/DL (ref 7–30)
BUN SERPL-MCNC: 18 MG/DL (ref 7–30)
CALCIUM SERPL-MCNC: 9.1 MG/DL (ref 8.5–10.1)
CALCIUM SERPL-MCNC: 9.4 MG/DL (ref 8.5–10.1)
CHLORIDE BLD-SCNC: 107 MMOL/L (ref 94–109)
CHLORIDE BLD-SCNC: 110 MMOL/L (ref 94–109)
CO2 SERPL-SCNC: 27 MMOL/L (ref 20–32)
CO2 SERPL-SCNC: 30 MMOL/L (ref 20–32)
CREAT SERPL-MCNC: 0.73 MG/DL (ref 0.52–1.04)
CREAT SERPL-MCNC: 0.78 MG/DL (ref 0.52–1.04)
EOSINOPHIL # BLD AUTO: 0.6 10E3/UL (ref 0–0.7)
EOSINOPHIL NFR BLD AUTO: 8 %
ERYTHROCYTE [DISTWIDTH] IN BLOOD BY AUTOMATED COUNT: 12.8 % (ref 10–15)
GFR SERPL CREATININE-BSD FRML MDRD: 71 ML/MIN/1.73M2
GFR SERPL CREATININE-BSD FRML MDRD: 77 ML/MIN/1.73M2
GLUCOSE BLD-MCNC: 68 MG/DL (ref 70–99)
GLUCOSE BLD-MCNC: 80 MG/DL (ref 70–99)
HCT VFR BLD AUTO: 39.3 % (ref 35–47)
HGB BLD-MCNC: 12.6 G/DL (ref 11.7–15.7)
HOLD SPECIMEN: NORMAL
IMM GRANULOCYTES # BLD: 0 10E3/UL
IMM GRANULOCYTES NFR BLD: 0 %
LYMPHOCYTES # BLD AUTO: 1.5 10E3/UL (ref 0.8–5.3)
LYMPHOCYTES NFR BLD AUTO: 22 %
MCH RBC QN AUTO: 30.4 PG (ref 26.5–33)
MCHC RBC AUTO-ENTMCNC: 32.1 G/DL (ref 31.5–36.5)
MCV RBC AUTO: 95 FL (ref 78–100)
MONOCYTES # BLD AUTO: 0.7 10E3/UL (ref 0–1.3)
MONOCYTES NFR BLD AUTO: 10 %
NEUTROPHILS # BLD AUTO: 4.2 10E3/UL (ref 1.6–8.3)
NEUTROPHILS NFR BLD AUTO: 59 %
NRBC # BLD AUTO: 0 10E3/UL
NRBC BLD AUTO-RTO: 0 /100
NT-PROBNP SERPL-MCNC: 191 PG/ML (ref 0–1800)
PLATELET # BLD AUTO: 304 10E3/UL (ref 150–450)
POTASSIUM BLD-SCNC: 4 MMOL/L (ref 3.4–5.3)
POTASSIUM BLD-SCNC: 4.7 MMOL/L (ref 3.4–5.3)
PROT SERPL-MCNC: 7 G/DL (ref 6.8–8.8)
RBC # BLD AUTO: 4.14 10E6/UL (ref 3.8–5.2)
SODIUM SERPL-SCNC: 140 MMOL/L (ref 133–144)
SODIUM SERPL-SCNC: 142 MMOL/L (ref 133–144)
TROPONIN I SERPL-MCNC: <0.015 UG/L (ref 0–0.04)
TSH SERPL DL<=0.005 MIU/L-ACNC: 1.84 MU/L (ref 0.4–4)
WBC # BLD AUTO: 7.1 10E3/UL (ref 4–11)

## 2021-08-21 PROCEDURE — 250N000009 HC RX 250: Performed by: EMERGENCY MEDICINE

## 2021-08-21 PROCEDURE — 85025 COMPLETE CBC W/AUTO DIFF WBC: CPT | Performed by: EMERGENCY MEDICINE

## 2021-08-21 PROCEDURE — 71275 CT ANGIOGRAPHY CHEST: CPT

## 2021-08-21 PROCEDURE — 80048 BASIC METABOLIC PNL TOTAL CA: CPT | Performed by: EMERGENCY MEDICINE

## 2021-08-21 PROCEDURE — 250N000011 HC RX IP 250 OP 636: Performed by: EMERGENCY MEDICINE

## 2021-08-21 PROCEDURE — 36415 COLL VENOUS BLD VENIPUNCTURE: CPT | Performed by: EMERGENCY MEDICINE

## 2021-08-21 PROCEDURE — 84484 ASSAY OF TROPONIN QUANT: CPT | Performed by: EMERGENCY MEDICINE

## 2021-08-21 PROCEDURE — 99285 EMERGENCY DEPT VISIT HI MDM: CPT | Mod: 25

## 2021-08-21 PROCEDURE — 83880 ASSAY OF NATRIURETIC PEPTIDE: CPT | Performed by: EMERGENCY MEDICINE

## 2021-08-21 RX ORDER — IOPAMIDOL 755 MG/ML
60 INJECTION, SOLUTION INTRAVASCULAR ONCE
Status: COMPLETED | OUTPATIENT
Start: 2021-08-21 | End: 2021-08-21

## 2021-08-21 RX ADMIN — IOPAMIDOL 60 ML: 755 INJECTION, SOLUTION INTRAVENOUS at 11:08

## 2021-08-21 RX ADMIN — SODIUM CHLORIDE 86 ML: 9 INJECTION, SOLUTION INTRAVENOUS at 11:08

## 2021-08-21 ASSESSMENT — MIFFLIN-ST. JEOR: SCORE: 1197.97

## 2021-08-21 ASSESSMENT — ENCOUNTER SYMPTOMS
FATIGUE: 1
ABDOMINAL PAIN: 0
DYSURIA: 0
SHORTNESS OF BREATH: 1

## 2021-08-21 NOTE — RESULT ENCOUNTER NOTE
Provider called patient on phone and discussed lab results, including Positive D-Dimers and need for further evaluation in ED for possible PE.(patient having acute onset SOB and +D-dimers)  Patient reiterated understanding and stated she will go to [facility] ED.  Provider called [facility] ED and gave report to triage. They advised if ED provider has any further questions about case , will contact this provider.

## 2021-08-21 NOTE — ED PROVIDER NOTES
"  History     Chief Complaint:  Abnormal Labs    HPI   Lizzette Chanel is a 80 year old female with history of polio, subdural hematoma, Adenocarcinoma of endometrium, who presents for evaluation of abnormal labs. The patient reports that she has had several weeks of increasing fatigue and 1 week of shortness of breath. She normally walks 1 mile a day with her dog, but this is now \"pushing it\". The patient still however is able to complete all her daily tasks and activities. She was seen yesterday in clinic for her symptoms where she was found to have an elevated D Dimer and recommended to come to the ED today. She denies leg swelling or pain, dysuria, hemoptysis, chest pain, or abdominal pain. The patient lives independently.     Review of Systems   Constitutional: Positive for fatigue.   Respiratory: Positive for shortness of breath.         No hemoptysis    Cardiovascular: Negative for chest pain and leg swelling.   Gastrointestinal: Negative for abdominal pain.   Genitourinary: Negative for dysuria.   Musculoskeletal:        No leg pain   All other systems reviewed and are negative.      Allergies:  No Known Allergies    Medications:  alendronate  Apoaequorin   famotidine   losartan   Rosuvastatin    Past Medical History:     Adenocarcinoma of endometrium    Arthritis    Complication of anesthesia   Hypercholesteremia   CAITLYN    Osteopenia   Peripheral neuropathy   Polio   Skin cancer   Statin intolerance   Subdural hematoma   asthma   Spinal stenosis     Past Surgical History:    Appendectomy  Biopsy   Hysterectomy total, bilateral salpingo-oophorectomy, node dissection  Dilation and curettage, hysteroscopy   Phacoemulsification clear cornea with standard intraocular lens implant   Soft tissue surgery  Shoulder surgery  Tonsillectomy     Family History:    Father: Coronary Artery Disease   Mother: heart disease, alzheimer disease, Cerebrovascular Disease, substance abuse, osteoporosis  Brother: Coronary Artery " "Disease, asthma    Social History:  The patient was unaccompanied to the ED.  Patient went to .     Physical Exam     Patient Vitals for the past 24 hrs:   BP Temp Temp src Pulse Resp SpO2 Height Weight   08/21/21 1151 (!) 154/84 -- -- 82 16 99 % -- --   08/21/21 0936 (!) 177/79 98.2  F (36.8  C) Oral 81 18 97 % 1.727 m (5' 8\") 68.9 kg (152 lb)     Physical Exam  Vitals: reviewed by me  General: Pt seen on [facility], [facility], cooperative, and alert to conversation  Eyes: Tracking well, clear conjunctiva BL  ENT: MMM, midline trachea.   Lungs: No tachypnea, no accessory muscle use. No respiratory distress.   CV: Rate as above  Abd: Soft, non tender, no guarding, no rebound. Non distended  MSK: no joint effusion.  No evidence of trauma  Skin: No rash  Neuro: Clear speech and no facial droop.  Psych: Not RIS, no e/o AH/VH      Emergency Department Course     Imaging:    CT Chest Pulmonary Embolism w Contrast  1.  Mild cardiomegaly with vascular congestion and mild basilar  predominant interstitial thickening suggestive of CHF.  2.  No evidence of pulmonary embolism.  ERIKA ABURTO MD   Reading per radiology     Laboratory:    CBC: WBC 7.1, HGB 12.6,   BMP: WNL (Creatinine 0.78)    Troponin (Collected 0950): <0.015     BNP: 191    Procedures:    Emergency Department Course:    Reviewed:    I reviewed the patient's nursing notes, vitals, past medical records, Care Everywhere.     Assessments:    0950 I performed an exam of the patient as documented above.     1308 Patient rechecked and updated.  Discussed plan of care and patient is agreeable with discharge.     Disposition:  The patient was discharged to home.     Impression & Plan      Medical Decision Making:  Lizzette Chanel is a 80 year old female who presents to the emergency department today for evaluation of shortness of breath in the setting of an elevated D-dimer.  She is here primarily because she was asked to come, given elevated D-dimer " which was done as part of her outpatient work-up.  Thankfully her CT scan does not show any evidence of a pulmonary embolism.  She actually has normal vital signs here, and on clarification she tells me that she still is able to do all of the activities now that she could do a month ago, it is only slightly more difficult to walk around her normal loop.  She has normal troponin, no evidence of heart failure on her labs, but does have possibly enlarged heart and some effusion that is mild on her CT scan.  We got her up and walked around, and she never got below 97% saturation.  I do think in this setting she is stable to go home, she is highly motivated to participate in her own care and knows the name and location of her regular physician, and feels comfortable going out and following with him in the week ahead.  I do think that she is okay for discharge at this point, she does tell me she will come back with any changes as well which is reassuring.  We will plan for discharge as above.        Diagnosis:      ICD-10-CM    1. Shortness of breath  R06.02      Scribe Disclosure:  I, Orla Severson, am serving as a scribe at 9:42 AM on 8/21/2021 to document services personally performed by Zaid Sapp MD based on my observations and the provider's statements to me.     Scribe Disclosure:  I, Laya Fisher, am serving as a scribe at 1:08 PM on 8/21/2021 to document services personally performed by Zaid Sapp MD based on my observations and the provider's statements to me.       [facility] EMERGENCY DEPT         Zaid Sapp MD  08/21/21 [id number]

## 2021-08-21 NOTE — DISCHARGE INSTRUCTIONS
As we discussed, no clear cause of your shortness of breath was found, though thankfully you do not have a blood clot that you were sent here to rule out.  You do seem to have a small amount of fluid in your lungs however, but not enough to be clinically significant at this time, or to make you need to come in to the hospital.  Please do follow therefore with your regular doctor in the week ahead as we did discuss.  You may need additional testing such as an ultrasound of your heart, and your work-up is not complete.  Since you did very well walking around here, and you seem otherwise healthy, we do think is okay for you to go home, just be sure to come back if things get worse and be absolutely certain again to follow with your regular doctor this week.

## 2021-08-23 ENCOUNTER — PATIENT OUTREACH (OUTPATIENT)
Dept: FAMILY MEDICINE | Facility: CLINIC | Age: 81
End: 2021-08-23

## 2021-08-23 NOTE — TELEPHONE ENCOUNTER
What type of discharge? Emergency Department  Risk of Hospital admission or ED visit: 55%  Is a TCM episode required? No  When should the patient follow up with PCP? within 30 days of discharge.    Concetta Ty RN  [facility]

## 2021-08-24 DIAGNOSIS — I10 ESSENTIAL HYPERTENSION: ICD-10-CM

## 2021-08-24 NOTE — TELEPHONE ENCOUNTER
Losartan 50 mg tablets    Summary: Take 1 tablet (25 mg) by mouth daily, Disp-90 tablet, R-1, E-Prescribe  Dose is changed     Dose, Route, Frequency: 25 mg, Oral, DAILY  Start: 8/5/2021  Ord/Sold: 8/5/2021

## 2021-08-24 NOTE — TELEPHONE ENCOUNTER
"Hospital/TCU/ED for chronic condition Discharge Protocol    \"Hi, my name is Scott Vogel RN, a registered nurse, and I am calling from [facility].  I am calling to follow up and see how things are going for you after your recent emergency visit/hospital/TCU stay.\"    Tell me how you are doing now that you are home?\" doing      Discharge Instructions    \"Let's review your discharge instructions.  What is/are the follow-up recommendations?  Pt. Response: none    \"Has an appointment with your primary care provider been scheduled?\"   Yes. (confirm)    \"When you see the provider, I would recommend that you bring your medications with you.\"    Medications    \"Tell me what changed about your medicines when you discharged?\"    Changes to chronic meds?    0-1    \"What questions do you have about your medications?\"    None     New diagnoses of heart failure, COPD, diabetes, or MI?    No              Post Discharge Medication Reconciliation Status: discharge medications reconciled, continue medications without change.    Was MTM referral placed (*Make sure to put transitions as reason for referral)?   No    Call Summary    \"What questions or concerns do you have about your recent visit and your follow-up care?\"     none    \"If you have questions or things don't continue to improve, we encourage you contact us through the main clinic number (give number).  Even if the clinic is not open, triage nurses are available 24/7 to help you.     We would like you to know that our clinic has extended hours (provide information).  We also have urgent care (provide details on closest location and hours/contact info)\"      \"Thank you for your time and take care!\"             "

## 2021-08-25 PROBLEM — R06.00 DYSPNEA: Status: ACTIVE | Noted: 2021-08-25

## 2021-08-25 PROBLEM — R53.83 FATIGUE: Status: ACTIVE | Noted: 2021-08-25

## 2021-08-25 RX ORDER — LOSARTAN POTASSIUM 25 MG/1
25 TABLET ORAL DAILY
Qty: 90 TABLET | Refills: 1 | Status: SHIPPED | OUTPATIENT
Start: 2021-08-25 | End: 2021-08-27

## 2021-08-27 ENCOUNTER — OFFICE VISIT (OUTPATIENT)
Dept: CARDIOLOGY | Facility: CLINIC | Age: 81
End: 2021-08-27
Attending: INTERNAL MEDICINE
Payer: COMMERCIAL

## 2021-08-27 VITALS
OXYGEN SATURATION: 96 % | HEART RATE: 66 BPM | DIASTOLIC BLOOD PRESSURE: 82 MMHG | SYSTOLIC BLOOD PRESSURE: 151 MMHG | BODY MASS INDEX: 23.11 KG/M2 | WEIGHT: 152 LBS

## 2021-08-27 DIAGNOSIS — R06.02 SHORTNESS OF BREATH: ICD-10-CM

## 2021-08-27 DIAGNOSIS — R53.82 CHRONIC FATIGUE: ICD-10-CM

## 2021-08-27 DIAGNOSIS — G47.33 OSA (OBSTRUCTIVE SLEEP APNEA): ICD-10-CM

## 2021-08-27 DIAGNOSIS — I10 ESSENTIAL HYPERTENSION: Primary | ICD-10-CM

## 2021-08-27 PROBLEM — R06.00 DYSPNEA: Status: RESOLVED | Noted: 2021-08-25 | Resolved: 2021-08-27

## 2021-08-27 PROCEDURE — 99204 OFFICE O/P NEW MOD 45 MIN: CPT | Performed by: INTERNAL MEDICINE

## 2021-08-27 RX ORDER — LOSARTAN POTASSIUM 25 MG/1
25 TABLET ORAL 2 TIMES DAILY
Qty: 90 TABLET | Refills: 1 | COMMUNITY
Start: 2021-08-27 | End: 2021-10-29

## 2021-08-27 RX ORDER — ASPIRIN 81 MG/1
81 TABLET, CHEWABLE ORAL DAILY
Qty: 100 TABLET | Refills: 4 | COMMUNITY
Start: 2021-08-27 | End: 2024-09-12

## 2021-08-27 NOTE — LETTER
8/27/2021    Guzman Bruner MD  [address]  [facility] [address]    RE: Lizzette Chanel       Dear Colleague,    I had the pleasure of seeing Lizzette Chanel in the [facility].    Clinic visit note dictated. Dictation reference number - [id number]            REVIEW OF SYSTEMS:  A comprehensive 10-point review of systems was completed and the pertinent positives are documented in the history of present illness.    Skin:  Negative     Eyes:  Positive for glasses  ENT:  Negative    Respiratory:  Positive for dyspnea on exertion  Cardiovascular:    Positive for;fatigue;edema  Gastroenterology:      Genitourinary:       Musculoskeletal:       Neurologic:       Psychiatric:       Heme/Lymph/Imm:       Endocrine:         CURRENT MEDICATIONS:  Current Outpatient Medications   Medication Sig Dispense Refill     alendronate (FOSAMAX) 70 MG tablet Take 1 tablet (70 mg) by mouth every 7 days 12 tablet 3     Apoaequorin 10 MG CAPS Take 1 capsule by mouth daily Prevagen       aspirin (ASA) 81 MG chewable tablet Take 1 tablet (81 mg) by mouth daily 100 tablet 4     Calcium Carbonate-Vitamin D (CALCIUM-D PO) Take by mouth daily Liquid  Calcium 1200 mg  & Vit D 800 iU       Coenzyme Q10 (COQ10 PO) Take 100 mg by mouth.       famotidine (PEPCID) 40 MG tablet TAKE 1 TABLET(40 MG) BY MOUTH DAILY 90 tablet 3     losartan (COZAAR) 25 MG tablet Take 1 tablet (25 mg) by mouth 2 times daily 90 tablet 1     ORDER FOR DME Use your CPAP device as directed by your provider.       rosuvastatin (CRESTOR) 5 MG tablet TAKE 1 TABLET(5 MG) BY MOUTH DAILY 90 tablet 3     vitamin B-12 (CYANOCOBALAMIN) 1000 MCG tablet Take 1,000 mcg by mouth daily           ALLERGIES:  No Known Allergies    PAST MEDICAL HISTORY:    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    had  vaginal bleeding     Arthritis mild in knees, shoulders     Cancer (H) skin--Basil Cell, [place]     Chest pain 2003    neg est thallium     Chest pain 04/2016    nl est echo and cxr     Complication of anesthesia     BP maddie to over 200 in PACU 1 month ago-after D&C     Dyspnea 8/25/2021     Fatigue 8/25/2021     Hx of colonoscopy 2003    incomplete, be nl     Hx of colonoscopy 06/03/2011    nl     Hypercholesteremia 2011    aches with zocor     CAITLYN (obstructive sleep apnea) 12/2012    done Ronald, using dental device, added cpap 2014      Osteopenia 2013    Dr. Taylor     Peripheral neuropathy     Dr. 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     Statin intolerance     zocor and one other caused aches     Subdural hematoma (H) 01/2016    traumatic, fu ct 3/16 resolved     Uncomplicated asthma        PAST SURGICAL HISTORY:    Past Surgical History:   Procedure Laterality Date     ABDOMEN SURGERY  1960     APPENDECTOMY  60's     BIOPSY  2010    inside upper lip     DAVINCI HYSTERECTOMY TOTAL, BILATERAL SALPINGO-OOPHORECTOMY, NODE DISSECTION, COMBINED Bilateral 7/8/2021    Procedure: ROBOTIC ASSISTED TOTAL LAPAROSCOPIC HYSTERECTOMY, BILATERAL SALPINGO-OOPHORECTOMY, WASHINGS, INTRA-OPERATIVE SENTINEL LYMPH NODE MAPPING, BILATERAL PELVIC AND PARA-AORTIC LYMPHADENECTOMY, REPAIR VAGINAL TEAR;  Surgeon: Dianne Garland MD;  Location:  OR     DILATION AND CURETTAGE, HYSTEROSCOPY WITH ULTRASOUND GUIDANCE N/A 5/11/2021    Procedure: HYSTEROSCOPY UNDER ULTRASOUND GUIDANCE, DILATION AND CURETTAGE OF UTERUS;  Surgeon: Isabel Ferro MD;  Location:  OR     OPERATIVE HYSTEROSCOPY WITH MORCELLATOR N/A 5/11/2021    Procedure: MYOSURE MORCELLATOR;  Surgeon: Isabel Ferro MD;  Location:  OR     PHACOEMULSIFICATION CLEAR CORNEA WITH STANDARD INTRAOCULAR LENS IMPLANT  3/21/2012    Procedure:PHACOEMULSIFICATION CLEAR CORNEA WITH STANDARD  INTRAOCULAR LENS IMPLANT; RIGHT PHACOEMULSIFICATION CLEAR CORNEA WITH STANDARD INTRAOCULAR LENS IMPLANT ; Surgeon:CHANDRAKANT HERNANDEZ; [place] [place]     PHACOEMULSIFICATION CLEAR CORNEA WITH STANDARD INTRAOCULAR LENS IMPLANT  3/28/2012    Procedure:PHACOEMULSIFICATION CLEAR CORNEA WITH STANDARD INTRAOCULAR LENS IMPLANT; LEFT PHACOEMULSIFICATION CLEAR CORNEA WITH STANDARD INTRAOCULAR LENS IMPLANT ; Surgeon:CHANDRAKANT HERNANDEZ; [place] [place]     SHOULDER SURGERY      twice     SOFT TISSUE SURGERY  R&L Rotator cuff repairs     TONSILLECTOMY  child       FAMILY HISTORY:    Family History   Problem Relation Age of Onset     C.A.D. Father      Heart Disease Father      Heart Disease Mother 97     Alzheimer Disease Mother      Cerebrovascular Disease Mother         mild. Still was very functional     Substance Abuse Mother         Significant treatment over 15 yrs but ended with very good results     Osteoporosis Mother      C.A.D. Brother      Asthma Brother        SOCIAL HISTORY:    Social History     Socioeconomic History     Marital status: Single     Spouse name: None     Number of children: 0     Years of education: None     Highest education level: None   Occupational History     Occupation: physical therapy     Employer: RETIRED   Tobacco Use     Smoking status: Former Smoker     Packs/day: 1.50     Years: 12.00     Pack years: 18.00     Types: Cigarettes     Start date: 1966     Quit date: 3/17/1974     Years since quittin.4     Smokeless tobacco: Never Used     Tobacco comment: enjoyed it!   Substance and Sexual Activity     Alcohol use: Yes     Alcohol/week: 0.0 standard drinks     Comment: 1-2/day     Drug use: No     Sexual activity: Not Currently     Partners: Male     Birth control/protection: None   Other Topics Concern     Parent/sibling w/ CABG, MI or angioplasty before 65F 55M? Yes     Comment: Brother  of heart attack at 49 yrs old   Social History Narrative    Single, no kids    Has a  waylon    Has godchildren that can help out     Social Determinants of Health     Financial Resource Strain:      Difficulty of Paying Living Expenses:    Food Insecurity:      Worried About Running Out of Food in the Last Year:      Ran Out of Food in the Last Year:    Transportation Needs:      Lack of Transportation (Medical):      Lack of Transportation (Non-Medical):    Physical Activity:      Days of Exercise per Week:      Minutes of Exercise per Session:    Stress:      Feeling of Stress :    Social Connections:      Frequency of Communication with Friends and Family:      Frequency of Social Gatherings with Friends and Family:      Attends Judaism Services:      Active Member of Clubs or Organizations:      Attends Club or Organization Meetings:      Marital Status:    Intimate Partner Violence:      Fear of Current or Ex-Partner:      Emotionally Abused:      Physically Abused:      Sexually Abused:        PHYSICAL EXAM:    Vitals: BP (!) 151/82   Pulse 66   Wt 68.9 kg (152 lb)   SpO2 96%   BMI 23.11 kg/m    Wt Readings from Last 5 Encounters:   08/27/21 68.9 kg (152 lb)   08/21/21 68.9 kg (152 lb)   08/20/21 68.9 kg (152 lb)   07/27/21 70.8 kg (156 lb)   07/08/21 71.3 kg (157 lb 2 oz)           Encounter Diagnoses   Name Primary?     Essential hypertension Yes     Shortness of breath      Chronic fatigue      CAITLYN (obstructive sleep apnea)  CPAP pressures set at:  LPL of 5 and UPL of 14        Orders Placed This Encounter   Procedures     Follow-Up with Cardiologist     Echocardiogram Complete       CC  REFERRING PROVIDER:  Rita aBxter MD  [address]  [place] [address]                    Thank you for allowing me to participate in the care of your patient.      Sincerely,     Katlyn Alarcon MD     [facility]  cc:   Rita Baxter MD  [address]  [place] [address]

## 2021-08-27 NOTE — PROGRESS NOTES
Clinic visit note dictated. Dictation reference number - [id number]            REVIEW OF SYSTEMS:  A comprehensive 10-point review of systems was completed and the pertinent positives are documented in the history of present illness.    Skin:  Negative     Eyes:  Positive for glasses  ENT:  Negative    Respiratory:  Positive for dyspnea on exertion  Cardiovascular:    Positive for;fatigue;edema  Gastroenterology:      Genitourinary:       Musculoskeletal:       Neurologic:       Psychiatric:       Heme/Lymph/Imm:       Endocrine:         CURRENT MEDICATIONS:  Current Outpatient Medications   Medication Sig Dispense Refill     alendronate (FOSAMAX) 70 MG tablet Take 1 tablet (70 mg) by mouth every 7 days 12 tablet 3     Apoaequorin 10 MG CAPS Take 1 capsule by mouth daily Prevagen       aspirin (ASA) 81 MG chewable tablet Take 1 tablet (81 mg) by mouth daily 100 tablet 4     Calcium Carbonate-Vitamin D (CALCIUM-D PO) Take by mouth daily Liquid  Calcium 1200 mg  & Vit D 800 iU       Coenzyme Q10 (COQ10 PO) Take 100 mg by mouth.       famotidine (PEPCID) 40 MG tablet TAKE 1 TABLET(40 MG) BY MOUTH DAILY 90 tablet 3     losartan (COZAAR) 25 MG tablet Take 1 tablet (25 mg) by mouth 2 times daily 90 tablet 1     ORDER FOR DME Use your CPAP device as directed by your provider.       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 Taylor     Peripheral neuropathy     Dr. 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     Statin intolerance     zocor and one other caused aches     Subdural hematoma (H) 01/2016    traumatic, fu ct 3/16 resolved     Uncomplicated asthma        PAST SURGICAL HISTORY:    Past Surgical History:   Procedure Laterality Date     ABDOMEN SURGERY  1960     APPENDECTOMY  60's     BIOPSY  2010    inside upper lip     DAVINCI HYSTERECTOMY TOTAL, BILATERAL SALPINGO-OOPHORECTOMY, NODE DISSECTION, COMBINED Bilateral 7/8/2021    Procedure: ROBOTIC ASSISTED TOTAL LAPAROSCOPIC HYSTERECTOMY, BILATERAL SALPINGO-OOPHORECTOMY, WASHINGS, INTRA-OPERATIVE SENTINEL LYMPH NODE MAPPING, BILATERAL PELVIC AND PARA-AORTIC LYMPHADENECTOMY, REPAIR VAGINAL TEAR;  Surgeon: Dianne Garland MD;  Location:  OR     DILATION AND CURETTAGE, HYSTEROSCOPY WITH ULTRASOUND GUIDANCE N/A 5/11/2021    Procedure: HYSTEROSCOPY UNDER ULTRASOUND GUIDANCE, DILATION AND CURETTAGE OF UTERUS;  Surgeon: Isabel Ferro MD;  Location:  OR     OPERATIVE HYSTEROSCOPY WITH MORCELLATOR N/A 5/11/2021    Procedure: MYOSURE MORCELLATOR;  Surgeon: Isabel Ferro MD;  Location:  OR     PHACOEMULSIFICATION CLEAR CORNEA WITH STANDARD INTRAOCULAR LENS IMPLANT  3/21/2012    Procedure:PHACOEMULSIFICATION CLEAR CORNEA WITH STANDARD INTRAOCULAR LENS IMPLANT; RIGHT PHACOEMULSIFICATION CLEAR CORNEA WITH STANDARD INTRAOCULAR LENS IMPLANT ; Surgeon:CHANDRAKANT HERNANDEZ; Location: [facility]     PHACOEMULSIFICATION CLEAR CORNEA WITH STANDARD INTRAOCULAR LENS IMPLANT  3/28/2012     Procedure:PHACOEMULSIFICATION CLEAR CORNEA WITH STANDARD INTRAOCULAR LENS IMPLANT; LEFT PHACOEMULSIFICATION CLEAR CORNEA WITH STANDARD INTRAOCULAR LENS IMPLANT ; Surgeon:CHANDRAKANT HERNANDEZ; Location[facility]     SHOULDER SURGERY      twice     SOFT TISSUE SURGERY  R&L Rotator cuff repairs     TONSILLECTOMY  child       FAMILY HISTORY:    Family History   Problem Relation Age of Onset     C.A.D. Father      Heart Disease Father      Heart Disease Mother 97     Alzheimer Disease Mother      Cerebrovascular Disease Mother         mild. Still was very functional     Substance Abuse Mother         Significant treatment over 15 yrs but ended with very good results     Osteoporosis Mother      C.A.D. Brother      Asthma Brother        SOCIAL HISTORY:    Social History     Socioeconomic History     Marital status: Single     Spouse name: None     Number of children: 0     Years of education: None     Highest education level: None   Occupational History     Occupation: physical therapy     Employer: RETIRED   Tobacco Use     Smoking status: Former Smoker     Packs/day: 1.50     Years: 12.00     Pack years: 18.00     Types: Cigarettes     Start date: 1966     Quit date: 3/17/1974     Years since quittin.4     Smokeless tobacco: Never Used     Tobacco comment: enjoyed it!   Substance and Sexual Activity     Alcohol use: Yes     Alcohol/week: 0.0 standard drinks     Comment: 1-2/day     Drug use: No     Sexual activity: Not Currently     Partners: Male     Birth control/protection: None   Other Topics Concern     Parent/sibling w/ CABG, MI or angioplasty before 65F 55M? Yes     Comment: Brother  of heart attack at 49 yrs old   Social History Narrative    Single, no kids    Has a cabin    Has godchildren that can help out     Social Determinants of Health     Financial Resource Strain:      Difficulty of Paying Living Expenses:    Food Insecurity:      Worried About Running Out of Food in the Last Year:      Ran  Out of Food in the Last Year:    Transportation Needs:      Lack of Transportation (Medical):      Lack of Transportation (Non-Medical):    Physical Activity:      Days of Exercise per Week:      Minutes of Exercise per Session:    Stress:      Feeling of Stress :    Social Connections:      Frequency of Communication with Friends and Family:      Frequency of Social Gatherings with Friends and Family:      Attends Zoroastrianism Services:      Active Member of Clubs or Organizations:      Attends Club or Organization Meetings:      Marital Status:    Intimate Partner Violence:      Fear of Current or Ex-Partner:      Emotionally Abused:      Physically Abused:      Sexually Abused:        PHYSICAL EXAM:    Vitals: BP (!) 151/82   Pulse 66   Wt 68.9 kg (152 lb)   SpO2 96%   BMI 23.11 kg/m    Wt Readings from Last 5 Encounters:   08/27/21 68.9 kg (152 lb)   08/21/21 68.9 kg (152 lb)   08/20/21 68.9 kg (152 lb)   07/27/21 70.8 kg (156 lb)   07/08/21 71.3 kg (157 lb 2 oz)           Encounter Diagnoses   Name Primary?     Essential hypertension Yes     Shortness of breath      Chronic fatigue      CAITLYN (obstructive sleep apnea)  CPAP pressures set at:  LPL of 5 and UPL of 14        Orders Placed This Encounter   Procedures     Follow-Up with Cardiologist     Echocardiogram Complete       CC  REFERRING PROVIDER:  Rita Baxter MD  [address] [place] [address]  [place] [address]

## 2021-08-27 NOTE — PROGRESS NOTES
Service Date: 08/27/2021    PRIMARY CARE PROVIDER: Guzman Bruner MD    REFERRING PROVIDER:  Rita Baxter MD    REASON FOR VISIT:    1.  Hypertension.  2.  Fatigue.  3.  Cardiac risk stratification.    HISTORY OF PRESENT ILLNESS:     It was my pleasure to visit with Jessika Chanel, who is new to Cardiology.  She was accompanied by her long-term friend and retired RN.  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  Her BMI is 20 kg/m2.  Nontobacco user.    She is here to address her recent concerns:  1.  New diagnosis of essential hypertension.  Since March, her blood pressure has been elevated, leading to the diagnosis of hypertension.  She is on losartan 25 mg daily at night.  Jessika's friend keeps a meticulous log of blood pressures including several times a day.  I reviewed disease.  Their concern is that her blood pressure is elevated when she first wakes up in the morning and then gradually tends to go down, but I also noted readings during the day when her blood pressures as high as 180 systolic.  I note that Jessika has optimally treated obstructive sleep apnea and she wears a CPAP consistently every night for the last 8-10 years.  She [facility] and her readings are optimal.    2.  Chronic fatigue with recent worsening.  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  She says she has chronic baseline fatigue, but usually it is functional.  Over the last few months, her fatigue is noticeably worse.  She denies any symptoms of heart failure such as lower extremity edema and unusual shortness of breath or palpitations.     3.  Cardiac risk stratification.   Jessika wants to minimize her overall cardiac risk.  Her father was a cardiologist and had coronary artery disease in his 70s.  Jessika, herself, has no diagnosis of CAD, congestive heart failure or arrhythmias.  She has never used tobacco products.  She is on 5 mg of rosuvastatin with a total  cholesterol of 180, HDL 85, LDL 81, triglycerides 68.  She is not diabetic.    DIAGNOSTIC DATA:    Labs:  Lipid panel as above.  Creatinine 0.78 with an estimated GFR 71, potassium 4.0.  CBC normal.  TSH normal.    I personally reviewed her recent ECG image, which shows sinus rhythm with chronic right bundle branch block and normal cardiac intervals.    Review of the CT chest done recently by Dr. Baxter to rule out pulmonary embolus.  Coronary arteries were normal in caliber.  Negative for PE.  Thoracic aorta negative.  As expected, she was noted to have moderate coronary artery calcification.  Also, there was some mention of possibly interstitial thickening suggestive of CHF.  This is a very nonspecific finding on a CT chest.    I also note that her recent NT-proBNP and troponin were both negative as were her liver enzymes.    PHYSICAL EXAMINATION:    GENERAL: Pertinent for a lady who appears his stated age, has noticeable kyphosis.  Normal mood and affect, very good historian.    CARDIOVASCULAR:  Cardiac exam unremarkable with regular heart sounds.  Nondisplaced apical impulse.  No murmur, no vascular bruit.  LUNGS:  Clear to auscultation.  EXTREMITIES:  She does have a significant superficial varicose veins, which are longstanding, but she does not have any lower extremity edema or skin changes suggestive of chronic venostasis.  Her abdominal exam is unremarkable.    DIAGNOSES:    1.  New diagnosis of benign essential hypertension, suboptimally controlled.  2.  Cardiac risk stratification.  3.  Recent worsening of chronic fatigue.  4.  Family history of coronary artery disease.  5.  Obstructive sleep apnea, CPAP compliant.    ASSESSMENT:    I think Jessika has benign essential hypertension.  I agree with losartan and with a higher dose her blood pressure will be optimally controlled at goal of 134/80 or below.  She consistently wears CPAP, renal function is normal, physically active.  There are no symptoms or signs  of angina, congestive heart failure or arrhythmia.  With regards to cardiac risk stratification, the biggest intervention would be actively treating her blood pressure.  She is already on a statin and we could aim for an LDL goal of 70 or below, but she is already complaining of worsening fatigue and I want to do one thing at a time.    PLAN:    1.  Increase losartan from 25 mg daily to 25 mg 2 times daily.  2.  Start primary prevention aspirin 81 mg daily over-the-counter.  3.  Prescription renewed.  4.  Continue rosuvastatin 5 mg for now.  Once blood pressure is optimally treated, will increase to 10 mg daily.  5.  Transthoracic echocardiogram to assess cardiac function.  6.  Reiterated that multiple daily blood pressure checking it is not needed.  Check once or twice a week.  7.  Followup video visit with me to review her echo results and blood pressure.        Katlyn Alarcon MD        D: 2021   T: 2021   MT: TAMY    Name:     LATHA SAAVEDRA  MRN:      [id number]        Account:      [id number]   :      1940           Service Date: 2021       Document: [id number]

## 2021-08-27 NOTE — PATIENT INSTRUCTIONS
MEDICATION CHANGES:  1.  Increase losartan to 25 mg 2 times daily.  2.  Start over-the-counter baby aspirin 81 mg.  3.  Continue rosuvastatin.    FOLLOW UP:  1.  Heart ultrasound or transthoracic echocardiogram.  2.  Check your blood pressure no more than once or twice a week.  3.  Follow-up with Dr. Alarcon (video visit) in 4 to 6 weeks.    If you have any questions or concerns, please contact my nurses at [phone].

## 2021-08-31 ENCOUNTER — TRANSFERRED RECORDS (OUTPATIENT)
Dept: HEALTH INFORMATION MANAGEMENT | Facility: CLINIC | Age: 81
End: 2021-08-31

## 2021-09-04 ENCOUNTER — HEALTH MAINTENANCE LETTER (OUTPATIENT)
Age: 81
End: 2021-09-04

## 2021-09-09 ENCOUNTER — OFFICE VISIT (OUTPATIENT)
Dept: FAMILY MEDICINE | Facility: CLINIC | Age: 81
End: 2021-09-09
Payer: COMMERCIAL

## 2021-09-09 VITALS
WEIGHT: 151 LBS | HEIGHT: 68 IN | SYSTOLIC BLOOD PRESSURE: 157 MMHG | DIASTOLIC BLOOD PRESSURE: 71 MMHG | RESPIRATION RATE: 16 BRPM | HEART RATE: 80 BPM | BODY MASS INDEX: 22.88 KG/M2 | OXYGEN SATURATION: 99 % | TEMPERATURE: 97.1 F

## 2021-09-09 DIAGNOSIS — R53.83 FATIGUE, UNSPECIFIED TYPE: ICD-10-CM

## 2021-09-09 DIAGNOSIS — R06.02 SHORTNESS OF BREATH: ICD-10-CM

## 2021-09-09 DIAGNOSIS — I10 ESSENTIAL HYPERTENSION: Primary | ICD-10-CM

## 2021-09-09 PROCEDURE — 99214 OFFICE O/P EST MOD 30 MIN: CPT | Performed by: INTERNAL MEDICINE

## 2021-09-09 RX ORDER — HYDROCHLOROTHIAZIDE 12.5 MG/1
12.5 TABLET ORAL DAILY
Qty: 90 TABLET | Refills: 3 | Status: SHIPPED | OUTPATIENT
Start: 2021-09-09 | End: 2022-03-17

## 2021-09-09 ASSESSMENT — MIFFLIN-ST. JEOR: SCORE: 1193.43

## 2021-09-09 NOTE — PATIENT INSTRUCTIONS
Add the new blood pressure medicine, hydrochlorothiazide 12.5mg once daily in the morning, and continue the losartan.    Follow up with me in 5 weeks     Guzman Bruner M.D.

## 2021-09-09 NOTE — PROGRESS NOTES
This is a very pleasant 80-year-old here for follow-up to her hypertension, fatigue and shortness of breath.  I reviewed multiple recent notes as well as the emergency room visit and scans and labs.    Patient has hypertension for which she was started on losartan at low-dose at the end of July.  This was subsequently raised to twice daily by cardiology.  However, her blood pressure remains elevated at home, never under the 140 range.  She does not have headaches or dizziness.  No chest pain or shortness of breath.  No GI symptoms.    With respect to the shortness of breath she was seen as noted for a CT because of an elevated D-dimer.  There was no PE but there was evidence of possible CHF.  She subsequently saw cardiology and is now to have an echo test done.  She notes her shortness of breath has not been an issue recently.    She has had fatigue for some time but this seems to be better as well.    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    had vaginal bleeding     Arthritis mild in knees, shoulders     Cancer (H) skin--Basil Cell, [place]     Chest pain 2003    neg est thallium     Chest pain 04/2016    nl est echo and cxr     Complication of anesthesia     BP maddie to over 200 in PACU 1 month ago-after D&C     Dyspnea 08/25/2021     Essential hypertension 7/2021     Fatigue 08/25/2021     Hx of colonoscopy 2003    incomplete, be nl     Hx of colonoscopy 06/03/2011    nl     Hypercholesteremia 2011    aches with zocor     CAITLYN (obstructive sleep apnea) 12/2012    done [place], using dental device, added cpap 2014      Osteopenia 2013    Dr. Taylor     Peripheral neuropathy     Dr. 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     SOB (shortness of breath) 08/2021    elevated d dimer, ct no pe but signs of chf     Statin intolerance      zocor and one other caused aches     Subdural hematoma (H) 01/2016    traumatic, fu ct 3/16 resolved     Uncomplicated asthma      Past Surgical History:   Procedure Laterality Date     ABDOMEN SURGERY  1960     APPENDECTOMY  60's     BIOPSY  2010    inside upper lip     DAVINCI HYSTERECTOMY TOTAL, BILATERAL SALPINGO-OOPHORECTOMY, NODE DISSECTION, COMBINED Bilateral 7/8/2021    Procedure: ROBOTIC ASSISTED TOTAL LAPAROSCOPIC HYSTERECTOMY, BILATERAL SALPINGO-OOPHORECTOMY, WASHINGS, INTRA-OPERATIVE SENTINEL LYMPH NODE MAPPING, BILATERAL PELVIC AND PARA-AORTIC LYMPHADENECTOMY, REPAIR VAGINAL TEAR;  Surgeon: Dianne Garland MD;  Location:  OR     DILATION AND CURETTAGE, HYSTEROSCOPY WITH ULTRASOUND GUIDANCE N/A 5/11/2021    Procedure: HYSTEROSCOPY UNDER ULTRASOUND GUIDANCE, DILATION AND CURETTAGE OF UTERUS;  Surgeon: Isabel Ferro MD;  Location:  OR     OPERATIVE HYSTEROSCOPY WITH MORCELLATOR N/A 5/11/2021    Procedure: MYOSURE MORCELLATOR;  Surgeon: Isabel Ferro MD;  Location:  OR     PHACOEMULSIFICATION CLEAR CORNEA WITH STANDARD INTRAOCULAR LENS IMPLANT  3/21/2012    Procedure:PHACOEMULSIFICATION CLEAR CORNEA WITH STANDARD INTRAOCULAR LENS IMPLANT; RIGHT PHACOEMULSIFICATION CLEAR CORNEA WITH STANDARD INTRAOCULAR LENS IMPLANT ; Surgeon:CHANDRKAANT HERNANDEZ; Location:[facility]     PHACOEMULSIFICATION CLEAR CORNEA WITH STANDARD INTRAOCULAR LENS IMPLANT  3/28/2012    Procedure:PHACOEMULSIFICATION CLEAR CORNEA WITH STANDARD INTRAOCULAR LENS IMPLANT; LEFT PHACOEMULSIFICATION CLEAR CORNEA WITH STANDARD INTRAOCULAR LENS IMPLANT ; Surgeon:CHANDRAKANT HERNANDEZ; Location: [facility]     SHOULDER SURGERY  1990's    twice     SOFT TISSUE SURGERY  R&L Rotator cuff repairs     TONSILLECTOMY  child     Social History     Socioeconomic History     Marital status: Single     Spouse name: Not on file     Number of children: 0     Years of education: Not on file     Highest education level: Not on file   Occupational  History     Occupation: physical therapy     Employer: RETIRED   Tobacco Use     Smoking status: Former Smoker     Packs/day: 1.50     Years: 12.00     Pack years: 18.00     Types: Cigarettes     Start date: 1966     Quit date: 3/17/1974     Years since quittin.5     Smokeless tobacco: Never Used     Tobacco comment: enjoyed it!   Substance and Sexual Activity     Alcohol use: Yes     Alcohol/week: 0.0 standard drinks     Comment: 1-2/day     Drug use: No     Sexual activity: Not Currently     Partners: Male     Birth control/protection: None   Other Topics Concern     Parent/sibling w/ CABG, MI or angioplasty before 65F 55M? Yes     Comment: Brother  of heart attack at 49 yrs old   Social History Narrative    Single, no kids    Has a cabin    Has godchildren that can help out     Social Determinants of Health     Financial Resource Strain:      Difficulty of Paying Living Expenses:    Food Insecurity:      Worried About Running Out of Food in the Last Year:      Ran Out of Food in the Last Year:    Transportation Needs:      Lack of Transportation (Medical):      Lack of Transportation (Non-Medical):    Physical Activity:      Days of Exercise per Week:      Minutes of Exercise per Session:    Stress:      Feeling of Stress :    Social Connections:      Frequency of Communication with Friends and Family:      Frequency of Social Gatherings with Friends and Family:      Attends Restorationism Services:      Active Member of Clubs or Organizations:      Attends Club or Organization Meetings:      Marital Status:    Intimate Partner Violence:      Fear of Current or Ex-Partner:      Emotionally Abused:      Physically Abused:      Sexually Abused:      Current Outpatient Medications   Medication Sig Dispense Refill     alendronate (FOSAMAX) 70 MG tablet Take 1 tablet (70 mg) by mouth every 7 days 12 tablet 3     Apoaequorin 10 MG CAPS Take 1 capsule by mouth daily Prevagen       aspirin (ASA) 81 MG chewable  "tablet Take 1 tablet (81 mg) by mouth daily 100 tablet 4     Calcium Carbonate-Vitamin D (CALCIUM-D PO) Take by mouth daily Liquid  Calcium 1200 mg  & Vit D 800 iU       Coenzyme Q10 (COQ10 PO) Take 100 mg by mouth.       famotidine (PEPCID) 40 MG tablet TAKE 1 TABLET(40 MG) BY MOUTH DAILY 90 tablet 3     hydrochlorothiazide (HYDRODIURIL) 12.5 MG tablet Take 1 tablet (12.5 mg) by mouth daily 90 tablet 3     losartan (COZAAR) 25 MG tablet Take 1 tablet (25 mg) by mouth 2 times daily 90 tablet 1     ORDER FOR DME Use your CPAP device as directed by your provider.       rosuvastatin (CRESTOR) 5 MG tablet TAKE 1 TABLET(5 MG) BY MOUTH DAILY 90 tablet 3     vitamin B-12 (CYANOCOBALAMIN) 1000 MCG tablet Take 1,000 mcg by mouth daily       No Known Allergies  FAMILY HISTORY NOTED AND REVIEWED    REVIEW OF SYSTEMS: above    PHYSICAL EXAM    BP (!) 157/71 (BP Location: Left arm, Patient Position: Sitting, Cuff Size: Adult Regular)   Pulse 80   Temp 97.1  F (36.2  C) (Temporal)   Resp 16   Ht 1.727 m (5' 8\")   Wt 68.5 kg (151 lb)   SpO2 99%   BMI 22.96 kg/m      Patient appears non toxic  Lungs - clear, normal flow  Cardiovascular - regular rate and rhythm, no murmer, rub or gallop, no jvp or edema, carotids within normal limits, no bruits.  Abdomen - normal active bowel sounds, soft, non tender, no masses, guarding or rebound, no hepatosplenomegaly    Labs noted    ASSESSMENT:  1. Hypertension, control not adequate, doubt 2nd cause  2. Shortness of breath, resolved, agree with echo  3. Fatigue, improved    PLAN:  Add hydrochlorothiazide 12.5mg  Echo  Follow up 5 weeks      Guzman Bruner M.D.          "

## 2021-09-16 DIAGNOSIS — R49.0 HOARSENESS: ICD-10-CM

## 2021-09-16 DIAGNOSIS — R09.89 CHRONIC THROAT CLEARING: ICD-10-CM

## 2021-09-17 RX ORDER — FAMOTIDINE 40 MG/1
40 TABLET, FILM COATED ORAL DAILY
Qty: 90 TABLET | Refills: 3 | Status: SHIPPED | OUTPATIENT
Start: 2021-09-17 | End: 2021-10-29

## 2021-09-17 NOTE — TELEPHONE ENCOUNTER
Prescription approved per [facility] Refill Protocol.  LOV: 9/9/2021    Concetta Ty RN  [facility]

## 2021-09-20 ENCOUNTER — HOSPITAL ENCOUNTER (OUTPATIENT)
Dept: CARDIOLOGY | Facility: CLINIC | Age: 81
Discharge: HOME | End: 2021-09-20
Attending: INTERNAL MEDICINE | Admitting: INTERNAL MEDICINE
Payer: COMMERCIAL

## 2021-09-20 ENCOUNTER — TELEPHONE (OUTPATIENT)
Dept: CARDIOLOGY | Facility: CLINIC | Age: 81
End: 2021-09-20

## 2021-09-20 DIAGNOSIS — G47.33 OSA (OBSTRUCTIVE SLEEP APNEA): ICD-10-CM

## 2021-09-20 DIAGNOSIS — R06.02 SHORTNESS OF BREATH: ICD-10-CM

## 2021-09-20 DIAGNOSIS — I10 ESSENTIAL HYPERTENSION: ICD-10-CM

## 2021-09-20 DIAGNOSIS — R53.82 CHRONIC FATIGUE: ICD-10-CM

## 2021-09-20 LAB — LVEF ECHO: NORMAL

## 2021-09-20 PROCEDURE — 93306 TTE W/DOPPLER COMPLETE: CPT

## 2021-09-20 PROCEDURE — 93306 TTE W/DOPPLER COMPLETE: CPT | Mod: 26 | Performed by: INTERNAL MEDICINE

## 2021-09-20 NOTE — TELEPHONE ENCOUNTER
Echo 9/20/2021 noted. To be reviewed at OV 10/28/2021 with Dr. Alarcon. Hypertension is being titrated for better control: Dr. Alarcon increased losartan to 25mg BID and Dr. Carter added hydrochlorothiazide 12.5mg on 9/9/2021.    Attempted to contact patient to review stable echo report. Left message for patient to call if any questions prior to her OV with Dr. Alarcon.

## 2021-09-23 ENCOUNTER — OFFICE VISIT (OUTPATIENT)
Dept: NEUROLOGY | Facility: CLINIC | Age: 81
End: 2021-09-23
Attending: PSYCHIATRY & NEUROLOGY
Payer: COMMERCIAL

## 2021-09-23 ENCOUNTER — TRANSFERRED RECORDS (OUTPATIENT)
Dept: HEALTH INFORMATION MANAGEMENT | Facility: CLINIC | Age: 81
End: 2021-09-23

## 2021-09-23 VITALS
SYSTOLIC BLOOD PRESSURE: 124 MMHG | DIASTOLIC BLOOD PRESSURE: 66 MMHG | HEIGHT: 68 IN | OXYGEN SATURATION: 96 % | BODY MASS INDEX: 22.97 KG/M2 | HEART RATE: 85 BPM | WEIGHT: 151.6 LBS

## 2021-09-23 DIAGNOSIS — G14 POST-POLIO SYNDROME (H): Primary | ICD-10-CM

## 2021-09-23 DIAGNOSIS — M48.02 CERVICAL STENOSIS OF SPINAL CANAL: ICD-10-CM

## 2021-09-23 DIAGNOSIS — G60.9 IDIOPATHIC PERIPHERAL NEUROPATHY: ICD-10-CM

## 2021-09-23 PROCEDURE — 99203 OFFICE O/P NEW LOW 30 MIN: CPT | Performed by: PSYCHIATRY & NEUROLOGY

## 2021-09-23 PROCEDURE — G0463 HOSPITAL OUTPT CLINIC VISIT: HCPCS

## 2021-09-23 ASSESSMENT — MIFFLIN-ST. JEOR: SCORE: 1196.15

## 2021-09-23 NOTE — PROGRESS NOTES
[facility]    Lizzette Chanel MRN# [id number]   Age: 80 year old YOB: 1940     Requesting physician: No ref. provider found  Guzman Bruner            Assessment and Plan:   Assessment:  1.  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  Peripheral neuropathy - likely primary sensory neuropathy  3.  History of cervical spinal stenosis     Plan:  1.  No change in interventions other than suggesting use of walking aid for balance when necessary  2.  RTC 1 year    As far as duties post polio syndrome, this has been stable and in fact her power in the upper extremities may be as good as I have ever measured it over the past several years.  Clearly, monitoring her activity level and avoiding overuse has been successful.  Her balance issue is likely related to her peripheral neuropathy impairing her position sense more so than the cervical spinal stenosis as there is been no accompanying symptoms of weakness or long tract findings or complaints.  With this in mind, we have elected not to reimage her cervical spine at this time but simply to continue to monitor her circumstance.  As far as her balance goes, intermittent use of an assistive device like a walking stick would be suggested which she will take under advisement.  I will otherwise continue to follow her, she will call for any future concerns, and return for follow-up in 1 year.               History of Present Illness:   CC:  I met with Jessika today for 30 minutes to review her neurological status.  I have followed her for the past several years in my former practice after investigating her complaints of increasing weakness and attenuation of her stamina in the upper extremities.  Although she was found to have a mild peripheral neuropathy as well as cervical spinal stenosis, the actual origin of her upper extremity weakness had to do with the residuals of polio and the adverse impact of an overuse  phenomena on these weakened anterior horn cells and nerves.  Once we are able to remedy her overuse phenomena, she remained stable and had been so for the past several years.  I have not seen her for 2 years and she presents today for follow-up indicating that she continues to recognize impairment and stamina but not so much further loss of strength.  Her only other neurological complaint deals with some difficulty with balance although she has not fallen nor taken to use any assistive devices.  She is not aware of any difficulty with coordination, dexterity, power, or bowel or bladder function.  She continues to exercise quite vigorously and [place] but has used more caution taking more frequent breaks in her usual workday.    The balance of her health history is not changed although she unfortunately had recent surgery for adenocarcinoma of the uterus.  She reports that she is cancer free at this time.               Physical Exam:   Her clinical examination reflects no abnormalities of mental status or cranial nerve.  Her motor exam reveals actually quite excellent strength in all 4 extremities and if there is any weakness at all, it might be just very slightly impacting her deltoids.  She has no fasciculation or atrophy.  Her sensory exam reflects an abnormal Romberg quickly corrected with handhold touch.  She does have mild distal sensory loss.  Her reflexes are normal and symmetrical in the arms increased at the knees absent and diminished at the ankles with downgoing toes.  Her gait is normal based and steady.         Data:   All laboratory data reviewed  All imaging studies reviewed by me             DATA for DOCUMENTATION:         Past Medical History:     Patient Active Problem List   Diagnosis     Peripheral neuropathy     Hx of colonoscopy     Hyperlipidemia LDL goal <130     CAITLYN (obstructive sleep apnea)  CPAP pressures set at:  LPL of 5 and UPL of 14     Osteopenia      Advanced directives, counseling/discussion     Spinal stenosis in cervical region     Post-polio syndrome     Adenocarcinoma of endometrium (H)     Fatigue     Essential hypertension     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    had vaginal bleeding     Arthritis mild in knees, shoulders     Cancer (H) skin--Basil Cell, [place]     Chest pain 2003    neg est thallium     Chest pain 04/2016    nl est echo and cxr     Complication of anesthesia     BP maddie to over 200 in PACU 1 month ago-after D&C     Dyspnea 08/25/2021     Essential hypertension 7/2021     Fatigue 08/25/2021     Hx of colonoscopy 2003    incomplete, be nl     Hx of colonoscopy 06/03/2011    nl     Hypercholesteremia 2011    aches with zocor     CAITLYN (obstructive sleep apnea) 12/2012    done [place], using dental device, added cpap 2014      Osteopenia 2013    Dr. Taylor     Peripheral neuropathy     Dr. 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       Past Surgical History:     Past Surgical History:   Procedure Laterality Date     ABDOMEN SURGERY  1960     APPENDECTOMY  60's     BIOPSY  2010    inside upper lip     DAVINCI HYSTERECTOMY TOTAL, BILATERAL SALPINGO-OOPHORECTOMY, NODE DISSECTION, COMBINED Bilateral 7/8/2021    Procedure: ROBOTIC ASSISTED TOTAL LAPAROSCOPIC HYSTERECTOMY, BILATERAL SALPINGO-OOPHORECTOMY, WASHINGS, INTRA-OPERATIVE SENTINEL LYMPH NODE MAPPING, BILATERAL PELVIC AND PARA-AORTIC LYMPHADENECTOMY, REPAIR VAGINAL TEAR;  Surgeon: Dianne Garland MD;   Location: [facility]     DILATION AND CURETTAGE, HYSTEROSCOPY WITH ULTRASOUND GUIDANCE N/A 2021    Procedure: HYSTEROSCOPY UNDER ULTRASOUND GUIDANCE, DILATION AND CURETTAGE OF UTERUS;  Surgeon: Isabel Ferro MD;  Location: [facility] OR     OPERATIVE HYSTEROSCOPY WITH MORCELLATOR N/A 2021    Procedure: MYOSURE MORCELLATOR;  Surgeon: Isabel Ferro MD;  Location: [facility] OR     PHACOEMULSIFICATION CLEAR CORNEA WITH STANDARD INTRAOCULAR LENS IMPLANT  3/21/2012    Procedure:PHACOEMULSIFICATION CLEAR CORNEA WITH STANDARD INTRAOCULAR LENS IMPLANT; RIGHT PHACOEMULSIFICATION CLEAR CORNEA WITH STANDARD INTRAOCULAR LENS IMPLANT ; Surgeon:CHANDRAKANT HERNANDEZ; Location: [facility]     PHACOEMULSIFICATION CLEAR CORNEA WITH STANDARD INTRAOCULAR LENS IMPLANT  3/28/2012    Procedure:PHACOEMULSIFICATION CLEAR CORNEA WITH STANDARD INTRAOCULAR LENS IMPLANT; LEFT PHACOEMULSIFICATION CLEAR CORNEA WITH STANDARD INTRAOCULAR LENS IMPLANT ; Surgeon:CHANDRAKANT HERNANDEZ; Location: [facility]     SHOULDER SURGERY      twice     SOFT TISSUE SURGERY  R&L Rotator cuff repairs     TONSILLECTOMY  child            Social History:     Social History     Socioeconomic History     Marital status: Single     Spouse name: Not on file     Number of children: 0     Years of education: Not on file     Highest education level: Not on file   Occupational History     Occupation: physical therapy     Employer: RETIRED   Tobacco Use     Smoking status: Former Smoker     Packs/day: 1.50     Years: 12.00     Pack years: 18.00     Types: Cigarettes     Start date: 1966     Quit date: 3/17/1974     Years since quittin.5     Smokeless tobacco: Never Used     Tobacco comment: enjoyed it!   Substance and Sexual Activity     Alcohol use: Yes     Alcohol/week: 0.0 standard drinks     Comment: 1-2/day     Drug use: No     Sexual activity: Not Currently     Partners: Male     Birth control/protection: None   Other Topics Concern     Parent/sibling w/ CABG, MI or  angioplasty before 65F 55M? Yes     Comment: Brother  of heart attack at 49 yrs old   Social History Narrative    Single, no kids    Has a cabin    Has godchildren that can help out     Social Determinants of Health     Financial Resource Strain:      Difficulty of Paying Living Expenses:    Food Insecurity:      Worried About Running Out of Food in the Last Year:      Ran Out of Food in the Last Year:    Transportation Needs:      Lack of Transportation (Medical):      Lack of Transportation (Non-Medical):    Physical Activity:      Days of Exercise per Week:      Minutes of Exercise per Session:    Stress:      Feeling of Stress :    Social Connections:      Frequency of Communication with Friends and Family:      Frequency of Social Gatherings with Friends and Family:      Attends [employer] Services:      Active Member of Clubs or Organizations:      Attends Club or Organization Meetings:      Marital Status:    Intimate Partner Violence:      Fear of Current or Ex-Partner:      Emotionally Abused:      Physically Abused:      Sexually Abused:               Family History:     Family History   Problem Relation Age of Onset     C.A.D. Father      Heart Disease Father      Heart Disease Mother 97     Alzheimer Disease Mother      Cerebrovascular Disease Mother         mild. Still was very functional     Substance Abuse Mother         Significant treatment over 15 yrs but ended with very good results     Osteoporosis Mother      C.A.D. Brother      Asthma Brother             Medications:     Current Outpatient Medications   Medication Sig     alendronate (FOSAMAX) 70 MG tablet Take 1 tablet (70 mg) by mouth every 7 days     Apoaequorin 10 MG CAPS Take 1 capsule by mouth daily Prevagen     aspirin (ASA) 81 MG chewable tablet Take 1 tablet (81 mg) by mouth daily     Calcium Carbonate-Vitamin D (CALCIUM-D PO) Take by mouth daily Liquid  Calcium 1200 mg  & Vit D 800 iU     Coenzyme Q10 (COQ10 PO) Take 100 mg by  mouth.     famotidine (PEPCID) 40 MG tablet Take 1 tablet (40 mg) by mouth daily     hydrochlorothiazide (HYDRODIURIL) 12.5 MG tablet Take 1 tablet (12.5 mg) by mouth daily     rosuvastatin (CRESTOR) 5 MG tablet TAKE 1 TABLET(5 MG) BY MOUTH DAILY     vitamin B-12 (CYANOCOBALAMIN) 1000 MCG tablet Take 1,000 mcg by mouth daily     losartan (COZAAR) 25 MG tablet Take 1 tablet (25 mg) by mouth 2 times daily     ORDER FOR DME Use your CPAP device as directed by your provider.     No current facility-administered medications for this visit.              Review of Systems:   A comprehensive 10 point review of systems (constitutional, ENT, cardiac, peripheral vascular, lymphatic, respiratory, GI, , Musculoskeletal, skin, Neurological) was performed and found to be negative except as described in this note.     See intake form completed by patient

## 2021-09-23 NOTE — LETTER
9/23/2021         RE: Lizzette Chanel  [address]  [place] [address]        Dear Colleague,    Thank you for referring your patient, Lizzette Chanel, to the [facility] [place]. Please see a copy of my visit note below.        [facility]    Lizzette Chanel MRN# [id number]   Age: 80 year old YOB: 1940     Requesting physician: No ref. provider found  Guzman Bruner            Assessment and Plan:   Assessment:  1.  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  Peripheral neuropathy - likely primary sensory neuropathy  3.  History of cervical spinal stenosis     Plan:  1.  No change in interventions other than suggesting use of walking aid for balance when necessary  2.  RTC 1 year    As far as duties post polio syndrome, this has been stable and in fact her power in the upper extremities may be as good as I have ever measured it over the past several years.  Clearly, monitoring her activity level and avoiding overuse has been successful.  Her balance issue is likely related to her peripheral neuropathy impairing her position sense more so than the cervical spinal stenosis as there is been no accompanying symptoms of weakness or long tract findings or complaints.  With this in mind, we have elected not to reimage her cervical spine at this time but simply to continue to monitor her circumstance.  As far as her balance goes, intermittent use of an assistive device like a walking stick would be suggested which she will take under advisement.  I will otherwise continue to follow her, she will call for any future concerns, and return for follow-up in 1 year.               History of Present Illness:   CC:  I met with Jessika today for 30 minutes to review her neurological status.  I have followed her for the past several years in my former practice after investigating her complaints of increasing weakness and  attenuation of her stamina in the upper extremities.  Although she was found to have a mild peripheral neuropathy as well as cervical spinal stenosis, the actual origin of her upper extremity weakness had to do with the residuals of polio and the adverse impact of an overuse phenomena on these weakened anterior horn cells and nerves.  Once we are able to remedy her overuse phenomena, she remained stable and had been so for the past several years.  I have not seen her for 2 years and she presents today for follow-up indicating that she continues to recognize impairment and stamina but not so much further loss of strength.  Her only other neurological complaint deals with some difficulty with balance although she has not fallen nor taken to use any assistive devices.  She is not aware of any difficulty with coordination, dexterity, power, or bowel or bladder function.  She continues to exercise quite vigorously and [place] but has used more caution taking more frequent breaks in her usual workday.    The balance of her health history is not changed although she unfortunately had recent surgery for adenocarcinoma of the uterus.  She reports that she is cancer free at this time.               Physical Exam:   Her clinical examination reflects no abnormalities of mental status or cranial nerve.  Her motor exam reveals actually quite excellent strength in all 4 extremities and if there is any weakness at all, it might be just very slightly impacting her deltoids.  She has no fasciculation or atrophy.  Her sensory exam reflects an abnormal Romberg quickly corrected with handhold touch.  She does have mild distal sensory loss.  Her reflexes are normal and symmetrical in the arms increased at the knees absent and diminished at the ankles with downgoing toes.  Her gait is normal based and steady.         Data:   All laboratory data reviewed  All imaging studies reviewed by me              DATA for DOCUMENTATION:         Past Medical History:     Patient Active Problem List   Diagnosis     Peripheral neuropathy     Hx of colonoscopy     Hyperlipidemia LDL goal <130     CAITLYN (obstructive sleep apnea)  CPAP pressures set at:  LPL of 5 and UPL of 14     Osteopenia     Advanced directives, counseling/discussion     Spinal stenosis in cervical region     Post-polio syndrome     Adenocarcinoma of endometrium (H)     Fatigue     Essential hypertension     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    had vaginal bleeding     Arthritis mild in knees, shoulders     Cancer (H) skin--Basil Cell, [place]     Chest pain 2003    neg est thallium     Chest pain 04/2016    nl est echo and cxr     Complication of anesthesia     BP maddie to over 200 in PACU 1 month ago-after D&C     Dyspnea 08/25/2021     Essential hypertension 7/2021     Fatigue 08/25/2021     Hx of colonoscopy 2003    incomplete, be nl     Hx of colonoscopy 06/03/2011    nl     Hypercholesteremia 2011    aches with zocor     CAITLYN (obstructive sleep apnea) 12/2012    done [place], using dental device, added cpap 2014      Osteopenia 2013    Dr. Taylor     Peripheral neuropathy     Dr. 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       Past Surgical History:     Past Surgical History:   Procedure Laterality Date     ABDOMEN SURGERY  1960     APPENDECTOMY  60's     BIOPSY  2010    inside upper lip     DAVINCI HYSTERECTOMY TOTAL, BILATERAL SALPINGO-OOPHORECTOMY, NODE DISSECTION, COMBINED  Bilateral 2021    Procedure: ROBOTIC ASSISTED TOTAL LAPAROSCOPIC HYSTERECTOMY, BILATERAL SALPINGO-OOPHORECTOMY, WASHINGS, INTRA-OPERATIVE SENTINEL LYMPH NODE MAPPING, BILATERAL PELVIC AND PARA-AORTIC LYMPHADENECTOMY, REPAIR VAGINAL TEAR;  Surgeon: Dianne Garland MD;  Location:  OR     DILATION AND CURETTAGE, HYSTEROSCOPY WITH ULTRASOUND GUIDANCE N/A 2021    Procedure: HYSTEROSCOPY UNDER ULTRASOUND GUIDANCE, DILATION AND CURETTAGE OF UTERUS;  Surgeon: Isabel Ferro MD;  Location:  OR     OPERATIVE HYSTEROSCOPY WITH MORCELLATOR N/A 2021    Procedure: MYOSURE MORCELLATOR;  Surgeon: Isabel Ferro MD;  Location:  OR     PHACOEMULSIFICATION CLEAR CORNEA WITH STANDARD INTRAOCULAR LENS IMPLANT  3/21/2012    Procedure:PHACOEMULSIFICATION CLEAR CORNEA WITH STANDARD INTRAOCULAR LENS IMPLANT; RIGHT PHACOEMULSIFICATION CLEAR CORNEA WITH STANDARD INTRAOCULAR LENS IMPLANT ; Surgeon:CHANDRAKANT HERNANDEZ; Location: [facility]     PHACOEMULSIFICATION CLEAR CORNEA WITH STANDARD INTRAOCULAR LENS IMPLANT  3/28/2012    Procedure:PHACOEMULSIFICATION CLEAR CORNEA WITH STANDARD INTRAOCULAR LENS IMPLANT; LEFT PHACOEMULSIFICATION CLEAR CORNEA WITH STANDARD INTRAOCULAR LENS IMPLANT ; Surgeon:CHANDRAKANT HERNANDEZ; Location: [facility]     SHOULDER SURGERY      twice     SOFT TISSUE SURGERY  R&L Rotator cuff repairs     TONSILLECTOMY  child            Social History:     Social History     Socioeconomic History     Marital status: Single     Spouse name: Not on file     Number of children: 0     Years of education: Not on file     Highest education level: Not on file   Occupational History     Occupation: physical therapy     Employer: RETIRED   Tobacco Use     Smoking status: Former Smoker     Packs/day: 1.50     Years: 12.00     Pack years: 18.00     Types: Cigarettes     Start date: 1966     Quit date: 3/17/1974     Years since quittin.5     Smokeless tobacco: Never Used     Tobacco comment: enjoyed  it!   Substance and Sexual Activity     Alcohol use: Yes     Alcohol/week: 0.0 standard drinks     Comment: 1-2/day     Drug use: No     Sexual activity: Not Currently     Partners: Male     Birth control/protection: None   Other Topics Concern     Parent/sibling w/ CABG, MI or angioplasty before 65F 55M? Yes     Comment: Brother  of heart attack at 49 yrs old   Social History Narrative    Single, no kids    Has a cabin    Has godchildren that can help out     Social Determinants of Health     Financial Resource Strain:      Difficulty of Paying Living Expenses:    Food Insecurity:      Worried About Running Out of Food in the Last Year:      Ran Out of Food in the Last Year:    Transportation Needs:      Lack of Transportation (Medical):      Lack of Transportation (Non-Medical):    Physical Activity:      Days of Exercise per Week:      Minutes of Exercise per Session:    Stress:      Feeling of Stress :    Social Connections:      Frequency of Communication with Friends and Family:      Frequency of Social Gatherings with Friends and Family:      Attends [employer] Services:      Active Member of Clubs or Organizations:      Attends Club or Organization Meetings:      Marital Status:    Intimate Partner Violence:      Fear of Current or Ex-Partner:      Emotionally Abused:      Physically Abused:      Sexually Abused:               Family History:     Family History   Problem Relation Age of Onset     C.A.D. Father      Heart Disease Father      Heart Disease Mother 97     Alzheimer Disease Mother      Cerebrovascular Disease Mother         mild. Still was very functional     Substance Abuse Mother         Significant treatment over 15 yrs but ended with very good results     Osteoporosis Mother      C.A.D. Brother      Asthma Brother             Medications:     Current Outpatient Medications   Medication Sig     alendronate (FOSAMAX) 70 MG tablet Take 1 tablet (70 mg) by mouth every 7 days     Apoaequorin 10  MG CAPS Take 1 capsule by mouth daily Prevagen     aspirin (ASA) 81 MG chewable tablet Take 1 tablet (81 mg) by mouth daily     Calcium Carbonate-Vitamin D (CALCIUM-D PO) Take by mouth daily Liquid  Calcium 1200 mg  & Vit D 800 iU     Coenzyme Q10 (COQ10 PO) Take 100 mg by mouth.     famotidine (PEPCID) 40 MG tablet Take 1 tablet (40 mg) by mouth daily     hydrochlorothiazide (HYDRODIURIL) 12.5 MG tablet Take 1 tablet (12.5 mg) by mouth daily     rosuvastatin (CRESTOR) 5 MG tablet TAKE 1 TABLET(5 MG) BY MOUTH DAILY     vitamin B-12 (CYANOCOBALAMIN) 1000 MCG tablet Take 1,000 mcg by mouth daily     losartan (COZAAR) 25 MG tablet Take 1 tablet (25 mg) by mouth 2 times daily     ORDER FOR DME Use your CPAP device as directed by your provider.     No current facility-administered medications for this visit.              Review of Systems:   A comprehensive 10 point review of systems (constitutional, ENT, cardiac, peripheral vascular, lymphatic, respiratory, GI, , Musculoskeletal, skin, Neurological) was performed and found to be negative except as described in this note.     See intake form completed by patient        Again, thank you for allowing me to participate in the care of your patient.        Sincerely,        Mohinder Lyles MD, MD

## 2021-09-24 ENCOUNTER — TELEPHONE (OUTPATIENT)
Dept: FAMILY MEDICINE | Facility: CLINIC | Age: 81
End: 2021-09-24

## 2021-09-24 DIAGNOSIS — E78.5 HYPERLIPIDEMIA LDL GOAL <130: ICD-10-CM

## 2021-09-24 RX ORDER — ROSUVASTATIN CALCIUM 5 MG/1
TABLET, COATED ORAL
Qty: 90 TABLET | Refills: 0 | Status: SHIPPED | OUTPATIENT
Start: 2021-09-24 | End: 2021-12-07

## 2021-09-24 NOTE — TELEPHONE ENCOUNTER
Reason for Call:  Medication or medication refill:    Do you use a [facility]?  Name of the pharmacy and phone number for the current request:       [id number] - [place], [place] - [address] AT [facility] [address]      Name of the medication requested:     rosuvastatin (CRESTOR) 5 MG tablet 90 tablet         Other request: patient is out and needing this today if possible    Can we leave a detailed message on this number? YES    Phone number patient can be reached at: Work number on file:  There is no work phone number on file. or Cell number on file:    Telephone Information:   Mobile [phone]       Best Time: any    Call taken on 9/24/2021 at 11:58 AM by Jennifer Johnson

## 2021-09-24 NOTE — TELEPHONE ENCOUNTER
LDL Cholesterol Calculated   Date Value Ref Range Status   02/25/2021 81 <100 mg/dL Final     Comment:     Desirable:       <100 mg/dl     Prescription approved per [facility] Refill Protocol.  Xochitl AHN RN

## 2021-10-19 ENCOUNTER — TRANSFERRED RECORDS (OUTPATIENT)
Dept: HEALTH INFORMATION MANAGEMENT | Facility: CLINIC | Age: 81
End: 2021-10-19
Payer: COMMERCIAL

## 2021-10-26 ENCOUNTER — TRANSFERRED RECORDS (OUTPATIENT)
Dept: HEALTH INFORMATION MANAGEMENT | Facility: CLINIC | Age: 81
End: 2021-10-26
Payer: COMMERCIAL

## 2021-10-29 ENCOUNTER — OFFICE VISIT (OUTPATIENT)
Dept: FAMILY MEDICINE | Facility: CLINIC | Age: 81
End: 2021-10-29
Payer: COMMERCIAL

## 2021-10-29 VITALS
TEMPERATURE: 97.7 F | DIASTOLIC BLOOD PRESSURE: 74 MMHG | RESPIRATION RATE: 16 BRPM | BODY MASS INDEX: 22.88 KG/M2 | SYSTOLIC BLOOD PRESSURE: 131 MMHG | OXYGEN SATURATION: 96 % | HEART RATE: 71 BPM | HEIGHT: 68 IN | WEIGHT: 151 LBS

## 2021-10-29 DIAGNOSIS — I10 ESSENTIAL HYPERTENSION: ICD-10-CM

## 2021-10-29 DIAGNOSIS — Z23 HIGH PRIORITY FOR 2019-NCOV VACCINE: Primary | ICD-10-CM

## 2021-10-29 PROCEDURE — 99213 OFFICE O/P EST LOW 20 MIN: CPT | Performed by: INTERNAL MEDICINE

## 2021-10-29 PROCEDURE — 80048 BASIC METABOLIC PNL TOTAL CA: CPT | Performed by: INTERNAL MEDICINE

## 2021-10-29 PROCEDURE — 36415 COLL VENOUS BLD VENIPUNCTURE: CPT | Performed by: INTERNAL MEDICINE

## 2021-10-29 PROCEDURE — 0064A COVID-19,PF,MODERNA (18+ YRS BOOSTER .25ML): CPT | Performed by: INTERNAL MEDICINE

## 2021-10-29 RX ORDER — LOSARTAN POTASSIUM 25 MG/1
25 TABLET ORAL 2 TIMES DAILY
Qty: 90 TABLET | Refills: 3 | Status: SHIPPED | OUTPATIENT
Start: 2021-10-29 | End: 2022-03-17

## 2021-10-29 ASSESSMENT — MIFFLIN-ST. JEOR: SCORE: 1188.43

## 2021-10-29 NOTE — PROGRESS NOTES
The patient is here for follow-up to her hypertension. As noted and reviewed I saw her about 6 weeks ago for this and added hydrochlorothiazide. She also had some dyspnea on exertion although at that time it was much improved. She had a CT of her chest with negative PE but signs of CHF prior to this and a follow-up cardiac echo was normal.    She is doing quite well. Her blood pressure is super now as noted. She has no headaches or dizziness or chest pain or shortness of breath. Her fatigue has resolved.    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    had vaginal bleeding     Arthritis mild in knees, shoulders     Cancer (H) skin--Basil Cell, [place]     Chest pain 2003    neg est thallium     Chest pain 04/2016    nl est echo and cxr     Complication of anesthesia     BP maddie to over 200 in PACU 1 month ago-after D&C     Dyspnea 08/25/2021     Essential hypertension 07/2021     Fatigue 08/25/2021     Hx of colonoscopy 2003    incomplete, be nl     Hx of colonoscopy 06/03/2011    nl     Hypercholesteremia 2011    aches with zocor     CAITLYN (obstructive sleep apnea) 12/2012    done [place], using dental device, added cpap 2014      Osteopenia 2013    Dr. Taylor     Peripheral neuropathy     Dr. 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     SOB (shortness of breath) 08/2021    elevated d dimer, ct no pe but signs of chf, then echo nl 9/21     Statin intolerance     zocor and one other caused aches     Subdural hematoma (H) 01/2016    traumatic, fu ct 3/16 resolved     Uncomplicated asthma      Past Surgical History:   Procedure Laterality Date     ABDOMEN SURGERY  1960     APPENDECTOMY  60's     BIOPSY  2010    inside upper lip     DAVINCI HYSTERECTOMY TOTAL, BILATERAL  SALPINGO-OOPHORECTOMY, NODE DISSECTION, COMBINED Bilateral 2021    Procedure: ROBOTIC ASSISTED TOTAL LAPAROSCOPIC HYSTERECTOMY, BILATERAL SALPINGO-OOPHORECTOMY, WASHINGS, INTRA-OPERATIVE SENTINEL LYMPH NODE MAPPING, BILATERAL PELVIC AND PARA-AORTIC LYMPHADENECTOMY, REPAIR VAGINAL TEAR;  Surgeon: Dianne Garland MD;  Location:  OR     DILATION AND CURETTAGE, HYSTEROSCOPY WITH ULTRASOUND GUIDANCE N/A 2021    Procedure: HYSTEROSCOPY UNDER ULTRASOUND GUIDANCE, DILATION AND CURETTAGE OF UTERUS;  Surgeon: Isabel Ferro MD;  Location:  OR     OPERATIVE HYSTEROSCOPY WITH MORCELLATOR N/A 2021    Procedure: MYOSURE MORCELLATOR;  Surgeon: Isabel Ferro MD;  Location:  OR     PHACOEMULSIFICATION CLEAR CORNEA WITH STANDARD INTRAOCULAR LENS IMPLANT  3/21/2012    Procedure:PHACOEMULSIFICATION CLEAR CORNEA WITH STANDARD INTRAOCULAR LENS IMPLANT; RIGHT PHACOEMULSIFICATION CLEAR CORNEA WITH STANDARD INTRAOCULAR LENS IMPLANT ; Surgeon:CHANDRAKANT HERNANDEZ; Location: [facility]     PHACOEMULSIFICATION CLEAR CORNEA WITH STANDARD INTRAOCULAR LENS IMPLANT  3/28/2012    Procedure:PHACOEMULSIFICATION CLEAR CORNEA WITH STANDARD INTRAOCULAR LENS IMPLANT; LEFT PHACOEMULSIFICATION CLEAR CORNEA WITH STANDARD INTRAOCULAR LENS IMPLANT ; Surgeon:CHANDRAKANT HERNANDEZ; Location:[facility]     SHOULDER SURGERY      twice     SOFT TISSUE SURGERY  R&L Rotator cuff repairs     TONSILLECTOMY  child     Social History     Socioeconomic History     Marital status: Single     Spouse name: Not on file     Number of children: 0     Years of education: Not on file     Highest education level: Not on file   Occupational History     Occupation: physical therapy     Employer: RETIRED   Tobacco Use     Smoking status: Former Smoker     Packs/day: 1.50     Years: 12.00     Pack years: 18.00     Types: Cigarettes     Start date: 1966     Quit date: 3/17/1974     Years since quittin.6     Smokeless tobacco: Never Used      Tobacco comment: enjoyed it!   Substance and Sexual Activity     Alcohol use: Yes     Alcohol/week: 0.0 standard drinks     Comment: 1-2/day     Drug use: No     Sexual activity: Not Currently     Partners: Male     Birth control/protection: None   Other Topics Concern     Parent/sibling w/ CABG, MI or angioplasty before 65F 55M? Yes     Comment: Brother  of heart attack at 49 yrs old   Social History Narrative    Single, no kids    Has a cabin    Has godchildren that can help out     Social Determinants of Health     Financial Resource Strain:      Difficulty of Paying Living Expenses:    Food Insecurity:      Worried About Running Out of Food in the Last Year:      Ran Out of Food in the Last Year:    Transportation Needs:      Lack of Transportation (Medical):      Lack of Transportation (Non-Medical):    Physical Activity:      Days of Exercise per Week:      Minutes of Exercise per Session:    Stress:      Feeling of Stress :    Social Connections:      Frequency of Communication with Friends and Family:      Frequency of Social Gatherings with Friends and Family:      Attends Orthodox Services:      Active Member of Clubs or Organizations:      Attends Club or Organization Meetings:      Marital Status:    Intimate Partner Violence:      Fear of Current or Ex-Partner:      Emotionally Abused:      Physically Abused:      Sexually Abused:      Current Outpatient Medications   Medication Sig Dispense Refill     alendronate (FOSAMAX) 70 MG tablet Take 1 tablet (70 mg) by mouth every 7 days 12 tablet 3     Apoaequorin 10 MG CAPS Take 1 capsule by mouth daily Prevagen       aspirin (ASA) 81 MG chewable tablet Take 1 tablet (81 mg) by mouth daily 100 tablet 4     Calcium Carbonate-Vitamin D (CALCIUM-D PO) Take by mouth daily Liquid  Calcium 1200 mg  & Vit D 800 iU       Coenzyme Q10 (COQ10 PO) Take 100 mg by mouth.       hydrochlorothiazide (HYDRODIURIL) 12.5 MG tablet Take 1 tablet (12.5 mg) by mouth daily 90  "tablet 3     losartan (COZAAR) 25 MG tablet Take 1 tablet (25 mg) by mouth 2 times daily 90 tablet 3     ORDER FOR DME Use your CPAP device as directed by your provider.       rosuvastatin (CRESTOR) 5 MG tablet TAKE 1 TABLET(5 MG) BY MOUTH DAILY 90 tablet 0     vitamin B-12 (CYANOCOBALAMIN) 1000 MCG tablet Take 1,000 mcg by mouth daily       No Known Allergies  FAMILY HISTORY NOTED AND REVIEWED    REVIEW OF SYSTEMS: above    PHYSICAL EXAM    /74 (BP Location: Right arm, Patient Position: Chair, Cuff Size: Adult Large)   Pulse 71   Temp 97.7  F (36.5  C) (Tympanic)   Resp 16   Ht 1.727 m (5' 8\")   Wt 68.5 kg (151 lb)   SpO2 96%   Breastfeeding No   BMI 22.96 kg/m      Patient appears non toxic  Lungs clear  cv reglar rate and rhythm    ASSESSMENT:  1. Hypertension, controlled  2. Shortness of breath, resolved    PLAN:  Labs now  Follow up complete physical exam 6 months  covid booster    Guzman Bruner M.D.        "

## 2021-10-30 LAB
ANION GAP SERPL CALCULATED.3IONS-SCNC: 7 MMOL/L (ref 3–14)
BUN SERPL-MCNC: 19 MG/DL (ref 7–30)
CALCIUM SERPL-MCNC: 9.5 MG/DL (ref 8.5–10.1)
CHLORIDE BLD-SCNC: 106 MMOL/L (ref 94–109)
CO2 SERPL-SCNC: 26 MMOL/L (ref 20–32)
CREAT SERPL-MCNC: 0.84 MG/DL (ref 0.52–1.04)
GFR SERPL CREATININE-BSD FRML MDRD: 64 ML/MIN/1.73M2
GLUCOSE BLD-MCNC: 102 MG/DL (ref 70–99)
POTASSIUM BLD-SCNC: 4.1 MMOL/L (ref 3.4–5.3)
SODIUM SERPL-SCNC: 139 MMOL/L (ref 133–144)

## 2021-10-30 NOTE — RESULT ENCOUNTER NOTE
It was nice to see you.  Your should be able to view the lab results.    Your labs look very good.  Let me know if you have questions.    Guzman Bruner M.D.

## 2021-10-31 DIAGNOSIS — I10 ESSENTIAL HYPERTENSION: ICD-10-CM

## 2021-11-01 RX ORDER — LOSARTAN POTASSIUM 25 MG/1
TABLET ORAL
Qty: 180 TABLET | OUTPATIENT
Start: 2021-11-01

## 2021-11-05 NOTE — TELEPHONE ENCOUNTER
alendronate (FOSAMAX) 70 MG tablet 12 tablet 3 7/5/2021  No   Sig - Route: Take 1 tablet (70 mg) by mouth every 7 days - Oral   Sent to pharmacy as: Alendronate Sodium 70 MG Oral Tablet (FOSAMAX)   Class: E-Prescribe   Order: [id number]   E-Prescribing Status: Receipt confirmed by pharmacy (7/5/2021 12:19 PM CDT)       Printout Tracking    External Result Report     Pharmacy    [id number] - [place], [place] - [address] AT [facility] [address]     Pharmacy seeking refill of alendronate.  Last Rx'd on 7-5-2021 good for one year .    Too soon to renew.    Fax sent back to pharmacy - check records for Rx; not due to renew.    Kemi Botello RT (R)

## 2021-12-06 ENCOUNTER — TELEPHONE (OUTPATIENT)
Dept: SLEEP MEDICINE | Facility: CLINIC | Age: 81
End: 2021-12-06
Payer: COMMERCIAL

## 2021-12-06 DIAGNOSIS — E78.5 HYPERLIPIDEMIA LDL GOAL <130: ICD-10-CM

## 2021-12-06 NOTE — TELEPHONE ENCOUNTER
PT called wanting more information regarding her upcoming appointment. TC gave PT the [facility] number and address so she could call and get the correct information.

## 2021-12-07 RX ORDER — ROSUVASTATIN CALCIUM 5 MG/1
TABLET, COATED ORAL
Qty: 90 TABLET | Refills: 0 | Status: SHIPPED | OUTPATIENT
Start: 2021-12-07 | End: 2022-03-14

## 2021-12-07 NOTE — TELEPHONE ENCOUNTER
LDL Cholesterol Calculated   Date Value Ref Range Status   02/25/2021 81 <100 mg/dL Final     Comment:     Desirable:       <100 mg/dl

## 2021-12-20 ENCOUNTER — TELEPHONE (OUTPATIENT)
Dept: SLEEP MEDICINE | Facility: CLINIC | Age: 81
End: 2021-12-20
Payer: COMMERCIAL

## 2021-12-20 NOTE — TELEPHONE ENCOUNTER
Reason for call:  Other   Patient called regarding (reason for call): appointment  Additional comments: Patient request appt scheduled on 1/7 at 9am to be a telephone appt instead.     Phone number to reach patient:  Home number on file [phone] (home)    Best Time:  any    Can we leave a detailed message on this number?  NA    Travel screening: Not Applicable

## 2021-12-30 ENCOUNTER — TRANSFERRED RECORDS (OUTPATIENT)
Dept: HEALTH INFORMATION MANAGEMENT | Facility: CLINIC | Age: 81
End: 2021-12-30
Payer: COMMERCIAL

## 2021-12-31 ENCOUNTER — DOCUMENTATION ONLY (OUTPATIENT)
Dept: SLEEP MEDICINE | Facility: CLINIC | Age: 81
End: 2021-12-31
Payer: COMMERCIAL

## 2021-12-31 NOTE — PROGRESS NOTES
PT CAME TO [facility] FOR MANUAL D/L. SHE IS USING AT 87%. FAXED COPY TO  FOR HER APPOINTMENT NEXT WEEK. GAVE PT A COPY ALSO. DISCUSSED HER MASK LEAK. PT STATED SHE COULDN'T STAND THE FFM SO SHE WENT BACK TO HER NASAL MASK AND IT IS SO OLD THAT IT IS LEAKING ALOT. GOT HER A DEMO MED N20 CUSHION. TOLD HER I WILL SHIP MORE ON MONDAY WHEN SHE IS ELIGIBLE.

## 2022-01-04 ASSESSMENT — SLEEP AND FATIGUE QUESTIONNAIRES
HOW LIKELY ARE YOU TO NOD OFF OR FALL ASLEEP WHILE SITTING AND READING: SLIGHT CHANCE OF DOZING
HOW LIKELY ARE YOU TO NOD OFF OR FALL ASLEEP WHILE SITTING AND TALKING TO SOMEONE: SLIGHT CHANCE OF DOZING
HOW LIKELY ARE YOU TO NOD OFF OR FALL ASLEEP WHILE SITTING QUIETLY AFTER LUNCH WITHOUT ALCOHOL: SLIGHT CHANCE OF DOZING
HOW LIKELY ARE YOU TO NOD OFF OR FALL ASLEEP WHILE WATCHING TV: SLIGHT CHANCE OF DOZING
HOW LIKELY ARE YOU TO NOD OFF OR FALL ASLEEP WHILE LYING DOWN TO REST IN THE AFTERNOON WHEN CIRCUMSTANCES PERMIT: MODERATE CHANCE OF DOZING
HOW LIKELY ARE YOU TO NOD OFF OR FALL ASLEEP IN A CAR, WHILE STOPPED FOR A FEW MINUTES IN TRAFFIC: SLIGHT CHANCE OF DOZING
HOW LIKELY ARE YOU TO NOD OFF OR FALL ASLEEP WHILE SITTING INACTIVE IN A PUBLIC PLACE: SLIGHT CHANCE OF DOZING
HOW LIKELY ARE YOU TO NOD OFF OR FALL ASLEEP WHEN YOU ARE A PASSENGER IN A CAR FOR AN HOUR WITHOUT A BREAK: SLIGHT CHANCE OF DOZING

## 2022-01-07 ENCOUNTER — VIRTUAL VISIT (OUTPATIENT)
Dept: SLEEP MEDICINE | Facility: CLINIC | Age: 82
End: 2022-01-07
Payer: COMMERCIAL

## 2022-01-07 VITALS — HEIGHT: 68 IN | BODY MASS INDEX: 22.73 KG/M2 | WEIGHT: 150 LBS

## 2022-01-07 DIAGNOSIS — G47.33 OSA (OBSTRUCTIVE SLEEP APNEA): Primary | ICD-10-CM

## 2022-01-07 PROCEDURE — 99213 OFFICE O/P EST LOW 20 MIN: CPT | Mod: 95 | Performed by: INTERNAL MEDICINE

## 2022-01-07 ASSESSMENT — MIFFLIN-ST. JEOR: SCORE: 1183.9

## 2022-01-07 NOTE — PROGRESS NOTES
"Lizzette Chanel is a 81 year old female being evaluated via a billable telephone visit.     \"This telephone visit will be conducted via a call between you and your physician/provider. We have found that certain health care needs can be provided without the need for an in-person visit or physical exam.  This service lets us provide the care you need with a telephone conversation.  If a prescription is necessary we can send it directly to your pharmacy.  If lab work is needed we can place an order for that and you can then stop by our lab to have the test done at a later time.\"    Telephone visits are billed at different rates depending on your insurance coverage.  Please reach out to your insurance provider with any questions.    Patient has given verbal consent for  a Telephone visit? Yes    What telephone number would you like your provider to contact at at:  [phone]      How would you like to obtain your AVS? Mail a copy    Zoey Cynthia    Telephone Visit Details:     Telephone Visit Start Time: 9:00 AM    Telephone Visit End Time:9:13 AM     Chief complaint: Follow-up obstructive sleep apnea     History of Present Illness: 81-year-old female with history of overall mild, REM predominant obstructive sleep apnea.  She is currently using CPAP.  In the past she did use an oral appliance.  She is here today for routine follow-up.  She continues to use CPAP all night every night.  She uses this as she knows it is the right thing to do for her health.  She had some skin cancer on her face last year that required her to switch to a full facemask.  Since that is healed she has returned to her previous mask.  She did not like the full facemask.  She typically goes to bed around 1030 to 11 PM.  She denies difficulty initiating sleep.  Sometimes she will wake up and think about things and have difficulty returning to sleep.  She denies that this is a significant problem, it is relatively rare.  She is usually up for " the day between 6 AM and 8 AM.  She gets out and walks the dog about 3 times a day.  Occasionally she may take a half an hour nap.  She denies new sleep concerns.  No restless legs, sleepwalking, sleep talking or dream and acting behavior.    Her CPAP she is getting quite old.  She also is required to get downloads done that she needs to go into get these done manually.    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    had vaginal bleeding     Arthritis mild in knees, shoulders     Cancer (H) skin--Basil Cell, [place]     Chest pain 2003    neg est thallium     Chest pain 04/2016    nl est echo and cxr     Complication of anesthesia     BP maddie to over 200 in PACU 1 month ago-after D&C     Dyspnea 08/25/2021     Essential hypertension 07/2021     Fatigue 08/25/2021     Hx of colonoscopy 2003    incomplete, be nl     Hx of colonoscopy 06/03/2011    nl     Hypercholesteremia 2011    aches with zocor     CAITLYN (obstructive sleep apnea) 12/2012    done [place], using dental device, added cpap 2014      Osteopenia 2013    Dr. Taylor     Peripheral neuropathy     Dr. 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     SOB (shortness of breath) 08/2021    elevated d dimer, ct no pe but signs of chf, then echo nl 9/21     Statin intolerance     zocor and one other caused aches     Subdural hematoma (H) 01/2016    traumatic, fu ct 3/16 resolved     Uncomplicated asthma        No Known Allergies    Current Outpatient Medications   Medication     alendronate (FOSAMAX) 70 MG tablet     Apoaequorin 10 MG CAPS     aspirin (ASA) 81 MG chewable tablet     Calcium  Carbonate-Vitamin D (CALCIUM-D PO)     Coenzyme Q10 (COQ10 PO)     hydrochlorothiazide (HYDRODIURIL) 12.5 MG tablet     losartan (COZAAR) 25 MG tablet     ORDER FOR DME     rosuvastatin (CRESTOR) 5 MG tablet     vitamin B-12 (CYANOCOBALAMIN) 1000 MCG tablet     No current facility-administered medications for this visit.       Social History     Socioeconomic History     Marital status: Single     Spouse name: Not on file     Number of children: 0     Years of education: Not on file     Highest education level: Not on file   Occupational History     Occupation: physical therapy     Employer: RETIRED   Tobacco Use     Smoking status: Former Smoker     Packs/day: 1.50     Years: 12.00     Pack years: 18.00     Types: Cigarettes     Start date: 1966     Quit date: 3/17/1974     Years since quittin.8     Smokeless tobacco: Never Used     Tobacco comment: enjoyed it!   Substance and Sexual Activity     Alcohol use: Yes     Alcohol/week: 0.0 standard drinks     Comment: 1-2/day     Drug use: No     Sexual activity: Not Currently     Partners: Male     Birth control/protection: None   Other Topics Concern     Parent/sibling w/ CABG, MI or angioplasty before 65F 55M? Yes     Comment: Brother  of heart attack at 49 yrs old   Social History Narrative    Single, no kids    Has a cabin    Has godchildren that can help out     Social Determinants of Health     Financial Resource Strain: Not on file   Food Insecurity: Not on file   Transportation Needs: Not on file   Physical Activity: Not on file   Stress: Not on file   Social Connections: Not on file   Intimate Partner Violence: Not on file   Housing Stability: Not on file       Family History   Problem Relation Age of Onset     C.A.D. Father      Heart Disease Father      Heart Disease Mother 97     Alzheimer Disease Mother      Cerebrovascular Disease Mother         mild. Still was very functional     Substance Abuse Mother         Significant treatment over 15  "yrs but ended with very good results     Osteoporosis Mother      C.A.SURYA. Brother      Asthma Brother            EXAM:  Ht 1.727 m (5' 8\")   Wt 68 kg (150 lb)   BMI 22.81 kg/m    GENERAL: Alert and no distress  RESP: No audible wheeze, cough, able to speak in complete sentences.    PSYCH: Mentation appears normal, affect normal, judgement and insight intact, normal speech.       PSG [facility] 12/2/2012  Weight 170 lbs, BMI 25.7  AHI 7.3, RDI 8.6, REM AHI 24.7 lowest sat 70%    ResMed   Auto-PAP 13.0 - 17.0 cmH2O 30 day usage data:    86% of days with > 4 hours of use. 1/30 days with no use.   Average use 439 minutes per day.   95%ile Leak 92.36 L/min. (Excessive)  CPAP 95% pressure 13.7 cm.   AHI 5.3 events per hour. (Unknown events because of leak)    ASSESSMENT:  81-year-old female with history of overall mild REM predominant obstructive sleep apnea getting good clinical benefit and meeting compliance goals.  She does continue to have excessive leak.  This has been an ongoing issue for her.  Her current mask cushion is quite full.  She would also benefit from a updated CPAP machine that allows downloads to be obtained remotely given that mobility can be a challenge for her.  Ongoing treatment of her obstructive sleep apnea is medically indicated.    PLAN:  Orders generated for updated CPAP supplies and replacement machine eligible/available.  Patient is going to work with her DME provider to get updated supplies and continue to try to avoid mask leak.  Follow-up in 1 year.  Contact me if any new sleep issues arise in the meantime.      25 minutes spent on the date of the encounter doing chart review, history and exam, documentation and further activities per the note    Rhonda Perez M.D.  Pulmonary/Critical Care/Sleep Medicine    [facility]   Floor 1, [address]   [address] [place], [place] [address]. [place] [address]   Appointments: " [phone]    The above note was dictated using voice recognition software and may include typographical errors. Please contact the author for any clarifications.

## 2022-01-07 NOTE — PATIENT INSTRUCTIONS
.For general sleep health questions:   http://sleepeducation.org    For tips about PAP and COVID-19:  https://www.thoracic.org/patients/patient-resources/resources/covid-19-and-home-pap-therapy.pdf    For general info about COVID-19 including vaccines:  https://[employer].org/covid19        Continue PAP therapy every night, for all hours that you are sleeping (including naps.)  As always, try to get at least 8 hours of sleep or more each day, keep a regular sleep schedule, and avoid sleep deprivation. Avoid alcohol.    Reasons that you might need a change to your pressure therapy would be weight gain or loss, waking having inadvertently removed your PAP overnight, having previously felt refreshed by sleep with CPAP use and now waking un-refreshed, and return of daytime sleepiness. Also, the development of new medical problems  (such as heart failure, stroke, medications such as narcotics) can sometimes affect breathing at night and change your PAP therapy needs.    Please bring PAP with you if you are hospitalized.  If anticipating surgery be sure to discuss with your surgeon that you have sleep apnea and use PAP therapy.      Maintain your equipment as recommended which includes routine cleaning and replacement of supplies.      Call DME for any questions regarding supplies or maintenance.    [facility] [facility], [facility] [phone]      Do not drive on engage in potentially dangerous activities if feeling sleepy.    Please follow up in sleep clinic again in 12 months.        Tips for your PAP use-    Mask fitting tips  Mask fitting exercise:    To improve your mask seal and your mobility at night, put mask on and secure in place.  Lie down in bed with full pressure and roll to one side, adjust headgear while in that position to eliminate any leaks. Repeat process rolling to other side.     The mask seal does not have to be perfect:   CPAP machines are designed to make  up for small leaks. However, you will not tolerate leaks blowing in your eyes so you will need to adjust.   Any leak should only be near or at the bottom of the mask.  We expect your mask to leak slightly at night.    Do not over-tighten the headgear straps, tighter IS NOT better, we expect minimal leak.    First try re-positioning the mask or headgear before tightening the headgear straps.  Mask leaks are expected due to changing sleeping positions. Try pulling the mask away from your skin allowing the cushion to re-inflate will minimize the leak.  If you struggle for a good fit, try turning the CPAP off and then readjust the mask by pulling it away from your face and then turning back on the CPAP.        Humidifier tips  Humidifiers can be adjusted to increase or decrease the amount of moisture according to your comfort level. You may need to adjust this frequently at first, but then might only change it with seasonal weather changes.     Try INCREASING the humidity if:  You experience a dry, irritated nasal passage or throat.  You have a runny, drippy nose or sneezing fits after using CPAP.  You experience nasal congestion during or after CPAP use.    Try DECREASING the humidity if:  You have excessive condensation or  rain out  in the tubing or mask.  Otherwise keep the tubing warm during the night by running it underneath the blankets or pillow.      Clinic visit after initial PAP set-up   Bring your equipment with you to your 5-8 week follow up clinic visit.  We will be extracting your data from the machine if not available from the cloud based modem.        Travel  Always take your equipment with you when you travel.  If you fly with your equipment bring it on with you as a carry on.  Medical equipment does not count as a carry on.    If you travel international the machines take 110-240v.  The only adapter needed is the adapter that will fit into the receptacle (outlet).    You may also want to bring an  extension cord as many hotel rooms have limited outlets at the bedside.  Do not travel with water in your humidifier chamber.     Cleaning and Maintenance Guidelines    Equipment Frequency Cleaning Method   Mask First Day    Daily      Weekly Soak mask in hot soapy water for 30 minutes, rinse and air dry.  Wipe nasal cushion with a hot soapy (Ivory, baby shampoo) cloth and rinse.  Baby wipes may also be used.  Do not use anti-bacterial soaps,Joya  liquid soap, rubbing alcohol, bleach or ammonia.  Wash frame in hot soapy water (Ivory, baby shampoo) rinse and let air dry   Headgear Biweekly Wash in hot soapy water, rinse and air dry   Reusable Gray Filter Weekly Wash in hot soapy water, rinse, put in towel squeeze moisture out, let air dry   Disposable White Filter Check Weekly Replace when brown or gray in color; at least every 2 to 3 months   Humidifier Chamber Daily    Weekly Empty distilled water from humidifier and let air dry    Hand wash in hot soapy water, rinse and air dry   Tubing Weekly Wash in hot soapy water, rinse and let air dry   Mask, Tubing and Humidifier Chamber As needed Disinfect: Soak in 1 part distilled white vinegar to 3 parts hot water for 30 minutes, rinse well and air dry  Not the material headgear        MASK AND SUPPLY REORDERING and EQUIPMENT NEEDS through your DME and per your insurance  Reminder: Most insurance companies will allow for a new mask, headgear, tubing, and reusable gray filter every six months.  Disposable white ultra-fine filters are covered monthly.      HOME AND SAFETY INSTRUCTIONS    Do not use frayed or cracked electrical cords, multi plug adaptors, or switched receptacles    Do not immerse electrical equipment into water    Assure that electrical cords do not become a tripping hazard      Your BMI is Body mass index is 22.81 kg/m .  Weight management is a personal decision.  If you are interested in exploring weight loss strategies, the following discussion covers the  approaches that may be successful. Body mass index (BMI) is one way to tell whether you are at a healthy weight, overweight, or obese. It measures your weight in relation to your height.  A BMI of 18.5 to 24.9 is in the healthy range. A person with a BMI of 25 to 29.9 is considered overweight, and someone with a BMI of 30 or greater is considered obese. More than two-thirds of American adults are considered overweight or obese.  Being overweight or obese increases the risk for further weight gain. Excess weight may lead to heart disease and diabetes.  Creating and following plans for healthy eating and physical activity may help you improve your health.  Weight control is part of healthy lifestyle and includes exercise, emotional health, and healthy eating habits. Careful eating habits lifelong are the mainstay of weight control. Though there are significant health benefits from weight loss, long-term weight loss with diet alone may be very difficult to achieve- studies show long-term success with dietary management in less than 10% of people. Attaining a healthy weight may be especially difficult to achieve in those with severe obesity. In some cases, medications, devices and surgical management might be considered.  What can you do?  If you are overweight or obese and are interested in methods for weight loss, you should discuss this with your provider.     Consider reducing daily calorie intake by 500 calories.     Keep a food journal.     Avoiding skipping meals, consider cutting portions instead.    Diet combined with exercise helps maintain muscle while optimizing fat loss. Strength training is particularly important for building and maintaining muscle mass. Exercise helps reduce stress, increase energy, and improves fitness. Increasing exercise without diet control, however, may not burn enough calories to loose weight.       Start walking three days a week 10-20 minutes at a time    Work towards walking  thirty minutes five days a week     Eventually, increase the speed of your walking for 1-2 minutes at time    And look into health and wellness programs that may be available through your health insurance provider, employer, local community center, or livier club.

## 2022-02-08 ENCOUNTER — TELEPHONE (OUTPATIENT)
Dept: FAMILY MEDICINE | Facility: CLINIC | Age: 82
End: 2022-02-08
Payer: COMMERCIAL

## 2022-02-08 DIAGNOSIS — M81.0 AGE RELATED OSTEOPOROSIS, UNSPECIFIED PATHOLOGICAL FRACTURE PRESENCE: Primary | ICD-10-CM

## 2022-02-08 NOTE — TELEPHONE ENCOUNTER
She certainly can have a bone density test at any time.  I doubt the Fosamax however is affecting her teeth but she should check with the dentist on that.    Guzman Bruner M.D.

## 2022-02-08 NOTE — TELEPHONE ENCOUNTER
Patient is questioning her Fosamax and if she should continue on the medication.     She has concerns with her teeth having cracks after her recent dental appointments.     When can she have another Dexa scan because she wants to stop the Fosamax.     We spoke about the medication SE and prevention of bone injury due to a fall.   She will decide if she wants too continue on the medication.     Any other advice to offer to the patient? I have no idea how often  Medicare or her supplement cover a dexa scan?     Isabel Caro RN  -[facility]

## 2022-02-09 NOTE — TELEPHONE ENCOUNTER
Patient did follow up with her dentist.    Dr. Bruner, would you mind signing the Dexa Scan order? Patient would like another Dexa Scan.     Isabel Caro RN  [facility]

## 2022-02-09 NOTE — TELEPHONE ENCOUNTER
Guzman Bruner MD  [facility] Triage  Caller: Unspecified (Yesterday,  3:08 PM)  Done     Thank you     Called and LVM notifying pt of order signed     Xochitl AHN, Triage RN  [facility]

## 2022-02-23 ENCOUNTER — HOSPITAL ENCOUNTER (OUTPATIENT)
Dept: BONE DENSITY | Facility: CLINIC | Age: 82
Discharge: HOME | End: 2022-02-23
Attending: INTERNAL MEDICINE | Admitting: INTERNAL MEDICINE
Payer: COMMERCIAL

## 2022-02-23 DIAGNOSIS — M81.0 AGE RELATED OSTEOPOROSIS, UNSPECIFIED PATHOLOGICAL FRACTURE PRESENCE: ICD-10-CM

## 2022-02-23 PROCEDURE — 77085 DXA BONE DENSITY AXL VRT FX: CPT

## 2022-02-23 NOTE — RESULT ENCOUNTER NOTE
Jessika,    Good news, your bone density is improved so I would stay on the fosamax.  I believe you have been on it for 2 years.  Usually we leave people on it for 5 years.    If you have any questions please call me.    Guzman

## 2022-03-16 NOTE — PROGRESS NOTES
SUBJECTIVE:   Lizzette Chanel is a 81 year old female who presents for Preventive Visit.    Patient presents for a physical with her power of  for healthcare directive.  Overall she is doing well but does have several issues to go over.    She has ongoing fatigue which she has had for quite some time.  This is not new.    She does have some memory issues.  Short-term.  Is been gradually developing.    At times she can feel sad.  She does not feel overly down or depressed but has been less busy and finds that she does have some sadness.    She is otherwise doing quite well.  She had a bone density test as noted and reviewed with her.  She takes her medicines regularly.  She does exercise.  She is up-to-date with GYN.               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    had vaginal bleeding, surgery done 2021     Arthritis mild in knees, shoulders     Cancer (H) skin--Basil Cell, [place]     Chest pain 2003    neg est thallium     Chest pain 04/2016    nl est echo and cxr     Complication of anesthesia     BP maddie to over 200 in PACU 1 month ago-after D&C     Essential hypertension 07/2021     Fatigue 08/25/2021     Hx of colonoscopy 2003    incomplete, be nl     Hx of colonoscopy 06/03/2011    nl     Hypercholesteremia 2011    aches with zocor     CAITLYN (obstructive sleep apnea) 12/2012    done [place], using dental device, added cpap 2014      Osteopenia 2013    Dr. Taylor     Osteoporosis of lumbar spine 07/2020    added fosamax     Peripheral neuropathy     Dr. 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     SOB (shortness of breath) 08/2021    elevated d dimer, ct no pe but signs of chf, then echo nl 9/21     Statin intolerance     zocor and one other caused aches     Subdural hematoma (H)  01/2016    traumatic, fu ct 3/16 resolved     Uncomplicated asthma              Past Surgical History:      Past Surgical History:   Procedure Laterality Date     ABDOMEN SURGERY  1960     APPENDECTOMY  60's     BIOPSY  2010    inside upper lip     DAVINCI HYSTERECTOMY TOTAL, BILATERAL SALPINGO-OOPHORECTOMY, NODE DISSECTION, COMBINED Bilateral 7/8/2021    Procedure: ROBOTIC ASSISTED TOTAL LAPAROSCOPIC HYSTERECTOMY, BILATERAL SALPINGO-OOPHORECTOMY, WASHINGS, INTRA-OPERATIVE SENTINEL LYMPH NODE MAPPING, BILATERAL PELVIC AND PARA-AORTIC LYMPHADENECTOMY, REPAIR VAGINAL TEAR;  Surgeon: Dianne Garland MD;  Location:  OR     DILATION AND CURETTAGE, HYSTEROSCOPY WITH ULTRASOUND GUIDANCE N/A 5/11/2021    Procedure: HYSTEROSCOPY UNDER ULTRASOUND GUIDANCE, DILATION AND CURETTAGE OF UTERUS;  Surgeon: Isabel Ferro MD;  Location:  OR     OPERATIVE HYSTEROSCOPY WITH MORCELLATOR N/A 5/11/2021    Procedure: MYOSURE MORCELLATOR;  Surgeon: Isabel Ferro MD;  Location:  OR     PHACOEMULSIFICATION CLEAR CORNEA WITH STANDARD INTRAOCULAR LENS IMPLANT  3/21/2012    Procedure:PHACOEMULSIFICATION CLEAR CORNEA WITH STANDARD INTRAOCULAR LENS IMPLANT; RIGHT PHACOEMULSIFICATION CLEAR CORNEA WITH STANDARD INTRAOCULAR LENS IMPLANT ; Surgeon:CHANDRAKANT HERNANDEZ; Location:[facility]     PHACOEMULSIFICATION CLEAR CORNEA WITH STANDARD INTRAOCULAR LENS IMPLANT  3/28/2012    Procedure:PHACOEMULSIFICATION CLEAR CORNEA WITH STANDARD INTRAOCULAR LENS IMPLANT; LEFT PHACOEMULSIFICATION CLEAR CORNEA WITH STANDARD INTRAOCULAR LENS IMPLANT ; Surgeon:CHANDRAKANT HERNANDEZ; Location: [facility]     SHOULDER SURGERY  1990's    twice     SOFT TISSUE SURGERY  R&L Rotator cuff repairs     TONSILLECTOMY  child             Social History:     Social History     Socioeconomic History     Marital status: Single     Spouse name: Not on file     Number of children: 0     Years of education: Not on file     Highest education level: Not on file   Occupational  History     Occupation: physical therapy     Employer: RETIRED   Tobacco Use     Smoking status: Former Smoker     Packs/day: 1.50     Years: 12.00     Pack years: 18.00     Types: Cigarettes     Start date: 1966     Quit date: 3/17/1974     Years since quittin.0     Smokeless tobacco: Never Used     Tobacco comment: enjoyed it!   Substance and Sexual Activity     Alcohol use: Yes     Comment: Minimal     Drug use: No     Sexual activity: Not Currently     Partners: Male     Birth control/protection: None   Other Topics Concern     Parent/sibling w/ CABG, MI or angioplasty before 65F 55M? Yes     Comment: Brother  of heart attack at 49 yrs old   Social History Narrative    Single, no kids    Has a cabin    Has godchildren that can help out     Social Determinants of Health     Financial Resource Strain: Not on file   Food Insecurity: Not on file   Transportation Needs: Not on file   Physical Activity: Not on file   Stress: Not on file   Social Connections: Not on file   Intimate Partner Violence: Not on file   Housing Stability: Not on file             Family History:   reviewed         Allergies:   No Known Allergies          Medications:     Current Outpatient Medications   Medication Sig Dispense Refill     hydrochlorothiazide (HYDRODIURIL) 12.5 MG tablet Take 1 tablet (12.5 mg) by mouth daily 90 tablet 3     losartan (COZAAR) 25 MG tablet Take 1 tablet (25 mg) by mouth 2 times daily 180 tablet 3     alendronate (FOSAMAX) 70 MG tablet Take 1 tablet (70 mg) by mouth every 7 days 12 tablet 3     Apoaequorin 10 MG CAPS Take 1 capsule by mouth daily Prevagen       aspirin (ASA) 81 MG chewable tablet Take 1 tablet (81 mg) by mouth daily 100 tablet 4     Calcium Carbonate-Vitamin D (CALCIUM-D PO) Take by mouth daily Liquid  Calcium 1200 mg  & Vit D 800 iU       Coenzyme Q10 (COQ10 PO) Take 100 mg by mouth.       ORDER FOR DME Use your CPAP device as directed by your provider.       rosuvastatin (CRESTOR) 5  "MG tablet TAKE 1 TABLET(5 MG) BY MOUTH DAILY 90 tablet 3     vitamin B-12 (CYANOCOBALAMIN) 1000 MCG tablet Take 1,000 mcg by mouth daily                 Review of Systems:   The 10 point Review of Systems is negative other than noted in the HPI           Physical Exam:   Blood pressure 137/74, pulse 71, resp. rate 16, height 1.727 m (5' 8\"), weight 70.3 kg (155 lb), SpO2 95 %, not currently breastfeeding.    Exam:  Constitutional: healthy appearing, alert and in no distress  Heent: Normocephalic. Head without obvious masses or lesions. PERRLDC, EOMI. Mouth exam within normal limits: tongue, mucous membranes, posterior pharynx all normal, no lesions or abnormalities seen.  Tm's and canals within normal limits bilaterally. Neck supple, no nuchal rigidity or masses. No supraclavicular, or cervical adenopathy. Thyroid symmetric, no masses.  Cardiovascular: Regular rate and rhythm, no murmer, rub or gallops.  JVP not elevated, no edema.  Carotids within normal limits bilaterally, no bruits.  Respiratory: Normal respiratory effort.  Lungs clear, normal flow, no wheezing or crackles.  Gastrointestinal: Normal active bowel sounds.   Soft, not tender, no masses, guarding or rebound.  No hepatosplenomegaly.   Musculoskeletal: extremities normal, no gross deformities noted.  Skin: no suspicious lesions or rashes   Neurologic: Mental status within normal limits.  Speech fluent.  No gross motor abnormalities and gait intact.  Psychiatric: mentation appears normal and affect normal.         Data:   Labs sent        Assessment:   1. Normal complete physical exam  2/. Memory loss, suspect real, check labs and to neuro  3. Fatigue, doubtl pathologic, check labs  4. Sadness, mild depression, she will counseling but if not improving she will let me know.  5.  Sleep apnea, CPAP.  6.  Hypertension, controlled.  7.  Osteoporosis, on medication  8.  Uterine carcinoma, resected.  9.  Hyperlipidemia, follow-up labs, on treatment  10. " "hcm         Plan:   Exercise, diet  Letter with labs  Counseling  Labs and neuro eval  Up to date immunizations   If sadness is not improving soon follow-up with me      Guzman Bruner M.D.                  Are you in the first 12 months of your Medicare coverage?  No    Healthy Habits:     In general, how would you rate your overall health?  Good    Frequency of exercise:  6-7 days/week    Duration of exercise:  Greater than 60 minutes    Do you usually eat at least 4 servings of fruit and vegetables a day, include whole grains    & fiber and avoid regularly eating high fat or \"junk\" foods?  No    Taking medications regularly:  Yes    Medication side effects:  None    Ability to successfully perform activities of daily living:  No assistance needed    Home Safety:  No safety concerns identified    Hearing Impairment:  Difficulty following a conversation in a noisy restaurant or crowded room, feel that people are mumbling or not speaking clearly, need to ask people to speak up or repeat themselves and difficulty understanding soft or whispered speech    In the past 6 months, have you been bothered by leaking of urine? Yes    In general, how would you rate your overall mental or emotional health?  Good      PHQ-2 Total Score: 2    Additional concerns today:  Yes    Do you feel safe in your environment? Yes    Have you ever done Advance Care Planning? (For example, a Health Directive, POLST, or a discussion with a medical provider or your loved ones about your wishes): Yes, advance care planning is on file.       Fall risk  Fallen 2 or more times in the past year?: No  Any fall with injury in the past year?: No    Cognitive Screening   1) Repeat 3 items (Leader, Season, Table)    2) Clock draw: NORMAL  3) 3 item recall: Recalls 3 objects  Results: 3 items recalled: COGNITIVE IMPAIRMENT LESS LIKELY    Mini-CogTM Copyright ALYSSIA Webb. Licensed by the author for use in [facility]; reprinted with permission " "(jeanne@[facility]). All rights reserved.      Do you have sleep apnea, excessive snoring or daytime drowsiness?: yes    Reviewed and updated as needed this visit by clinical staff                  Reviewed and updated as needed this visit by Provider                 Social History     Tobacco Use     Smoking status: Former Smoker     Packs/day: 1.50     Years: 12.00     Pack years: 18.00     Types: Cigarettes     Start date: 1966     Quit date: 3/17/1974     Years since quittin.0     Smokeless tobacco: Never Used     Tobacco comment: enjoyed it!   Substance Use Topics     Alcohol use: Yes     Alcohol/week: 0.0 standard drinks     Comment: 1-2/day     If you drink alcohol do you typically have >3 drinks per day or >7 drinks per week? No              Current providers sharing in care for this patient include:   Patient Care Team:  Guzman Bruner MD as PCP - General (Internal Medicine)  Rhonda Perez MD as Assigned Pulmonology Provider  Katlyn Alarcon MD as Assigned Heart and Vascular Provider  Guzman Bruner MD as Assigned PCP  Mohinder Lyles MD as Assigned Neuroscience Provider    The following health maintenance items are reviewed in Epic and correct as of today:  Health Maintenance Due   Topic Date Due     ANNUAL REVIEW OF HM ORDERS  Never done     PHQ-2 (once per calendar year)  2022     MEDICARE ANNUAL WELLNESS VISIT  2022             Pertinent mammograms are reviewed under the imaging tab.    Review of Systems      OBJECTIVE:   There were no vitals taken for this visit. Estimated body mass index is 22.81 kg/m  as calculated from the following:    Height as of 22: 1.727 m (5' 8\").    Weight as of 22: 68 kg (150 lb).  Physical Exam          ASSESSMENT / PLAN:           COUNSELING:  Reviewed preventive health counseling, as reflected in patient instructions       Regular exercise       Healthy diet/nutrition    Estimated body mass index is 22.81 kg/m  as " "calculated from the following:    Height as of 1/7/22: 1.727 m (5' 8\").    Weight as of 1/7/22: 68 kg (150 lb).        She reports that she quit smoking about 48 years ago. Her smoking use included cigarettes. She started smoking about 56 years ago. She has a 18.00 pack-year smoking history. She has never used smokeless tobacco.      Appropriate preventive services were discussed with this patient, including applicable screening as appropriate for cardiovascular disease, diabetes, osteopenia/osteoporosis, and glaucoma.  As appropriate for age/gender, discussed screening for colorectal cancer, prostate cancer, breast cancer, and cervical cancer. Checklist reviewing preventive services available has been given to the patient.    Reviewed patients plan of care and provided an AVS. The Basic Care Plan (routine screening as documented in Health Maintenance) for Lizzette meets the Care Plan requirement. This Care Plan has been established and reviewed with the Patient.    Counseling Resources:  ATP IV Guidelines  Pooled Cohorts Equation Calculator  Breast Cancer Risk Calculator  Breast Cancer: Medication to Reduce Risk  FRAX Risk Assessment  ICSI Preventive Guidelines  Dietary Guidelines for Americans, 2010  USDA's MyPlate  ASA Prophylaxis  Lung CA Screening    Guzman Bruner MD  [facility]    Identified Health Risks:  "

## 2022-03-17 ENCOUNTER — OFFICE VISIT (OUTPATIENT)
Dept: FAMILY MEDICINE | Facility: CLINIC | Age: 82
End: 2022-03-17
Payer: COMMERCIAL

## 2022-03-17 VITALS
HEART RATE: 71 BPM | SYSTOLIC BLOOD PRESSURE: 137 MMHG | WEIGHT: 155 LBS | DIASTOLIC BLOOD PRESSURE: 74 MMHG | OXYGEN SATURATION: 95 % | BODY MASS INDEX: 23.49 KG/M2 | RESPIRATION RATE: 16 BRPM | HEIGHT: 68 IN

## 2022-03-17 DIAGNOSIS — R45.89 SADNESS: ICD-10-CM

## 2022-03-17 DIAGNOSIS — I10 ESSENTIAL HYPERTENSION: ICD-10-CM

## 2022-03-17 DIAGNOSIS — R41.3 MEMORY LOSS: ICD-10-CM

## 2022-03-17 DIAGNOSIS — G47.33 OSA (OBSTRUCTIVE SLEEP APNEA): ICD-10-CM

## 2022-03-17 DIAGNOSIS — E78.5 HYPERLIPIDEMIA LDL GOAL <130: ICD-10-CM

## 2022-03-17 DIAGNOSIS — C54.1 ADENOCARCINOMA OF ENDOMETRIUM (H): ICD-10-CM

## 2022-03-17 DIAGNOSIS — Z00.00 MEDICARE ANNUAL WELLNESS VISIT, SUBSEQUENT: Primary | ICD-10-CM

## 2022-03-17 DIAGNOSIS — R53.83 FATIGUE, UNSPECIFIED TYPE: ICD-10-CM

## 2022-03-17 DIAGNOSIS — M81.0 OSTEOPOROSIS OF LUMBAR SPINE: ICD-10-CM

## 2022-03-17 LAB
ERYTHROCYTE [DISTWIDTH] IN BLOOD BY AUTOMATED COUNT: 13.3 % (ref 10–15)
FOLATE SERPL-MCNC: 10 NG/ML
HCT VFR BLD AUTO: 38.6 % (ref 35–47)
HGB BLD-MCNC: 12.5 G/DL (ref 11.7–15.7)
MCH RBC QN AUTO: 31.6 PG (ref 26.5–33)
MCHC RBC AUTO-ENTMCNC: 32.4 G/DL (ref 31.5–36.5)
MCV RBC AUTO: 98 FL (ref 78–100)
PLATELET # BLD AUTO: 251 10E3/UL (ref 150–450)
RBC # BLD AUTO: 3.95 10E6/UL (ref 3.8–5.2)
VIT B12 SERPL-MCNC: 1334 PG/ML (ref 193–986)
WBC # BLD AUTO: 7.8 10E3/UL (ref 4–11)

## 2022-03-17 PROCEDURE — 86780 TREPONEMA PALLIDUM: CPT | Performed by: INTERNAL MEDICINE

## 2022-03-17 PROCEDURE — 80053 COMPREHEN METABOLIC PANEL: CPT | Performed by: INTERNAL MEDICINE

## 2022-03-17 PROCEDURE — 82746 ASSAY OF FOLIC ACID SERUM: CPT | Performed by: INTERNAL MEDICINE

## 2022-03-17 PROCEDURE — 99214 OFFICE O/P EST MOD 30 MIN: CPT | Mod: 25 | Performed by: INTERNAL MEDICINE

## 2022-03-17 PROCEDURE — 84443 ASSAY THYROID STIM HORMONE: CPT | Performed by: INTERNAL MEDICINE

## 2022-03-17 PROCEDURE — 85027 COMPLETE CBC AUTOMATED: CPT | Performed by: INTERNAL MEDICINE

## 2022-03-17 PROCEDURE — 82607 VITAMIN B-12: CPT | Performed by: INTERNAL MEDICINE

## 2022-03-17 PROCEDURE — 99397 PER PM REEVAL EST PAT 65+ YR: CPT | Performed by: INTERNAL MEDICINE

## 2022-03-17 PROCEDURE — 80061 LIPID PANEL: CPT | Performed by: INTERNAL MEDICINE

## 2022-03-17 PROCEDURE — 82306 VITAMIN D 25 HYDROXY: CPT | Performed by: INTERNAL MEDICINE

## 2022-03-17 PROCEDURE — 36415 COLL VENOUS BLD VENIPUNCTURE: CPT | Performed by: INTERNAL MEDICINE

## 2022-03-17 RX ORDER — HYDROCHLOROTHIAZIDE 12.5 MG/1
12.5 TABLET ORAL DAILY
Qty: 90 TABLET | Refills: 3 | Status: SHIPPED | OUTPATIENT
Start: 2022-03-17 | End: 2022-08-29

## 2022-03-17 RX ORDER — LOSARTAN POTASSIUM 25 MG/1
25 TABLET ORAL 2 TIMES DAILY
Qty: 180 TABLET | Refills: 3 | Status: SHIPPED | OUTPATIENT
Start: 2022-03-17 | End: 2023-03-17

## 2022-03-17 ASSESSMENT — ENCOUNTER SYMPTOMS
JOINT SWELLING: 0
HEADACHES: 0
CONSTIPATION: 0
CHILLS: 0
NAUSEA: 0
NERVOUS/ANXIOUS: 0
BREAST MASS: 0
SHORTNESS OF BREATH: 0
HEARTBURN: 0
COUGH: 0
DYSURIA: 0
HEMATURIA: 0
ABDOMINAL PAIN: 0
WEAKNESS: 1
DIARRHEA: 0
PARESTHESIAS: 0
DIZZINESS: 0
EYE PAIN: 0
SORE THROAT: 0
PALPITATIONS: 0
FREQUENCY: 0
ARTHRALGIAS: 0
FEVER: 0
HEMATOCHEZIA: 0
MYALGIAS: 0

## 2022-03-17 ASSESSMENT — PAIN SCALES - GENERAL: PAINLEVEL: NO PAIN (0)

## 2022-03-17 ASSESSMENT — ACTIVITIES OF DAILY LIVING (ADL): CURRENT_FUNCTION: NO ASSISTANCE NEEDED

## 2022-03-17 NOTE — LETTER
March 18, 2022      Jessika A Darío  [address] [facility] [address]  [place] [address]        Dear ,    We are writing to inform you of your test results.    I am happy to report that your cbc or complete blood count is normal with no signs of anemia, leukemia or platelet abnormalities. Your chemistry panel shows no signs of diabetes.  Your blood salts, kidney tests, liver tests, thyroid test, folic acid, vitamin D level, b12 level, and proteins are all fine.     Your total cholesterol is 176 with the normal range being below 200.  Your HDL or good cholesterol is 69 with the normal range being above 50.  Your LDL or bad cholesterol is 73 with the normal range being below 130.  These numbers are very good.     I am happy to bring you this overall excellent report.  Please see neurology for the memory.       Resulted Orders   CBC with platelets   Result Value Ref Range    WBC Count 7.8 4.0 - 11.0 10e3/uL    RBC Count 3.95 3.80 - 5.20 10e6/uL    Hemoglobin 12.5 11.7 - 15.7 g/dL    Hematocrit 38.6 35.0 - 47.0 %    MCV 98 78 - 100 fL    MCH 31.6 26.5 - 33.0 pg    MCHC 32.4 31.5 - 36.5 g/dL    RDW 13.3 10.0 - 15.0 %    Platelet Count 251 150 - 450 10e3/uL   Comprehensive metabolic panel   Result Value Ref Range    Sodium 139 133 - 144 mmol/L    Potassium 4.1 3.4 - 5.3 mmol/L    Chloride 105 94 - 109 mmol/L    Carbon Dioxide (CO2) 25 20 - 32 mmol/L    Anion Gap 9 3 - 14 mmol/L    Urea Nitrogen 21 7 - 30 mg/dL    Creatinine 0.90 0.52 - 1.04 mg/dL    Calcium 9.5 8.5 - 10.1 mg/dL    Glucose 97 70 - 99 mg/dL    Alkaline Phosphatase 85 40 - 150 U/L    AST 22 0 - 45 U/L    ALT 26 0 - 50 U/L    Protein Total 7.5 6.8 - 8.8 g/dL    Albumin 3.6 3.4 - 5.0 g/dL    Bilirubin Total 0.4 0.2 - 1.3 mg/dL    GFR Estimate 63 >60 mL/min/1.73m2      Comment:      Effective December 21, 2021 eGFRcr in adults is calculated using the 2021 CKD-EPI creatinine equation which includes age and gender (Lyndsey et al., NEJM, DOI:  10.1056/NFVXxu2521086)   Lipid panel reflex to direct LDL Fasting   Result Value Ref Range    Cholesterol 176 <200 mg/dL    Triglycerides 170 (H) <150 mg/dL    Direct Measure HDL 69 >=50 mg/dL    LDL Cholesterol Calculated 73 <=100 mg/dL    Non HDL Cholesterol 107 <130 mg/dL    Patient Fasting > 8hrs? No     Narrative    Cholesterol  Desirable:  <200 mg/dL    Triglycerides  Normal:  Less than 150 mg/dL  Borderline High:  150-199 mg/dL  High:  200-499 mg/dL  Very High:  Greater than or equal to 500 mg/dL    Direct Measure HDL  Female:  Greater than or equal to 50 mg/dL   Male:  Greater than or equal to 40 mg/dL    LDL Cholesterol  Desirable:  <100mg/dL  Above Desirable:  100-129 mg/dL   Borderline High:  130-159 mg/dL   High:  160-189 mg/dL   Very High:  >= 190 mg/dL    Non HDL Cholesterol  Desirable:  130 mg/dL  Above Desirable:  130-159 mg/dL  Borderline High:  160-189 mg/dL  High:  190-219 mg/dL  Very High:  Greater than or equal to 220 mg/dL   TSH with free T4 reflex   Result Value Ref Range    TSH 1.61 0.40 - 4.00 mU/L   Vitamin B12   Result Value Ref Range    Vitamin B12 1,334 (H) 193 - 986 pg/mL   Folate   Result Value Ref Range    Folic Acid 10.0 >=5.4 ng/mL      Comment:      Deficient: <3.4 ng/mL  Indeterminate: 3.4-5.4 ng/mL  Normal: > 5.4 ng/mL   Treponema Abs w Reflex to RPR and Titer   Result Value Ref Range    Treponema Antibody Total Nonreactive Nonreactive   Vitamin D Deficiency   Result Value Ref Range    Vitamin D, Total (25-Hydroxy) 66 20 - 75 ug/L    Narrative    Season, race, dietary intake, and treatment affect the concentration of 25-hydroxy-Vitamin D. Values may decrease during winter months and increase during summer months. Values 20-29 ug/L may indicate Vitamin D insufficiency and values <20 ug/L may indicate Vitamin D deficiency.    Vitamin D determination is routinely performed by an immunoassay specific for 25 hydroxyvitamin D3.  If an individual is on vitamin D2(ergocalciferol)  supplementation, please specify 25 OH vitamin D2 and D3 level determination by LCMSMS test [id number].         If you have any questions or concerns, please call the clinic at the number listed above.       Sincerely,      Guzman Bruner MD

## 2022-03-18 LAB
ALBUMIN SERPL-MCNC: 3.6 G/DL (ref 3.4–5)
ALP SERPL-CCNC: 85 U/L (ref 40–150)
ALT SERPL W P-5'-P-CCNC: 26 U/L (ref 0–50)
ANION GAP SERPL CALCULATED.3IONS-SCNC: 9 MMOL/L (ref 3–14)
AST SERPL W P-5'-P-CCNC: 22 U/L (ref 0–45)
BILIRUB SERPL-MCNC: 0.4 MG/DL (ref 0.2–1.3)
BUN SERPL-MCNC: 21 MG/DL (ref 7–30)
CALCIUM SERPL-MCNC: 9.5 MG/DL (ref 8.5–10.1)
CHLORIDE BLD-SCNC: 105 MMOL/L (ref 94–109)
CHOLEST SERPL-MCNC: 176 MG/DL
CO2 SERPL-SCNC: 25 MMOL/L (ref 20–32)
CREAT SERPL-MCNC: 0.9 MG/DL (ref 0.52–1.04)
DEPRECATED CALCIDIOL+CALCIFEROL SERPL-MC: 66 UG/L (ref 20–75)
FASTING STATUS PATIENT QL REPORTED: NO
GFR SERPL CREATININE-BSD FRML MDRD: 63 ML/MIN/1.73M2
GLUCOSE BLD-MCNC: 97 MG/DL (ref 70–99)
HDLC SERPL-MCNC: 69 MG/DL
LDLC SERPL CALC-MCNC: 73 MG/DL
NONHDLC SERPL-MCNC: 107 MG/DL
POTASSIUM BLD-SCNC: 4.1 MMOL/L (ref 3.4–5.3)
PROT SERPL-MCNC: 7.5 G/DL (ref 6.8–8.8)
SODIUM SERPL-SCNC: 139 MMOL/L (ref 133–144)
T PALLIDUM AB SER QL: NONREACTIVE
TRIGL SERPL-MCNC: 170 MG/DL
TSH SERPL DL<=0.005 MIU/L-ACNC: 1.61 MU/L (ref 0.4–4)

## 2022-03-18 NOTE — RESULT ENCOUNTER NOTE
It was a pleasure seeing you for your physical examination.  I wanted to get back to you with your test results.  I have enclosed a copy for your review.     I am happy to report that your cbc or complete blood count is normal with no signs of anemia, leukemia or platelet abnormalities. Your chemistry panel shows no signs of diabetes.  Your blood salts, kidney tests, liver tests, thyroid test, folic acid, vitamin D level, b12 level, and proteins are all fine.    Your total cholesterol is 176 with the normal range being below 200.  Your HDL or good cholesterol is 69 with the normal range being above 50.  Your LDL or bad cholesterol is 73 with the normal range being below 130.  These numbers are very good.    I am happy to bring you this overall excellent report.  Please see neurology for the memory.  If you have any questions please call me.    Guzman

## 2022-03-21 ENCOUNTER — TELEPHONE (OUTPATIENT)
Dept: FAMILY MEDICINE | Facility: CLINIC | Age: 82
End: 2022-03-21
Payer: COMMERCIAL

## 2022-03-21 DIAGNOSIS — H91.93 HEARING PROBLEM OF BOTH EARS: Primary | ICD-10-CM

## 2022-03-21 NOTE — TELEPHONE ENCOUNTER
Pt called requesting order on epic for an audiology evaluation. Has ongoing problems hearing both ears. She has scheduled future appt with Yumi Joyce 04/13. Audiology referral pended. Please review and sign if agree with order.

## 2022-03-22 NOTE — TELEPHONE ENCOUNTER
"LVM notifying pt \"the referral you requested has been signed\"     Xochitl AHN, Triage RN  [facility]     "

## 2022-03-30 ENCOUNTER — TRANSFERRED RECORDS (OUTPATIENT)
Dept: HEALTH INFORMATION MANAGEMENT | Facility: CLINIC | Age: 82
End: 2022-03-30
Payer: COMMERCIAL

## 2022-04-13 ENCOUNTER — OFFICE VISIT (OUTPATIENT)
Dept: AUDIOLOGY | Facility: CLINIC | Age: 82
End: 2022-04-13
Payer: COMMERCIAL

## 2022-04-13 DIAGNOSIS — H91.93 HEARING PROBLEM OF BOTH EARS: ICD-10-CM

## 2022-04-13 DIAGNOSIS — H90.3 SENSORINEURAL HEARING LOSS, BILATERAL: Primary | ICD-10-CM

## 2022-04-13 PROCEDURE — 92557 COMPREHENSIVE HEARING TEST: CPT | Performed by: AUDIOLOGIST

## 2022-04-13 PROCEDURE — 92550 TYMPANOMETRY & REFLEX THRESH: CPT | Performed by: AUDIOLOGIST

## 2022-04-13 NOTE — PROGRESS NOTES
AUDIOLOGY REPORT    SUBJECTIVE:  Lizzette Chanel is a 81 year old female who was seen in the Audiology Clinic at the [facility] for audiologic evaluation, referred by Guzman Bruner M.D. The patient reports a gradual decrease in hearing status bilaterally.  She also reports bilateral constant tinnitus that is not bothersome. The patient denies  bilateral otalgia, bilateral drainage, bilateral aural fullness and dizziness. No history of ear surgeries were reported.  The patient notes difficulty with communication in a variety of listening situations, specifically in background noise as she reports people sound muffled.    OBJECTIVE:  Fall Risk Screen:  1. Have you fallen two or more times in the past year? No  2. Have you fallen and had an injury in the past year? No    Timed Up and Go Score (in seconds): not tested  Is patient a fall risk? No  Referral initiated: No  Fall Risk Assessment Completed by Audiology    Otoscopic exam indicates ears are clear of cerumen bilaterally     Pure Tone Thresholds assessed using conventional audiometry with good  reliability from 250-8000 Hz bilaterally using insert earphones and circumaural headphones     RIGHT:  mild sloping to moderate-severe sensorineural hearing loss    LEFT:    borderline-normal sloping to moderate-severe sensorineural hearing loss    Tympanogram:    RIGHT: hypermobile    LEFT:   normal eardrum mobility    Reflexes (reported by stimulus ear):  RIGHT: Ipsilateral is present at normal levels  RIGHT: Contralateral is present at elevated levels  LEFT:   Ipsilateral is present at normal levels  LEFT:   Contralateral is absent at frequencies tested    Speech Reception Threshold:    RIGHT: 35 dB HL    LEFT:   30 dB HL    Word Recognition Score:     RIGHT: 100% at 70 dB HL using NU-6 recorded word list.    LEFT:   100% at 70 dB HL using NU-6 recorded word list.      ASSESSMENT:     Today's results revealed a bilateral  sensorineural hearing loss. Today s results were discussed with the patient in detail.     PLAN:  Patient was counseled regarding hearing loss and impact on communication.  Patient is a good candidate for amplification at this time. It is recommended that the patient monitor her hearing status every 1-2 years, sooner if concerns arise.  A trial with amplification is recommended, the process of obtaining hearing aids was reviewed with the patient. She will contact her insurance to see if there is a hearing aid benefit prior to scheduling anything. She was provided a copy of her hearing test today.  Please call this clinic with questions regarding these results or recommendations.        Maddison Sumner  Audiologist  [place] License  #[id number]

## 2022-05-12 ENCOUNTER — TRANSFERRED RECORDS (OUTPATIENT)
Dept: HEALTH INFORMATION MANAGEMENT | Facility: CLINIC | Age: 82
End: 2022-05-12
Payer: COMMERCIAL

## 2022-05-18 ENCOUNTER — TRANSFERRED RECORDS (OUTPATIENT)
Dept: HEALTH INFORMATION MANAGEMENT | Facility: CLINIC | Age: 82
End: 2022-05-18
Payer: COMMERCIAL

## 2022-05-26 ENCOUNTER — TRANSFERRED RECORDS (OUTPATIENT)
Dept: FAMILY MEDICINE | Facility: CLINIC | Age: 82
End: 2022-05-26

## 2022-05-27 ENCOUNTER — TRANSFERRED RECORDS (OUTPATIENT)
Dept: HEALTH INFORMATION MANAGEMENT | Facility: CLINIC | Age: 82
End: 2022-05-27
Payer: COMMERCIAL

## 2022-06-02 ENCOUNTER — OFFICE VISIT (OUTPATIENT)
Dept: NEUROLOGY | Facility: CLINIC | Age: 82
End: 2022-06-02
Payer: COMMERCIAL

## 2022-06-02 ENCOUNTER — TRANSFERRED RECORDS (OUTPATIENT)
Dept: HEALTH INFORMATION MANAGEMENT | Facility: CLINIC | Age: 82
End: 2022-06-02

## 2022-06-02 VITALS
BODY MASS INDEX: 23.49 KG/M2 | HEIGHT: 68 IN | HEART RATE: 65 BPM | DIASTOLIC BLOOD PRESSURE: 69 MMHG | OXYGEN SATURATION: 99 % | WEIGHT: 155 LBS | SYSTOLIC BLOOD PRESSURE: 143 MMHG

## 2022-06-02 DIAGNOSIS — G14 POST-POLIO SYNDROME (H): ICD-10-CM

## 2022-06-02 DIAGNOSIS — R41.3 MEMORY LOSS: ICD-10-CM

## 2022-06-02 DIAGNOSIS — G60.9 IDIOPATHIC PERIPHERAL NEUROPATHY: Primary | ICD-10-CM

## 2022-06-02 DIAGNOSIS — M48.02 CERVICAL STENOSIS OF SPINAL CANAL: ICD-10-CM

## 2022-06-02 PROCEDURE — 99215 OFFICE O/P EST HI 40 MIN: CPT | Performed by: PSYCHIATRY & NEUROLOGY

## 2022-06-02 NOTE — NURSING NOTE
"Lizzette Chanel is a 81 year old female who presents for:  Chief Complaint   Patient presents with     Follow Up     Post-polio syndrome follow up. Consult for memory and fatigue.        Initial Vitals:  BP (!) 143/69   Pulse 65   Ht 1.727 m (5' 8\")   Wt 70.3 kg (155 lb)   SpO2 99%   BMI 23.57 kg/m   Estimated body mass index is 23.57 kg/m  as calculated from the following:    Height as of this encounter: 1.727 m (5' 8\").    Weight as of this encounter: 70.3 kg (155 lb).. Body surface area is 1.84 meters squared. BP completed using cuff size: regular    Nursing Comments:     Arabella Kinsey    "

## 2022-06-02 NOTE — LETTER
6/2/2022         RE: Lizzette Chanel  [address]  [place] [address]        Dear Colleague,    Thank you for referring your patient, Lizzette Chanel, to the [facility]UR[facility]. Please see a copy of my visit note below.        [facility]    Lizzette Chanel MRN# [id number]   Age: 81 year old YOB: 1940     Requesting physician: No ref. provider found  Guzman Bruner            Assessment and Plan:   Assessment:  1.  Memory difficulties  2.  Post polio syndrome - stable  3.  Gait difficulty and loss of stamina - possibly related to her peripheral neuropathy but there may have been some progression of her cervical spinal stenosis identified by changes on her exam.     Plan:  1.  Will image the brain and perform Neuropsychometric testing to determine origin and extent of memory issues  2.  Will re-image her cervical spine to review the spinal stenosis component  3.  No changes need be made regarding her post-polio syndrome or neuropathy other than using her assistive devices for walking to prevent falls.    Given the changes in her memory we will review this further with neuropsychometric testing and MR brain imaging.  At the moment I suspect she is experiencing deficits consistent with mild cognitive impairment but will review this further with the studies noted above.  Will be reconvening to discuss these findings on July 7.    We will also be obtaining an updated image of her cervical spine to see if the stenosis has progressed to the extent that there is myelopathy causing her worsening stamina, balance, and new upgoing toe.             History of Present Illness:   CC:  I met with Jessika today for nearly 1 hour.  She was accompanied by her friend Funmilayo Kruger who assisted in the interview and information process.    I have followed Jessika for what turned out to be a post polio syndrome.  She initially presented with upper extremity weakness and  fatigue.  At that time clinical testing, EMG on cervical MRI imaging was performed.  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  With simple reduction in her overuse phenomena, that has remained stable over the years.  She continues to find it quite limiting in terms of the amount of physical activity she was used to doing but has been managing reasonably well with it.  At the same time we did that testing, a longstanding primary sensory peripheral neuropathy was identified as well interfering with her ability to feel the ground and thus creating some difficulty with gait and balance.  I did not feel that the cervical spinal stenosis was of sufficient nature at that time to cause any significant myelopathy and did not feel that it was contributing to her symptoms.    She has been stable and in fact when I visited with her last September her clinical examination had not changed at all.  She presents today however having been recognized as having some difficulties with recent memory by her own observation as well as by her friends.  She has difficulty locating her phone, has gotten lost while walking on 1 occasion, and there have been some conversational issues identified by her friends and corroborated by her guest today.  She also feels somewhat increasingly restricted and that her stamina is worse and also believes that her balance has taken a turn for the worse as well.  She denies any other neurological changes.    The balance of her neurologic review of systems is nonremarkable for headaches, vertigo, diplopia, dysarthria, dysphagia, focal weakness, loss of consciousness or seizures.    She remains active and independent [place] concerns  pertaining to her balance have prompted her friends to supervise her when she is out of town.  She has recently experienced episode of significant lumbar radiculopathy from the lumbar spine rating into the left lower extremity which has been significantly improved with an oral course of prednisone.               Physical Exam:   Her current neurological examination reflects abnormalities in the following areas:    Her mental status is somewhat impaired recalling only 1 of 3 objects at 5 minutes.  She is able to recount a certain amount of current events however has gaps in her knowledge base despite recognition of the news items.  The balance of her mental status exam discloses intact speech and language comprehension expression and repetition as well as past memory events and global cognitive function.    Her motor exam continues to reflect some weakness in the deltoids but this time also weakness in the right tricep for the first time.  Her distal strength is quite normal in both the upper and lower extremities although she has some left ulnar distribution atrophy and weakness as residual of her polio.    Her reflexes remained diminished but symmetrical at the biceps brachioradialis knees, absent at the ankles but she has an upgoing right toe sign today for the first time.    Her sensory exam is unchanged continue to reflect some distal sensory loss.  Coordination is preserved.  Her gait is a little broad-based and she is more unsteady with her feet together eyes open and eyes closed.  There are no abnormalities identified in her cranial nerve exam.         Data:   All laboratory data reviewed  All imaging studies reviewed by me             DATA for DOCUMENTATION:         Past Medical History:     Patient Active Problem List   Diagnosis     Peripheral neuropathy     Hx of colonoscopy     Hyperlipidemia LDL goal <130     CAITLYN (obstructive sleep apnea)  CPAP pressures set at:  LPL of 5 and UPL of 14     Osteopenia      Advanced directives, counseling/discussion     Spinal stenosis in cervical region     Post-polio syndrome     Adenocarcinoma of endometrium (H)     Fatigue     Essential hypertension     Osteoporosis of lumbar spine     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    had vaginal bleeding, surgery done 2021     Arthritis mild in knees, shoulders     Cancer (H) skin--Basil Cell, [place]     Chest pain 2003    neg est thallium     Chest pain 04/2016    nl est echo and cxr     Complication of anesthesia     BP maddie to over 200 in PACU 1 month ago-after D&C     Essential hypertension 07/2021     Fatigue 08/25/2021     Hx of colonoscopy 2003    incomplete, be nl     Hx of colonoscopy 06/03/2011    nl     Hypercholesteremia 2011    aches with zocor     CAITLYN (obstructive sleep apnea) 12/2012    done [place], using dental device, added cpap 2014      Osteopenia 2013    Dr. Taylor     Osteoporosis of lumbar spine 07/2020    added fosamax     Peripheral neuropathy     Dr. 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       Past Surgical History:     Past Surgical History:   Procedure Laterality Date     ABDOMEN SURGERY  1960     APPENDECTOMY  60's     BIOPSY  2010    inside upper lip     DAVINCI HYSTERECTOMY TOTAL, BILATERAL SALPINGO-OOPHORECTOMY, NODE DISSECTION, COMBINED Bilateral 7/8/2021    Procedure: ROBOTIC ASSISTED TOTAL LAPAROSCOPIC HYSTERECTOMY, BILATERAL SALPINGO-OOPHORECTOMY, WASHINGS, INTRA-OPERATIVE SENTINEL LYMPH NODE MAPPING,  BILATERAL PELVIC AND PARA-AORTIC LYMPHADENECTOMY, REPAIR VAGINAL TEAR;  Surgeon: Dianne Garland MD;  Location:  OR     DILATION AND CURETTAGE, HYSTEROSCOPY WITH ULTRASOUND GUIDANCE N/A 2021    Procedure: HYSTEROSCOPY UNDER ULTRASOUND GUIDANCE, DILATION AND CURETTAGE OF UTERUS;  Surgeon: Isabel Ferro MD;  Location:  OR     OPERATIVE HYSTEROSCOPY WITH MORCELLATOR N/A 2021    Procedure: MYOSURE MORCELLATOR;  Surgeon: Isabel Ferro MD;  Location:  OR     PHACOEMULSIFICATION CLEAR CORNEA WITH STANDARD INTRAOCULAR LENS IMPLANT  3/21/2012    Procedure:PHACOEMULSIFICATION CLEAR CORNEA WITH STANDARD INTRAOCULAR LENS IMPLANT; RIGHT PHACOEMULSIFICATION CLEAR CORNEA WITH STANDARD INTRAOCULAR LENS IMPLANT ; Surgeon:CHANDRAKANT HERNANDEZ; Location: [facility]     PHACOEMULSIFICATION CLEAR CORNEA WITH STANDARD INTRAOCULAR LENS IMPLANT  3/28/2012    Procedure:PHACOEMULSIFICATION CLEAR CORNEA WITH STANDARD INTRAOCULAR LENS IMPLANT; LEFT PHACOEMULSIFICATION CLEAR CORNEA WITH STANDARD INTRAOCULAR LENS IMPLANT ; Surgeon:CHANDRAKANT HERNANDEZ; Location: [facility]     SHOULDER SURGERY      twice     SOFT TISSUE SURGERY  R&L Rotator cuff repairs     TONSILLECTOMY  child            Social History:     Social History     Socioeconomic History     Marital status: Single     Spouse name: Not on file     Number of children: 0     Years of education: Not on file     Highest education level: Not on file   Occupational History     Occupation: physical therapy     Employer: RETIRED   Tobacco Use     Smoking status: Former Smoker     Packs/day: 1.50     Years: 12.00     Pack years: 18.00     Types: Cigarettes     Start date: 1966     Quit date: 3/17/1974     Years since quittin.2     Smokeless tobacco: Never Used     Tobacco comment: enjoyed it!   Substance and Sexual Activity     Alcohol use: Yes     Comment: Minimal     Drug use: No     Sexual activity: Not Currently     Partners: Male     Birth  control/protection: None   Other Topics Concern     Parent/sibling w/ CABG, MI or angioplasty before 65F 55M? Yes     Comment: Brother  of heart attack at 49 yrs old   Social History Narrative    Single, no kids    Has a cabin    Has godchildren that can help out     Social Determinants of Health     Financial Resource Strain: Not on file   Food Insecurity: Not on file   Transportation Needs: Not on file   Physical Activity: Not on file   Stress: Not on file   Social Connections: Not on file   Intimate Partner Violence: Not on file   Housing Stability: Not on file              Family History:     Family History   Problem Relation Age of Onset     C.A.D. Father      Heart Disease Father      Heart Disease Mother 97     Alzheimer Disease Mother      Cerebrovascular Disease Mother         mild. Still was very functional     Substance Abuse Mother         Significant treatment over 15 yrs but ended with very good results     Osteoporosis Mother      C.A.D. Brother      Asthma Brother             Medications:     Current Outpatient Medications   Medication Sig     alendronate (FOSAMAX) 70 MG tablet Take 1 tablet (70 mg) by mouth every 7 days     Apoaequorin 10 MG CAPS Take 1 capsule by mouth daily Prevagen     aspirin (ASA) 81 MG chewable tablet Take 1 tablet (81 mg) by mouth daily     Calcium Carbonate-Vitamin D (CALCIUM-D PO) Take by mouth daily Liquid  Calcium 1200 mg  & Vit D 800 iU     Coenzyme Q10 (COQ10 PO) Take 100 mg by mouth.     hydrochlorothiazide (HYDRODIURIL) 12.5 MG tablet Take 1 tablet (12.5 mg) by mouth daily     losartan (COZAAR) 25 MG tablet Take 1 tablet (25 mg) by mouth 2 times daily     ORDER FOR DME Use your CPAP device as directed by your provider.     rosuvastatin (CRESTOR) 5 MG tablet TAKE 1 TABLET(5 MG) BY MOUTH DAILY     vitamin B-12 (CYANOCOBALAMIN) 1000 MCG tablet Take 1,000 mcg by mouth daily     No current facility-administered medications for this visit.              Review of  Systems:   A comprehensive 10 point review of systems (constitutional, ENT, cardiac, peripheral vascular, lymphatic, respiratory, GI, , Musculoskeletal, skin, Neurological) was performed and found to be negative except as described in this note.     See intake form completed by patient        Again, thank you for allowing me to participate in the care of your patient.        Sincerely,        Mohinder Lyles MD, MD

## 2022-06-02 NOTE — PROGRESS NOTES
[facility]    Lizzette Chanel MRN# [id number]   Age: 81 year old YOB: 1940     Requesting physician: No ref. provider found  Guzman Bruner            Assessment and Plan:   Assessment:  1.  Memory difficulties  2.  Post polio syndrome - stable  3.  Gait difficulty and loss of stamina - possibly related to her peripheral neuropathy but there may have been some progression of her cervical spinal stenosis identified by changes on her exam.     Plan:  1.  Will image the brain and perform Neuropsychometric testing to determine origin and extent of memory issues  2.  Will re-image her cervical spine to review the spinal stenosis component  3.  No changes need be made regarding her post-polio syndrome or neuropathy other than using her assistive devices for walking to prevent falls.    Given the changes in her memory we will review this further with neuropsychometric testing and MR brain imaging.  At the moment I suspect she is experiencing deficits consistent with mild cognitive impairment but will review this further with the studies noted above.  Will be reconvening to discuss these findings on July 7.    We will also be obtaining an updated image of her cervical spine to see if the stenosis has progressed to the extent that there is myelopathy causing her worsening stamina, balance, and new upgoing toe.             History of Present Illness:   CC:  I met with Jessika today for nearly 1 hour.  She was accompanied by her friend Funmilayo Kruger who assisted in the interview and information process.    I have followed Jessika for what turned out to be a post polio syndrome.  She initially presented with upper extremity weakness and fatigue.  At that time clinical testing, EMG on cervical MRI imaging was performed.  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  With simple reduction in her overuse phenomena, that has remained stable over the years.  She continues to find it quite limiting in terms of the amount of physical activity she was used to doing but has been managing reasonably well with it.  At the same time we did that testing, a longstanding primary sensory peripheral neuropathy was identified as well interfering with her ability to feel the ground and thus creating some difficulty with gait and balance.  I did not feel that the cervical spinal stenosis was of sufficient nature at that time to cause any significant myelopathy and did not feel that it was contributing to her symptoms.    She has been stable and in fact when I visited with her last September her clinical examination had not changed at all.  She presents today however having been recognized as having some difficulties with recent memory by her own observation as well as by her friends.  She has difficulty locating her phone, has gotten lost while walking on 1 occasion, and there have been some conversational issues identified by her friends and corroborated by her guest today.  She also feels somewhat increasingly restricted and that her stamina is worse and also believes that her balance has taken a turn for the worse as well.  She denies any other neurological changes.    The balance of her neurologic review of systems is nonremarkable for headaches, vertigo, diplopia, dysarthria, dysphagia, focal weakness, loss of consciousness or seizures.    [place] remains active and independent [place] concerns pertaining to her balance have prompted her friends to supervise her when she is out of town.  She has recently experienced episode of significant lumbar radiculopathy from the lumbar spine rating into the left lower extremity which has been significantly improved  with an oral course of prednisone.               Physical Exam:   Her current neurological examination reflects abnormalities in the following areas:    Her mental status is somewhat impaired recalling only 1 of 3 objects at 5 minutes.  She is able to recount a certain amount of current events however has gaps in her knowledge base despite recognition of the news items.  The balance of her mental status exam discloses intact speech and language comprehension expression and repetition as well as past memory events and global cognitive function.    Her motor exam continues to reflect some weakness in the deltoids but this time also weakness in the right tricep for the first time.  Her distal strength is quite normal in both the upper and lower extremities although she has some left ulnar distribution atrophy and weakness as residual of her polio.    Her reflexes remained diminished but symmetrical at the biceps brachioradialis knees, absent at the ankles but she has an upgoing right toe sign today for the first time.    Her sensory exam is unchanged continue to reflect some distal sensory loss.  Coordination is preserved.  Her gait is a little broad-based and she is more unsteady with her feet together eyes open and eyes closed.  There are no abnormalities identified in her cranial nerve exam.         Data:   All laboratory data reviewed  All imaging studies reviewed by me             DATA for DOCUMENTATION:         Past Medical History:     Patient Active Problem List   Diagnosis     Peripheral neuropathy     Hx of colonoscopy     Hyperlipidemia LDL goal <130     CAITLYN (obstructive sleep apnea)  CPAP pressures set at:  LPL of 5 and UPL of 14     Osteopenia     Advanced directives, counseling/discussion     Spinal stenosis in cervical region     Post-polio syndrome     Adenocarcinoma of endometrium (H)     Fatigue     Essential hypertension     Osteoporosis of lumbar spine     Past Medical History:   Diagnosis Date      Abdominal pain 2011    ct abd and pelvis uterine fiborid, renal cysts and tics     Adenocarcinoma of endometrium (H) 05/2021    had vaginal bleeding, surgery done 2021     Arthritis mild in knees, shoulders     Cancer (H) skin--Basil Cell, [place]     Chest pain 2003    neg est thallium     Chest pain 04/2016    nl est echo and cxr     Complication of anesthesia     BP maddie to over 200 in PACU 1 month ago-after D&C     Essential hypertension 07/2021     Fatigue 08/25/2021     Hx of colonoscopy 2003    incomplete, be nl     Hx of colonoscopy 06/03/2011    nl     Hypercholesteremia 2011    aches with zocor     CAITLYN (obstructive sleep apnea) 12/2012    done [place], using dental device, added cpap 2014      Osteopenia 2013    Dr. Taylor     Osteoporosis of lumbar spine 07/2020    added fosamax     Peripheral neuropathy     Dr. 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       Past Surgical History:     Past Surgical History:   Procedure Laterality Date     ABDOMEN SURGERY  1960     APPENDECTOMY  60's     BIOPSY  2010    inside upper lip     DAVINCI HYSTERECTOMY TOTAL, BILATERAL SALPINGO-OOPHORECTOMY, NODE DISSECTION, COMBINED Bilateral 7/8/2021    Procedure: ROBOTIC ASSISTED TOTAL LAPAROSCOPIC HYSTERECTOMY, BILATERAL SALPINGO-OOPHORECTOMY, WASHINGS, INTRA-OPERATIVE SENTINEL LYMPH NODE MAPPING, BILATERAL PELVIC AND PARA-AORTIC LYMPHADENECTOMY, REPAIR VAGINAL TEAR;  Surgeon: Dianne Garland MD;  Location: [facility] OR     DILATION AND CURETTAGE, HYSTEROSCOPY WITH ULTRASOUND GUIDANCE N/A 5/11/2021    Procedure: HYSTEROSCOPY UNDER ULTRASOUND GUIDANCE, DILATION AND  CURETTAGE OF UTERUS;  Surgeon: Isabel Ferro MD;  Location:  OR     OPERATIVE HYSTEROSCOPY WITH MORCELLATOR N/A 2021    Procedure: MYOSURE MORCELLATOR;  Surgeon: Isabel Ferro MD;  Location:  OR     PHACOEMULSIFICATION CLEAR CORNEA WITH STANDARD INTRAOCULAR LENS IMPLANT  3/21/2012    Procedure:PHACOEMULSIFICATION CLEAR CORNEA WITH STANDARD INTRAOCULAR LENS IMPLANT; RIGHT PHACOEMULSIFICATION CLEAR CORNEA WITH STANDARD INTRAOCULAR LENS IMPLANT ; Surgeon:CHANDRAKANT HERNANDEZ; Location: [facility]     PHACOEMULSIFICATION CLEAR CORNEA WITH STANDARD INTRAOCULAR LENS IMPLANT  3/28/2012    Procedure:PHACOEMULSIFICATION CLEAR CORNEA WITH STANDARD INTRAOCULAR LENS IMPLANT; LEFT PHACOEMULSIFICATION CLEAR CORNEA WITH STANDARD INTRAOCULAR LENS IMPLANT ; Surgeon:CHANDRAKANT HERNANDEZ; Location: [facility]     SHOULDER SURGERY      twice     SOFT TISSUE SURGERY  R&L Rotator cuff repairs     TONSILLECTOMY  child            Social History:     Social History     Socioeconomic History     Marital status: Single     Spouse name: Not on file     Number of children: 0     Years of education: Not on file     Highest education level: Not on file   Occupational History     Occupation: physical therapy     Employer: RETIRED   Tobacco Use     Smoking status: Former Smoker     Packs/day: 1.50     Years: 12.00     Pack years: 18.00     Types: Cigarettes     Start date: 1966     Quit date: 3/17/1974     Years since quittin.2     Smokeless tobacco: Never Used     Tobacco comment: enjoyed it!   Substance and Sexual Activity     Alcohol use: Yes     Comment: Minimal     Drug use: No     Sexual activity: Not Currently     Partners: Male     Birth control/protection: None   Other Topics Concern     Parent/sibling w/ CABG, MI or angioplasty before 65F 55M? Yes     Comment: Brother  of heart attack at 49 yrs old   Social History Narrative    Single, no kids    Has a cabin    Has godchildren that can help out     Social  Determinants of Health     Financial Resource Strain: Not on file   Food Insecurity: Not on file   Transportation Needs: Not on file   Physical Activity: Not on file   Stress: Not on file   Social Connections: Not on file   Intimate Partner Violence: Not on file   Housing Stability: Not on file              Family History:     Family History   Problem Relation Age of Onset     C.A.D. Father      Heart Disease Father      Heart Disease Mother 97     Alzheimer Disease Mother      Cerebrovascular Disease Mother         mild. Still was very functional     Substance Abuse Mother         Significant treatment over 15 yrs but ended with very good results     Osteoporosis Mother      C.A.D. Brother      Asthma Brother             Medications:     Current Outpatient Medications   Medication Sig     alendronate (FOSAMAX) 70 MG tablet Take 1 tablet (70 mg) by mouth every 7 days     Apoaequorin 10 MG CAPS Take 1 capsule by mouth daily Prevagen     aspirin (ASA) 81 MG chewable tablet Take 1 tablet (81 mg) by mouth daily     Calcium Carbonate-Vitamin D (CALCIUM-D PO) Take by mouth daily Liquid  Calcium 1200 mg  & Vit D 800 iU     Coenzyme Q10 (COQ10 PO) Take 100 mg by mouth.     hydrochlorothiazide (HYDRODIURIL) 12.5 MG tablet Take 1 tablet (12.5 mg) by mouth daily     losartan (COZAAR) 25 MG tablet Take 1 tablet (25 mg) by mouth 2 times daily     ORDER FOR DME Use your CPAP device as directed by your provider.     rosuvastatin (CRESTOR) 5 MG tablet TAKE 1 TABLET(5 MG) BY MOUTH DAILY     vitamin B-12 (CYANOCOBALAMIN) 1000 MCG tablet Take 1,000 mcg by mouth daily     No current facility-administered medications for this visit.              Review of Systems:   A comprehensive 10 point review of systems (constitutional, ENT, cardiac, peripheral vascular, lymphatic, respiratory, GI, , Musculoskeletal, skin, Neurological) was performed and found to be negative except as described in this note.     See intake form completed by  patient

## 2022-06-13 ENCOUNTER — ANCILLARY PROCEDURE (OUTPATIENT)
Dept: MRI IMAGING | Facility: CLINIC | Age: 82
End: 2022-06-13
Attending: PSYCHIATRY & NEUROLOGY
Payer: COMMERCIAL

## 2022-06-13 DIAGNOSIS — R41.3 MEMORY LOSS: ICD-10-CM

## 2022-06-13 DIAGNOSIS — M48.02 CERVICAL STENOSIS OF SPINAL CANAL: ICD-10-CM

## 2022-06-13 PROCEDURE — 255N000002 HC RX 255 OP 636: Performed by: PSYCHIATRY & NEUROLOGY

## 2022-06-13 PROCEDURE — 72141 MRI NECK SPINE W/O DYE: CPT

## 2022-06-13 PROCEDURE — 70553 MRI BRAIN STEM W/O & W/DYE: CPT

## 2022-06-13 PROCEDURE — A9585 GADOBUTROL INJECTION: HCPCS | Performed by: PSYCHIATRY & NEUROLOGY

## 2022-06-13 RX ORDER — GADOBUTROL 604.72 MG/ML
7.5 INJECTION INTRAVENOUS ONCE
Status: COMPLETED | OUTPATIENT
Start: 2022-06-13 | End: 2022-06-13

## 2022-06-13 RX ADMIN — GADOBUTROL 7.5 ML: 604.72 INJECTION INTRAVENOUS at 13:24

## 2022-06-16 ENCOUNTER — TRANSFERRED RECORDS (OUTPATIENT)
Dept: FAMILY MEDICINE | Facility: CLINIC | Age: 82
End: 2022-06-16

## 2022-07-07 ENCOUNTER — OFFICE VISIT (OUTPATIENT)
Dept: NEUROLOGY | Facility: CLINIC | Age: 82
End: 2022-07-07
Payer: COMMERCIAL

## 2022-07-07 VITALS
BODY MASS INDEX: 23.49 KG/M2 | DIASTOLIC BLOOD PRESSURE: 67 MMHG | HEART RATE: 71 BPM | OXYGEN SATURATION: 97 % | WEIGHT: 155 LBS | SYSTOLIC BLOOD PRESSURE: 130 MMHG | HEIGHT: 68 IN

## 2022-07-07 DIAGNOSIS — G93.89 BRAIN MASS: ICD-10-CM

## 2022-07-07 DIAGNOSIS — M79.605 PAIN OF LEFT LOWER EXTREMITY: ICD-10-CM

## 2022-07-07 DIAGNOSIS — M48.02 SPINAL STENOSIS IN CERVICAL REGION: Primary | ICD-10-CM

## 2022-07-07 DIAGNOSIS — G14 POST-POLIO SYNDROME (H): ICD-10-CM

## 2022-07-07 PROCEDURE — 99215 OFFICE O/P EST HI 40 MIN: CPT | Performed by: PSYCHIATRY & NEUROLOGY

## 2022-07-07 NOTE — PROGRESS NOTES
[facility]    Lizzette Chanel MRN# [id number]   Age: 81 year old YOB: 1940     Requesting physician: No ref. provider found  Guzman Bruner            Assessment and Plan:   Assessment:  1.  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  Memory difficulties of ? Origin  3.  New right hemisphere brain lesion - ? Meningioma  4.  Disabling left thigh, knee and calf pain, accompanied by left leg swelling - has had injection from Dr. Browne without benefit but no additional workup     Plan:  1.  Will ask Dr. Banuelos to look at the recent imaging of her cervical spine which discloses mod-severe cord impingement (without cord signal changes) yet a relatively preserved neurological exam in terms of her power and reflexes  2.  Neuropsychometric analysis to be completed on 7/25 and will discuss by phone once reviewed  3.  Follow up brain MRI 3 months to review new right hemisphere mass  4.  Pt has plans to visit with new PM & R MD (Dr. Jinny Sprague) for assessment of her right leg pain but she will see Dr. Bruner soon for assessment of vascular symptoms and pain in left leg    With regard to Ms. Chanel's worsening stamina and fatigue and balance, I am uncertain that the cervical spinal stenosis has progressed so substantially over the past few years and am somewhat reassured by the preserved nature of her physical examination, particularly her power and reflexes.  She does have balance issues quite likely related to impression of the posterior columns but I am not certain that this is risen to the level of crisis and am further uncertain as to whether we would even contemplate a neurosurgical approach.  To this end however I will asked Dr. Banuelos what his thoughts might be on the same and plan on following her clinically with every 3-month physical exams to see if there is any  progression.    With regard to her cognitive difficulties, we will be obtaining the results of her neuropsychometric testing.  I will speak with her by phone after that.    With regard to this new lesion found in the right hemisphere, suspected to be a meningioma, we will be repeating her brain imaging in 3 months and meeting again at that time.  I think when she has seen Dr. Banuelos, he can also offer opinion as to whether that is a reasonable approach or not.    With regard to her left leg pain, I suggest an evaluation of her vascular function in the left lower extremity for which she will make an appointment to see Dr. Bruner as well as following through with the next appointment with Dr. Jinny Sprague for assessment of her musculoskeletal issues in the left lower extremity.             History of Present Illness:   CC:  I met with Jessika today for 1 hour.  She was accompanied by her friend who took down copious notes.  We met in follow-up to review to neurological issues disclosed at her last visit.    Her history of issues dates back several decades when she presented with worsening fatigue and weakness primarily in her upper extremities.  An extensive review was undertaken which disclosed some degree of cervical spinal stenosis but not sufficient to account for her difficulties.  Instead, lower motor neuron abnormalities were seen on clinical examination particularly in the left upper extremity and were accompanied by EMG findings of denervation which appeared most consistent with a post polio syndrome and she was treated as such limiting her activities and managing symptomatically.  She had done reasonably well over the past decade or so without significant deterioration however more recently she perceived not only worsening stamina and fatigue but also difficulty with her balance.    In addition to her motor difficulties she was also concerned about some cognitive deterioration particularly in form of recent  memory.  Based on those 2 issues, we scheduled her for neuropsychometric testing, MR imaging of the brain, and follow-up imaging of her cervical cord.    She presents today for further review.  I reviewed the images with her of her cervical cord disclosing what is interpreted as moderate to severe stenosis at the C5-6 level impacting the cord and bilateral foraminal outlets.  Similar but less extensive changes were seen at the C3-4 level and a fair amount of degenerative changes seen diffusely throughout the cervical spine.  Reportedly comparison was made with a CT imaging of the cervical spine in January 2016 however the report from the MR study does not indicate whether the neuroradiologist felt there were any changes or not.  To my perusal, I do not identify any cord signal abnormalities on the current MR study.    She also had brain imaging done which shows an enhancing lesion in the right temporal lobe with heterogeneous tissue type felt most consistent with meningioma.  This was not however present on previous brain imaging done with CT studies in 2016 and 2018.  The brain imaging also discloses a moderate amount of small vessel cerebrovascular ischemic changes as well as moderate degree of atrophy.    Prior to going over her examination, we also reviewed issues she has been having with her left leg which is quite swollen and painful and has been so for the past several months interfering with her mobility and gait.  She finds that by the end of the day the left leg is perhaps twice the size of the right and is accompanied by pain over the lateral hamstring knee joint and upper left calf.  She has been evaluated by Dr. Felix Browne and indicates that he has done an injection in her back without benefit.  She has not seen Dr. Bruner for the swelling.  She does have an appointment to see another PM&R specialist since Dr. Browne is retiring but has not seen her yet or if she had any imaging of her spine, hip, or  knee.               Physical Exam:   Her current neurological examination is somewhat reassuring for her although she has a positive Romberg and today has an upgoing left toe, she has appropriate strength tone and bulk in all 4 extremities with exception of the ulnar nerve in the left arm.  She has intact sensation.  Her deep tendon reflexes are diminished but symmetrical throughout with absent ankle reflexes and again as noted above, an upgoing left toe (it was an upgoing right toe at the last visit).  Her gait however is notably and allogenic favoring the left leg.  Consultative strength testing of the left leg also provokes pain in the left lateral hamstring and left side of the knee.  She does have a disparity in tissue with the left leg being larger than the right at the quadricep, calf, and foot.         Data:   All laboratory data reviewed  All imaging studies reviewed by me             DATA for DOCUMENTATION:         Past Medical History:     Patient Active Problem List   Diagnosis     Peripheral neuropathy     Hx of colonoscopy     Hyperlipidemia LDL goal <130     CAITLYN (obstructive sleep apnea)  CPAP pressures set at:  LPL of 5 and UPL of 14     Osteopenia     Advanced directives, counseling/discussion     Spinal stenosis in cervical region     Post-polio syndrome     Adenocarcinoma of endometrium (H)     Fatigue     Essential hypertension     Osteoporosis of lumbar spine     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    had vaginal bleeding, surgery done 2021     Arthritis mild in knees, shoulders     Cancer (H) skin--Basil Cell, [place]     Chest pain 2003    neg est thallium     Chest pain 04/2016    nl est echo and cxr     Complication of anesthesia     BP maddie to over 200 in PACU 1 month ago-after D&C     Essential hypertension 07/2021     Fatigue 08/25/2021     Hx of colonoscopy  2003    incomplete, be nl     Hx of colonoscopy 06/03/2011    nl     Hypercholesteremia 2011    aches with zocor     CAITLYN (obstructive sleep apnea) 12/2012    done [place], using dental device, added cpap 2014      Osteopenia 2013    Dr. Taylor     Osteoporosis of lumbar spine 07/2020    added fosamax     Peripheral neuropathy     Dr. 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       Past Surgical History:     Past Surgical History:   Procedure Laterality Date     ABDOMEN SURGERY  1960     APPENDECTOMY  60's     BIOPSY  2010    inside upper lip     DAVINCI HYSTERECTOMY TOTAL, BILATERAL SALPINGO-OOPHORECTOMY, NODE DISSECTION, COMBINED Bilateral 7/8/2021    Procedure: ROBOTIC ASSISTED TOTAL LAPAROSCOPIC HYSTERECTOMY, BILATERAL SALPINGO-OOPHORECTOMY, WASHINGS, INTRA-OPERATIVE SENTINEL LYMPH NODE MAPPING, BILATERAL PELVIC AND PARA-AORTIC LYMPHADENECTOMY, REPAIR VAGINAL TEAR;  Surgeon: Dianne Garland MD;  Location:  OR     DILATION AND CURETTAGE, HYSTEROSCOPY WITH ULTRASOUND GUIDANCE N/A 5/11/2021    Procedure: HYSTEROSCOPY UNDER ULTRASOUND GUIDANCE, DILATION AND CURETTAGE OF UTERUS;  Surgeon: Isabel Ferro MD;  Location:  OR     OPERATIVE HYSTEROSCOPY WITH MORCELLATOR N/A 5/11/2021    Procedure: MYOSURE MORCELLATOR;  Surgeon: Isabel Ferro MD;  Location:  OR     PHACOEMULSIFICATION CLEAR CORNEA WITH STANDARD INTRAOCULAR LENS IMPLANT  3/21/2012    Procedure:PHACOEMULSIFICATION CLEAR CORNEA WITH STANDARD INTRAOCULAR LENS IMPLANT; RIGHT PHACOEMULSIFICATION CLEAR CORNEA WITH STANDARD INTRAOCULAR LENS IMPLANT ; Surgeon:CHANDRAKANT HERNANDEZ  Location: [facility]     PHACOEMULSIFICATION CLEAR CORNEA WITH STANDARD INTRAOCULAR LENS IMPLANT  3/28/2012    Procedure:PHACOEMULSIFICATION CLEAR CORNEA WITH STANDARD INTRAOCULAR LENS IMPLANT; LEFT PHACOEMULSIFICATION CLEAR CORNEA WITH STANDARD INTRAOCULAR LENS IMPLANT ; Surgeon:CHANDRAKANT HENRANDEZ; Location: [facility]     SHOULDER SURGERY      twice     SOFT TISSUE SURGERY  R&L Rotator cuff repairs     TONSILLECTOMY  child            Social History:     Social History     Socioeconomic History     Marital status: Single     Spouse name: Not on file     Number of children: 0     Years of education: Not on file     Highest education level: Not on file   Occupational History     Occupation: physical therapy     Employer: RETIRED   Tobacco Use     Smoking status: Former Smoker     Packs/day: 1.50     Years: 12.00     Pack years: 18.00     Types: Cigarettes     Start date: 1966     Quit date: 3/17/1974     Years since quittin.3     Smokeless tobacco: Never Used     Tobacco comment: enjoyed it!   Substance and Sexual Activity     Alcohol use: Yes     Comment: Minimal     Drug use: No     Sexual activity: Not Currently     Partners: Male     Birth control/protection: None   Other Topics Concern     Parent/sibling w/ CABG, MI or angioplasty before 65F 55M? Yes     Comment: Brother  of heart attack at 49 yrs old   Social History Narrative    Single, no kids    Has a cabin    Has godchildren that can help out     Social Determinants of Health     Financial Resource Strain: Not on file   Food Insecurity: Not on file   Transportation Needs: Not on file   Physical Activity: Not on file   Stress: Not on file   Social Connections: Not on file   Intimate Partner Violence: Not on file   Housing Stability: Not on file              Family History:     Family History   Problem Relation Age of Onset     C.A.D. Father      Heart Disease Father      Heart Disease Mother 97     Alzheimer Disease Mother      Cerebrovascular Disease  Mother         mild. Still was very functional     Substance Abuse Mother         Significant treatment over 15 yrs but ended with very good results     Osteoporosis Mother      C.A.D. Brother      Asthma Brother             Medications:     Current Outpatient Medications   Medication Sig     alendronate (FOSAMAX) 70 MG tablet Take 1 tablet (70 mg) by mouth every 7 days     Apoaequorin 10 MG CAPS Take 1 capsule by mouth daily Prevagen     aspirin (ASA) 81 MG chewable tablet Take 1 tablet (81 mg) by mouth daily     Calcium Carbonate-Vitamin D (CALCIUM-D PO) Take by mouth daily Liquid  Calcium 1200 mg  & Vit D 800 iU     Coenzyme Q10 (COQ10 PO) Take 100 mg by mouth.     hydrochlorothiazide (HYDRODIURIL) 12.5 MG tablet Take 1 tablet (12.5 mg) by mouth daily     losartan (COZAAR) 25 MG tablet Take 1 tablet (25 mg) by mouth 2 times daily     ORDER FOR DME Use your CPAP device as directed by your provider.     rosuvastatin (CRESTOR) 5 MG tablet TAKE 1 TABLET(5 MG) BY MOUTH DAILY     vitamin B-12 (CYANOCOBALAMIN) 1000 MCG tablet Take 1,000 mcg by mouth daily     No current facility-administered medications for this visit.              Review of Systems:   A comprehensive 10 point review of systems (constitutional, ENT, cardiac, peripheral vascular, lymphatic, respiratory, GI, , Musculoskeletal, skin, Neurological) was performed and found to be negative except as described in this note.     See intake form completed by patient

## 2022-07-07 NOTE — LETTER
7/7/2022         RE: Lizzette Chanel  [address]  [place] [address]        Dear Colleague,    Thank you for referring your patient, Lizzette Chanel, to the [facility]. Please see a copy of my visit note below.        [facility]    Lizzette Chanel MRN# [id number]   Age: 81 year old YOB: 1940     Requesting physician: No ref. provider found  Guzman Bruner            Assessment and Plan:   Assessment:  1.  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  Memory difficulties of ? Origin  3.  New right hemisphere brain lesion - ? Meningioma  4.  Disabling left thigh, knee and calf pain, accompanied by left leg swelling - has had injection from Dr. Browne without benefit but no additional workup     Plan:  1.  Will ask Dr. Banuelos to look at the recent imaging of her cervical spine which discloses mod-severe cord impingement (without cord signal changes) yet a relatively preserved neurological exam in terms of her power and reflexes  2.  Neuropsychometric analysis to be completed on 7/25 and will discuss by phone once reviewed  3.  Follow up brain MRI 3 months to review new right hemisphere mass  4.  Pt has plans to visit with new PM & R MD (Dr. Jinny Sprague) for assessment of her right leg pain but she will see Dr. Bruner soon for assessment of vascular symptoms and pain in left leg    With regard to Ms. Chanel's worsening stamina and fatigue and balance, I am uncertain that the cervical spinal stenosis has progressed so substantially over the past few years and am somewhat reassured by the preserved nature of her physical examination, particularly her power and reflexes.  She does have balance issues quite likely related to impression of the posterior columns but I am not certain that this is risen to the level of crisis  and am further uncertain as to whether we would even contemplate a neurosurgical approach.  To this end however I will asked Dr. Banuelos what his thoughts might be on the same and plan on following her clinically with every 3-month physical exams to see if there is any progression.    With regard to her cognitive difficulties, we will be obtaining the results of her neuropsychometric testing.  I will speak with her by phone after that.    With regard to this new lesion found in the right hemisphere, suspected to be a meningioma, we will be repeating her brain imaging in 3 months and meeting again at that time.  I think when she has seen Dr. Banuelos, he can also offer opinion as to whether that is a reasonable approach or not.    With regard to her left leg pain, I suggest an evaluation of her vascular function in the left lower extremity for which she will make an appointment to see Dr. Bruner as well as following through with the next appointment with Dr. Jinny Sprague for assessment of her musculoskeletal issues in the left lower extremity.             History of Present Illness:   CC:  I met with Jessika today for 1 hour.  She was accompanied by her friend who took down copious notes.  We met in follow-up to review to neurological issues disclosed at her last visit.    Her history of issues dates back several decades when she presented with worsening fatigue and weakness primarily in her upper extremities.  An extensive review was undertaken which disclosed some degree of cervical spinal stenosis but not sufficient to account for her difficulties.  Instead, lower motor neuron abnormalities were seen on clinical examination particularly in the left upper extremity and were accompanied by EMG findings of denervation which appeared most consistent with a post polio syndrome and she was treated as such limiting her activities and managing symptomatically.  She had done reasonably well over the past decade or so without  significant deterioration however more recently she perceived not only worsening stamina and fatigue but also difficulty with her balance.    In addition to her motor difficulties she was also concerned about some cognitive deterioration particularly in form of recent memory.  Based on those 2 issues, we scheduled her for neuropsychometric testing, MR imaging of the brain, and follow-up imaging of her cervical cord.    She presents today for further review.  I reviewed the images with her of her cervical cord disclosing what is interpreted as moderate to severe stenosis at the C5-6 level impacting the cord and bilateral foraminal outlets.  Similar but less extensive changes were seen at the C3-4 level and a fair amount of degenerative changes seen diffusely throughout the cervical spine.  Reportedly comparison was made with a CT imaging of the cervical spine in January 2016 however the report from the MR study does not indicate whether the neuroradiologist felt there were any changes or not.  To my perusal, I do not identify any cord signal abnormalities on the current MR study.    She also had brain imaging done which shows an enhancing lesion in the right temporal lobe with heterogeneous tissue type felt most consistent with meningioma.  This was not however present on previous brain imaging done with CT studies in 2016 and 2018.  The brain imaging also discloses a moderate amount of small vessel cerebrovascular ischemic changes as well as moderate degree of atrophy.    Prior to going over her examination, we also reviewed issues she has been having with her left leg which is quite swollen and painful and has been so for the past several months interfering with her mobility and gait.  She finds that by the end of the day the left leg is perhaps twice the size of the right and is accompanied by pain over the lateral hamstring knee joint and upper left calf.  She has been evaluated by Dr. Felix Browne and indicates  that he has done an injection in her back without benefit.  She has not seen Dr. Bruner for the swelling.  She does have an appointment to see another PM&R specialist since Dr. Browne is retiring but has not seen her yet or if she had any imaging of her spine, hip, or knee.               Physical Exam:   Her current neurological examination is somewhat reassuring for her although she has a positive Romberg and today has an upgoing left toe, she has appropriate strength tone and bulk in all 4 extremities with exception of the ulnar nerve in the left arm.  She has intact sensation.  Her deep tendon reflexes are diminished but symmetrical throughout with absent ankle reflexes and again as noted above, an upgoing left toe (it was an upgoing right toe at the last visit).  Her gait however is notably and allogenic favoring the left leg.  Consultative strength testing of the left leg also provokes pain in the left lateral hamstring and left side of the knee.  She does have a disparity in tissue with the left leg being larger than the right at the quadricep, calf, and foot.         Data:   All laboratory data reviewed  All imaging studies reviewed by me             DATA for DOCUMENTATION:         Past Medical History:     Patient Active Problem List   Diagnosis     Peripheral neuropathy     Hx of colonoscopy     Hyperlipidemia LDL goal <130     CAITLYN (obstructive sleep apnea)  CPAP pressures set at:  LPL of 5 and UPL of 14     Osteopenia     Advanced directives, counseling/discussion     Spinal stenosis in cervical region     Post-polio syndrome     Adenocarcinoma of endometrium (H)     Fatigue     Essential hypertension     Osteoporosis of lumbar spine     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    had vaginal bleeding, surgery done 2021     Arthritis mild in knees, shoulders     Cancer (H)  skin--Basil Cell, [place]     Chest pain 2003    neg est thallium     Chest pain 04/2016    nl est echo and cxr     Complication of anesthesia     BP maddie to over 200 in PACU 1 month ago-after D&C     Essential hypertension 07/2021     Fatigue 08/25/2021     Hx of colonoscopy 2003    incomplete, be nl     Hx of colonoscopy 06/03/2011    nl     Hypercholesteremia 2011    aches with zocor     CAITLYN (obstructive sleep apnea) 12/2012    done [place], using dental device, added cpap 2014      Osteopenia 2013    Dr. Taylor     Osteoporosis of lumbar spine 07/2020    added fosamax     Peripheral neuropathy     Dr. 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       Past Surgical History:     Past Surgical History:   Procedure Laterality Date     ABDOMEN SURGERY  1960     APPENDECTOMY  60's     BIOPSY  2010    inside upper lip     DAVINCI HYSTERECTOMY TOTAL, BILATERAL SALPINGO-OOPHORECTOMY, NODE DISSECTION, COMBINED Bilateral 7/8/2021    Procedure: ROBOTIC ASSISTED TOTAL LAPAROSCOPIC HYSTERECTOMY, BILATERAL SALPINGO-OOPHORECTOMY, WASHINGS, INTRA-OPERATIVE SENTINEL LYMPH NODE MAPPING, BILATERAL PELVIC AND PARA-AORTIC LYMPHADENECTOMY, REPAIR VAGINAL TEAR;  Surgeon: Dianne Garland MD;  Location:  OR     DILATION AND CURETTAGE, HYSTEROSCOPY WITH ULTRASOUND GUIDANCE N/A 5/11/2021    Procedure: HYSTEROSCOPY UNDER ULTRASOUND GUIDANCE, DILATION AND CURETTAGE OF UTERUS;  Surgeon: Isabel Ferro MD;  Location:  OR     OPERATIVE HYSTEROSCOPY WITH MORCELLATOR N/A 5/11/2021    Procedure: MYOSURE MORCELLATOR;  Surgeon: Isabel Ferro MD;  Location:  OR      PHACOEMULSIFICATION CLEAR CORNEA WITH STANDARD INTRAOCULAR LENS IMPLANT  3/21/2012    Procedure:PHACOEMULSIFICATION CLEAR CORNEA WITH STANDARD INTRAOCULAR LENS IMPLANT; RIGHT PHACOEMULSIFICATION CLEAR CORNEA WITH STANDARD INTRAOCULAR LENS IMPLANT ; Surgeon:CHANDRAKANT HERNANDEZ; Location:[facility]     PHACOEMULSIFICATION CLEAR CORNEA WITH STANDARD INTRAOCULAR LENS IMPLANT  3/28/2012    Procedure:PHACOEMULSIFICATION CLEAR CORNEA WITH STANDARD INTRAOCULAR LENS IMPLANT; LEFT PHACOEMULSIFICATION CLEAR CORNEA WITH STANDARD INTRAOCULAR LENS IMPLANT ; Surgeon:CHANDRAKANT HERNANDEZ; Location:[facility]     SHOULDER SURGERY      twice     SOFT TISSUE SURGERY  R&L Rotator cuff repairs     TONSILLECTOMY  child            Social History:     Social History     Socioeconomic History     Marital status: Single     Spouse name: Not on file     Number of children: 0     Years of education: Not on file     Highest education level: Not on file   Occupational History     Occupation: physical therapy     Employer: RETIRED   Tobacco Use     Smoking status: Former Smoker     Packs/day: 1.50     Years: 12.00     Pack years: 18.00     Types: Cigarettes     Start date: 1966     Quit date: 3/17/1974     Years since quittin.3     Smokeless tobacco: Never Used     Tobacco comment: enjoyed it!   Substance and Sexual Activity     Alcohol use: Yes     Comment: Minimal     Drug use: No     Sexual activity: Not Currently     Partners: Male     Birth control/protection: None   Other Topics Concern     Parent/sibling w/ CABG, MI or angioplasty before 65F 55M? Yes     Comment: Brother  of heart attack at 49 yrs old   Social History Narrative    Single, no kids    Has a cabin    Has godchildren that can help out     Social Determinants of Health     Financial Resource Strain: Not on file   Food Insecurity: Not on file   Transportation Needs: Not on file   Physical Activity: Not on file   Stress: Not on file   Social Connections: Not on file    Intimate Partner Violence: Not on file   Housing Stability: Not on file              Family History:     Family History   Problem Relation Age of Onset     C.A.D. Father      Heart Disease Father      Heart Disease Mother 97     Alzheimer Disease Mother      Cerebrovascular Disease Mother         mild. Still was very functional     Substance Abuse Mother         Significant treatment over 15 yrs but ended with very good results     Osteoporosis Mother      C.A.D. Brother      Asthma Brother             Medications:     Current Outpatient Medications   Medication Sig     alendronate (FOSAMAX) 70 MG tablet Take 1 tablet (70 mg) by mouth every 7 days     Apoaequorin 10 MG CAPS Take 1 capsule by mouth daily Prevagen     aspirin (ASA) 81 MG chewable tablet Take 1 tablet (81 mg) by mouth daily     Calcium Carbonate-Vitamin D (CALCIUM-D PO) Take by mouth daily Liquid  Calcium 1200 mg  & Vit D 800 iU     Coenzyme Q10 (COQ10 PO) Take 100 mg by mouth.     hydrochlorothiazide (HYDRODIURIL) 12.5 MG tablet Take 1 tablet (12.5 mg) by mouth daily     losartan (COZAAR) 25 MG tablet Take 1 tablet (25 mg) by mouth 2 times daily     ORDER FOR DME Use your CPAP device as directed by your provider.     rosuvastatin (CRESTOR) 5 MG tablet TAKE 1 TABLET(5 MG) BY MOUTH DAILY     vitamin B-12 (CYANOCOBALAMIN) 1000 MCG tablet Take 1,000 mcg by mouth daily     No current facility-administered medications for this visit.              Review of Systems:   A comprehensive 10 point review of systems (constitutional, ENT, cardiac, peripheral vascular, lymphatic, respiratory, GI, , Musculoskeletal, skin, Neurological) was performed and found to be negative except as described in this note.     See intake form completed by patient        Again, thank you for allowing me to participate in the care of your patient.        Sincerely,        Mohinder Lyles MD, MD

## 2022-07-19 ENCOUNTER — TRANSFERRED RECORDS (OUTPATIENT)
Dept: FAMILY MEDICINE | Facility: CLINIC | Age: 82
End: 2022-07-19

## 2022-07-21 ENCOUNTER — TRANSFERRED RECORDS (OUTPATIENT)
Dept: FAMILY MEDICINE | Facility: CLINIC | Age: 82
End: 2022-07-21

## 2022-07-24 DIAGNOSIS — M81.0 AGE RELATED OSTEOPOROSIS, UNSPECIFIED PATHOLOGICAL FRACTURE PRESENCE: ICD-10-CM

## 2022-07-25 ENCOUNTER — OFFICE VISIT (OUTPATIENT)
Dept: NEUROLOGY | Facility: CLINIC | Age: 82
End: 2022-07-25
Attending: PSYCHIATRY & NEUROLOGY
Payer: COMMERCIAL

## 2022-07-25 DIAGNOSIS — D32.0 INTRACRANIAL MENINGIOMA (H): ICD-10-CM

## 2022-07-25 DIAGNOSIS — G14 POSTPOLIO SYNDROME (H): ICD-10-CM

## 2022-07-25 DIAGNOSIS — G31.84 MCI (MILD COGNITIVE IMPAIRMENT) WITH MEMORY LOSS: Primary | ICD-10-CM

## 2022-07-25 RX ORDER — ALENDRONATE SODIUM 70 MG/1
TABLET ORAL
Qty: 12 TABLET | Refills: 3 | Status: SHIPPED | OUTPATIENT
Start: 2022-07-25 | End: 2023-04-03

## 2022-07-25 NOTE — PROGRESS NOTES
SUBJECTIVE:   Lizzette Chanel is a 77 year old female who presents to clinic today for the following health issues:    She had elevated CRP so we added crestor and she is tolerating that well, unlike the zocor  She read this could affect her balance and memory.  Overall she feels weaker from her post polio and is less active than she used to be  She is worried she could be anemic, although has no hx of that.  She has tried to donate blood in the last month and told she was too low at 12.    Past Medical History:   Diagnosis Date     Abdominal pain 2011    ct abd and pelvis uterine fiborid, renal cysts and tics     Chest pain 2003    neg est thallium     Chest pain 4/16    nl est echo and cxr     Hx of colonoscopy 2003    incomplete, be nl     Hx of colonoscopy [place], 2011    nl     Hypercholesteremia 2011    aches with zocor     Intermittent asthma 1995     CAITLYN (obstructive sleep apnea) 12/12    done [place], using dental device, added cpap 2014      Osteopenia 2013    Dr. Taylor     Peripheral neuropathy     Dr. Latia Sparks 1956     Statin intolerance     zocor and one other caused aches     Subdural hematoma (H) 1/16    traumatic, fu ct 3/16 resolved     Past Surgical History:   Procedure Laterality Date     APPENDECTOMY  60's     PHACOEMULSIFICATION CLEAR CORNEA WITH STANDARD INTRAOCULAR LENS IMPLANT  3/21/2012    Procedure:PHACOEMULSIFICATION CLEAR CORNEA WITH STANDARD INTRAOCULAR LENS IMPLANT; RIGHT PHACOEMULSIFICATION CLEAR CORNEA WITH STANDARD INTRAOCULAR LENS IMPLANT ; Surgeon:CHANDRAKANT HERNANDEZ; Location:[facility]     PHACOEMULSIFICATION CLEAR CORNEA WITH STANDARD INTRAOCULAR LENS IMPLANT  3/28/2012    Procedure:PHACOEMULSIFICATION CLEAR CORNEA WITH STANDARD INTRAOCULAR LENS IMPLANT; LEFT PHACOEMULSIFICATION CLEAR CORNEA WITH STANDARD INTRAOCULAR LENS IMPLANT ; Surgeon:CHANDRAKANT HERNANDEZ; Location:[facility]     SHOULDER SURGERY  1990's    twice     TONSILLECTOMY  child     Social History   Substance Use Topics  "    Smoking status: Former Smoker     Packs/day: 1.50     Years: 12.00     Types: Cigarettes     Quit date: 3/17/1974     Smokeless tobacco: Never Used     Alcohol use 0.0 oz/week     0 Standard drinks or equivalent per week      Comment: 1-2/day     Current Outpatient Prescriptions   Medication Sig Dispense Refill     CALCIUM PO Take by mouth daily Takes liquid form       Coenzyme Q10 (COQ10 PO) Take 100 mg by mouth.       fluticasone (FLONASE) 50 MCG/ACT spray Spray 1-2 sprays into both nostrils daily 1 Bottle 11     Nutritional Supplements (JUICE PLUS FIBRE PO) Take by mouth 2 times daily        ORDER FOR DME Use your CPAP device as directed by your provider.       rosuvastatin (CRESTOR) 5 MG tablet Take 1 tablet (5 mg) by mouth daily 90 tablet 2     No Known Allergies    PHYSICAL EXAM:    /76 (BP Location: Left arm, Patient Position: Sitting, Cuff Size: Adult Regular)  Pulse 87  Temp 97.9  F (36.6  C) (Oral)  Ht 5' 8\" (1.727 m)  Wt 162 lb (73.5 kg)  SpO2 95%  BMI 24.63 kg/m2    Patient appears non toxic  Rechecked BP and this about the same. She will get a reading at home.    Assessment and Plan:     (R53.83) Other fatigue  (primary encounter diagnosis)  Comment: suspect due to post polio, will check labs  Plan: Vitamin D level            (D64.9) Anemia, unspecified type  Comment: was told she was 11.9 at the blood bank  Plan: Ferritin, Vitamin B12, Iron and iron binding         capacity, Folate, CBC with platelets            (G14) Post-polio syndrome  Comment:   Plan: has been eval by neuro but considering going to [place]    (E78.5) Hyperlipidemia LDL goal <130  Comment:  Plan: Lipid Profile        Future order for July to see how crestor working    (R79.82) Elevated C-reactive protein (CRP)  Comment:   Plan: on crestor now and tolerating this      Jennifer Jose PA-C      " patient seen and evaluated s/p workup.  Feeling improved.  Slight elevation to Cr, likely due to dehydration as patient was in heat at beach and is sunburnt.  Given 1 liter of fluids.  Otherwise labs, CT head and CXR clear.  Patient tolerating PO.  Laceration repaired.  Suspect syncope related to dehydration, +covid and also he reports he did not sleep well.  Reviewed with patient strict return precautions and follow up -Garth Mccord PA-C

## 2022-07-25 NOTE — LETTER
2022       RE: Lizzette Chanel  [address]  [facility] [facility] [id number]     Dear Colleague,    Thank you for referring your patient, Lizzette Chanel, to the [facility] at [facility]. Please see a copy of my visit note below.    Patient was seen for neuropsychological evaluation at the request of Dr. Mohinder Lyles, for the purposes of diagnostic clarification and treatment planning.  3 hrs 11 min of test administration and scoring were provided by this writer, Deirdre Gustafson.  Please see Dr. Franck Mccauley's report for a full interpretation of the findings.      Adult Neuropsychology Clinic  [facility]      NEUROPSYCHOLOGICAL EVALUATION    RELEVANT HISTORY AND REASON FOR REFERRAL    This is a report of neuropsychological consultation regarding Lizzette Chanel (Judy), an 81-year-old, right-handed woman with 16 years of formal education. She presents with concerns about slowly progressive memory problems over the last couple of years, such as misplacing items, being forgetful, and getting lost. She saw Dr. Mohinder Lyles for a neurological evaluation in  and had trouble on memory items on bedside cognitive screening. There have been increasing issues with fatigue and weakness, and her mood has been lower than usual. She had polio in high school and has had persistent left-sided weakness since then. She was diagnosed with post-polio syndrome in 2018, and it is currently seen as stable. A right frontal meningioma was discovered on brain MRI in , along with mild small vessel ischemic disease and mild-to-moderate parenchymal volume loss. She lives alone and remains independent for all ADLs. Family history includes Alzheimer s disease and small strokes for her mother, who  at 93 in skilled nursing care. Her father and brother had heart disease and  at 63 and 49, respectively. Additional medical concerns include hypertension,  hyperlipidemia, obstructive sleep apnea, cervical spinal stenosis, neuropathy, and osteoporosis. Her current medication list includes alendronate, apoaequorin, 81 mg aspirin, calcium carbonate-vitamin D, coenzyme Q10, hydrochlorothiazide, losartan, rosuvastatin, and vitamin B-12.     In today s interview, Ms. Chanel confirms that her primary concerns are short-term memory lapses that have gradually worsened over the last couple of years. She is here with her longtime friend, Fabiola, who describes the short-term memory troubles as very prominent. She says that Ms. Chanel catches herself and notices the lapses, provoking frustration. She was doing things like losing track of her mobile phone, but it has been helpful to keep it in a fixed location at home. She has been lost while driving a few times, and Fabiola is a little concerned about driving safety. Ms. Chanel has not felt any particular need to change or limit her driving habits. She manages her medications independently and reports no problems. She manages her finances independently and reports no problems. She has her own home, and she lives alone with her dog. She also has a cabin up [place] and keeps it up on her own.     She has never been  and does not have children. She does not have any family members in the area. She does have many friends, and she tries to see them as often as possible. The COVID-19 pandemic has substantially reduced her socialization frequency and quality.    She is experiencing more challenges with her mood than is typical for her. Living with her dog helps, and she still enjoys time with her friends. She went to see a counselor about 2 or 3 years ago and found it helpful, but for most of her life she has enjoyed exceptional emotional adjustment. She has never taken psychiatric medications or had a psychiatric hospitalization. She reports some fleeting thoughts of suicide within the last year, which she actively talks  herself away from. She denies any lasting desires, preparations, or ever taking any actions to harm herself. She has not had any hallucinatory experiences. I get no indications in the report today of fundamental shifts in personality, temperament, or management of social behaviors. She is described as socially slowed down and less engaged than usual, seemingly for a mix of lower mood, chronic fatigue, and the pandemic.    She has treated her sleep apnea with CPAP for many years. Her sleep is usually good overnight. Records indicate a history of insomnia, but she reports no such concerns currently. There have been no indications of REM sleep behaviors. She is chronically exhausted during the day. She would prefer to take a nap but cannot always do so. She says there have been some instances of unintentional sleeping during the day. Fabiola notes that Ms. Chanel used to have really great levels of energy, but in the last year she has frequently talked about how exhausted she is. She is bothered by declining physical abilities, particularly with left leg troubles, and the lack of diagnostic clarity around it.     As noted, a possible right frontal meningioma was just discovered on neuroimaging studies last month. It was described as an extra-axial mass of dural origin having a mild degree of regional mass-effect. There is no history of stroke, seizure, TBI, or migraine. She reports no abnormal changes to her senses of smell, taste, or vision. She had her hearing tested and plans to get hearing aids but has not yet obtained them. She notices abnormal sensation in her fingers and feet. She has been more careful than usual when walking, to keep her balance. She does not use any sort of assistive device, and she reports no falls.    She has not had a COVID-19 infection.    She reports minimal alcohol use and no history of problematic drinking. She used to use tobacco but quit in 1974. She does not use any illicit  drugs.    There is no history of early academic delays. She was always a very good student. She was driven to achieve and also had a high level of involvement in extracurricular activities. Her polio infection came when she was in high school. After high school, she went on to earn a bachelor s degree in physical therapy.    Her career was in physical therapy. 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 She retired at 63.    BEHAVIORAL OBSERVATIONS    Ms. Chanel was polite and cooperative with the evaluation. She did appear tired, but she was not somnolent. A high-frequency, low amplitude head and neck tremor were visible throughout the visit. She was open and candid in the interview, and she demonstrated appropriate levels of insight into the referral concerns. She was highly conversant and did not show indications of gross aphasia. Thought processes were linear and goal-directed. Affective display was congruent with mood and conversational content. During the testing session, she was notably slow in her approach to tasks that did not have set time limits. She was alert and attentive. Her test engagement was good, and she persisted well with all requested procedures. The data are seen as valid estimations of her cognitive capacity.     MEASURES ADMINISTERED    The following measures were administered by a trained psychometrist, under my supervision:    Orientation: Time, Place, Basic Personal Information, Recent US Presidents; Wide Range Achievement Test 5: Word Reading; Wechsler Adult Intelligence Scale-IV: Similarities, Matrix Reasoning, Digit Span, Coding; Controlled Oral Word Association Test; Animal Naming Test; [place] Naming Test; Marlo Visual Acuity Screen; Clock Drawing; Finger Tapping; Trail Making Test; Stroop Test; [place] Card Sorting Test; Emiliano-Osterrieth Complex Figure Test; Emiliano Auditory  Verbal Learning Test; Wechsler Memory Scale-IV: Logical Memory; Geriatric Depression Scale.    RESULTS AND INTERPRETATION    Orientation: Orientation was normal for time and basic personal information. She was not able to demonstrate orientation to place. She was able to name 2 of the 6 most recent [place] presidents.     Language & Related Skills: Basic reading and pronunciation skills were average. Abstract verbal analogical reasoning was average. Letter-based verbal fluency was average for total correct items but notable for 6 errors. Category-based verbal fluency was below average. Confrontation naming was below average.    Visual Perceptual & Constructional Skills: Binocular, corrected, near-point visual acuity was 20/20 on Marlo screening. Visual reasoning through pattern identification was high average. Clock drawing was normal. Copying a complex geometric figure was average for total accuracy but very slow to complete.     Motor Skills: Speeded finger tapping was average with the dominant right hand and exceptionally low with the nondominant left hand.     Mental Speed & Executive Functioning: As noted above, speeded verbal fluency performances were mildly abnormal. Cognitive processing speed was average on a timed transcription task. Visual scanning and graphomotor sequencing under simple conditions was average. Scanning and sequencing under greater executive demands to control divided attention was average. Speeded word reading was low average and speeded color naming was average. Color naming under demands to inhibit reflexive responding was average. Conceptualization, inductive reasoning, and using continuous feedback to overcome errors were average on an ambiguous card-sorting task.     Attention & Working Memory: Immediate auditory attention and working memory were average for repeating and rearranging digit strings.     Learning & Anterograde Memory: Immediate verbal memory for short stories was low  average, and delayed story recall was below average. Delayed recognition of story details was low average. Learning a word list over repeated readings was low average. Delayed free recall of the list was exceptionally low, and delayed recognition of the list was low average. A few minutes after the initial copy, incidental free recall of the complex figure was low average. After 30 minutes, recall of the figure was below average, and recognition of individual figure elements was exceptionally low.     Emotional Functioning: On a brief self-report inventory, she endorsed minimal to borderline symptoms related to depression and anxiety (GDS = 8/30).     IMPRESSIONS    The neuropsychological results are mildly abnormal. Cognitive testing shows a deficit in naming/lexical retrieval and anterograde memory formation, and some aspects of orientation are lower than expected. There is also significant fine-motor slowing for the left hand. She can be very slow to complete tasks when they are devoid of time limits, but on those tasks with explicit instructions to go as quickly as possible, she does well. The remainder of today s performances are within normal ranges.     The concerns about low mood and chronic fatigue are common in post-polio syndrome. I do not suspect a primary psychiatric disorder.     She reports that left-sided weakness and motor trouble has been present since having polio in high school. There could be an additional contribution from the right frontal meningioma recently seen on imaging. However, the right frontal meningioma would not be expected to produce the cognitive issues observed today. Those would involve the medial temporal lobes, basal forebrain, and left anterior temporal regions. The neuropsychological profile looks like the early effects of Alzheimer s disease with a superimposed focal motor problem. Ms. Chanel is not in a state of dementia at this time. She retains many significant cognitive  strengths and is apparently living alone without serious problems. However, there could be problems she is not noticing or has forgotten about. Nonetheless, a diagnosis of mild cognitive impairment (MCI) is appropriate at this time.     RECOMMENDATIONS    I met with Ms. Chanel and her friend, Fabiola, to discuss preliminary results and recommendations after the testing session.     1. Continued neurological care and monitoring are needed. I defer to Dr. Lyles on any additional workups or treatment options.   2. She should continue to work with her medical team on pharmacological treatments for fatigue and low mood.   3. She should endeavor to stay physically active, while minimizing fall risk.   4. Caution with driving would be appropriate, but I do not have evidence to suggest driving needs to stop.   5. Her memory deficit puts her at risk for errors in managing medications and finances. She does not have family in the area. It would be ideal to have trusted friends, or possibly hired professionals, providing occasional checks on these matters. An Occupational Therapy referral is warranted, to get objective data on risks for ADL management.   6. I encourage making a connection to the Alzheimer s Association (www.alz.org). They are a great resource for education, guidance, and support in planning for the years to come.   7. Longitudinal monitoring of cognition is advised. I would like to see her again in about 12 months, to check stability versus change in her cognitive abilities.     Franck Mccauley, PhD, LP, ABPP-CN  Board Certified in Clinical Neuropsychology  Licensed Psychologist [id number]      Time spent: One unit psychiatric evaluation including records review, interview, and clinical assessment licensed and board-certified neuropsychologist (CPT 69966). 132 minutes neuropsychological testing evaluation by licensed and board-certified neuropsychologist, including integration of patient data, interpretation  of standardized test results and clinical data, clinical decision-making, treatment planning, report, and interactive feedback to the patient (CPT 22511, 59266). 191 minutes of psychological and neuropsychological test administration and scoring by technician (CPT 61797, 54824). Diagnoses: G31.84, G14, D32.0

## 2022-07-25 NOTE — PROGRESS NOTES
Patient was seen for neuropsychological evaluation at the request of Dr. Mohinder Lyles, for the purposes of diagnostic clarification and treatment planning.  3 hrs 11 min of test administration and scoring were provided by this writer, Deirdre Gustafson.  Please see Dr. Franck Mccauley's report for a full interpretation of the findings.

## 2022-07-29 ENCOUNTER — TRANSFERRED RECORDS (OUTPATIENT)
Dept: FAMILY MEDICINE | Facility: CLINIC | Age: 82
End: 2022-07-29

## 2022-07-29 ENCOUNTER — OFFICE VISIT (OUTPATIENT)
Dept: NEUROSURGERY | Facility: CLINIC | Age: 82
End: 2022-07-29
Attending: PSYCHIATRY & NEUROLOGY
Payer: COMMERCIAL

## 2022-07-29 VITALS
OXYGEN SATURATION: 98 % | HEIGHT: 68 IN | BODY MASS INDEX: 23.49 KG/M2 | HEART RATE: 74 BPM | WEIGHT: 155 LBS | DIASTOLIC BLOOD PRESSURE: 61 MMHG | SYSTOLIC BLOOD PRESSURE: 128 MMHG

## 2022-07-29 DIAGNOSIS — D32.0 MENINGIOMA, CEREBRAL (H): ICD-10-CM

## 2022-07-29 DIAGNOSIS — M48.02 SPINAL STENOSIS IN CERVICAL REGION: ICD-10-CM

## 2022-07-29 PROCEDURE — 99203 OFFICE O/P NEW LOW 30 MIN: CPT | Performed by: NEUROLOGICAL SURGERY

## 2022-07-29 ASSESSMENT — PAIN SCALES - GENERAL: PAINLEVEL: NO PAIN (0)

## 2022-07-29 NOTE — PROGRESS NOTES
Dear Mohinder,    It was a pleasure to see Lizzette Chanel today in Neurosurgery Clinic. She is a 81 year old female who is here today for evaluation of a meningioma as well as her cervical stenosis.    She has a history of polio as a teenager and has had stable left upper extremity sequelae from this for a long time.  She also describes some symptoms of increasing fatigue recently and perhaps some worsening memory problems.    She underwent an MRI of the cervical spine and MRI of the brain and is here to discuss some of the findings.    She is a retired physical therapist.  She has a small dog.  She is here with her friend.    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    had vaginal bleeding, surgery done 2021     Arthritis mild in knees, shoulders     Cancer (H) skin--Basil Cell, [place]     Chest pain 2003    neg est thallium     Chest pain 04/2016    nl est echo and cxr     Complication of anesthesia     BP maddie to over 200 in PACU 1 month ago-after D&C     Essential hypertension 07/2021     Fatigue 08/25/2021     Hx of colonoscopy 2003    incomplete, be nl     Hx of colonoscopy 06/03/2011    nl     Hypercholesteremia 2011    aches with zocor     CAITLYN (obstructive sleep apnea) 12/2012    done Emeigh, using dental device, added cpap 2014      Osteopenia 2013    Dr. Taylor     Osteoporosis of lumbar spine 07/2020    added fosamax     Peripheral neuropathy     Dr. 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     SOB (shortness of breath) 08/2021    elevated d dimer, ct no pe but signs of chf, then echo nl 9/21     Statin intolerance     zocor and one other caused aches     Subdural hematoma (H) 01/2016    traumatic, fu ct 3/16 resolved     Uncomplicated asthma      Past Surgical History:   Procedure Laterality Date     ABDOMEN SURGERY   1960     APPENDECTOMY  60's     BIOPSY  2010    inside upper lip     DAVINCI HYSTERECTOMY TOTAL, BILATERAL SALPINGO-OOPHORECTOMY, NODE DISSECTION, COMBINED Bilateral 7/8/2021    Procedure: ROBOTIC ASSISTED TOTAL LAPAROSCOPIC HYSTERECTOMY, BILATERAL SALPINGO-OOPHORECTOMY, WASHINGS, INTRA-OPERATIVE SENTINEL LYMPH NODE MAPPING, BILATERAL PELVIC AND PARA-AORTIC LYMPHADENECTOMY, REPAIR VAGINAL TEAR;  Surgeon: Dianne Garland MD;  Location:  OR     DILATION AND CURETTAGE, HYSTEROSCOPY WITH ULTRASOUND GUIDANCE N/A 5/11/2021    Procedure: HYSTEROSCOPY UNDER ULTRASOUND GUIDANCE, DILATION AND CURETTAGE OF UTERUS;  Surgeon: Isabel Ferro MD;  Location:  OR     OPERATIVE HYSTEROSCOPY WITH MORCELLATOR N/A 5/11/2021    Procedure: MYOSURE MORCELLATOR;  Surgeon: Isabel Ferro MD;  Location:  OR     PHACOEMULSIFICATION CLEAR CORNEA WITH STANDARD INTRAOCULAR LENS IMPLANT  3/21/2012    Procedure:PHACOEMULSIFICATION CLEAR CORNEA WITH STANDARD INTRAOCULAR LENS IMPLANT; RIGHT PHACOEMULSIFICATION CLEAR CORNEA WITH STANDARD INTRAOCULAR LENS IMPLANT ; Surgeon:CHANDRAKANT HERNANDEZ; Location: [facility]     PHACOEMULSIFICATION CLEAR CORNEA WITH STANDARD INTRAOCULAR LENS IMPLANT  3/28/2012    Procedure:PHACOEMULSIFICATION CLEAR CORNEA WITH STANDARD INTRAOCULAR LENS IMPLANT; LEFT PHACOEMULSIFICATION CLEAR CORNEA WITH STANDARD INTRAOCULAR LENS IMPLANT ; Surgeon:CHANDRAKANT HERNANDEZ; Location:[facility]     SHOULDER SURGERY  1990's    twice     SOFT TISSUE SURGERY  R&L Rotator cuff repairs     TONSILLECTOMY  child      No Known Allergies    Current Outpatient Medications:      alendronate (FOSAMAX) 70 MG tablet, TAKE 1 TABLET(70 MG) BY MOUTH EVERY 7 DAYS, Disp: 12 tablet, Rfl: 3     Apoaequorin 10 MG CAPS, Take 1 capsule by mouth daily Prevagen, Disp: , Rfl:      aspirin (ASA) 81 MG chewable tablet, Take 1 tablet (81 mg) by mouth daily, Disp: 100 tablet, Rfl: 4     Calcium Carbonate-Vitamin D (CALCIUM-D PO), Take by mouth daily  Liquid  Calcium 1200 mg  & Vit D 800 iU, Disp: , Rfl:      Coenzyme Q10 (COQ10 PO), Take 100 mg by mouth., Disp: , Rfl:      hydrochlorothiazide (HYDRODIURIL) 12.5 MG tablet, Take 1 tablet (12.5 mg) by mouth daily, Disp: 90 tablet, Rfl: 3     losartan (COZAAR) 25 MG tablet, Take 1 tablet (25 mg) by mouth 2 times daily, Disp: 180 tablet, Rfl: 3     ORDER FOR DME, Use your CPAP device as directed by your provider., Disp: , Rfl:      rosuvastatin (CRESTOR) 5 MG tablet, TAKE 1 TABLET(5 MG) BY MOUTH DAILY, Disp: 90 tablet, Rfl: 3     vitamin B-12 (CYANOCOBALAMIN) 1000 MCG tablet, Take 1,000 mcg by mouth daily, Disp: , Rfl:   Social History     Socioeconomic History     Marital status: Single     Spouse name: None     Number of children: 0     Years of education: None     Highest education level: None   Occupational History     Occupation: physical therapy     Employer: RETIRED   Tobacco Use     Smoking status: Former Smoker     Packs/day: 1.50     Years: 12.00     Pack years: 18.00     Types: Cigarettes     Start date: 1966     Quit date: 3/17/1974     Years since quittin.4     Smokeless tobacco: Never Used     Tobacco comment: enjoyed it!   Substance and Sexual Activity     Alcohol use: Yes     Comment: Minimal     Drug use: No     Sexual activity: Not Currently     Partners: Male     Birth control/protection: None   Other Topics Concern     Parent/sibling w/ CABG, MI or angioplasty before 65F 55M? Yes     Comment: Brother  of heart attack at 49 yrs old   Social History Narrative    Single, no kids    Has a cabin    Has godchildren that can help out      Problem (# of Occurrences) Relation (Name,Age of Onset)    Alzheimer Disease (1) Mother (Cate Chanel)    Asthma (1) Brother (Jorje Chanel)    C.A.D. (2) Father, Brother    Cerebrovascular Disease (1) Mother (Cate Chanel): mild. Still was very functional    Heart Disease (2) Father, Mother (Cate Chanel, 97)    Osteoporosis (1) Mother (Cate  "Darío)    Substance Abuse (1) Mother (Cate Chanel): Significant treatment over 15 yrs but ended with very good results           ROS: 10 point ROS neg other than the symptoms noted above in the HPI.    Vitals:    07/29/22 0809   BP: 128/61   Pulse: 74   SpO2: 98%   Weight: 70.3 kg (155 lb)   Height: 1.727 m (5' 8\")     Body mass index is 23.57 kg/m .  No Pain (0)    Awake alert and oriented.  Pupils equal round reactive light.  Facial sensation intact to light touch.    Shoulder shrug 5 out of 5 bilaterally.  Bilateral upper extremity strength is 5 out of 5 in all muscle groups although she does have some atrophy of the left hand.    Bilateral lower extremity strength is 5 out of 5 in the right lower extremity and 4+ out of 5 in the left lower extremity.    Reflexes 2+ bicep 0 tricep 1+ patella 0 Achilles.    No Dozier's, clonus.    Sensation intact to light touch.    Imaging: MRI of the brain shows a lesion over the right cerebral convexity most consistent with a meningioma.  Review of previous head CTs from 2018 in 2016 suggest that there is likely a small mass there at that time.    Review of the MRI of the cervical spine shows stenosis without signal change.    The imaging was reviewed with the patient shown to the patient in clinic today.    Assessment: 1.  Asymptomatic meningioma.  2.  Cervical stenosis without myelopathy.    Plan: I have recommended repeat MRI in approximately a year to evaluate her meningioma.  This appears to been subtly present on prior imaging.     As to her cervical stenosis, I think observation would be the best course of action as she does not seem to have any particular symptoms related to this.    I know you are planning on following her up for both of these things and we will leave her in your hands unless there is a change in her symptomatology.  We discussed symptoms of both problems that would prompt repeat evaluation.     "

## 2022-07-29 NOTE — PROGRESS NOTES
"Lizzette Chanel is a 81 year old female who presents for:  Chief Complaint   Patient presents with     Consult     Opinion request for cervical spinal stenosis and R brain lesion.        Initial Vitals:  /61   Pulse 74   Ht 5' 8\" (1.727 m)   Wt 155 lb (70.3 kg)   SpO2 98%   BMI 23.57 kg/m   Estimated body mass index is 23.57 kg/m  as calculated from the following:    Height as of this encounter: 5' 8\" (1.727 m).    Weight as of this encounter: 155 lb (70.3 kg).. Body surface area is 1.84 meters squared. BP completed using cuff size: regular  No Pain (0)    Nursing Comments:     Rosana Allan MA    "

## 2022-07-29 NOTE — LETTER
7/29/2022         RE: Lizzette Chanel  [address]  [place] [address]        Dear Colleague,    Thank you for referring your patient, Lizzette Chanel, to the [facility]. Please see a copy of my visit note below.    Dear Mohinder,    It was a pleasure to see Lizzette Chanel today in Neurosurgery Clinic. She is a 81 year old female who is here today for evaluation of a meningioma as well as her cervical stenosis.    She has a history of polio as a teenager and has had stable left upper extremity sequelae from this for a long time.  She also describes some symptoms of increasing fatigue recently and perhaps some worsening memory problems.    She underwent an MRI of the cervical spine and MRI of the brain and is here to discuss some of the findings.    She is a retired physical therapist.  She has a small dog.  She is here with her friend.    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    had vaginal bleeding, surgery done 2021     Arthritis mild in knees, shoulders     Cancer (H) skin--Basil Cell, [place]     Chest pain 2003    neg est thallium     Chest pain 04/2016    nl est echo and cxr     Complication of anesthesia     BP maddie to over 200 in PACU 1 month ago-after D&C     Essential hypertension 07/2021     Fatigue 08/25/2021     Hx of colonoscopy 2003    incomplete, be nl     Hx of colonoscopy 06/03/2011    nl     Hypercholesteremia 2011    aches with zocor     CAITLYN (obstructive sleep apnea) 12/2012    done [place], using dental device, added cpap 2014      Osteopenia 2013    Dr. Taylor     Osteoporosis of lumbar spine 07/2020    added fosamax     Peripheral neuropathy     Dr. 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     SOB (shortness of breath) 08/2021    elevated d dimer, ct no pe  but signs of chf, then echo nl 9/21     Statin intolerance     zocor and one other caused aches     Subdural hematoma (H) 01/2016    traumatic, fu ct 3/16 resolved     Uncomplicated asthma      Past Surgical History:   Procedure Laterality Date     ABDOMEN SURGERY  1960     APPENDECTOMY  60's     BIOPSY  2010    inside upper lip     DAVINCI HYSTERECTOMY TOTAL, BILATERAL SALPINGO-OOPHORECTOMY, NODE DISSECTION, COMBINED Bilateral 7/8/2021    Procedure: ROBOTIC ASSISTED TOTAL LAPAROSCOPIC HYSTERECTOMY, BILATERAL SALPINGO-OOPHORECTOMY, WASHINGS, INTRA-OPERATIVE SENTINEL LYMPH NODE MAPPING, BILATERAL PELVIC AND PARA-AORTIC LYMPHADENECTOMY, REPAIR VAGINAL TEAR;  Surgeon: Dianne aGrland MD;  Location:  OR     DILATION AND CURETTAGE, HYSTEROSCOPY WITH ULTRASOUND GUIDANCE N/A 5/11/2021    Procedure: HYSTEROSCOPY UNDER ULTRASOUND GUIDANCE, DILATION AND CURETTAGE OF UTERUS;  Surgeon: Isabel Ferro MD;  Location:  OR     OPERATIVE HYSTEROSCOPY WITH MORCELLATOR N/A 5/11/2021    Procedure: MYOSURE MORCELLATOR;  Surgeon: Isabel Ferro MD;  Location:  OR     PHACOEMULSIFICATION CLEAR CORNEA WITH STANDARD INTRAOCULAR LENS IMPLANT  3/21/2012    Procedure:PHACOEMULSIFICATION CLEAR CORNEA WITH STANDARD INTRAOCULAR LENS IMPLANT; RIGHT PHACOEMULSIFICATION CLEAR CORNEA WITH STANDARD INTRAOCULAR LENS IMPLANT ; Surgeon:CHANDRAKANT HERNANDEZ; Location:[facility]     PHACOEMULSIFICATION CLEAR CORNEA WITH STANDARD INTRAOCULAR LENS IMPLANT  3/28/2012    Procedure:PHACOEMULSIFICATION CLEAR CORNEA WITH STANDARD INTRAOCULAR LENS IMPLANT; LEFT PHACOEMULSIFICATION CLEAR CORNEA WITH STANDARD INTRAOCULAR LENS IMPLANT ; Surgeon:CHANDRAKANT HERNANDEZ; Location:[facility]     SHOULDER SURGERY  1990's    twice     SOFT TISSUE SURGERY  R&L Rotator cuff repairs     TONSILLECTOMY  child      No Known Allergies    Current Outpatient Medications:      alendronate (FOSAMAX) 70 MG tablet, TAKE 1 TABLET(70 MG) BY MOUTH EVERY 7 DAYS, Disp: 12 tablet,  Rfl: 3     Apoaequorin 10 MG CAPS, Take 1 capsule by mouth daily Prevagen, Disp: , Rfl:      aspirin (ASA) 81 MG chewable tablet, Take 1 tablet (81 mg) by mouth daily, Disp: 100 tablet, Rfl: 4     Calcium Carbonate-Vitamin D (CALCIUM-D PO), Take by mouth daily Liquid  Calcium 1200 mg  & Vit D 800 iU, Disp: , Rfl:      Coenzyme Q10 (COQ10 PO), Take 100 mg by mouth., Disp: , Rfl:      hydrochlorothiazide (HYDRODIURIL) 12.5 MG tablet, Take 1 tablet (12.5 mg) by mouth daily, Disp: 90 tablet, Rfl: 3     losartan (COZAAR) 25 MG tablet, Take 1 tablet (25 mg) by mouth 2 times daily, Disp: 180 tablet, Rfl: 3     ORDER FOR DME, Use your CPAP device as directed by your provider., Disp: , Rfl:      rosuvastatin (CRESTOR) 5 MG tablet, TAKE 1 TABLET(5 MG) BY MOUTH DAILY, Disp: 90 tablet, Rfl: 3     vitamin B-12 (CYANOCOBALAMIN) 1000 MCG tablet, Take 1,000 mcg by mouth daily, Disp: , Rfl:   Social History     Socioeconomic History     Marital status: Single     Spouse name: None     Number of children: 0     Years of education: None     Highest education level: None   Occupational History     Occupation: physical therapy     Employer: RETIRED   Tobacco Use     Smoking status: Former Smoker     Packs/day: 1.50     Years: 12.00     Pack years: 18.00     Types: Cigarettes     Start date: 1966     Quit date: 3/17/1974     Years since quittin.4     Smokeless tobacco: Never Used     Tobacco comment: enjoyed it!   Substance and Sexual Activity     Alcohol use: Yes     Comment: Minimal     Drug use: No     Sexual activity: Not Currently     Partners: Male     Birth control/protection: None   Other Topics Concern     Parent/sibling w/ CABG, MI or angioplasty before 65F 55M? Yes     Comment: Brother  of heart attack at 49 yrs old   Social History Narrative    Single, no kids    Has a cabin    Has godchildren that can help out      Problem (# of Occurrences) Relation (Name,Age of Onset)    Alzheimer Disease (1) Mother (Cate  "Darío)    Asthma (1) Brother (Jorje Chanel)    C.A.D. (2) Father, Brother    Cerebrovascular Disease (1) Mother (Cate Chanel): mild. Still was very functional    Heart Disease (2) Father, Mother (Cate Chanel, 97)    Osteoporosis (1) Mother (Cate Chanel)    Substance Abuse (1) Mother (Cate Chanel): Significant treatment over 15 yrs but ended with very good results           ROS: 10 point ROS neg other than the symptoms noted above in the HPI.    Vitals:    07/29/22 0809   BP: 128/61   Pulse: 74   SpO2: 98%   Weight: 70.3 kg (155 lb)   Height: 1.727 m (5' 8\")     Body mass index is 23.57 kg/m .  No Pain (0)    Awake alert and oriented.  Pupils equal round reactive light.  Facial sensation intact to light touch.    Shoulder shrug 5 out of 5 bilaterally.  Bilateral upper extremity strength is 5 out of 5 in all muscle groups although she does have some atrophy of the left hand.    Bilateral lower extremity strength is 5 out of 5 in the right lower extremity and 4+ out of 5 in the left lower extremity.    Reflexes 2+ bicep 0 tricep 1+ patella 0 Achilles.    No Dozier's, clonus.    Sensation intact to light touch.    Imaging: MRI of the brain shows a lesion over the right cerebral convexity most consistent with a meningioma.  Review of previous head CTs from 2018 in 2016 suggest that there is likely a small mass there at that time.    Review of the MRI of the cervical spine shows stenosis without signal change.    The imaging was reviewed with the patient shown to the patient in clinic today.    Assessment: 1.  Asymptomatic meningioma.  2.  Cervical stenosis without myelopathy.    Plan: I have recommended repeat MRI in approximately a year to evaluate her meningioma.  This appears to been subtly present on prior imaging.     As to her cervical stenosis, I think observation would be the best course of action as she does not seem to have any particular symptoms related to this.    I know you are planning on " "following her up for both of these things and we will leave her in your hands unless there is a change in her symptomatology.  We discussed symptoms of both problems that would prompt repeat evaluation.       Lizzette Chanel is a 81 year old female who presents for:  Chief Complaint   Patient presents with     Consult     Opinion request for cervical spinal stenosis and R brain lesion.        Initial Vitals:  /61   Pulse 74   Ht 5' 8\" (1.727 m)   Wt 155 lb (70.3 kg)   SpO2 98%   BMI 23.57 kg/m   Estimated body mass index is 23.57 kg/m  as calculated from the following:    Height as of this encounter: 5' 8\" (1.727 m).    Weight as of this encounter: 155 lb (70.3 kg).. Body surface area is 1.84 meters squared. BP completed using cuff size: regular  No Pain (0)    Nursing Comments:     Rosana Allan MA        Again, thank you for allowing me to participate in the care of your patient.        Sincerely,        Leif Banuelos MD    "

## 2022-07-29 NOTE — PATIENT INSTRUCTIONS
Patient Next Steps:    Follow-up with Dr. Lyles as planned     Please call us if you have any further questions or concerns.    [facility]   Phone: [phone]  Fax: [phone]

## 2022-08-01 NOTE — PROGRESS NOTES
Adult Neuropsychology Clinic  [facility]      NEUROPSYCHOLOGICAL EVALUATION    RELEVANT HISTORY AND REASON FOR REFERRAL    This is a report of neuropsychological consultation regarding Lizzette Chanel (Judy), an 81-year-old, right-handed woman with 16 years of formal education. She presents with concerns about slowly progressive memory problems over the last couple of years, such as misplacing items, being forgetful, and getting lost. She saw Dr. Mohinder Lyles for a neurological evaluation in  and had trouble on memory items on bedside cognitive screening. There have been increasing issues with fatigue and weakness, and her mood has been lower than usual. She had polio in high school and has had persistent left-sided weakness since then. She was diagnosed with post-polio syndrome in 2018, and it is currently seen as stable. A right frontal meningioma was discovered on brain MRI in , along with mild small vessel ischemic disease and mild-to-moderate parenchymal volume loss. She lives alone and remains independent for all ADLs. Family history includes Alzheimer s disease and small strokes for her mother, who  at 93 in skilled nursing care. Her father and brother had heart disease and  at 63 and 49, respectively. Additional medical concerns include hypertension, hyperlipidemia, obstructive sleep apnea, cervical spinal stenosis, neuropathy, and osteoporosis. Her current medication list includes alendronate, apoaequorin, 81 mg aspirin, calcium carbonate-vitamin D, coenzyme Q10, hydrochlorothiazide, losartan, rosuvastatin, and vitamin B-12.     In today s interview, Ms. Chanel confirms that her primary concerns are short-term memory lapses that have gradually worsened over the last couple of years. She is here with her longtime friend, Fabiola, who describes the short-term memory troubles as very prominent. She says that Ms. Chanel catches herself and notices the lapses, provoking frustration. She  was doing things like losing track of her mobile phone, but it has been helpful to keep it in a fixed location at home. She has been lost while driving a few times, and Fabiola is a little concerned about driving safety. Ms. Chanel has not felt any particular need to change or limit her driving habits. She manages her medications independently and reports no problems. She manages her finances independently and reports no problems. She has her own home, and she lives alone with her dog. [place]     She has never been  and does not have children. She does not have any family members in the area. She does have many friends, and she tries to see them as often as possible. The COVID-19 pandemic has substantially reduced her socialization frequency and quality.    She is experiencing more challenges with her mood than is typical for her. Living with her dog helps, and she still enjoys time with her friends. She went to see a counselor about 2 or 3 years ago and found it helpful, but for most of her life she has enjoyed exceptional emotional adjustment. She has never taken psychiatric medications or had a psychiatric hospitalization. She reports some fleeting thoughts of suicide within the last year, which she actively talks herself away from. She denies any lasting desires, preparations, or ever taking any actions to harm herself. She has not had any hallucinatory experiences. I get no indications in the report today of fundamental shifts in personality, temperament, or management of social behaviors. She is described as socially slowed down and less engaged than usual, seemingly for a mix of lower mood, chronic fatigue, and the pandemic.    She has treated her sleep apnea with CPAP for many years. Her sleep is usually good overnight. Records indicate a history of insomnia, but she reports no such concerns currently. There have been no indications of REM sleep  behaviors. She is chronically exhausted during the day. She would prefer to take a nap but cannot always do so. She says there have been some instances of unintentional sleeping during the day. Fabiola notes that Ms. Chanel used to have really great levels of energy, but in the last year she has frequently talked about how exhausted she is. She is bothered by declining physical abilities, particularly with left leg troubles, and the lack of diagnostic clarity around it.     As noted, a possible right frontal meningioma was just discovered on neuroimaging studies last month. It was described as an extra-axial mass of dural origin having a mild degree of regional mass-effect. There is no history of stroke, seizure, TBI, or migraine. She reports no abnormal changes to her senses of smell, taste, or vision. She had her hearing tested and plans to get hearing aids but has not yet obtained them. She notices abnormal sensation in her fingers and feet. She has been more careful than usual when walking, to keep her balance. She does not use any sort of assistive device, and she reports no falls.    She has not had a COVID-19 infection.    She reports minimal alcohol use and no history of problematic drinking. She used to use tobacco but quit in 1974. She does not use any illicit drugs.    There is no history of early academic delays. She was always a very good student. She was driven to achieve and also had a high level of involvement in extracurricular activities. Her polio infection came when she was in high school. After high school, she went on to earn a bachelor s degree in physical therapy.    Her career was in physical therapy. 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 She retired at 63.    BEHAVIORAL OBSERVATIONS    Ms. Chanel was polite and cooperative with the evaluation. She did appear tired, but she  was not somnolent. A high-frequency, low amplitude head and neck tremor were visible throughout the visit. She was open and candid in the interview, and she demonstrated appropriate levels of insight into the referral concerns. She was highly conversant and did not show indications of gross aphasia. Thought processes were linear and goal-directed. Affective display was congruent with mood and conversational content. During the testing session, she was notably slow in her approach to tasks that did not have set time limits. She was alert and attentive. Her test engagement was good, and she persisted well with all requested procedures. The data are seen as valid estimations of her cognitive capacity.     MEASURES ADMINISTERED    The following measures were administered by a trained psychometrist, under my supervision:    Orientation: Time, Place, Basic Personal Information, Recent [place] Presidents; Wide Range Achievement Test 5: Word Reading; Wechsler Adult Intelligence Scale-IV: Similarities, Matrix Reasoning, Digit Span, Coding; Controlled Oral Word Association Test; Animal Naming Test; Pioche Naming Test; Marlo Visual Acuity Screen; Clock Drawing; Finger Tapping; Trail Making Test; Stroop Test; [place] Card Sorting Test; Emiliano-Osterrieth Complex Figure Test; Emiliano Auditory Verbal Learning Test; Wechsler Memory Scale-IV: Logical Memory; Geriatric Depression Scale.    RESULTS AND INTERPRETATION    Orientation: Orientation was normal for time and basic personal information. She was not able to demonstrate orientation to place. She was able to name 2 of the 6 most recent [place] presidents.     Language & Related Skills: Basic reading and pronunciation skills were average. Abstract verbal analogical reasoning was average. Letter-based verbal fluency was average for total correct items but notable for 6 errors. Category-based verbal fluency was below average. Confrontation naming was below average.    Visual Perceptual &  Constructional Skills: Binocular, corrected, near-point visual acuity was 20/20 on Marlo screening. Visual reasoning through pattern identification was high average. Clock drawing was normal. Copying a complex geometric figure was average for total accuracy but very slow to complete.     Motor Skills: Speeded finger tapping was average with the dominant right hand and exceptionally low with the nondominant left hand.     Mental Speed & Executive Functioning: As noted above, speeded verbal fluency performances were mildly abnormal. Cognitive processing speed was average on a timed transcription task. Visual scanning and graphomotor sequencing under simple conditions was average. Scanning and sequencing under greater executive demands to control divided attention was average. Speeded word reading was low average and speeded color naming was average. Color naming under demands to inhibit reflexive responding was average. Conceptualization, inductive reasoning, and using continuous feedback to overcome errors were average on an ambiguous card-sorting task.     Attention & Working Memory: Immediate auditory attention and working memory were average for repeating and rearranging digit strings.     Learning & Anterograde Memory: Immediate verbal memory for short stories was low average, and delayed story recall was below average. Delayed recognition of story details was low average. Learning a word list over repeated readings was low average. Delayed free recall of the list was exceptionally low, and delayed recognition of the list was low average. A few minutes after the initial copy, incidental free recall of the complex figure was low average. After 30 minutes, recall of the figure was below average, and recognition of individual figure elements was exceptionally low.     Emotional Functioning: On a brief self-report inventory, she endorsed minimal to borderline symptoms related to depression and anxiety (GDS =  8/30).     IMPRESSIONS    The neuropsychological results are mildly abnormal. Cognitive testing shows a deficit in naming/lexical retrieval and anterograde memory formation, and some aspects of orientation are lower than expected. There is also significant fine-motor slowing for the left hand. She can be very slow to complete tasks when they are devoid of time limits, but on those tasks with explicit instructions to go as quickly as possible, she does well. The remainder of today s performances are within normal ranges.     The concerns about low mood and chronic fatigue are common in post-polio syndrome. I do not suspect a primary psychiatric disorder.     She reports that left-sided weakness and motor trouble has been present since having polio in high school. There could be an additional contribution from the right frontal meningioma recently seen on imaging. However, the right frontal meningioma would not be expected to produce the cognitive issues observed today. Those would involve the medial temporal lobes, basal forebrain, and left anterior temporal regions. The neuropsychological profile looks like the early effects of Alzheimer s disease with a superimposed focal motor problem. Ms. Chanel is not in a state of dementia at this time. She retains many significant cognitive strengths and is apparently living alone without serious problems. However, there could be problems she is not noticing or has forgotten about. Nonetheless, a diagnosis of mild cognitive impairment (MCI) is appropriate at this time.     RECOMMENDATIONS    I met with Ms. Chanel and her friend, Fabiola, to discuss preliminary results and recommendations after the testing session.     1. Continued neurological care and monitoring are needed. I defer to Dr. Lyles on any additional workups or treatment options.   2. She should continue to work with her medical team on pharmacological treatments for fatigue and low mood.   3. She should  endeavor to stay physically active, while minimizing fall risk.   4. Caution with driving would be appropriate, but I do not have evidence to suggest driving needs to stop.   5. Her memory deficit puts her at risk for errors in managing medications and finances. She does not have family in the area. It would be ideal to have trusted friends, or possibly hired professionals, providing occasional checks on these matters. An Occupational Therapy referral is warranted, to get objective data on risks for ADL management.   6. I encourage making a connection to the Alzheimer s Association (www.alz.org). They are a great resource for education, guidance, and support in planning for the years to come.   7. Longitudinal monitoring of cognition is advised. I would like to see her again in about 12 months, to check stability versus change in her cognitive abilities.     Franck Mccauley, PhD, LP, ABPP-CN  Board Certified in Clinical Neuropsychology  Licensed Psychologist [id number]      Time spent: One unit psychiatric evaluation including records review, interview, and clinical assessment licensed and board-certified neuropsychologist (CPT 31070). 132 minutes neuropsychological testing evaluation by licensed and board-certified neuropsychologist, including integration of patient data, interpretation of standardized test results and clinical data, clinical decision-making, treatment planning, report, and interactive feedback to the patient (CPT 06365, 38124). 191 minutes of psychological and neuropsychological test administration and scoring by technician (CPT 03759, 74515). Diagnoses: G31.84, G14, D32.0

## 2022-08-01 NOTE — PROGRESS NOTES
Name: Lizzette Chanel MRN: [id number]  : 1940  FOURNIER: 2022  Staff: FRANCISCO Tech: FRANDY Age: 81  Sex: Female Hand: Right Educ: 16  Vision: 20/20 ?with correction / ?without correction    ORIENTATION     Time  -0     Place  0 /2     Personal info          Presidents     WRAT5   SS %ile Grade Equiv.     Word Reading  99 47 >12.9    WAIS-IV   Raw SSa     Similarities  22 10     Matrix Reasoning 15 14     Digit Span  25 11 RDS= 7     Coding  32 8    COWAT (CFL)     Raw: 34  SS: 10 %ile: 41-59    ANIMAL NAMING TEST     Raw: 10  SS: 4 T: 27    BOSTON NAMING TEST     Raw: 33  SS: 5 %ile: 3-5     CLOCK DRAWING: Normal    FINGER TAPPING   Avg  SS T     RH  40.0 6 52     LH 19.7 0 23     TRAILS Raw  Err SS %ile     A 50  0 10 41-59      B 107  0 11 60-71     STROOP TEST   Raw SS %ile      Word 72 8 19-28     Color  52 10 41-59     C/W  25 10 41-59    WCST (64 cards) Raw      Categories: 4      Total Correct: 53     Total Errors: 11      Persev. Err.: 5      Concept. Resp.:53      FTMS:  1     LTL:  2.78     RYAN-O    Raw    T %ile     Time to Copy        2-5     Copy    31.0     >16     Short Delay Recall 5.0 41 18     Long Delay Recall 3.5 35 7     Recognition Total 14 <20 <1    AVLT (30-91 Lewis Norms)     Trial 1 2 3 4 5 B 6 30              3 6 8 8 10 3 1 1       Raw T %ile     Trial 1-5 Total   35 44 17-31     Short Delay Recall (T6)  1 28 ?2     30  Recall   1 28 ?2     30  Recognition Hits/FPs  14/3 42 17-31     Memory Efficiency Score 0.88 32 3-7    WMS-IV  Raw SS / %ile     LM I  17 6     LM II  1 4     LM Recog. 14 10th-16th    GDS (30-item)     Raw:  8 Interpretation: Minimal

## 2022-08-18 ENCOUNTER — OFFICE VISIT (OUTPATIENT)
Dept: FAMILY MEDICINE | Facility: CLINIC | Age: 82
End: 2022-08-18
Payer: COMMERCIAL

## 2022-08-18 VITALS
HEART RATE: 65 BPM | OXYGEN SATURATION: 96 % | SYSTOLIC BLOOD PRESSURE: 126 MMHG | RESPIRATION RATE: 16 BRPM | WEIGHT: 157 LBS | HEIGHT: 68 IN | DIASTOLIC BLOOD PRESSURE: 61 MMHG | BODY MASS INDEX: 23.79 KG/M2

## 2022-08-18 DIAGNOSIS — D32.0 MENINGIOMA, CEREBRAL (H): ICD-10-CM

## 2022-08-18 DIAGNOSIS — F32.A MILD DEPRESSIVE DISORDER: ICD-10-CM

## 2022-08-18 DIAGNOSIS — M79.662 PAIN OF LEFT LOWER LEG: Primary | ICD-10-CM

## 2022-08-18 DIAGNOSIS — M48.02 SPINAL STENOSIS IN CERVICAL REGION: ICD-10-CM

## 2022-08-18 PROCEDURE — 99214 OFFICE O/P EST MOD 30 MIN: CPT | Performed by: INTERNAL MEDICINE

## 2022-08-18 RX ORDER — ESCITALOPRAM OXALATE 5 MG/1
5 TABLET ORAL DAILY
Qty: 50 TABLET | Refills: 0 | Status: SHIPPED | OUTPATIENT
Start: 2022-08-18 | End: 2022-08-22

## 2022-08-18 ASSESSMENT — PAIN SCALES - GENERAL: PAINLEVEL: MODERATE PAIN (4)

## 2022-08-18 NOTE — PROGRESS NOTES
This is a very pleasant patient seen with her friend for follow-up on multiple issues.  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  She is also noted to have memory difficulties with recent testing showing mild cognitive impairment.  Additionally, she has a new right hemispheric brain lesion felt likely due to a meningioma.  Imaging of her brain on June 13 of this year showed a 3.8 cm dural based mass.  She subsequently has been seen by Dr. Banuelos of neurosurgery for this as well as cervical spinal stenosis.  He recommended a follow-up MRI of her brain in a year to evaluate the meningioma and observation for the cervical stenosis.    Patient is struggling with a few issues.  She is having a pain in the left posterior leg.  It seems to be in the posterior thigh center just above and below her knee.  She does not have pain at rest but really feels it mostly when she bends her knee.  She can feel it if she is walking at times or standing too long.  She had a prior ultrasound of her left leg to rule out DVT and this was unremarkable, done by Dr. Felix Vickers.  She does not have pain on the right side.    Additionally, she is having mood issues.  She is normally a very upbeat person and active person but lately has not been so.  She admits to having a depressed mood and not wanting to do things but is not suicidal.  She has ongoing fatigue which I evaluated before with negative labs.    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    had vaginal bleeding, surgery done 2021     Arthritis mild in knees, shoulders     Cancer (H) skin--Basil Cell, Squamas     Chest pain 2003    neg est thallium     Chest pain 04/2016    nl est echo and  cxr     Complication of anesthesia     BP maddie to over 200 in PACU 1 month ago-after D&C     Essential hypertension 07/2021     Fatigue 08/25/2021     Hx of colonoscopy 2003    incomplete, be nl     Hx of colonoscopy 06/03/2011    nl     Hypercholesteremia 2011    aches with zocor     CAITLYN (obstructive sleep apnea) 12/2012    done [place], using dental device, added cpap 2014      Osteopenia 2013    Dr. Taylor     Osteoporosis of lumbar spine 07/2020    added fosamax     Peripheral neuropathy     Dr. 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     SOB (shortness of breath) 08/2021    elevated d dimer, ct no pe but signs of chf, then echo nl 9/21     Statin intolerance     zocor and one other caused aches     Subdural hematoma (H) 01/2016    traumatic, fu ct 3/16 resolved     Uncomplicated asthma      Past Surgical History:   Procedure Laterality Date     ABDOMEN SURGERY  1960     APPENDECTOMY  60's     BIOPSY  2010    inside upper lip     DAVINCI HYSTERECTOMY TOTAL, BILATERAL SALPINGO-OOPHORECTOMY, NODE DISSECTION, COMBINED Bilateral 7/8/2021    Procedure: ROBOTIC ASSISTED TOTAL LAPAROSCOPIC HYSTERECTOMY, BILATERAL SALPINGO-OOPHORECTOMY, WASHINGS, INTRA-OPERATIVE SENTINEL LYMPH NODE MAPPING, BILATERAL PELVIC AND PARA-AORTIC LYMPHADENECTOMY, REPAIR VAGINAL TEAR;  Surgeon: Dianne Garland MD;  Location:  OR     DILATION AND CURETTAGE, HYSTEROSCOPY WITH ULTRASOUND GUIDANCE N/A 5/11/2021    Procedure: HYSTEROSCOPY UNDER ULTRASOUND GUIDANCE, DILATION AND CURETTAGE OF UTERUS;  Surgeon: Isabel Ferro MD;  Location:  OR     OPERATIVE HYSTEROSCOPY WITH MORCELLATOR N/A 5/11/2021    Procedure: MYOSURE MORCELLATOR;  Surgeon: Isabel Ferro MD;  Location:  OR     PHACOEMULSIFICATION CLEAR CORNEA WITH STANDARD INTRAOCULAR LENS IMPLANT  3/21/2012    Procedure:PHACOEMULSIFICATION CLEAR CORNEA WITH STANDARD INTRAOCULAR LENS IMPLANT; RIGHT  PHACOEMULSIFICATION CLEAR CORNEA WITH STANDARD INTRAOCULAR LENS IMPLANT ; Surgeon:CHANDRAKANT HERNANDEZ; Location: [facility]     PHACOEMULSIFICATION CLEAR CORNEA WITH STANDARD INTRAOCULAR LENS IMPLANT  3/28/2012    Procedure:PHACOEMULSIFICATION CLEAR CORNEA WITH STANDARD INTRAOCULAR LENS IMPLANT; LEFT PHACOEMULSIFICATION CLEAR CORNEA WITH STANDARD INTRAOCULAR LENS IMPLANT ; Surgeon:CHANDRAKANT HERNANDEZ; Location: [facility]     SHOULDER SURGERY      twice     SOFT TISSUE SURGERY  R&L Rotator cuff repairs     TONSILLECTOMY  child     Social History     Socioeconomic History     Marital status: Single     Spouse name: Not on file     Number of children: 0     Years of education: Not on file     Highest education level: Not on file   Occupational History     Occupation: physical therapy     Employer: RETIRED   Tobacco Use     Smoking status: Former Smoker     Packs/day: 1.50     Years: 12.00     Pack years: 18.00     Types: Cigarettes     Start date: 1966     Quit date: 3/17/1974     Years since quittin.4     Smokeless tobacco: Never Used     Tobacco comment: enjoyed it!   Substance and Sexual Activity     Alcohol use: Yes     Comment: Minimal     Drug use: No     Sexual activity: Not Currently     Partners: Male     Birth control/protection: None   Other Topics Concern     Parent/sibling w/ CABG, MI or angioplasty before 65F 55M? Yes     Comment: Brother  of heart attack at 49 yrs old   Social History Narrative    Single, no kids    Has a cabin    Has godchildren that can help out     Social Determinants of Health     Financial Resource Strain: Not on file   Food Insecurity: Not on file   Transportation Needs: Not on file   Physical Activity: Not on file   Stress: Not on file   Social Connections: Not on file   Intimate Partner Violence: Not on file   Housing Stability: Not on file     Current Outpatient Medications   Medication Sig Dispense Refill     alendronate (FOSAMAX) 70 MG tablet TAKE 1 TABLET(70 MG) BY MOUTH  "EVERY 7 DAYS 12 tablet 3     Apoaequorin 10 MG CAPS Take 1 capsule by mouth daily Prevagen       aspirin (ASA) 81 MG chewable tablet Take 1 tablet (81 mg) by mouth daily 100 tablet 4     Calcium Carbonate-Vitamin D (CALCIUM-D PO) Take by mouth daily Liquid  Calcium 1200 mg  & Vit D 800 iU       Coenzyme Q10 (COQ10 PO) Take 100 mg by mouth.       escitalopram (LEXAPRO) 5 MG tablet Take 1 tablet (5 mg) by mouth daily 50 tablet 0     hydrochlorothiazide (HYDRODIURIL) 12.5 MG tablet Take 1 tablet (12.5 mg) by mouth daily 90 tablet 3     losartan (COZAAR) 25 MG tablet Take 1 tablet (25 mg) by mouth 2 times daily 180 tablet 3     ORDER FOR DME Use your CPAP device as directed by your provider.       rosuvastatin (CRESTOR) 5 MG tablet TAKE 1 TABLET(5 MG) BY MOUTH DAILY 90 tablet 3     vitamin B-12 (CYANOCOBALAMIN) 1000 MCG tablet Take 1,000 mcg by mouth daily       No Known Allergies  FAMILY HISTORY NOTED AND REVIEWED    REVIEW OF SYSTEMS: above    PHYSICAL EXAM    /61 (BP Location: Left arm, Patient Position: Chair, Cuff Size: Adult Large)   Pulse 65   Resp 16   Ht 1.727 m (5' 8\")   Wt 71.2 kg (157 lb)   SpO2 96%   Breastfeeding No   BMI 23.87 kg/m      Patient appears non toxic  cv reglar rate and rhythm  Abdomen non-tender  Emanation of her legs revealed varicosities bilaterally.  She does have extreme pain when I flex her knee a significant degree and yells out in pain.  The area she points to is just above and below her knee but the knee itself is not painful, red, swollen, or tender.  Distal pulses are intact.    ASSESSMENT:  1.  Left posterior leg pain of unclear cause.  It certainly is not a clot and I think it is less likely to be knee related.  I do not think it is back related and I do not think it is vascular.  It certainly seems like it soft tissue.  She has seen a different PMR doctor soon and I would like to see what that person says as I am not sure.  2.  Depressed mood, certainly " understandable.  We discussed treatment options including low-dose SSRI and she would like to try that.  3.  Meningioma, follow-up per neurosurgery.  4.  Spinal stenosis of the cervical region, call if worsening symptoms.    PLAN:  See pmr for thigh  Add lexapro 5mg  Follow up 4 weeks  Call if new changes    Guzman Bruner M.D.

## 2022-08-22 RX ORDER — ESCITALOPRAM OXALATE 5 MG/1
TABLET ORAL
Qty: 90 TABLET | Refills: 1 | Status: SHIPPED | OUTPATIENT
Start: 2022-08-22 | End: 2022-09-28

## 2022-08-22 NOTE — TELEPHONE ENCOUNTER
Routing refill request to provider for review/approval because:  Drug not on the [facility] refill protocol   PHQ 8/20/2021   PHQ-9 Total Score 3   Q9: Thoughts of better off dead/self-harm past 2 weeks Not at all       Catie Hurt RN

## 2022-08-28 DIAGNOSIS — I10 ESSENTIAL HYPERTENSION: ICD-10-CM

## 2022-08-29 RX ORDER — HYDROCHLOROTHIAZIDE 12.5 MG/1
TABLET ORAL
Qty: 90 TABLET | Refills: 3 | Status: SHIPPED | OUTPATIENT
Start: 2022-08-29 | End: 2023-09-07

## 2022-09-28 ENCOUNTER — OFFICE VISIT (OUTPATIENT)
Dept: FAMILY MEDICINE | Facility: CLINIC | Age: 82
End: 2022-09-28
Payer: COMMERCIAL

## 2022-09-28 VITALS
HEART RATE: 69 BPM | SYSTOLIC BLOOD PRESSURE: 122 MMHG | BODY MASS INDEX: 23.89 KG/M2 | TEMPERATURE: 96.9 F | DIASTOLIC BLOOD PRESSURE: 67 MMHG | HEIGHT: 68 IN | WEIGHT: 157.6 LBS | OXYGEN SATURATION: 95 % | RESPIRATION RATE: 16 BRPM

## 2022-09-28 DIAGNOSIS — F32.A MILD DEPRESSIVE DISORDER: ICD-10-CM

## 2022-09-28 DIAGNOSIS — Z23 HIGH PRIORITY FOR 2019-NCOV VACCINE: ICD-10-CM

## 2022-09-28 DIAGNOSIS — Z23 NEED FOR PROPHYLACTIC VACCINATION AND INOCULATION AGAINST INFLUENZA: ICD-10-CM

## 2022-09-28 PROCEDURE — 91313 COVID-19,PF,MODERNA BIVALENT: CPT | Performed by: INTERNAL MEDICINE

## 2022-09-28 PROCEDURE — 96127 BRIEF EMOTIONAL/BEHAV ASSMT: CPT | Performed by: INTERNAL MEDICINE

## 2022-09-28 PROCEDURE — G0008 ADMIN INFLUENZA VIRUS VAC: HCPCS | Performed by: INTERNAL MEDICINE

## 2022-09-28 PROCEDURE — 0134A COVID-19,PF,MODERNA BIVALENT: CPT | Performed by: INTERNAL MEDICINE

## 2022-09-28 PROCEDURE — 90662 IIV NO PRSV INCREASED AG IM: CPT | Performed by: INTERNAL MEDICINE

## 2022-09-28 PROCEDURE — 99213 OFFICE O/P EST LOW 20 MIN: CPT | Mod: 25 | Performed by: INTERNAL MEDICINE

## 2022-09-28 RX ORDER — ESCITALOPRAM OXALATE 5 MG/1
5 TABLET ORAL DAILY
Qty: 90 TABLET | Refills: 1 | Status: SHIPPED | OUTPATIENT
Start: 2022-09-28 | End: 2023-04-07

## 2022-09-28 ASSESSMENT — PAIN SCALES - GENERAL: PAINLEVEL: NO PAIN (0)

## 2022-09-28 ASSESSMENT — PATIENT HEALTH QUESTIONNAIRE - PHQ9: SUM OF ALL RESPONSES TO PHQ QUESTIONS 1-9: 5

## 2022-09-28 NOTE — PATIENT INSTRUCTIONS
Continue the lexapro and call if you feel worse.  Follow up with the therapist.  See me in 2 to 3 months    Guzman Bruner M.D.

## 2022-09-28 NOTE — PROGRESS NOTES
This is a very pleasant 81-year-old who presents with her friend for follow-up on 2 issues.    When I saw her about 5 weeks ago she noticed a mood change and not wanting to do things as much and I added Lexapro 5 mg.  Since then she has been taking it and has no side effects and feels as if she is better.  She is feeling more upbeat.  She is also exercising more.  Her PHQ-9 score is 5.    Additionally, she was having a pain in the left posterior thigh.  She subsequently saw PMR and had an MRI of her knee which does show some changes.  Her plan is to follow-up with orthopedics.  She did have some dry needle therapy yesterday and this did help.    She otherwise is doing well.  She needs a COVID booster and flu shot.    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    had vaginal bleeding, surgery done 2021     Arthritis mild in knees, shoulders     Cancer (H) skin--Basil Cell, Squamas     Chest pain 2003    neg est thallium     Chest pain 04/2016    nl est echo and cxr     Complication of anesthesia     BP maddie to over 200 in PACU 1 month ago-after D&C     Essential hypertension 07/2021     Fatigue 08/25/2021     Hx of colonoscopy 2003    incomplete, be nl     Hx of colonoscopy 06/03/2011    nl     Hypercholesteremia 2011    aches with zocor     CAITLYN (obstructive sleep apnea) 12/2012    done [place], using dental device, added cpap 2014      Osteopenia 2013    Dr. Taylor     Osteoporosis of lumbar spine 07/2020    added fosamax     Peripheral neuropathy     Dr. 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     SOB (shortness of breath) 08/2021    elevated d dimer, ct no pe but signs of chf, then echo nl 9/21     Statin intolerance     zocor and one other caused aches     Subdural hematoma (H) 01/2016    traumatic, fu ct 3/16 resolved      Uncomplicated asthma      Past Surgical History:   Procedure Laterality Date     ABDOMEN SURGERY  1960     APPENDECTOMY  60's     BIOPSY  2010    inside upper lip     DAVINCI HYSTERECTOMY TOTAL, BILATERAL SALPINGO-OOPHORECTOMY, NODE DISSECTION, COMBINED Bilateral 7/8/2021    Procedure: ROBOTIC ASSISTED TOTAL LAPAROSCOPIC HYSTERECTOMY, BILATERAL SALPINGO-OOPHORECTOMY, WASHINGS, INTRA-OPERATIVE SENTINEL LYMPH NODE MAPPING, BILATERAL PELVIC AND PARA-AORTIC LYMPHADENECTOMY, REPAIR VAGINAL TEAR;  Surgeon: Dianne Garland MD;  Location:  OR     DILATION AND CURETTAGE, HYSTEROSCOPY WITH ULTRASOUND GUIDANCE N/A 5/11/2021    Procedure: HYSTEROSCOPY UNDER ULTRASOUND GUIDANCE, DILATION AND CURETTAGE OF UTERUS;  Surgeon: Isabel Ferro MD;  Location:  OR     OPERATIVE HYSTEROSCOPY WITH MORCELLATOR N/A 5/11/2021    Procedure: MYOSURE MORCELLATOR;  Surgeon: Isabel Ferro MD;  Location:  OR     PHACOEMULSIFICATION CLEAR CORNEA WITH STANDARD INTRAOCULAR LENS IMPLANT  3/21/2012    Procedure:PHACOEMULSIFICATION CLEAR CORNEA WITH STANDARD INTRAOCULAR LENS IMPLANT; RIGHT PHACOEMULSIFICATION CLEAR CORNEA WITH STANDARD INTRAOCULAR LENS IMPLANT ; Surgeon:CHANDRAKANT HERNANDEZ; Location: [facility]     PHACOEMULSIFICATION CLEAR CORNEA WITH STANDARD INTRAOCULAR LENS IMPLANT  3/28/2012    Procedure:PHACOEMULSIFICATION CLEAR CORNEA WITH STANDARD INTRAOCULAR LENS IMPLANT; LEFT PHACOEMULSIFICATION CLEAR CORNEA WITH STANDARD INTRAOCULAR LENS IMPLANT ; Surgeon:CHANDRAKANT HERNANDEZ; Location: [facility]     SHOULDER SURGERY  1990's    twice     SOFT TISSUE SURGERY  R&L Rotator cuff repairs     TONSILLECTOMY  child     Social History     Socioeconomic History     Marital status: Single     Spouse name: Not on file     Number of children: 0     Years of education: Not on file     Highest education level: Not on file   Occupational History     Occupation: physical therapy     Employer: RETIRED   Tobacco Use     Smoking status: Former Smoker      Packs/day: 1.50     Years: 12.00     Pack years: 18.00     Types: Cigarettes     Start date: 1966     Quit date: 3/17/1974     Years since quittin.5     Smokeless tobacco: Never Used     Tobacco comment: enjoyed it!   Substance and Sexual Activity     Alcohol use: Yes     Comment: Minimal     Drug use: No     Sexual activity: Not Currently     Partners: Male     Birth control/protection: None   Other Topics Concern     Parent/sibling w/ CABG, MI or angioplasty before 65F 55M? Yes     Comment: Brother  of heart attack at 49 yrs old   Social History Narrative    Single, no kids    Has a cabin    Has godchildren that can help out     Social Determinants of Health     Financial Resource Strain: Not on file   Food Insecurity: Not on file   Transportation Needs: Not on file   Physical Activity: Not on file   Stress: Not on file   Social Connections: Not on file   Intimate Partner Violence: Not on file   Housing Stability: Not on file     Current Outpatient Medications   Medication Sig Dispense Refill     alendronate (FOSAMAX) 70 MG tablet TAKE 1 TABLET(70 MG) BY MOUTH EVERY 7 DAYS 12 tablet 3     Apoaequorin 10 MG CAPS Take 1 capsule by mouth daily Prevagen       aspirin (ASA) 81 MG chewable tablet Take 1 tablet (81 mg) by mouth daily 100 tablet 4     Calcium Carbonate-Vitamin D (CALCIUM-D PO) Take by mouth daily Liquid  Calcium 1200 mg  & Vit D 800 iU       Coenzyme Q10 (COQ10 PO) Take 100 mg by mouth.       escitalopram (LEXAPRO) 5 MG tablet Take 1 tablet (5 mg) by mouth daily 90 tablet 1     hydrochlorothiazide (HYDRODIURIL) 12.5 MG tablet TAKE 1 TABLET(12.5 MG) BY MOUTH DAILY 90 tablet 3     losartan (COZAAR) 25 MG tablet Take 1 tablet (25 mg) by mouth 2 times daily 180 tablet 3     ORDER FOR DME Use your CPAP device as directed by your provider.       rosuvastatin (CRESTOR) 5 MG tablet TAKE 1 TABLET(5 MG) BY MOUTH DAILY 90 tablet 3     vitamin B-12 (CYANOCOBALAMIN) 1000 MCG tablet Take 1,000 mcg by  "mouth daily       No Known Allergies  FAMILY HISTORY NOTED AND REVIEWED    REVIEW OF SYSTEMS: above    PHYSICAL EXAM    /67 (BP Location: Left arm, Patient Position: Sitting, Cuff Size: Adult Regular)   Pulse 69   Temp 96.9  F (36.1  C) (Temporal)   Resp 16   Ht 1.727 m (5' 8\")   Wt 71.5 kg (157 lb 9.6 oz)   SpO2 95%   Breastfeeding No   BMI 23.96 kg/m      Patient appears non toxic    ASSESSMENT:  1. Mild depression, improved  2. Knee issue  3. Need for vaccine    PLAN:  Refilled lexapro  Call if changes or worsens  To call her therapist as well  Follow up 3 months or prn  covid booster and flu shot    Guzman Bruner M.D.          "

## 2022-10-03 ENCOUNTER — ANCILLARY PROCEDURE (OUTPATIENT)
Dept: MRI IMAGING | Facility: CLINIC | Age: 82
End: 2022-10-03
Attending: PSYCHIATRY & NEUROLOGY
Payer: COMMERCIAL

## 2022-10-03 DIAGNOSIS — G93.89 BRAIN MASS: ICD-10-CM

## 2022-10-03 PROCEDURE — 255N000002 HC RX 255 OP 636: Performed by: PSYCHIATRY & NEUROLOGY

## 2022-10-03 PROCEDURE — A9585 GADOBUTROL INJECTION: HCPCS | Performed by: PSYCHIATRY & NEUROLOGY

## 2022-10-03 PROCEDURE — 70553 MRI BRAIN STEM W/O & W/DYE: CPT

## 2022-10-03 RX ORDER — GADOBUTROL 604.72 MG/ML
8 INJECTION INTRAVENOUS ONCE
Status: COMPLETED | OUTPATIENT
Start: 2022-10-03 | End: 2022-10-03

## 2022-10-03 RX ADMIN — GADOBUTROL 8 ML: 604.72 INJECTION INTRAVENOUS at 09:11

## 2022-10-06 ENCOUNTER — OFFICE VISIT (OUTPATIENT)
Dept: NEUROLOGY | Facility: CLINIC | Age: 82
End: 2022-10-06
Payer: COMMERCIAL

## 2022-10-06 ENCOUNTER — TRANSFERRED RECORDS (OUTPATIENT)
Dept: HEALTH INFORMATION MANAGEMENT | Facility: CLINIC | Age: 82
End: 2022-10-06

## 2022-10-06 VITALS
HEIGHT: 68 IN | HEART RATE: 73 BPM | BODY MASS INDEX: 23.79 KG/M2 | WEIGHT: 157 LBS | DIASTOLIC BLOOD PRESSURE: 72 MMHG | SYSTOLIC BLOOD PRESSURE: 135 MMHG | OXYGEN SATURATION: 97 %

## 2022-10-06 DIAGNOSIS — G31.84 MCI (MILD COGNITIVE IMPAIRMENT) WITH MEMORY LOSS: Primary | ICD-10-CM

## 2022-10-06 DIAGNOSIS — G14 POSTPOLIO SYNDROME (H): ICD-10-CM

## 2022-10-06 DIAGNOSIS — D32.0 INTRACRANIAL MENINGIOMA (H): ICD-10-CM

## 2022-10-06 DIAGNOSIS — M48.02 SPINAL STENOSIS IN CERVICAL REGION: ICD-10-CM

## 2022-10-06 PROCEDURE — 99215 OFFICE O/P EST HI 40 MIN: CPT | Performed by: PSYCHIATRY & NEUROLOGY

## 2022-10-06 RX ORDER — MEMANTINE HYDROCHLORIDE 10 MG/1
10 TABLET ORAL 2 TIMES DAILY
Qty: 180 TABLET | Refills: 3 | Status: SHIPPED | OUTPATIENT
Start: 2022-11-04 | End: 2023-09-22

## 2022-10-06 RX ORDER — MEMANTINE HYDROCHLORIDE 5 MG-10 MG
KIT ORAL
Qty: 1 TABLET | Refills: 0 | Status: SHIPPED | OUTPATIENT
Start: 2022-10-06 | End: 2022-11-04

## 2022-10-06 NOTE — NURSING NOTE
"Lizzette Chanel is a 81 year old female who presents for:  Chief Complaint   Patient presents with     Follow Up     3 month f/u        Initial Vitals:  /72   Pulse 73   Ht 1.727 m (5' 8\")   Wt 71.2 kg (157 lb)   SpO2 97%   BMI 23.87 kg/m   Estimated body mass index is 23.87 kg/m  as calculated from the following:    Height as of this encounter: 1.727 m (5' 8\").    Weight as of this encounter: 71.2 kg (157 lb).. Body surface area is 1.85 meters squared. BP completed using cuff size: celina Kinsey    "

## 2022-10-06 NOTE — PROGRESS NOTES
[facility]    Lizzette Chanel MRN# [id number]   Age: 81 year old YOB: 1940     Requesting physician: No ref. provider found  Guzman Bruner            Assessment and Plan:   Assessment:  1.  Mild Cognitive impairment  2.  Cervical spinal stenosis  3.  Intracranial neoplasm - probable meningioma  4.  Post polio syndrome - stable  5.  Depression  6.  Mild peripheral neuropathy     Plan:  1.  Will start memantine and consider donepezil at a later date  2.  Recheck here in 6 months of spinal cord function/balance  3..  Follow up MRI brain in July 2023 to reassess   4.  Continue escitalopram               History of Present Illness:   CC:  I met with Jessika today to discuss her multiple neurological issues.  She continues to be stable but has difficulty with her mild cognitive impairment, clearly identified by Dr. Mccauley on his neuropsychometric analysis.  Her recent memory continues to be flawed based on our conversation today but she is receiving some outside help and her visit today is accompanied once again by her friend Funmilayo Tubbstner.  We spent 45 minutes together talking about her issues.    With regard to her mild cognitive impairment, will start memantine with a titration pack and then transition to a 10 mg twice daily dose schedule.  These medications have been ordered.  Benefits and side effects are discussed at length.    With regard to her cervical spinal stenosis, I will repeat her neurological examination in 6 months to gauge whether there has been any progression or worsening of her gait, balance or spinal cord compressive issues.    With regard to her intracranial neoplasm, we will be obtaining another imaging study in July to see if the 1 we did an July and just recently in October has changed at all.  Hopefully they will remain stable and require no further intervention.    Will not be making any changes pertaining to her post polio syndrome.    We will be continuing the  S-Citalopram for her depressive disorder.    I believe that her symptoms of numbness tingling and may be even fatigue might relate to her neuropathy and we will simply observe for further changes.               Physical Exam:   I did not go over her physical examination today as our visit was spent almost entirely in discussion.         Data:   All laboratory data reviewed  All imaging studies reviewed by me             DATA for DOCUMENTATION:         Past Medical History:     Patient Active Problem List   Diagnosis     Peripheral neuropathy     Hx of colonoscopy     Hyperlipidemia LDL goal <130     CAITLYN (obstructive sleep apnea)  CPAP pressures set at:  LPL of 5 and UPL of 14     Osteopenia     Advanced directives, counseling/discussion     Spinal stenosis in cervical region     Post-polio syndrome     Adenocarcinoma of endometrium (H)     Fatigue     Essential hypertension     Osteoporosis of lumbar spine     Meningioma, cerebral (H)     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    had vaginal bleeding, surgery done 2021     Arthritis mild in knees, shoulders     Cancer (H) skin--Basil Cell, Squamas     Chest pain 2003    neg est thallium     Chest pain 04/2016    nl est echo and cxr     Complication of anesthesia     BP maddie to over 200 in PACU 1 month ago-after D&C     Essential hypertension 07/2021     Fatigue 08/25/2021     Hx of colonoscopy 2003    incomplete, be nl     Hx of colonoscopy 06/03/2011    nl     Hypercholesteremia 2011    aches with zocor     CAITLYN (obstructive sleep apnea) 12/2012    done [place], using dental device, added cpap 2014      Osteopenia 2013    Dr. Taylor     Osteoporosis of lumbar spine 07/2020    added fosamax     Peripheral neuropathy     Dr. 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       Past Surgical History:     Past Surgical History:   Procedure Laterality Date     ABDOMEN SURGERY  1960     APPENDECTOMY  60's     BIOPSY  2010    inside upper lip     DAVINCI HYSTERECTOMY TOTAL, BILATERAL SALPINGO-OOPHORECTOMY, NODE DISSECTION, COMBINED Bilateral 7/8/2021    Procedure: ROBOTIC ASSISTED TOTAL LAPAROSCOPIC HYSTERECTOMY, BILATERAL SALPINGO-OOPHORECTOMY, WASHINGS, INTRA-OPERATIVE SENTINEL LYMPH NODE MAPPING, BILATERAL PELVIC AND PARA-AORTIC LYMPHADENECTOMY, REPAIR VAGINAL TEAR;  Surgeon: Dianne Garland MD;  Location:  OR     DILATION AND CURETTAGE, HYSTEROSCOPY WITH ULTRASOUND GUIDANCE N/A 5/11/2021    Procedure: HYSTEROSCOPY UNDER ULTRASOUND GUIDANCE, DILATION AND CURETTAGE OF UTERUS;  Surgeon: Isabel Ferro MD;  Location:  OR     OPERATIVE HYSTEROSCOPY WITH MORCELLATOR N/A 5/11/2021    Procedure: MYOSURE MORCELLATOR;  Surgeon: Isabel Ferro MD;  Location:  OR     PHACOEMULSIFICATION CLEAR CORNEA WITH STANDARD INTRAOCULAR LENS IMPLANT  3/21/2012    Procedure:PHACOEMULSIFICATION CLEAR CORNEA WITH STANDARD INTRAOCULAR LENS IMPLANT; RIGHT PHACOEMULSIFICATION CLEAR CORNEA WITH STANDARD INTRAOCULAR LENS IMPLANT ; Surgeon:CHANDRAKANT HERNANDEZ; Location:[facility]     PHACOEMULSIFICATION CLEAR CORNEA WITH STANDARD INTRAOCULAR LENS IMPLANT  3/28/2012    Procedure:PHACOEMULSIFICATION CLEAR CORNEA WITH STANDARD INTRAOCULAR LENS IMPLANT; LEFT PHACOEMULSIFICATION CLEAR CORNEA WITH STANDARD INTRAOCULAR LENS IMPLANT ; Surgeon:CHANDRAKANT HERNANDEZ; Location: [facility]     SHOULDER SURGERY  1990's    twice     SOFT TISSUE SURGERY  R&L Rotator cuff repairs     TONSILLECTOMY  child            Social History:      Social History     Socioeconomic History     Marital status: Single     Spouse name: Not on file     Number of children: 0     Years of education: Not on file     Highest education level: Not on file   Occupational History     Occupation: physical therapy     Employer: RETIRED   Tobacco Use     Smoking status: Former Smoker     Packs/day: 1.50     Years: 12.00     Pack years: 18.00     Types: Cigarettes     Start date: 1966     Quit date: 3/17/1974     Years since quittin.5     Smokeless tobacco: Never Used     Tobacco comment: enjoyed it!   Substance and Sexual Activity     Alcohol use: Yes     Comment: Minimal     Drug use: No     Sexual activity: Not Currently     Partners: Male     Birth control/protection: None   Other Topics Concern     Parent/sibling w/ CABG, MI or angioplasty before 65F 55M? Yes     Comment: Brother  of heart attack at 49 yrs old   Social History Narrative    Single, no kids    Has a cabin    Has godchildren that can help out     Social Determinants of Health     Financial Resource Strain: Not on file   Food Insecurity: Not on file   Transportation Needs: Not on file   Physical Activity: Not on file   Stress: Not on file   Social Connections: Not on file   Intimate Partner Violence: Not on file   Housing Stability: Not on file              Family History:     Family History   Problem Relation Age of Onset     C.A.D. Father      Heart Disease Father      Heart Disease Mother 97     Alzheimer Disease Mother      Cerebrovascular Disease Mother         mild. Still was very functional     Substance Abuse Mother         Significant treatment over 15 yrs but ended with very good results     Osteoporosis Mother      C.A.D. Brother      Asthma Brother             Medications:     Current Outpatient Medications   Medication Sig     alendronate (FOSAMAX) 70 MG tablet TAKE 1 TABLET(70 MG) BY MOUTH EVERY 7 DAYS     Apoaequorin 10 MG CAPS Take 1 capsule by mouth daily Prevagen     aspirin  (ASA) 81 MG chewable tablet Take 1 tablet (81 mg) by mouth daily     Calcium Carbonate-Vitamin D (CALCIUM-D PO) Take by mouth daily Liquid  Calcium 1200 mg  & Vit D 800 iU     Coenzyme Q10 (COQ10 PO) Take 100 mg by mouth.     escitalopram (LEXAPRO) 5 MG tablet Take 1 tablet (5 mg) by mouth daily     hydrochlorothiazide (HYDRODIURIL) 12.5 MG tablet TAKE 1 TABLET(12.5 MG) BY MOUTH DAILY     losartan (COZAAR) 25 MG tablet Take 1 tablet (25 mg) by mouth 2 times daily     [START ON 11/4/2022] memantine (NAMENDA) 10 MG tablet Take 1 tablet (10 mg) by mouth 2 times daily for 360 days     memantine (NAMENDA) 28 x 5 MG & 21 x 10 MG tablet Follow instructions on titration package     ORDER FOR DME Use your CPAP device as directed by your provider.     rosuvastatin (CRESTOR) 5 MG tablet TAKE 1 TABLET(5 MG) BY MOUTH DAILY     vitamin B-12 (CYANOCOBALAMIN) 1000 MCG tablet Take 1,000 mcg by mouth daily     No current facility-administered medications for this visit.              Review of Systems:   A comprehensive 10 point review of systems (constitutional, ENT, cardiac, peripheral vascular, lymphatic, respiratory, GI, , Musculoskeletal, skin, Neurological) was performed and found to be negative except as described in this note.     See intake form completed by patient

## 2022-10-06 NOTE — LETTER
10/6/2022         RE: Lizzette Chanel  [address]  [place] [address]        Dear Colleague,    Thank you for referring your patient, Lizzette Chanel, to the [facility]. Please see a copy of my visit note below.        [facility]    Lizzette Chanel MRN# [id number]   Age: 81 year old YOB: 1940     Requesting physician: No ref. provider found  Guzman Bruner            Assessment and Plan:   Assessment:  1.  Mild Cognitive impairment  2.  Cervical spinal stenosis  3.  Intracranial neoplasm - probable meningioma  4.  Post polio syndrome - stable  5.  Depression  6.  Mild peripheral neuropathy     Plan:  1.  Will start memantine and consider donepezil at a later date  2.  Recheck here in 6 months of spinal cord function/balance  3..  Follow up MRI brain in July 2023 to reassess   4.  Continue escitalopram               History of Present Illness:   CC:  I met with Jessika today to discuss her multiple neurological issues.  She continues to be stable but has difficulty with her mild cognitive impairment, clearly identified by Dr. Mccauley on his neuropsychometric analysis.  Her recent memory continues to be flawed based on our conversation today but she is receiving some outside help and her visit today is accompanied once again by her friend Funmilayo Saskia.  We spent 45 minutes together talking about her issues.    With regard to her mild cognitive impairment, will start memantine with a titration pack and then transition to a 10 mg twice daily dose schedule.  These medications have been ordered.  Benefits and side effects are discussed at length.    With regard to her cervical spinal stenosis, I will repeat her neurological examination in 6 months to gauge whether there has been any progression or worsening of her gait, balance or spinal cord compressive issues.    With regard to her intracranial neoplasm, we will be obtaining another  imaging study in July to see if the 1 we did an July and just recently in October has changed at all.  Hopefully they will remain stable and require no further intervention.    Will not be making any changes pertaining to her post polio syndrome.    We will be continuing the S-Citalopram for her depressive disorder.    I believe that her symptoms of numbness tingling and may be even fatigue might relate to her neuropathy and we will simply observe for further changes.               Physical Exam:   I did not go over her physical examination today as our visit was spent almost entirely in discussion.         Data:   All laboratory data reviewed  All imaging studies reviewed by me             DATA for DOCUMENTATION:         Past Medical History:     Patient Active Problem List   Diagnosis     Peripheral neuropathy     Hx of colonoscopy     Hyperlipidemia LDL goal <130     CAITLYN (obstructive sleep apnea)  CPAP pressures set at:  LPL of 5 and UPL of 14     Osteopenia     Advanced directives, counseling/discussion     Spinal stenosis in cervical region     Post-polio syndrome     Adenocarcinoma of endometrium (H)     Fatigue     Essential hypertension     Osteoporosis of lumbar spine     Meningioma, cerebral (H)     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    had vaginal bleeding, surgery done 2021     Arthritis mild in knees, shoulders     Cancer (H) skin--Basil Cell, Squamas     Chest pain 2003    neg est thallium     Chest pain 04/2016    nl est echo and cxr     Complication of anesthesia     BP maddie to over 200 in PACU 1 month ago-after D&C     Essential hypertension 07/2021     Fatigue 08/25/2021     Hx of colonoscopy 2003    incomplete, be nl     Hx of colonoscopy 06/03/2011    nl     Hypercholesteremia 2011    aches with zocor     CAITLYN (obstructive sleep apnea) 12/2012    done [place], using  dental device, added cpap 2014      Osteopenia 2013    Dr. Taylor     Osteoporosis of lumbar spine 07/2020    added fosamax     Peripheral neuropathy     Dr. 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       Past Surgical History:     Past Surgical History:   Procedure Laterality Date     ABDOMEN SURGERY  1960     APPENDECTOMY  60's     BIOPSY  2010    inside upper lip     DAVINCI HYSTERECTOMY TOTAL, BILATERAL SALPINGO-OOPHORECTOMY, NODE DISSECTION, COMBINED Bilateral 7/8/2021    Procedure: ROBOTIC ASSISTED TOTAL LAPAROSCOPIC HYSTERECTOMY, BILATERAL SALPINGO-OOPHORECTOMY, WASHINGS, INTRA-OPERATIVE SENTINEL LYMPH NODE MAPPING, BILATERAL PELVIC AND PARA-AORTIC LYMPHADENECTOMY, REPAIR VAGINAL TEAR;  Surgeon: Dianne Garland MD;  Location:  OR     DILATION AND CURETTAGE, HYSTEROSCOPY WITH ULTRASOUND GUIDANCE N/A 5/11/2021    Procedure: HYSTEROSCOPY UNDER ULTRASOUND GUIDANCE, DILATION AND CURETTAGE OF UTERUS;  Surgeon: Isabel Ferro MD;  Location:  OR     OPERATIVE HYSTEROSCOPY WITH MORCELLATOR N/A 5/11/2021    Procedure: MYOSURE MORCELLATOR;  Surgeon: Isabel Ferro MD;  Location:  OR     PHACOEMULSIFICATION CLEAR CORNEA WITH STANDARD INTRAOCULAR LENS IMPLANT  3/21/2012    Procedure:PHACOEMULSIFICATION CLEAR CORNEA WITH STANDARD INTRAOCULAR LENS IMPLANT; RIGHT PHACOEMULSIFICATION CLEAR CORNEA WITH STANDARD INTRAOCULAR LENS IMPLANT ; Surgeon:CHANDRAKANT HERNANDEZ; Location: [facility]     PHACOEMULSIFICATION CLEAR CORNEA WITH STANDARD INTRAOCULAR LENS IMPLANT  3/28/2012    Procedure:PHACOEMULSIFICATION CLEAR CORNEA WITH STANDARD INTRAOCULAR LENS  IMPLANT; LEFT PHACOEMULSIFICATION CLEAR CORNEA WITH STANDARD INTRAOCULAR LENS IMPLANT ; Surgeon:CHANDRAKANT HERNANDEZ Location: [facility]     SHOULDER SURGERY      twice     SOFT TISSUE SURGERY  R&L Rotator cuff repairs     TONSILLECTOMY  child            Social History:     Social History     Socioeconomic History     Marital status: Single     Spouse name: Not on file     Number of children: 0     Years of education: Not on file     Highest education level: Not on file   Occupational History     Occupation: physical therapy     Employer: RETIRED   Tobacco Use     Smoking status: Former Smoker     Packs/day: 1.50     Years: 12.00     Pack years: 18.00     Types: Cigarettes     Start date: 1966     Quit date: 3/17/1974     Years since quittin.5     Smokeless tobacco: Never Used     Tobacco comment: enjoyed it!   Substance and Sexual Activity     Alcohol use: Yes     Comment: Minimal     Drug use: No     Sexual activity: Not Currently     Partners: Male     Birth control/protection: None   Other Topics Concern     Parent/sibling w/ CABG, MI or angioplasty before 65F 55M? Yes     Comment: Brother  of heart attack at 49 yrs old   Social History Narrative    Single, no kids    Has a cabin    Has godchildren that can help out     Social Determinants of Health     Financial Resource Strain: Not on file   Food Insecurity: Not on file   Transportation Needs: Not on file   Physical Activity: Not on file   Stress: Not on file   Social Connections: Not on file   Intimate Partner Violence: Not on file   Housing Stability: Not on file              Family History:     Family History   Problem Relation Age of Onset     C.A.D. Father      Heart Disease Father      Heart Disease Mother 97     Alzheimer Disease Mother      Cerebrovascular Disease Mother         mild. Still was very functional     Substance Abuse Mother         Significant treatment over 15 yrs but ended with very good results     Osteoporosis Mother       RADHAALINDA. Brother      Asthma Brother             Medications:     Current Outpatient Medications   Medication Sig     alendronate (FOSAMAX) 70 MG tablet TAKE 1 TABLET(70 MG) BY MOUTH EVERY 7 DAYS     Apoaequorin 10 MG CAPS Take 1 capsule by mouth daily Prevagen     aspirin (ASA) 81 MG chewable tablet Take 1 tablet (81 mg) by mouth daily     Calcium Carbonate-Vitamin D (CALCIUM-D PO) Take by mouth daily Liquid  Calcium 1200 mg  & Vit D 800 iU     Coenzyme Q10 (COQ10 PO) Take 100 mg by mouth.     escitalopram (LEXAPRO) 5 MG tablet Take 1 tablet (5 mg) by mouth daily     hydrochlorothiazide (HYDRODIURIL) 12.5 MG tablet TAKE 1 TABLET(12.5 MG) BY MOUTH DAILY     losartan (COZAAR) 25 MG tablet Take 1 tablet (25 mg) by mouth 2 times daily     [START ON 11/4/2022] memantine (NAMENDA) 10 MG tablet Take 1 tablet (10 mg) by mouth 2 times daily for 360 days     memantine (NAMENDA) 28 x 5 MG & 21 x 10 MG tablet Follow instructions on titration package     ORDER FOR DME Use your CPAP device as directed by your provider.     rosuvastatin (CRESTOR) 5 MG tablet TAKE 1 TABLET(5 MG) BY MOUTH DAILY     vitamin B-12 (CYANOCOBALAMIN) 1000 MCG tablet Take 1,000 mcg by mouth daily     No current facility-administered medications for this visit.              Review of Systems:   A comprehensive 10 point review of systems (constitutional, ENT, cardiac, peripheral vascular, lymphatic, respiratory, GI, , Musculoskeletal, skin, Neurological) was performed and found to be negative except as described in this note.     See intake form completed by patient        Again, thank you for allowing me to participate in the care of your patient.        Sincerely,        Mohinder Lyels MD, MD

## 2022-11-17 ENCOUNTER — TRANSFERRED RECORDS (OUTPATIENT)
Dept: HEALTH INFORMATION MANAGEMENT | Facility: CLINIC | Age: 82
End: 2022-11-17

## 2022-11-29 ENCOUNTER — OFFICE VISIT (OUTPATIENT)
Dept: FAMILY MEDICINE | Facility: CLINIC | Age: 82
End: 2022-11-29
Payer: COMMERCIAL

## 2022-11-29 ENCOUNTER — NURSE TRIAGE (OUTPATIENT)
Dept: FAMILY MEDICINE | Facility: CLINIC | Age: 82
End: 2022-11-29

## 2022-11-29 VITALS
BODY MASS INDEX: 24.4 KG/M2 | HEART RATE: 70 BPM | OXYGEN SATURATION: 98 % | TEMPERATURE: 98.2 F | DIASTOLIC BLOOD PRESSURE: 66 MMHG | WEIGHT: 161 LBS | HEIGHT: 68 IN | SYSTOLIC BLOOD PRESSURE: 131 MMHG | RESPIRATION RATE: 16 BRPM

## 2022-11-29 DIAGNOSIS — R07.89 ATYPICAL CHEST PAIN: Primary | ICD-10-CM

## 2022-11-29 PROCEDURE — 99213 OFFICE O/P EST LOW 20 MIN: CPT | Performed by: INTERNAL MEDICINE

## 2022-11-29 PROCEDURE — 93000 ELECTROCARDIOGRAM COMPLETE: CPT | Performed by: INTERNAL MEDICINE

## 2022-11-29 ASSESSMENT — PAIN SCALES - GENERAL: PAINLEVEL: NO PAIN (0)

## 2022-11-29 NOTE — TELEPHONE ENCOUNTER
Janis, friend calling. On consent to communicate.   See triage below.     Per protocol, patient should be seen in office today. Appointment scheduled.   This RN advised patient's friend should patient develop chest pain again, radiating pain, difficulty breathing, she needs to be seen in the ED right away. Call 911. Friend verbalized understanding.       Appointments in Next Year    Nov 29, 2022  4:30 PM  (Arrive by 4:10 PM)  Provider Visit with Ab Long MD  [facility] ([facility] ) [phone]         Parul Gerardo RN BSN MSN  [facility]        Reason for Disposition    All other patients with chest pain (Exception: fleeting chest pain lasting a few seconds)    Patient wants to be seen    Additional Information    Negative: SEVERE difficulty breathing (e.g., struggling for each breath, speaks in single words)    Negative: Passed out (i.e., fainted, collapsed and was not responding)    Negative: Difficult to awaken or acting confused (e.g., disoriented, slurred speech)    Negative: Shock suspected (e.g., cold/pale/clammy skin, too weak to stand, low BP, rapid pulse)    Negative: Chest pain lasting longer than 5 minutes and ANY of the following:* Over 44 years old* Over 30 years old and at least one cardiac risk factor (e.g., diabetes mellitus, high blood pressure, high cholesterol, smoker, or strong family history of heart disease)* History of heart disease (i.e., angina, heart attack, heart failure, bypass surgery, takes nitroglycerin)* Pain is crushing, pressure-like, or heavy    Negative: Heart beating < 50 beats per minute OR > 140 beats per minute    Negative: Visible sweat on face or sweat dripping down face    Negative: Sounds like a life-threatening emergency to the triager    Negative: Followed an injury to chest    Negative: SEVERE chest pain    Negative: Pain also in shoulder(s) or arm(s) or jaw    Negative: Difficulty breathing     "Negative: Cocaine use within last 3 days    Negative: Major surgery in the past month    Negative: Hip or leg fracture (broken bone) in past month (or had cast on leg or ankle in past month)    Negative: Illness requiring prolonged bedrest in past month (e.g., immobilization, long hospital stay)    Negative: Long-distance travel in past month (e.g., car, bus, train, plane; with trip lasting 6 or more hours)    Negative: History of prior 'blood clot' in leg or lungs (i.e., deep vein thrombosis, pulmonary embolism)    Negative: History of inherited increased risk of blood clots (e.g., Factor 5 Leiden, Anti-thrombin 3, Protein C or Protein S deficiency, Prothrombin mutation)    Negative: Cancer treatment in the past two months (or has cancer now)    Negative: Heart beating irregularly or very rapidly    Negative: Chest pain lasting longer than 5 minutes and occurred in last 3 days (72 hours) (Exception: feels exactly the same as previously diagnosed heartburn and has accompanying sour taste in mouth)    Negative: Chest pain or 'angina' comes and goes and is happening more often (increasing in frequency) or getting worse (increasing in severity) (Exception: chest pains that last only a few seconds)    Negative: Dizziness or lightheadedness    Negative: Coughing up blood    Negative: Patient sounds very sick or weak to the triager    Negative: Patient says chest pain feels exactly the same as previously diagnosed 'heartburn' and describes burning in chest and accompanying sour taste in mouth    Negative: Fever > 100.4 F (38.0 C)    Negative: Chest pain(s) lasting a few seconds persists > 3 days    Negative: Rash in same area as pain (may be described as 'small blisters')    Answer Assessment - Initial Assessment Questions  1. LOCATION: \"Where does it hurt?\"        \"Right central chest\"  2. RADIATION: \"Does the pain go anywhere else?\" (e.g., into neck, jaw, arms, back)      \"Up to her left side of the neck\"  3. ONSET: " "\"When did the chest pain begin?\" (Minutes, hours or days)       Occurred at 1:30 pm today, lasted 2-3 minutes  4. PATTERN \"Does the pain come and go, or has it been constant since it started?\"  \"Does it get worse with exertion?\"       Occurred once  5. DURATION: \"How long does it last\" (e.g., seconds, minutes, hours)      2-3 minutes  6. SEVERITY: \"How bad is the pain?\"  (e.g., Scale 1-10; mild, moderate, or severe)     - MILD (1-3): doesn't interfere with normal activities      - MODERATE (4-7): interferes with normal activities or awakens from sleep     - SEVERE (8-10): excruciating pain, unable to do any normal activities        5-6/10 per patient   7. CARDIAC RISK FACTORS: \"Do you have any history of heart problems or risk factors for heart disease?\" (e.g., angina, prior heart attack; diabetes, high blood pressure, high cholesterol, smoker, or strong family history of heart disease)      Hypertension, strong family history of heart disease (father and brother)  8. PULMONARY RISK FACTORS: \"Do you have any history of lung disease?\"  (e.g., blood clots in lung, asthma, emphysema, birth control pills)      Patient reports asthma as a child but not now as an adult  9. CAUSE: \"What do you think is causing the chest pain?\"      Patient not sure  10. OTHER SYMPTOMS: \"Do you have any other symptoms?\" (e.g., dizziness, nausea, vomiting, sweating, fever, difficulty breathing, cough)        Denies any other symptoms   11. PREGNANCY: \"Is there any chance you are pregnant?\" \"When was your last menstrual period?\"        N/A    Protocols used: CHEST PAIN-A-[place]      "

## 2022-11-29 NOTE — PROGRESS NOTES
"  Assessment & Plan      Atypical chest pain:    Her presentation is not consistent with chest pain of cardiac etiology.  We did do an EKG which is reassuringly stable from previous.    I would theorized that she had a potential esophageal spasm which was self-limited.  Or another benign process.  Considered a TIA but she really has no other clinical features to go along with that.    She has a previously scheduled wellness visit coming up.  She was offered copious reassurance today.  Appropriate return precautions are discussed.      - EKG 12-lead complete w/read - Clinics        No follow-ups on file.    Ab Long MD  [facility] GUY Rosen is a 82 year old, presenting for the following health issues:  Chest Pain      HPI     Pt is new to me, established in clinic  Was in her usual state of health until 1:30 today.    She, along with her dear friend (present today) were setting up her [employer] tree.     She c/o pressure sensation upper left chest- near her neck and a general sense of feeling 'unwell'.     She had no associated diaphoresis nausea, or palpitations. No headache, vision changes, facial asymmetry or extremity weakness.      Her friend took her blood pressure and was noted to be under 100 systolic.  On repeat testing the blood pressure gradually improved to her baseline which is similar to below.    Entire episode lasted for 2 minutes after which she felt her usual self again.    Of note she is on once weekly Fosamax and took a dose just yesterday.    Review of Systems         Objective    /66 (BP Location: Right arm, Patient Position: Chair, Cuff Size: Adult Large)   Pulse 70   Temp 98.2  F (36.8  C) (Temporal)   Resp 16   Ht 1.727 m (5' 8\")   Wt 73 kg (161 lb)   SpO2 98%   BMI 24.48 kg/m    Body mass index is 24.48 kg/m .  Physical Exam   GENERAL: healthy, alert and no distress  NECK: no adenopathy, no asymmetry, masses, or scars and thyroid normal to " palpation  RESP: lungs clear to auscultation - no rales, rhonchi or wheezes  CV: regular rate and rhythm, normal S1 S2, no S3 or S4, no murmur, click or rub, no peripheral edema and peripheral pulses strong  ABDOMEN: soft, nontender, no hepatosplenomegaly, no masses and bowel sounds normal  MS: no gross musculoskeletal defects noted, no edema

## 2022-12-14 NOTE — TELEPHONE ENCOUNTER
Addended by: MARIA FERNANDA SAINI on: 12/14/2022 12:41 PM     Modules accepted: Level of Service     Routing refill request to provider for review/approval because:  Drug not on the [facility] refill protocol .     BP Readings from Last 3 Encounters:   08/21/21 (!) 154/84   08/20/21 118/61   07/27/21 120/58        Isabel Caro RN  [facility] Triage

## 2022-12-15 ENCOUNTER — TRANSFERRED RECORDS (OUTPATIENT)
Dept: FAMILY MEDICINE | Facility: CLINIC | Age: 82
End: 2022-12-15

## 2022-12-15 ENCOUNTER — OFFICE VISIT (OUTPATIENT)
Dept: FAMILY MEDICINE | Facility: CLINIC | Age: 82
End: 2022-12-15
Payer: COMMERCIAL

## 2022-12-15 VITALS
HEART RATE: 71 BPM | DIASTOLIC BLOOD PRESSURE: 66 MMHG | OXYGEN SATURATION: 94 % | TEMPERATURE: 97.7 F | WEIGHT: 162 LBS | BODY MASS INDEX: 24.55 KG/M2 | HEIGHT: 68 IN | SYSTOLIC BLOOD PRESSURE: 134 MMHG | RESPIRATION RATE: 16 BRPM

## 2022-12-15 DIAGNOSIS — G31.84 MCI (MILD COGNITIVE IMPAIRMENT) WITH MEMORY LOSS: Primary | ICD-10-CM

## 2022-12-15 DIAGNOSIS — E78.5 HYPERLIPIDEMIA LDL GOAL <130: ICD-10-CM

## 2022-12-15 DIAGNOSIS — I10 ESSENTIAL HYPERTENSION: ICD-10-CM

## 2022-12-15 PROCEDURE — 99213 OFFICE O/P EST LOW 20 MIN: CPT | Performed by: INTERNAL MEDICINE

## 2022-12-15 RX ORDER — ROSUVASTATIN CALCIUM 5 MG/1
5 TABLET, COATED ORAL DAILY
Qty: 90 TABLET | Refills: 3 | Status: SHIPPED | OUTPATIENT
Start: 2022-12-15 | End: 2023-09-07

## 2022-12-15 ASSESSMENT — PAIN SCALES - GENERAL: PAINLEVEL: NO PAIN (0)

## 2022-12-15 NOTE — PATIENT INSTRUCTIONS
Please do a drivers evaluation at [facility].    See me in March.    Guzman Bruner M.D.

## 2022-12-15 NOTE — PROGRESS NOTES
The patient presents with her friend pain for follow-up.  I  reviewed the [employer] message from her friend Fabiola yesterday and discussed this frankly.    Patient does have mild cognitive impairment which she admits to.  Some memory loss.  She lives by herself in independent living but sounds like its more senior living.  She has a dog.  It does not sound like there is any significant safety issues at home, no falls, not leaving the stove on but sounds like she does not always take her medicines.  She still drives and I am strongly recommended going to [facility] to do a 's assessment and she agrees to this.  She is wearing her lifeline pendant all the time now and does have her phone with her now all the time.  She does not feel at risk of falling.  She overall feels well and feels like her depression is doing quite well with the Lexapro now.    Exam but discussed with the patient and [place] at length.  At this time she certainly does have mild cognitive impairment.  She is on Namenda.  Overall she is stable but does have issues as noted above.  She is doing better now in terms of caring her phone and wearing her lifeline.  She is on the waiting list at [facility] for assisted living which is great.  She will get her 's evaluation.  Her friends are watching her closely and check on her twice a day.  They certainly can call me if there is any changes.    Guzman Bruner M.D.

## 2022-12-16 ENCOUNTER — TRANSFERRED RECORDS (OUTPATIENT)
Dept: HEALTH INFORMATION MANAGEMENT | Facility: CLINIC | Age: 82
End: 2022-12-16

## 2023-02-14 ENCOUNTER — TRANSFERRED RECORDS (OUTPATIENT)
Dept: HEALTH INFORMATION MANAGEMENT | Facility: CLINIC | Age: 83
End: 2023-02-14

## 2023-03-17 DIAGNOSIS — I10 ESSENTIAL HYPERTENSION: ICD-10-CM

## 2023-03-17 RX ORDER — LOSARTAN POTASSIUM 25 MG/1
TABLET ORAL
Qty: 180 TABLET | Refills: 1 | Status: SHIPPED | OUTPATIENT
Start: 2023-03-17 | End: 2023-09-07

## 2023-04-01 DIAGNOSIS — M81.0 AGE RELATED OSTEOPOROSIS, UNSPECIFIED PATHOLOGICAL FRACTURE PRESENCE: ICD-10-CM

## 2023-04-03 RX ORDER — ALENDRONATE SODIUM 70 MG/1
TABLET ORAL
Qty: 12 TABLET | Refills: 3 | Status: SHIPPED | OUTPATIENT
Start: 2023-04-03 | End: 2023-06-05

## 2023-04-07 ENCOUNTER — OFFICE VISIT (OUTPATIENT)
Dept: FAMILY MEDICINE | Facility: CLINIC | Age: 83
End: 2023-04-07
Payer: COMMERCIAL

## 2023-04-07 VITALS
DIASTOLIC BLOOD PRESSURE: 71 MMHG | TEMPERATURE: 97.7 F | SYSTOLIC BLOOD PRESSURE: 137 MMHG | HEART RATE: 75 BPM | HEIGHT: 68 IN | BODY MASS INDEX: 25.91 KG/M2 | RESPIRATION RATE: 16 BRPM | OXYGEN SATURATION: 97 % | WEIGHT: 171 LBS

## 2023-04-07 DIAGNOSIS — G31.84 MCI (MILD COGNITIVE IMPAIRMENT) WITH MEMORY LOSS: Primary | ICD-10-CM

## 2023-04-07 DIAGNOSIS — F32.A MILD DEPRESSIVE DISORDER: ICD-10-CM

## 2023-04-07 DIAGNOSIS — D32.0 INTRACRANIAL MENINGIOMA (H): ICD-10-CM

## 2023-04-07 DIAGNOSIS — E78.5 HYPERLIPIDEMIA LDL GOAL <130: ICD-10-CM

## 2023-04-07 DIAGNOSIS — R53.83 OTHER FATIGUE: ICD-10-CM

## 2023-04-07 DIAGNOSIS — I10 ESSENTIAL HYPERTENSION: ICD-10-CM

## 2023-04-07 DIAGNOSIS — F32.0 MAJOR DEPRESSIVE DISORDER, SINGLE EPISODE, MILD (H): ICD-10-CM

## 2023-04-07 LAB
ALBUMIN SERPL BCG-MCNC: 4.3 G/DL (ref 3.5–5.2)
ALP SERPL-CCNC: 82 U/L (ref 35–104)
ALT SERPL W P-5'-P-CCNC: 13 U/L (ref 10–35)
ANION GAP SERPL CALCULATED.3IONS-SCNC: 13 MMOL/L (ref 7–15)
AST SERPL W P-5'-P-CCNC: 22 U/L (ref 10–35)
BILIRUB SERPL-MCNC: 0.3 MG/DL
BUN SERPL-MCNC: 23.4 MG/DL (ref 8–23)
CALCIUM SERPL-MCNC: 9.5 MG/DL (ref 8.8–10.2)
CHLORIDE SERPL-SCNC: 104 MMOL/L (ref 98–107)
CHOLEST SERPL-MCNC: 162 MG/DL
CREAT SERPL-MCNC: 1.2 MG/DL (ref 0.51–0.95)
DEPRECATED HCO3 PLAS-SCNC: 25 MMOL/L (ref 22–29)
ERYTHROCYTE [DISTWIDTH] IN BLOOD BY AUTOMATED COUNT: 12.9 % (ref 10–15)
GFR SERPL CREATININE-BSD FRML MDRD: 44 ML/MIN/1.73M2
GLUCOSE SERPL-MCNC: 83 MG/DL (ref 70–99)
HCT VFR BLD AUTO: 38.4 % (ref 35–47)
HDLC SERPL-MCNC: 69 MG/DL
HGB BLD-MCNC: 12.4 G/DL (ref 11.7–15.7)
LDLC SERPL CALC-MCNC: 70 MG/DL
MCH RBC QN AUTO: 30.5 PG (ref 26.5–33)
MCHC RBC AUTO-ENTMCNC: 32.3 G/DL (ref 31.5–36.5)
MCV RBC AUTO: 94 FL (ref 78–100)
NONHDLC SERPL-MCNC: 93 MG/DL
PLATELET # BLD AUTO: 233 10E3/UL (ref 150–450)
POTASSIUM SERPL-SCNC: 4.9 MMOL/L (ref 3.4–5.3)
PROT SERPL-MCNC: 7 G/DL (ref 6.4–8.3)
RBC # BLD AUTO: 4.07 10E6/UL (ref 3.8–5.2)
SODIUM SERPL-SCNC: 142 MMOL/L (ref 136–145)
TRIGL SERPL-MCNC: 117 MG/DL
TSH SERPL DL<=0.005 MIU/L-ACNC: 2.94 UIU/ML (ref 0.3–4.2)
WBC # BLD AUTO: 7.6 10E3/UL (ref 4–11)

## 2023-04-07 PROCEDURE — 80053 COMPREHEN METABOLIC PANEL: CPT | Performed by: INTERNAL MEDICINE

## 2023-04-07 PROCEDURE — 84443 ASSAY THYROID STIM HORMONE: CPT | Performed by: INTERNAL MEDICINE

## 2023-04-07 PROCEDURE — 36415 COLL VENOUS BLD VENIPUNCTURE: CPT | Performed by: INTERNAL MEDICINE

## 2023-04-07 PROCEDURE — 85027 COMPLETE CBC AUTOMATED: CPT | Performed by: INTERNAL MEDICINE

## 2023-04-07 PROCEDURE — 80061 LIPID PANEL: CPT | Performed by: INTERNAL MEDICINE

## 2023-04-07 PROCEDURE — 99214 OFFICE O/P EST MOD 30 MIN: CPT | Performed by: INTERNAL MEDICINE

## 2023-04-07 RX ORDER — FLUOXETINE 10 MG/1
10 CAPSULE ORAL DAILY
Qty: 50 CAPSULE | Refills: 1 | Status: SHIPPED | OUTPATIENT
Start: 2023-04-07 | End: 2023-06-05

## 2023-04-07 ASSESSMENT — PAIN SCALES - GENERAL: PAINLEVEL: NO PAIN (0)

## 2023-04-07 ASSESSMENT — PATIENT HEALTH QUESTIONNAIRE - PHQ9: SUM OF ALL RESPONSES TO PHQ QUESTIONS 1-9: 6

## 2023-04-07 NOTE — PATIENT INSTRUCTIONS
Stop the lexapro and start the prozac at 10mg once daily.  Please send me a [employer] message in 3 weeks with an update, but if side effects let me know.  I will send you the lab results.  Do the driving evaluation.  4.

## 2023-04-07 NOTE — PROGRESS NOTES
Is a pleasant 82-year-old with memory issues who presents with her friend Fabiola who is a nurse lives in [place], for follow-up.  Overall she is doing well.  There is a few issues.    She does have mild cognitive impairment as well as cervical stenosis and an intracranial brain lesion felt likely due to a meningioma for which she has seen neurosurgery.  She has follow-up with neurology on April 20 for all of these issues.  The memory seems to be a bit worse.  We also discussed again having to do a  evaluation at [facility] which have strongly recommended and they will pursue.    She continues to have low energy.  Labs for this in the past have been negative and she uses CPAP.  She does have some mild depression.  She is on Lexapro and it seems to help but she still does not have the energy that she used to.  Her dog  Health issues which affects her.    She has hypertension with adequate blood pressure control.  She has hyperlipidemia.  She otherwise feels well with no other complaints.    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    had vaginal bleeding, surgery done 2021     Arthritis mild in knees, shoulders     Cancer (H) skin--Basil Cell, [place]     Chest pain 2003    neg est thallium     Chest pain 04/2016    nl est echo and cxr     Complication of anesthesia     BP maddie to over 200 in PACU 1 month ago-after D&C     Essential hypertension 07/2021     Fatigue 08/25/2021     Hx of colonoscopy 2003    incomplete, be nl     Hx of colonoscopy 06/03/2011    nl     Hypercholesteremia 2011    aches with zocor     Major depressive disorder, single episode, mild (H) 4/7/2023     MCI (mild cognitive impairment) with memory loss 2022    namenda added by Dr. Lyles 10/22     CAITLYN (obstructive sleep apnea) 12/2012    done [place], using dental device, added cpap 2014      Osteopenia  2013    Dr. Taylor     Osteoporosis of lumbar spine 07/2020    added fosamax     Peripheral neuropathy     Dr. 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     SOB (shortness of breath) 08/2021    elevated d dimer, ct no pe but signs of chf, then echo nl 9/21     Statin intolerance     zocor and one other caused aches     Subdural hematoma (H) 01/2016    traumatic, fu ct 3/16 resolved     Uncomplicated asthma      Past Surgical History:   Procedure Laterality Date     ABDOMEN SURGERY  1960     APPENDECTOMY  60's     BIOPSY  2010    inside upper lip     DAVINCI HYSTERECTOMY TOTAL, BILATERAL SALPINGO-OOPHORECTOMY, NODE DISSECTION, COMBINED Bilateral 7/8/2021    Procedure: ROBOTIC ASSISTED TOTAL LAPAROSCOPIC HYSTERECTOMY, BILATERAL SALPINGO-OOPHORECTOMY, WASHINGS, INTRA-OPERATIVE SENTINEL LYMPH NODE MAPPING, BILATERAL PELVIC AND PARA-AORTIC LYMPHADENECTOMY, REPAIR VAGINAL TEAR;  Surgeon: Dianne Garland MD;  Location:  OR     DILATION AND CURETTAGE, HYSTEROSCOPY WITH ULTRASOUND GUIDANCE N/A 5/11/2021    Procedure: HYSTEROSCOPY UNDER ULTRASOUND GUIDANCE, DILATION AND CURETTAGE OF UTERUS;  Surgeon: Isabel Ferro MD;  Location:  OR     OPERATIVE HYSTEROSCOPY WITH MORCELLATOR N/A 5/11/2021    Procedure: MYOSURE MORCELLATOR;  Surgeon: Isabel Ferro MD;  Location:  OR     PHACOEMULSIFICATION CLEAR CORNEA WITH STANDARD INTRAOCULAR LENS IMPLANT  3/21/2012    Procedure:PHACOEMULSIFICATION CLEAR CORNEA WITH STANDARD INTRAOCULAR LENS IMPLANT; RIGHT PHACOEMULSIFICATION CLEAR CORNEA WITH STANDARD INTRAOCULAR LENS IMPLANT ; Surgeon:CHANDRAKANT HERNANDEZ; Location: [facility]     PHACOEMULSIFICATION CLEAR CORNEA WITH STANDARD INTRAOCULAR LENS IMPLANT  3/28/2012    Procedure:PHACOEMULSIFICATION CLEAR CORNEA WITH STANDARD INTRAOCULAR LENS IMPLANT; LEFT PHACOEMULSIFICATION CLEAR CORNEA WITH STANDARD INTRAOCULAR LENS IMPLANT ; Surgeon:CHANDRAKANT HERNANDEZ;  Location:[facility]     SHOULDER SURGERY      twice     SOFT TISSUE SURGERY  R&L Rotator cuff repairs     TONSILLECTOMY  child     Social History     Socioeconomic History     Marital status: Single     Spouse name: Not on file     Number of children: 0     Years of education: Not on file     Highest education level: Not on file   Occupational History     Occupation: physical therapy     Employer: RETIRED   Tobacco Use     Smoking status: Former     Packs/day: 1.50     Years: 12.00     Pack years: 18.00     Types: Cigarettes     Start date: 1966     Quit date: 3/17/1974     Years since quittin.0     Smokeless tobacco: Never     Tobacco comments:     enjoyed it!   Vaping Use     Vaping status: Not on file   Substance and Sexual Activity     Alcohol use: Yes     Comment: Minimal     Drug use: No     Sexual activity: Not Currently     Partners: Male     Birth control/protection: None   Other Topics Concern     Parent/sibling w/ CABG, MI or angioplasty before 65F 55M? Yes     Comment: Brother  of heart attack at 49 yrs old   Social History Narrative    Single, no kids    Has a cabin    Has godchildren that can help out     Social Determinants of Health     Financial Resource Strain: Not on file   Food Insecurity: Not on file   Transportation Needs: Not on file   Physical Activity: Not on file   Stress: Not on file   Social Connections: Not on file   Intimate Partner Violence: Not on file   Housing Stability: Not on file     Current Outpatient Medications   Medication Sig Dispense Refill     alendronate (FOSAMAX) 70 MG tablet TAKE 1 TABLET(70 MG) BY MOUTH EVERY 7 DAYS 12 tablet 3     Apoaequorin 10 MG CAPS Take 1 capsule by mouth daily Prevagen       aspirin (ASA) 81 MG chewable tablet Take 1 tablet (81 mg) by mouth daily 100 tablet 4     Calcium Carbonate-Vitamin D (CALCIUM-D PO) Take by mouth daily Liquid  Calcium 1200 mg  & Vit D 800 iU       Coenzyme Q10 (COQ10 PO) Take 100 mg by mouth.       FLUoxetine  "(PROZAC) 10 MG capsule Take 1 capsule (10 mg) by mouth daily 50 capsule 1     hydrochlorothiazide (HYDRODIURIL) 12.5 MG tablet TAKE 1 TABLET(12.5 MG) BY MOUTH DAILY 90 tablet 3     losartan (COZAAR) 25 MG tablet TAKE 1 TABLET(25 MG) BY MOUTH TWICE DAILY 180 tablet 1     memantine (NAMENDA) 10 MG tablet Take 1 tablet (10 mg) by mouth 2 times daily for 360 days 180 tablet 3     ORDER FOR DME Use your CPAP device as directed by your provider.       rosuvastatin (CRESTOR) 5 MG tablet Take 1 tablet (5 mg) by mouth daily 90 tablet 3     vitamin B-12 (CYANOCOBALAMIN) 1000 MCG tablet Take 1,000 mcg by mouth daily       No Known Allergies  FAMILY HISTORY NOTED AND REVIEWED    REVIEW OF SYSTEMS: above    PHYSICAL EXAM    /71 (BP Location: Right arm, Patient Position: Sitting, Cuff Size: Adult Regular)   Pulse 75   Temp 97.7  F (36.5  C) (Temporal)   Resp 16   Ht 1.727 m (5' 8\")   Wt 77.6 kg (171 lb)   SpO2 97%   BMI 26.00 kg/m      Patient appears non toxic  Mouth - tongue midline and within normal limits, mucous membranes and posterior pharynx within normal limits, no lesions seen.  Neck - no masses, lesions or tenderness  Nodes - no supraclavicular, cervical or axially adenopathy .  Lungs - clear, normal flow  Cardiovascular - regular rate and rhythm, no murmer, rub or gallop, no jvp or edema, carotids within normal limits, no bruits.  Abdomen - normal active bowel sounds, soft, non tender, no masses, guarding or rebound, no hepatosplenomegaly      Labs sent    ASSESSMENT:  1. Mci, follow up neuro  2. Mild depression  3. Fatigue, suspect due to depression, age and dementia  4. Meningioma, cervical stenosis, memory loss, follow up neuro  5. Elevated cholesterol, follow up labs  6. Hypertension, controlled    PLAN:  Labs  Change lexapro to prozac 10mg, call if side effects  [facility]  Follow up office visit 4 months  See neuro  Up to date immunizations       Guzman Bruner, " M.D.

## 2023-04-09 NOTE — RESULT ENCOUNTER NOTE
Jessika,    It was nice to see you.  Your labs look good.  This includes your blood count, thyroid test, cholesterol, blood salts, sugar, liver tests and proteins.  Your creatinine or kidney test is just above normal, not a worry but let's repeat it at your next office visit.    Let me know if you have questions.    Guzman Bruner M.D.

## 2023-04-20 ENCOUNTER — OFFICE VISIT (OUTPATIENT)
Dept: NEUROLOGY | Facility: CLINIC | Age: 83
End: 2023-04-20
Payer: COMMERCIAL

## 2023-04-20 VITALS
OXYGEN SATURATION: 93 % | BODY MASS INDEX: 26 KG/M2 | SYSTOLIC BLOOD PRESSURE: 103 MMHG | WEIGHT: 171 LBS | HEART RATE: 71 BPM | DIASTOLIC BLOOD PRESSURE: 61 MMHG

## 2023-04-20 DIAGNOSIS — M48.02 SPINAL STENOSIS IN CERVICAL REGION: Primary | ICD-10-CM

## 2023-04-20 DIAGNOSIS — G14 POST-POLIO SYNDROME (H): ICD-10-CM

## 2023-04-20 DIAGNOSIS — F32.0 MAJOR DEPRESSIVE DISORDER, SINGLE EPISODE, MILD (H): ICD-10-CM

## 2023-04-20 DIAGNOSIS — G31.84 MCI (MILD COGNITIVE IMPAIRMENT) WITH MEMORY LOSS: ICD-10-CM

## 2023-04-20 DIAGNOSIS — G60.9 IDIOPATHIC PERIPHERAL NEUROPATHY: ICD-10-CM

## 2023-04-20 DIAGNOSIS — D32.0 MENINGIOMA, CEREBRAL (H): ICD-10-CM

## 2023-04-20 PROCEDURE — 99215 OFFICE O/P EST HI 40 MIN: CPT | Performed by: PSYCHIATRY & NEUROLOGY

## 2023-04-20 RX ORDER — DONEPEZIL HYDROCHLORIDE 10 MG/1
10 TABLET, FILM COATED ORAL AT BEDTIME
Qty: 90 TABLET | Refills: 3 | Status: SHIPPED | OUTPATIENT
Start: 2023-05-19 | End: 2023-05-11

## 2023-04-20 RX ORDER — DONEPEZIL HYDROCHLORIDE 5 MG/1
5 TABLET, FILM COATED ORAL AT BEDTIME
Qty: 28 TABLET | Refills: 0 | Status: SHIPPED | OUTPATIENT
Start: 2023-04-20 | End: 2023-05-11

## 2023-04-20 NOTE — NURSING NOTE
"Lizzette Chanel is a 82 year old female who presents for:  Chief Complaint   Patient presents with     RECHECK     MCI (mild cognitive impairment) with memory loss         Initial Vitals:  /61   Pulse 71   Wt 77.6 kg (171 lb)   SpO2 93%   BMI 26.00 kg/m   Estimated body mass index is 26 kg/m  as calculated from the following:    Height as of 4/7/23: 1.727 m (5' 8\").    Weight as of this encounter: 77.6 kg (171 lb).. Body surface area is 1.93 meters squared. BP completed using cuff size: celina Kinsey    "

## 2023-04-20 NOTE — LETTER
4/20/2023         RE: Lizzette Chanel  [address]  [place] [address]        Dear Colleague,    Thank you for referring your patient, Lizzette Chanel, to the [facility]. Please see a copy of my visit note below.        [facility]    Lizzette Chanel MRN# [id number]   Age: 82 year old YOB: 1940     Requesting physician: No ref. provider found  Guzman Bruner            Assessment and Plan:   Assessment:  1.  Cervical spinal stenosis  2.  Mild cognitive impairment  3.  Intracranial meningioma  4.  Postpolio syndrome  5.  Peripheral neuropathy  6.  Depression     Plan:  1.  We will proceed with implementation of donepezil in addition to the memantine, prescriptions ordered today  2.  Updated MR brain imaging to be done in July of this year to follow the meningioma  3.  Return to clinic 12/21/2023    Jessika's current clinical examination continues to reflect her post polio syndrome involving the left upper extremity ulnar distribution, mild cognitive impairment with loss in recent memory, difficulty with balance reflective of her peripheral neuropathy, and a diffuse hyperreflexia with bilateral upgoing toes possibly related to her cervical spinal stenosis but on the whole, unchanged.  Overall, I believe that she is clinically stable and will not be taking steps to intercede surgically in her cervical spine or intracranially.    We will follow the meningioma with an updated MR study this coming July.  I will add donepezil to the memantine at this time.  I will see her in follow-up 12/21/2023.             History of Present Illness:   CC:  I met with Jessika today for 45 minutes to follow-up on multiple neurological pathologies.  I have followed her for several decades for her postpolio syndrome which has been stable and a noncontributory.  She continues to experience some weakness without sensory loss in the upper extremity and her clinical  examination today does not disclose any appreciable change.  In the course of her work-up however imaging of her cervical spine disclosed spinal stenosis in the low to mid cervical region which I have followed clinically as well as with imaging and has not progressed.  Her visit today was set up to review her physical examination once again.    She also has developed mild cognitive impairment.  At her last visit memantine was started which she tolerates quite well however continues to experience recent memory difficulties.  At today's visit is plan to add donepezil.    She has been followed for a meningioma on MR imaging done last July which appears to be stable.  I do not believe she is experiencing any side effects and we plan to set up another MR study this coming July.    Jessika continues to do well.  She received some supervision and assistance from friends but otherwise lives alone in her own apartment.  She does operate her motor vehicle for short distances.  She is able to take care of her small dog and gets exercise with the dog on a daily basis.  She is not aware of any other complaints pertaining to her gait, balance, coordination, bowel or bladder function.  The balance of her neurologic review of systems remains noncontributory as well aside from the previously known difficulty with her peripheral neuropathy impairing her balance, the post polio syndrome impairing her upper extremity function, and the mild cognitive impairment.               Physical Exam:   Her current clinical examination reveals her to be awake and alert she is oriented and appropriate her speech is fluent comprehension is intact.  Recent memory is flawed.  Cranial nerves are normal.  Motor exam reveals good strength tone and bulk throughout although she is weak in the right tricep compared to the left she has atrophy and weakness in the ulnar distribution left upper extremity most noticeable in the interossei.  She does not have any  sensory loss.  Her gait is natural.  She is unable to tandem stand and has a positive Romberg falling with the eyes closed.  Her deep tendon reflexes are present perhaps increased and symmetrical with bilateral upgoing toes, more prominent on the left.         Data:   All laboratory data reviewed  All imaging studies reviewed by me             DATA for DOCUMENTATION:         Past Medical History:     Patient Active Problem List   Diagnosis     Peripheral neuropathy     Hx of colonoscopy     Hyperlipidemia LDL goal <130     CAITLYN (obstructive sleep apnea)  CPAP pressures set at:  LPL of 5 and UPL of 14     Osteopenia     Advanced directives, counseling/discussion     Spinal stenosis in cervical region     Post-polio syndrome     Adenocarcinoma of endometrium (H)     Fatigue     Essential hypertension     Osteoporosis of lumbar spine     Meningioma, cerebral (H)     MCI (mild cognitive impairment) with memory loss     Major depressive disorder, single episode, mild (H)     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    had vaginal bleeding, surgery done 2021     Arthritis mild in knees, shoulders     Cancer (H) skin--Basil Cell, Squamas     Chest pain 2003    neg est thallium     Chest pain 04/2016    nl est echo and cxr     Complication of anesthesia     BP maddie to over 200 in PACU 1 month ago-after D&C     Essential hypertension 07/2021     Fatigue 08/25/2021     Hx of colonoscopy 2003    incomplete, be nl     Hx of colonoscopy 06/03/2011    nl     Hypercholesteremia 2011    aches with zocor     Major depressive disorder, single episode, mild (H) 4/7/2023     MCI (mild cognitive impairment) with memory loss 2022    namenda added by Dr. Lyles 10/22     CAITLYN (obstructive sleep apnea) 12/2012    done [place], using dental device, added cpap 2014      Osteopenia 2013    Dr. Taylor     Osteoporosis  of lumbar spine 07/2020    added fosamax     Peripheral neuropathy     Dr. 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       Past Surgical History:     Past Surgical History:   Procedure Laterality Date     ABDOMEN SURGERY  1960     APPENDECTOMY  60's     BIOPSY  2010    inside upper lip     DAVINCI HYSTERECTOMY TOTAL, BILATERAL SALPINGO-OOPHORECTOMY, NODE DISSECTION, COMBINED Bilateral 7/8/2021    Procedure: ROBOTIC ASSISTED TOTAL LAPAROSCOPIC HYSTERECTOMY, BILATERAL SALPINGO-OOPHORECTOMY, WASHINGS, INTRA-OPERATIVE SENTINEL LYMPH NODE MAPPING, BILATERAL PELVIC AND PARA-AORTIC LYMPHADENECTOMY, REPAIR VAGINAL TEAR;  Surgeon: Dianne Garland MD;  Location:  OR     DILATION AND CURETTAGE, HYSTEROSCOPY WITH ULTRASOUND GUIDANCE N/A 5/11/2021    Procedure: HYSTEROSCOPY UNDER ULTRASOUND GUIDANCE, DILATION AND CURETTAGE OF UTERUS;  Surgeon: Isabel Ferro MD;  Location:  OR     OPERATIVE HYSTEROSCOPY WITH MORCELLATOR N/A 5/11/2021    Procedure: MYOSURE MORCELLATOR;  Surgeon: Isabel Ferro MD;  Location:  OR     PHACOEMULSIFICATION CLEAR CORNEA WITH STANDARD INTRAOCULAR LENS IMPLANT  3/21/2012    Procedure:PHACOEMULSIFICATION CLEAR CORNEA WITH STANDARD INTRAOCULAR LENS IMPLANT; RIGHT PHACOEMULSIFICATION CLEAR CORNEA WITH STANDARD INTRAOCULAR LENS IMPLANT ; Surgeon:CHANDRAKANT HERNANDEZ; Location: [facility]     PHACOEMULSIFICATION CLEAR CORNEA WITH STANDARD INTRAOCULAR LENS IMPLANT  3/28/2012    Procedure:PHACOEMULSIFICATION CLEAR CORNEA WITH STANDARD INTRAOCULAR LENS IMPLANT; LEFT PHACOEMULSIFICATION CLEAR CORNEA WITH STANDARD INTRAOCULAR LENS  IMPLANT ; Surgeon:CHANDRAKANT HERNANDEZ Location: [facility]     SHOULDER SURGERY      twice     SOFT TISSUE SURGERY  R&L Rotator cuff repairs     TONSILLECTOMY  child            Social History:     Social History     Socioeconomic History     Marital status: Single     Spouse name: Not on file     Number of children: 0     Years of education: Not on file     Highest education level: Not on file   Occupational History     Occupation: physical therapy     Employer: RETIRED   Tobacco Use     Smoking status: Former     Packs/day: 1.50     Years: 12.00     Pack years: 18.00     Types: Cigarettes     Start date: 1966     Quit date: 3/17/1974     Years since quittin.1     Smokeless tobacco: Never     Tobacco comments:     enjoyed it!   Vaping Use     Vaping status: Not on file   Substance and Sexual Activity     Alcohol use: Yes     Comment: Minimal     Drug use: No     Sexual activity: Not Currently     Partners: Male     Birth control/protection: None   Other Topics Concern     Parent/sibling w/ CABG, MI or angioplasty before 65F 55M? Yes     Comment: Brother  of heart attack at 49 yrs old   Social History Narrative    Single, no kids    Has a cabin    Has godchildren that can help out     Social Determinants of Health     Financial Resource Strain: Not on file   Food Insecurity: Not on file   Transportation Needs: Not on file   Physical Activity: Not on file   Stress: Not on file   Social Connections: Not on file   Intimate Partner Violence: Not on file   Housing Stability: Not on file              Family History:     Family History   Problem Relation Age of Onset     C.A.D. Father      Heart Disease Father      Heart Disease Mother 97     Alzheimer Disease Mother      Cerebrovascular Disease Mother         mild. Still was very functional     Substance Abuse Mother         Significant treatment over 15 yrs but ended with very good results     Osteoporosis Mother      C.A.D. Brother      Asthma Brother              Medications:     Current Outpatient Medications   Medication Sig     alendronate (FOSAMAX) 70 MG tablet TAKE 1 TABLET(70 MG) BY MOUTH EVERY 7 DAYS     Apoaequorin 10 MG CAPS Take 1 capsule by mouth daily Prevagen     aspirin (ASA) 81 MG chewable tablet Take 1 tablet (81 mg) by mouth daily     Calcium Carbonate-Vitamin D (CALCIUM-D PO) Take by mouth daily Liquid  Calcium 1200 mg  & Vit D 800 iU     Coenzyme Q10 (COQ10 PO) Take 100 mg by mouth.     [START ON 5/19/2023] donepezil (ARICEPT) 10 MG tablet Take 1 tablet (10 mg) by mouth At Bedtime     donepezil (ARICEPT) 5 MG tablet Take 1 tablet (5 mg) by mouth At Bedtime for 28 days     FLUoxetine (PROZAC) 10 MG capsule Take 1 capsule (10 mg) by mouth daily     hydrochlorothiazide (HYDRODIURIL) 12.5 MG tablet TAKE 1 TABLET(12.5 MG) BY MOUTH DAILY     losartan (COZAAR) 25 MG tablet TAKE 1 TABLET(25 MG) BY MOUTH TWICE DAILY     memantine (NAMENDA) 10 MG tablet Take 1 tablet (10 mg) by mouth 2 times daily for 360 days     ORDER FOR DME Use your CPAP device as directed by your provider.     rosuvastatin (CRESTOR) 5 MG tablet Take 1 tablet (5 mg) by mouth daily     vitamin B-12 (CYANOCOBALAMIN) 1000 MCG tablet Take 1,000 mcg by mouth daily     No current facility-administered medications for this visit.              Review of Systems:   A comprehensive 10 point review of systems (constitutional, ENT, cardiac, peripheral vascular, lymphatic, respiratory, GI, , Musculoskeletal, skin, Neurological) was performed and found to be negative except as described in this note.     See intake form completed by patient        Again, thank you for allowing me to participate in the care of your patient.        Sincerely,        Mohinder Lyles MD, MD

## 2023-04-20 NOTE — PROGRESS NOTES
[facility]    Lizzette Chanel MRN# [id number]   Age: 82 year old YOB: 1940     Requesting physician: No ref. provider found  Guzman Bruner            Assessment and Plan:   Assessment:  1.  Cervical spinal stenosis  2.  Mild cognitive impairment  3.  Intracranial meningioma  4.  Postpolio syndrome  5.  Peripheral neuropathy  6.  Depression     Plan:  1.  We will proceed with implementation of donepezil in addition to the memantine, prescriptions ordered today  2.  Updated MR brain imaging to be done in July of this year to follow the meningioma  3.  Return to clinic 12/21/2023    Jessika's current clinical examination continues to reflect her post polio syndrome involving the left upper extremity ulnar distribution, mild cognitive impairment with loss in recent memory, difficulty with balance reflective of her peripheral neuropathy, and a diffuse hyperreflexia with bilateral upgoing toes possibly related to her cervical spinal stenosis but on the whole, unchanged.  Overall, I believe that she is clinically stable and will not be taking steps to intercede surgically in her cervical spine or intracranially.    We will follow the meningioma with an updated MR study this coming July.  I will add donepezil to the memantine at this time.  I will see her in follow-up 12/21/2023.             History of Present Illness:   CC:  I met with Jessika today for 45 minutes to follow-up on multiple neurological pathologies.  I have followed her for several decades for her postpolio syndrome which has been stable and a noncontributory.  She continues to experience some weakness without sensory loss in the upper extremity and her clinical examination today does not disclose any appreciable change.  In the course of her work-up however imaging of her cervical spine disclosed spinal stenosis in the low to mid cervical region which I have followed clinically as well as with imaging and has not progressed.  Her  visit today was set up to review her physical examination once again.    She also has developed mild cognitive impairment.  At her last visit memantine was started which she tolerates quite well however continues to experience recent memory difficulties.  At today's visit is plan to add donepezil.    She has been followed for a meningioma on MR imaging done last July which appears to be stable.  I do not believe she is experiencing any side effects and we plan to set up another MR study this coming July.    Jessika continues to do well.  She received some supervision and assistance from friends but otherwise lives alone in her own apartment.  She does operate her motor vehicle for short distances.  She is able to take care of her small dog and gets exercise with the dog on a daily basis.  She is not aware of any other complaints pertaining to her gait, balance, coordination, bowel or bladder function.  The balance of her neurologic review of systems remains noncontributory as well aside from the previously known difficulty with her peripheral neuropathy impairing her balance, the post polio syndrome impairing her upper extremity function, and the mild cognitive impairment.               Physical Exam:   Her current clinical examination reveals her to be awake and alert she is oriented and appropriate her speech is fluent comprehension is intact.  Recent memory is flawed.  Cranial nerves are normal.  Motor exam reveals good strength tone and bulk throughout although she is weak in the right tricep compared to the left she has atrophy and weakness in the ulnar distribution left upper extremity most noticeable in the interossei.  She does not have any sensory loss.  Her gait is natural.  She is unable to tandem stand and has a positive Romberg falling with the eyes closed.  Her deep tendon reflexes are present perhaps increased and symmetrical with bilateral upgoing toes, more prominent on the left.         Data:   All  laboratory data reviewed  All imaging studies reviewed by me             DATA for DOCUMENTATION:         Past Medical History:     Patient Active Problem List   Diagnosis     Peripheral neuropathy     Hx of colonoscopy     Hyperlipidemia LDL goal <130     CAITLYN (obstructive sleep apnea)  CPAP pressures set at:  LPL of 5 and UPL of 14     Osteopenia     Advanced directives, counseling/discussion     Spinal stenosis in cervical region     Post-polio syndrome     Adenocarcinoma of endometrium (H)     Fatigue     Essential hypertension     Osteoporosis of lumbar spine     Meningioma, cerebral (H)     MCI (mild cognitive impairment) with memory loss     Major depressive disorder, single episode, mild (H)     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    had vaginal bleeding, surgery done 2021     Arthritis mild in knees, shoulders     Cancer (H) skin--Basil Cell, [place]     Chest pain 2003    neg est thallium     Chest pain 04/2016    nl est echo and cxr     Complication of anesthesia     BP madide to over 200 in PACU 1 month ago-after D&C     Essential hypertension 07/2021     Fatigue 08/25/2021     Hx of colonoscopy 2003    incomplete, be nl     Hx of colonoscopy 06/03/2011    nl     Hypercholesteremia 2011    aches with zocor     Major depressive disorder, single episode, mild (H) 4/7/2023     MCI (mild cognitive impairment) with memory loss 2022    namenda added by Dr. Lyles 10/22     CAITLYN (obstructive sleep apnea) 12/2012    done [place], using dental device, added cpap 2014      Osteopenia 2013    Dr. Taylor     Osteoporosis of lumbar spine 07/2020    added fosamax     Peripheral neuropathy     Dr. 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     SOB (shortness of breath) 08/2021    elevated d dimer, ct no pe but signs of chf, then echo  nl 9/21     Statin intolerance     zocor and one other caused aches     Subdural hematoma (H) 01/2016    traumatic, fu ct 3/16 resolved     Uncomplicated asthma        Also see scanned health assessment forms.       Past Surgical History:     Past Surgical History:   Procedure Laterality Date     ABDOMEN SURGERY  1960     APPENDECTOMY  60's     BIOPSY  2010    inside upper lip     DAVINCI HYSTERECTOMY TOTAL, BILATERAL SALPINGO-OOPHORECTOMY, NODE DISSECTION, COMBINED Bilateral 7/8/2021    Procedure: ROBOTIC ASSISTED TOTAL LAPAROSCOPIC HYSTERECTOMY, BILATERAL SALPINGO-OOPHORECTOMY, WASHINGS, INTRA-OPERATIVE SENTINEL LYMPH NODE MAPPING, BILATERAL PELVIC AND PARA-AORTIC LYMPHADENECTOMY, REPAIR VAGINAL TEAR;  Surgeon: Dianne Garland MD;  Location:  OR     DILATION AND CURETTAGE, HYSTEROSCOPY WITH ULTRASOUND GUIDANCE N/A 5/11/2021    Procedure: HYSTEROSCOPY UNDER ULTRASOUND GUIDANCE, DILATION AND CURETTAGE OF UTERUS;  Surgeon: Isabel Ferro MD;  Location:  OR     OPERATIVE HYSTEROSCOPY WITH MORCELLATOR N/A 5/11/2021    Procedure: MYOSURE MORCELLATOR;  Surgeon: Isabel Ferro MD;  Location:  OR     PHACOEMULSIFICATION CLEAR CORNEA WITH STANDARD INTRAOCULAR LENS IMPLANT  3/21/2012    Procedure:PHACOEMULSIFICATION CLEAR CORNEA WITH STANDARD INTRAOCULAR LENS IMPLANT; RIGHT PHACOEMULSIFICATION CLEAR CORNEA WITH STANDARD INTRAOCULAR LENS IMPLANT ; Surgeon:CHANDRAKANT HERNANDEZ; Location[facility]     PHACOEMULSIFICATION CLEAR CORNEA WITH STANDARD INTRAOCULAR LENS IMPLANT  3/28/2012    Procedure:PHACOEMULSIFICATION CLEAR CORNEA WITH STANDARD INTRAOCULAR LENS IMPLANT; LEFT PHACOEMULSIFICATION CLEAR CORNEA WITH STANDARD INTRAOCULAR LENS IMPLANT ; Surgeon:CHANDRAKANT HERNANDEZ; Location: [facility]     SHOULDER SURGERY  1990's    twice     SOFT TISSUE SURGERY  R&L Rotator cuff repairs     TONSILLECTOMY  child            Social History:     Social History     Socioeconomic History     Marital status: Single     Spouse  name: Not on file     Number of children: 0     Years of education: Not on file     Highest education level: Not on file   Occupational History     Occupation: physical therapy     Employer: RETIRED   Tobacco Use     Smoking status: Former     Packs/day: 1.50     Years: 12.00     Pack years: 18.00     Types: Cigarettes     Start date: 1966     Quit date: 3/17/1974     Years since quittin.1     Smokeless tobacco: Never     Tobacco comments:     enjoyed it!   Vaping Use     Vaping status: Not on file   Substance and Sexual Activity     Alcohol use: Yes     Comment: Minimal     Drug use: No     Sexual activity: Not Currently     Partners: Male     Birth control/protection: None   Other Topics Concern     Parent/sibling w/ CABG, MI or angioplasty before 65F 55M? Yes     Comment: Brother  of heart attack at 49 yrs old   Social History Narrative    Single, no kids    Has a cabin    Has godchildren that can help out     Social Determinants of Health     Financial Resource Strain: Not on file   Food Insecurity: Not on file   Transportation Needs: Not on file   Physical Activity: Not on file   Stress: Not on file   Social Connections: Not on file   Intimate Partner Violence: Not on file   Housing Stability: Not on file              Family History:     Family History   Problem Relation Age of Onset     C.A.D. Father      Heart Disease Father      Heart Disease Mother 97     Alzheimer Disease Mother      Cerebrovascular Disease Mother         mild. Still was very functional     Substance Abuse Mother         Significant treatment over 15 yrs but ended with very good results     Osteoporosis Mother      C.A.D. Brother      Asthma Brother             Medications:     Current Outpatient Medications   Medication Sig     alendronate (FOSAMAX) 70 MG tablet TAKE 1 TABLET(70 MG) BY MOUTH EVERY 7 DAYS     Apoaequorin 10 MG CAPS Take 1 capsule by mouth daily Prevagen     aspirin (ASA) 81 MG chewable tablet Take 1 tablet (81  mg) by mouth daily     Calcium Carbonate-Vitamin D (CALCIUM-D PO) Take by mouth daily Liquid  Calcium 1200 mg  & Vit D 800 iU     Coenzyme Q10 (COQ10 PO) Take 100 mg by mouth.     [START ON 5/19/2023] donepezil (ARICEPT) 10 MG tablet Take 1 tablet (10 mg) by mouth At Bedtime     donepezil (ARICEPT) 5 MG tablet Take 1 tablet (5 mg) by mouth At Bedtime for 28 days     FLUoxetine (PROZAC) 10 MG capsule Take 1 capsule (10 mg) by mouth daily     hydrochlorothiazide (HYDRODIURIL) 12.5 MG tablet TAKE 1 TABLET(12.5 MG) BY MOUTH DAILY     losartan (COZAAR) 25 MG tablet TAKE 1 TABLET(25 MG) BY MOUTH TWICE DAILY     memantine (NAMENDA) 10 MG tablet Take 1 tablet (10 mg) by mouth 2 times daily for 360 days     ORDER FOR DME Use your CPAP device as directed by your provider.     rosuvastatin (CRESTOR) 5 MG tablet Take 1 tablet (5 mg) by mouth daily     vitamin B-12 (CYANOCOBALAMIN) 1000 MCG tablet Take 1,000 mcg by mouth daily     No current facility-administered medications for this visit.              Review of Systems:   A comprehensive 10 point review of systems (constitutional, ENT, cardiac, peripheral vascular, lymphatic, respiratory, GI, , Musculoskeletal, skin, Neurological) was performed and found to be negative except as described in this note.     See intake form completed by patient

## 2023-04-27 ENCOUNTER — MYC MEDICAL ADVICE (OUTPATIENT)
Dept: FAMILY MEDICINE | Facility: CLINIC | Age: 83
End: 2023-04-27
Payer: COMMERCIAL

## 2023-04-27 ENCOUNTER — MYC MEDICAL ADVICE (OUTPATIENT)
Dept: NEUROLOGY | Facility: CLINIC | Age: 83
End: 2023-04-27
Payer: COMMERCIAL

## 2023-04-27 ENCOUNTER — NURSE TRIAGE (OUTPATIENT)
Dept: FAMILY MEDICINE | Facility: CLINIC | Age: 83
End: 2023-04-27
Payer: COMMERCIAL

## 2023-04-27 ENCOUNTER — TELEPHONE (OUTPATIENT)
Dept: NEUROLOGY | Facility: CLINIC | Age: 83
End: 2023-04-27
Payer: COMMERCIAL

## 2023-04-27 NOTE — TELEPHONE ENCOUNTER
[facility] Call Center    Phone Message    May a detailed message be left on voicemail: yes     Reason for Call: Symptoms or Concerns     If patient has red-flag symptoms, warm transfer to triage line    Current symptom or concern: Suicidal Ideation/ thoughts    Symptoms have been present for:  2-3 day(s)    Has patient previously been seen for this? No    By Dr. Lyles    Date: 4/27/23    Are there any new or worsening symptoms? Yes: Pt friend Janis is calling because she is worried about Jessika. Lizzette expressed to janis that she does not want to live anymore and that she is sad. Pt friend is worried about her and she is currently with the Pt and staying with her. Janis is unsure if this has something to do with the medication or not. Pt did talk to a triage nurse earlier, as writer did ask. Please call Pt to discuss      Action Taken: Message routed to:  Other: [facility]    Travel Screening: Not Applicable

## 2023-04-27 NOTE — TELEPHONE ENCOUNTER
"Patient is on phone and she is staying with a friend who is a nurse.     Please read the my chart message.     Advised to go to the ED now but she and her fiend is not willing to do this but will if there is further concerns.     Appointment scheduled for tomorrow to be seen in clinic.     Again advised the ED.     Patient is very fatigued and feels her medications are causing side effects now. She was told the ED would help her with her symptoms.     Reason for Disposition    Patient is threatening suicide now    Answer Assessment - Initial Assessment Questions  1. MAIN CONCERN: \"What happened that made you call today?\"      Very depressed and has had suicidal thought.   2. RISK OF HARM - SUICIDAL IDEATION:  \"Do you ever have thoughts of hurting or killing yourself?\"  (e.g., yes, no, no but preoccupation with thoughts about death)    - WISH TO BE DEAD:  \"Have you wished you were dead or wished you could go to sleep and not wake up?\"    - INTENT:  \"Have you had any thoughts of hurting or killing yourself?\" (e.g., yes, no, N/A) If Yes, ask: \"Are you having these thoughts about killing yourself right NOW?\"    - PLAN: \"Have you thought about how you might do this?\" \"Do you have a specific plan for how you would do this?\" (e.g., gun, knife, overdose, no plan, N/A)    - ACCESS: If yes to PLAN, \"Do you have access to ?\" (e.g., pills, gun in house, knife in kitchen)      No  3. RISK OF HARM - SUICIDE ATTEMPT: \"Have you tried to harm yourself recently?\" If Yes, ask: When was this?\"        No  4. RISK OF HARM - SUICIDAL BEHAVIOR: \"Have you ever done anything, started to do anything, or prepared to do anything to end your life?\" (e.g., collected pills, bought a gun, wrote a suicide note, cut yourself, started but changed your mind)      No  5. EVENTS AND STRESSORS: \"Has there been any new stress or recent changes in your life?\" (e.g., death of loved one, homelessness, negative event, relationship breakup, work)      Changes " "in life.   6. FUNCTIONAL IMPAIRMENT: \"How have things been going for you overall? Have you had more difficulty than usual doing your normal daily activities?\"  (e.g., better, same, worse; self-care, school, work, interactions)      Yes  7. SUPPORT: \"Who is with you now?\" \"Who do you live with?\" \"Do you have family or friends who you can talk to?\"       I'm with a friend now .   8. THERAPIST: \"Do you have a counselor or therapist? Name?\"      No  9. ALCOHOL USE OR SUBSTANCE USE (DRUG USE): \"Do you drink alcohol or use any illegal drugs?\"      No  10. OTHER: \"Do you have any other physical symptoms right now?\" (e.g., fever)        No  11. PREGNANCY or POSTPARTUM: \"Is there any chance you are pregnant?\" \"When was your last menstrual period?\" \"Were you recently pregnant?\" \"When did you give birth?\"        N/A    Protocols used: SUICIDE [id number]    Isabel Caro RN  -[facility]     "

## 2023-04-28 NOTE — TELEPHONE ENCOUNTER
Please see 4/27/23 telephone encounter.     Pt currently holding aricept, and will  the 5 mg tablets to start at a later date.     Janis will update clinic.     Bridgett VAN RN, BSN  [facility]

## 2023-04-28 NOTE — TELEPHONE ENCOUNTER
Per chart review pt has appt with primary care today.      Janis also sent myChart encounter as well. (Please see separate encounter).     Pt was prescribed aricept 4/20/23 and was to start at 5 mg tablets for 28 days then increase to 10 mg tablets.    Called Janis back, CTC on file.  She states pt had a good night and is feeling better, so they do not think ED is warranted at this time.  They also are going to cancel appt with Dr. Baxter today, as they would rather just get advise from Dr. Bruner's team regarding the prozac. Pt has event today that she wants to go to, and Janis will be staying with patient.      Pt has stopped the prozac and the aricept at this time.     Advised Janis that pt can hold off on starting the Aricept until they talk with PCP and discuss what to do with Prozac.  She agrees with holding off on starting the aricept for now, and will be in contact with update next week.     Advised them to reach out to PCP for further direction. RN also discussed that if pt is suicidal they should go to ED to get evaluated by mental health provider.      She verbalized understanding and acceptance of the plan. She will take pt to ED if pt's symptoms escalate.  She plans on continuing to stay with patient. She had no further questions at this time.  Advised can call back to clinic at any time with concerns.     Bridgett VAN RN, BSN  [facility]

## 2023-05-11 ENCOUNTER — OFFICE VISIT (OUTPATIENT)
Dept: FAMILY MEDICINE | Facility: CLINIC | Age: 83
End: 2023-05-11
Payer: COMMERCIAL

## 2023-05-11 VITALS
BODY MASS INDEX: 25.91 KG/M2 | HEIGHT: 68 IN | WEIGHT: 171 LBS | RESPIRATION RATE: 16 BRPM | TEMPERATURE: 97.7 F | SYSTOLIC BLOOD PRESSURE: 146 MMHG | HEART RATE: 60 BPM | OXYGEN SATURATION: 97 % | DIASTOLIC BLOOD PRESSURE: 68 MMHG

## 2023-05-11 DIAGNOSIS — F32.0 MAJOR DEPRESSIVE DISORDER, SINGLE EPISODE, MILD (H): Primary | ICD-10-CM

## 2023-05-11 DIAGNOSIS — G31.84 MCI (MILD COGNITIVE IMPAIRMENT) WITH MEMORY LOSS: ICD-10-CM

## 2023-05-11 PROCEDURE — 99213 OFFICE O/P EST LOW 20 MIN: CPT | Performed by: INTERNAL MEDICINE

## 2023-05-11 ASSESSMENT — PATIENT HEALTH QUESTIONNAIRE - PHQ9
10. IF YOU CHECKED OFF ANY PROBLEMS, HOW DIFFICULT HAVE THESE PROBLEMS MADE IT FOR YOU TO DO YOUR WORK, TAKE CARE OF THINGS AT HOME, OR GET ALONG WITH OTHER PEOPLE: SOMEWHAT DIFFICULT
SUM OF ALL RESPONSES TO PHQ QUESTIONS 1-9: 12
SUM OF ALL RESPONSES TO PHQ QUESTIONS 1-9: 12

## 2023-05-11 ASSESSMENT — PAIN SCALES - GENERAL: PAINLEVEL: NO PAIN (0)

## 2023-05-11 NOTE — PATIENT INSTRUCTIONS
Resume just the prozac 10mg daily.  Go to the emergency room or call if you feel worse.    Guzman Bruner M.D.

## 2023-05-11 NOTE — PROGRESS NOTES
The patient presents with her friend for follow-up to her depression.  As noted she has mild cognitive impairment.  She has had some mild depression dating back to last August for which she was on Lexapro.  I saw her on April 7 and due to ongoing depression and low energy we changed to Prozac.  She started this 10 mg and tolerated it well.  She then saw neurology on April 20 who added Aricept to her Namenda.  Shortly after that she started to feel worse in 1 day and in fact told her friend that she felt like she did not want to live anymore.  Please see the triage note from April 27.  This was only 1 day and since then she has not felt that way.  Her PHQ-9 is 12 is noted.  She does admit that she has felt like hurting herself in the past but states she would never do that.  She does have low energy.  She does not feel motivated and normally she likes to be up and active and then does not feel this way and that bothers her greatly.  Physically she is feeling fine.  She has some chronic left lower extremity edema which is not new.  She has been off the Prozac as noted since April 27.  She is sleeping well.  She is eating well.  Her weight is stable.    ASSESSMENT:  Mild major depression, I suspect worsening may have been from the Aricept and in fact she did seem according to her friend a little better with the Prozac.  I think we should resume the Prozac at 10 mg.  I discussed with Jessika and her friend that if she is feeling worse or suicidal she needs to go the ER and the patient does agree to this.  She will follow-up with me in person in 3 weeks.  I will also do a mental health consult for psychiatry as usually we can get him in quickly in it would be helpful to have their opinion.  She is seeing a therapist every 2 weeks and will continue this.        Guzman Bruner M.D.

## 2023-05-15 ENCOUNTER — TRANSFERRED RECORDS (OUTPATIENT)
Dept: HEALTH INFORMATION MANAGEMENT | Facility: CLINIC | Age: 83
End: 2023-05-15
Payer: COMMERCIAL

## 2023-06-01 ENCOUNTER — HEALTH MAINTENANCE LETTER (OUTPATIENT)
Age: 83
End: 2023-06-01

## 2023-06-02 ENCOUNTER — TELEPHONE (OUTPATIENT)
Dept: FAMILY MEDICINE | Facility: CLINIC | Age: 83
End: 2023-06-02
Payer: COMMERCIAL

## 2023-06-02 NOTE — TELEPHONE ENCOUNTER
General Call      Reason for Call:  Patient called and has an apt on 6/5 in person she wants to change it to a phone visit it will not allow me to change it    What are your questions or concerns:  n    Date of last appointment with provider: 6/5    Could we send this information to you in [facility] or would you prefer to receive a phone call?:   Patient would prefer a phone call   Okay to leave a detailed message?: Yes at Home number on file [phone] (home)

## 2023-06-05 ENCOUNTER — VIRTUAL VISIT (OUTPATIENT)
Dept: FAMILY MEDICINE | Facility: CLINIC | Age: 83
End: 2023-06-05
Payer: COMMERCIAL

## 2023-06-05 DIAGNOSIS — F32.0 MAJOR DEPRESSIVE DISORDER, SINGLE EPISODE, MILD (H): Primary | ICD-10-CM

## 2023-06-05 DIAGNOSIS — R53.83 OTHER FATIGUE: ICD-10-CM

## 2023-06-05 PROCEDURE — 99212 OFFICE O/P EST SF 10 MIN: CPT | Mod: 95 | Performed by: INTERNAL MEDICINE

## 2023-06-05 NOTE — PROGRESS NOTES
Jessika is a 82 year old who is being evaluated via a billable telephone visit.      What phone number would you like to be contacted at? [phone]    Janis Olmedogartner, her friend is on the phone with the patient.    Low energy  Memory loss    She stopped the prozac as noted on 5/15/23 and not back on the lexapro.  She also stopped the aricept.    Not sad, has lots of good friends, just notes low energy.  She sleeps well.  Feels she is doing well and is content without any antidepressants at this time.  Her friend does agree.    At this time we will not add any additional treatments.  She will call if she is having further problems or depression.  We will follow-up in the fall and do a physical at that time.    Guzman Bruner M.D.    12 minutes on the day of the encounter doing chart review, history and exam, documentation and further activities as noted above.

## 2023-06-20 ENCOUNTER — THERAPY VISIT (OUTPATIENT)
Dept: PHYSICAL THERAPY | Facility: CLINIC | Age: 83
End: 2023-06-20
Payer: COMMERCIAL

## 2023-06-20 DIAGNOSIS — R26.89 BALANCE PROBLEMS: Primary | ICD-10-CM

## 2023-06-20 DIAGNOSIS — R53.81 PHYSICAL DECONDITIONING: ICD-10-CM

## 2023-06-20 PROCEDURE — 97161 PT EVAL LOW COMPLEX 20 MIN: CPT | Mod: GP | Performed by: PHYSICAL THERAPIST

## 2023-06-20 PROCEDURE — 97110 THERAPEUTIC EXERCISES: CPT | Mod: GP | Performed by: PHYSICAL THERAPIST

## 2023-06-20 NOTE — PROGRESS NOTES
PHYSICAL THERAPY EVALUATION  Type of Visit: Evaluation    See electronic medical record for Abuse and Falls Screening details.    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    had vaginal bleeding, surgery done 2021     Arthritis mild in knees, shoulders     Cancer (H) skin--Basil Cell, [place]     Chest pain 2003    neg est thallium     Chest pain 04/2016    nl est echo and cxr     Complication of anesthesia     BP maddie to over 200 in PACU 1 month ago-after D&C     Essential hypertension 07/2021     Fatigue 08/25/2021     Hx of colonoscopy 2003    incomplete, be nl     Hx of colonoscopy 06/03/2011    nl     Hypercholesteremia 2011    aches with zocor     Major depressive disorder, single episode, mild (H) 04/07/2023     MCI (mild cognitive impairment) with memory loss 2022    namenda added by Dr. Lyles 10/22, did worse with prozac, stopped lexapro on her own 2023     CAITLYN (obstructive sleep apnea) 12/2012    done [place], using dental device, added cpap 2014      Osteopenia 2013    Dr. Taylor     Osteoporosis of lumbar spine 07/2020    added fosamax, she went off it 2023     Peripheral neuropathy     Dr. 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     SOB (shortness of breath) 08/2021    elevated d dimer, ct no pe but signs of chf, then echo nl 9/21     Statin intolerance     zocor and one other caused  aches     Subdural hematoma (H) 01/2016    traumatic, fu ct 3/16 resolved     Uncomplicated asthma        Dates & types of surgery:      Prior diagnostic imaging/testing results:       Prior therapy history for the same diagnosis, illness or injury: No      Prior Level of Function   Transfers: Independent  Ambulation: Independent  ADL: Independent  IADL: Driving, Finances    Living Environment  Social support: Alone   Type of home: Apartment/condo   Stairs to enter the home:         Ramp:     Stairs inside the home:         Help at home: Assist for driving and community activities  Equipment owned: Straight Cane (currently not using SEC)     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  Hobbies/Interests: Walking dog daily    Patient goals for therapy: feel stable when walking, maintain independence    Pain assessment: no pain reported currently, does indicate intermittent L knee pain due to OA     Objective   Cognitive Status Examination  Orientation: Oriented to person, place and time   Level of Consciousness: Alert  Follows Commands and Answers Questions: 100% of the time  Personal Safety and Judgement: Intact  Memory: Impaired, diagnosed mild cognitive impairment    OBSERVATION: accompanied by friend - Ev Motley  INTEGUMENTARY: chronic mild venous insufficiency in LEs, L>R, no pitting edema noted, some discoloration of legs, mild generalized swelling LLE  POSTURE: forward head, rounded shoulders  PALPATION: mild TTP reported L posterior distal-lateral thigh just proximal to knee joint line  RANGE OF MOTION: WFL BUEs, WFL RLE, limited AROM L knee to ~90 deg due to pain/OA  STRENGTH: WFL strength in extremities. Mild proximal hip/core weakness noted: 4- to 4/5 strength seated hip flexion, sidelying hip ABD R 4/5, L 4-/5, distally 5/5 in extremities.  Impaired functional strength noted with sit <> stand transitions from chair, difficulty noted.    BED MOBILITY: Independent    TRANSFERS: Independent,  Needing multiple attempts to rise, slow, difficulty due to weakness/balance    WHEELCHAIR MOBILITY: NA    GAIT:   Level of [place]: Independent  Assistive Device(s): None  Gait Deviations: slowed gait speed, mildly WBOS, reduced push off and foot clearance, impaired stability with head turns/pitching, change of speed, and pivoting.  Gait Distance: 300+ft, functional short community distances  Stairs: independent with step-to pattern using B railings    BALANCE: Impaired static balance and dynamic gait stability related to OA, deconditioning, and peripheral neuropathy    SPECIAL TESTS  Functional Gait Assessment (FGA)      10 Meter Walk Test (Comfortable)  Self Selected Gait speed: 0.80 m/sec, no AD   10 Meter Walk Test (Fast)     6 Minute Walk Test (6MWT)           Brady Balance Scale (BBS)     5 Times Sit-to-Stand (5TSTS)  76 seconds, needing multiple attempts to rise     Dynamic Gait Index (DGI)  8/12 4-item DGI indicating elevated risk for falls   Timed Up and Go (TUG) - sec 10.11 - no AD   Single Leg Stance Right (sec)    Single Leg Stance Left (sec)    Modified CTSIB Conditions (sec) Cond 1: 30  Cond 2: 30  Cond 4: 30  Cond 5 : 8 (avg three trials: 5'', 15'', 4'')   Romberg  (sec)    Sharpened Romberg (sec)    30 Second Sit to Stand (reps/height)            SENSATION: baseline peripheral neuropathy affecting B feet.  Intact to light touch without visual input, though impaired at feet/toes.  Patient reports intact sensation proximal to mid-calf.    REFLEXES:   COORDINATION: generally intact  MUSCLE TONE: Not tested, though chart indicates hx of hyperreflexia possibly related to cervical spinal stenosis        Assessment & Plan   CLINICAL IMPRESSIONS   Medical Diagnosis: Left Knee Osteoarthritis, Vestibular Physical Therapy    Treatment Diagnosis: Balance and Gait Impairment   Impression/Assessment: Patient is a 82 year old female with balance complaints.  The following significant findings have been  identified: Decreased ROM/flexibility, Decreased strength, Impaired balance, Decreased proprioception, Impaired sensation, Impaired gait, Impaired posture and Instability. These impairments interfere with their ability to perform self care tasks, recreational activities, household chores, household mobility and community mobility as compared to previous level of function.  Contributing factors to balance impairment include: peripheral neuropathy, L knee OA, impaired posture, and mild functional weakness. Fortunately, patient has led a fairly active lifestyle and has maintained good/fair functional strength. Currently balance/gait testing indicates elevated risk for falls, and would likely benefit from using an assistive device such as SEC or trekking pole to optimize her stability and independence, along with ongoing OP PT services and HEP.    Clinical Decision Making (Complexity):   Clinical Presentation: Stable/Uncomplicated  Clinical Presentation Rationale: based on medical and personal factors listed in PT evaluation  Clinical Decision Making (Complexity): Low complexity    PLAN OF CARE  Treatment Interventions:  Interventions: Gait Training, Neuromuscular Re-education, Therapeutic Exercise    Long Term Goals     PT Goal 1  Goal Identifier: 5xSTS  Goal Description: Patient will demonstrate ability to perform 5xSTS without UE support in < 40'' indicating significant improvment in strength, mobility status, and reduced risk for falls.  Rationale: to maximize safety and independence with performance of ADLs and functional tasks  Goal Progress: Baseline 76''  Target Date: 09/18/23  PT Goal 2  Goal Identifier: Gait Speed  Goal Description: Patient will demonstrate self selected gait speed >0.9 m/sec indicating significant improvement in mobility status, reduced fall risk, and improved safety/tolerance navigating community environments.  Rationale: to maximize safety and independence within the community  Goal  Progress: Baseline 0.80 m/sec - no AD  Target Date: 09/18/23  PT Goal 3  Goal Identifier: DGI  Goal Description: Patient will demonstrate score of 19/24 or greater on the DGI indicating improvement in dynamic gait stability and reduced fall risk.  Rationale: to maximize safety and independence with performance of ADLs and functional tasks;to maximize safety and independence within the home;to maximize safety and independence within the community  Goal Progress: Baseline 4-item DGI score 8/12  Target Date: 09/18/23  PT Goal 4  Goal Identifier: HEP  Goal Description: Patient will demonstrate independent understanding and carryover of HEP for longterm management of condition.  Rationale: to maximize safety and independence with performance of ADLs and functional tasks;to maximize safety and independence within the home;to maximize safety and independence within the community;to maximize safety and independence with self cares  Target Date: 09/18/23      Frequency of Treatment: 1x/wk reducing to e/o wk  Duration of Treatment: 90 days    Recommended Referrals to Other Professionals: none  Education Assessment:   Learner/Method: Patient;Demonstration;Pictures/Video;Listening  Education Comments: Eval findings, POC, HEP, fall risk, AD recommendations    Risks and benefits of evaluation/treatment have been explained.   Patient/Family/caregiver agrees with Plan of Care.     Evaluation Time:     PT Eval, Low Complexity Minutes (36318): 35      Signing Clinician: Jamie Reis PT      [facility]                                                                                   OUTPATIENT PHYSICAL THERAPY      PLAN OF TREATMENT FOR OUTPATIENT REHABILITATION   Patient's Last Name, First Name, Lizzette Grier YOB: 1940   Provider's Name   [facility]   Medical Record No.  [id number]     Onset Date: 05/15/23 (date of order used, onset vague/chronic  over past couple years)  Start of Care Date: 06/20/23     Medical Diagnosis:  Left Knee Osteoarthritis, Vestibular Physical Therapy      PT Treatment Diagnosis:  Balance and Gait Impairment Plan of Treatment  Frequency/Duration: 1x/wk reducing to e/o wk/ 90 days    Certification date from 06/20/23 to 09/18/23         See note for plan of treatment details and functional goals     Jamie Reis, PT                         I CERTIFY THE NEED FOR THESE SERVICES FURNISHED UNDER        THIS PLAN OF TREATMENT AND WHILE UNDER MY CARE     (Physician attestation of this document indicates review and certification of the therapy plan).                  Referring Provider:  Jessica Baer M.D. - [facility].  Cert being sent to PCP - Dr. Guzman Bruner      Initial Assessment  See Epic Evaluation- Start of Care Date: 06/20/23

## 2023-06-23 ENCOUNTER — TRANSCRIBE ORDERS (OUTPATIENT)
Dept: OTHER | Age: 83
End: 2023-06-23

## 2023-06-23 DIAGNOSIS — M17.12 OSTEOARTHRITIS OF LEFT KNEE, UNSPECIFIED OSTEOARTHRITIS TYPE: Primary | ICD-10-CM

## 2023-06-23 DIAGNOSIS — H81.90 VESTIBULAR DISORDER: ICD-10-CM

## 2023-07-19 ENCOUNTER — TELEPHONE (OUTPATIENT)
Dept: SLEEP MEDICINE | Facility: CLINIC | Age: 83
End: 2023-07-19
Payer: COMMERCIAL

## 2023-07-19 NOTE — TELEPHONE ENCOUNTER
Called to inform patient she will need a follow up visit if she's still interested in a replacement CPAP.  Patient stated her CPAP is working fine and does not need a replacement at this time.

## 2023-07-27 ENCOUNTER — ANCILLARY PROCEDURE (OUTPATIENT)
Dept: MRI IMAGING | Facility: CLINIC | Age: 83
End: 2023-07-27
Attending: PSYCHIATRY & NEUROLOGY
Payer: COMMERCIAL

## 2023-07-27 DIAGNOSIS — D32.0 MENINGIOMA, CEREBRAL (H): ICD-10-CM

## 2023-07-27 PROCEDURE — 255N000002 HC RX 255 OP 636: Mod: JZ | Performed by: PSYCHIATRY & NEUROLOGY

## 2023-07-27 PROCEDURE — A9585 GADOBUTROL INJECTION: HCPCS | Mod: JZ | Performed by: PSYCHIATRY & NEUROLOGY

## 2023-07-27 PROCEDURE — 70553 MRI BRAIN STEM W/O & W/DYE: CPT

## 2023-07-27 RX ORDER — GADOBUTROL 604.72 MG/ML
8 INJECTION INTRAVENOUS ONCE
Status: COMPLETED | OUTPATIENT
Start: 2023-07-27 | End: 2023-07-27

## 2023-07-27 RX ORDER — GADOBUTROL 604.72 MG/ML
0.1 INJECTION INTRAVENOUS ONCE
OUTPATIENT
Start: 2023-07-27 | End: 2023-07-27

## 2023-07-27 RX ADMIN — GADOBUTROL 8 ML: 604.72 INJECTION INTRAVENOUS at 11:13

## 2023-09-07 ENCOUNTER — OFFICE VISIT (OUTPATIENT)
Dept: FAMILY MEDICINE | Facility: CLINIC | Age: 83
End: 2023-09-07
Payer: COMMERCIAL

## 2023-09-07 VITALS
RESPIRATION RATE: 16 BRPM | OXYGEN SATURATION: 96 % | DIASTOLIC BLOOD PRESSURE: 78 MMHG | BODY MASS INDEX: 25.01 KG/M2 | HEIGHT: 68 IN | HEART RATE: 64 BPM | SYSTOLIC BLOOD PRESSURE: 122 MMHG | TEMPERATURE: 97.7 F | WEIGHT: 165 LBS

## 2023-09-07 DIAGNOSIS — Z00.00 MEDICARE ANNUAL WELLNESS VISIT, SUBSEQUENT: Primary | ICD-10-CM

## 2023-09-07 DIAGNOSIS — I10 ESSENTIAL HYPERTENSION: ICD-10-CM

## 2023-09-07 DIAGNOSIS — Z23 NEED FOR PROPHYLACTIC VACCINATION AND INOCULATION AGAINST INFLUENZA: ICD-10-CM

## 2023-09-07 DIAGNOSIS — E78.5 HYPERLIPIDEMIA LDL GOAL <130: ICD-10-CM

## 2023-09-07 DIAGNOSIS — F32.0 MAJOR DEPRESSIVE DISORDER, SINGLE EPISODE, MILD (H): ICD-10-CM

## 2023-09-07 DIAGNOSIS — G31.84 MCI (MILD COGNITIVE IMPAIRMENT) WITH MEMORY LOSS: ICD-10-CM

## 2023-09-07 DIAGNOSIS — R53.83 OTHER FATIGUE: ICD-10-CM

## 2023-09-07 DIAGNOSIS — C54.1 ADENOCARCINOMA OF ENDOMETRIUM (H): ICD-10-CM

## 2023-09-07 PROBLEM — N18.30 CKD (CHRONIC KIDNEY DISEASE) STAGE 3, GFR 30-59 ML/MIN (H): Status: ACTIVE | Noted: 2023-09-07

## 2023-09-07 LAB
ANION GAP SERPL CALCULATED.3IONS-SCNC: 13 MMOL/L (ref 7–15)
BUN SERPL-MCNC: 31.2 MG/DL (ref 8–23)
CALCIUM SERPL-MCNC: 9.4 MG/DL (ref 8.8–10.2)
CHLORIDE SERPL-SCNC: 104 MMOL/L (ref 98–107)
CREAT SERPL-MCNC: 1.07 MG/DL (ref 0.51–0.95)
DEPRECATED HCO3 PLAS-SCNC: 24 MMOL/L (ref 22–29)
EGFRCR SERPLBLD CKD-EPI 2021: 51 ML/MIN/1.73M2
GLUCOSE SERPL-MCNC: 86 MG/DL (ref 70–99)
POTASSIUM SERPL-SCNC: 4.4 MMOL/L (ref 3.4–5.3)
SODIUM SERPL-SCNC: 141 MMOL/L (ref 136–145)

## 2023-09-07 PROCEDURE — 90662 IIV NO PRSV INCREASED AG IM: CPT | Performed by: INTERNAL MEDICINE

## 2023-09-07 PROCEDURE — G0439 PPPS, SUBSEQ VISIT: HCPCS | Performed by: INTERNAL MEDICINE

## 2023-09-07 PROCEDURE — 36415 COLL VENOUS BLD VENIPUNCTURE: CPT | Performed by: INTERNAL MEDICINE

## 2023-09-07 PROCEDURE — 80048 BASIC METABOLIC PNL TOTAL CA: CPT | Performed by: INTERNAL MEDICINE

## 2023-09-07 PROCEDURE — G0008 ADMIN INFLUENZA VIRUS VAC: HCPCS | Performed by: INTERNAL MEDICINE

## 2023-09-07 RX ORDER — ROSUVASTATIN CALCIUM 5 MG/1
5 TABLET, COATED ORAL DAILY
Qty: 90 TABLET | Refills: 3 | Status: SHIPPED | OUTPATIENT
Start: 2023-09-07 | End: 2024-08-14

## 2023-09-07 RX ORDER — LOSARTAN POTASSIUM 25 MG/1
25 TABLET ORAL 2 TIMES DAILY
Qty: 180 TABLET | Refills: 3 | Status: SHIPPED | OUTPATIENT
Start: 2023-09-07 | End: 2024-08-02

## 2023-09-07 RX ORDER — HYDROCHLOROTHIAZIDE 12.5 MG/1
12.5 TABLET ORAL DAILY
Qty: 90 TABLET | Refills: 3 | Status: SHIPPED | OUTPATIENT
Start: 2023-09-07 | End: 2024-02-16

## 2023-09-07 ASSESSMENT — ACTIVITIES OF DAILY LIVING (ADL): CURRENT_FUNCTION: NO ASSISTANCE NEEDED

## 2023-09-07 ASSESSMENT — PATIENT HEALTH QUESTIONNAIRE - PHQ9
10. IF YOU CHECKED OFF ANY PROBLEMS, HOW DIFFICULT HAVE THESE PROBLEMS MADE IT FOR YOU TO DO YOUR WORK, TAKE CARE OF THINGS AT HOME, OR GET ALONG WITH OTHER PEOPLE: NOT DIFFICULT AT ALL
SUM OF ALL RESPONSES TO PHQ QUESTIONS 1-9: 2
SUM OF ALL RESPONSES TO PHQ QUESTIONS 1-9: 2

## 2023-09-07 ASSESSMENT — PAIN SCALES - GENERAL: PAINLEVEL: NO PAIN (0)

## 2023-09-07 NOTE — PROGRESS NOTES
SUBJECTIVE:   Jessika is a 82 year old who presents for Preventive Visit.    She presents with one of her friends and overall is doing quite well.  Jessika has no complaints and denies depression.  She does stay active and walks her dog daily.  Some ongoing fatigue for a long time but nothing new or different.    She is moving into assisted living at [facility] probably in October.               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    had vaginal bleeding, surgery done 2021     Arthritis mild in knees, shoulders     Cancer (H) skin--Basil Cell, [place]     Chest pain 2003    neg est thallium     Chest pain 04/2016    nl est echo and cxr     Complication of anesthesia     BP maddie to over 200 in PACU 1 month ago-after D&C     Essential hypertension 07/2021     Fatigue 08/25/2021     Hx of colonoscopy 2003    incomplete, be nl     Hx of colonoscopy 06/03/2011    nl     Hypercholesteremia 2011    aches with zocor     Major depressive disorder, single episode, mild (H) 04/07/2023     MCI (mild cognitive impairment) with memory loss 2022    namenda added by Dr. Lyles 10/22, did worse with prozac, stopped lexapro on her own 2023     CAITLYN (obstructive sleep apnea) 12/2012    done Moran, using dental device, added cpap 2014      Osteopenia 2013    Dr. Taylor     Osteoporosis of lumbar spine 07/2020    added fosamax, she went off it 2023     Peripheral neuropathy     Dr. 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     SOB (shortness of breath) 08/2021    elevated d dimer, ct no pe but signs of chf, then echo nl 9/21     Statin intolerance     zocor and one other caused aches     Subdural hematoma (H) 01/2016    traumatic, fu ct 3/16 resolved     Uncomplicated asthma              Past Surgical History:      Past  Surgical History:   Procedure Laterality Date     ABDOMEN SURGERY  1960     APPENDECTOMY  60's     BIOPSY  2010    inside upper lip     DAVINCI HYSTERECTOMY TOTAL, BILATERAL SALPINGO-OOPHORECTOMY, NODE DISSECTION, COMBINED Bilateral 7/8/2021    Procedure: ROBOTIC ASSISTED TOTAL LAPAROSCOPIC HYSTERECTOMY, BILATERAL SALPINGO-OOPHORECTOMY, WASHINGS, INTRA-OPERATIVE SENTINEL LYMPH NODE MAPPING, BILATERAL PELVIC AND PARA-AORTIC LYMPHADENECTOMY, REPAIR VAGINAL TEAR;  Surgeon: Dianne Garland MD;  Location:  OR     DILATION AND CURETTAGE, HYSTEROSCOPY WITH ULTRASOUND GUIDANCE N/A 5/11/2021    Procedure: HYSTEROSCOPY UNDER ULTRASOUND GUIDANCE, DILATION AND CURETTAGE OF UTERUS;  Surgeon: Isabel Ferro MD;  Location:  OR     OPERATIVE HYSTEROSCOPY WITH MORCELLATOR N/A 5/11/2021    Procedure: MYOSURE MORCELLATOR;  Surgeon: Isabel Ferro MD;  Location:  OR     PHACOEMULSIFICATION CLEAR CORNEA WITH STANDARD INTRAOCULAR LENS IMPLANT  3/21/2012    Procedure:PHACOEMULSIFICATION CLEAR CORNEA WITH STANDARD INTRAOCULAR LENS IMPLANT; RIGHT PHACOEMULSIFICATION CLEAR CORNEA WITH STANDARD INTRAOCULAR LENS IMPLANT ; Surgeon:CHANDRAKANT HERNANDEZ; Location:[facility]     PHACOEMULSIFICATION CLEAR CORNEA WITH STANDARD INTRAOCULAR LENS IMPLANT  3/28/2012    Procedure:PHACOEMULSIFICATION CLEAR CORNEA WITH STANDARD INTRAOCULAR LENS IMPLANT; LEFT PHACOEMULSIFICATION CLEAR CORNEA WITH STANDARD INTRAOCULAR LENS IMPLANT ; Surgeon:CHANDRAKANT HERNANDEZ; Location: [facility]     SHOULDER SURGERY  1990's    twice     SOFT TISSUE SURGERY  [employer]&L Rotator cuff repairs     TONSILLECTOMY  child             Social History:     Social History     Socioeconomic History     Marital status: Single     Spouse name: Not on file     Number of children: 0     Years of education: Not on file     Highest education level: Not on file   Occupational History     Occupation: physical therapy     Employer: RETIRED   Tobacco Use     Smoking status: Former      Packs/day: 1.50     Years: 12.00     Pack years: 18.00     Types: Cigarettes     Start date: 1966     Quit date: 3/17/1974     Years since quittin.5     Smokeless tobacco: Never     Tobacco comments:     enjoyed it!   Substance and Sexual Activity     Alcohol use: Yes     Comment: Minimal     Drug use: No     Sexual activity: Not Currently     Partners: Male     Birth control/protection: None   Other Topics Concern     Parent/sibling w/ CABG, MI or angioplasty before 65F 55M? Yes     Comment: Brother  of heart attack at 49 yrs old   Social History Narrative    Single, no kids    Has a cabin    Has godchildren that can help out     Social Determinants of Health     Financial Resource Strain: Not on file   Food Insecurity: Not on file   Transportation Needs: Not on file   Physical Activity: Not on file   Stress: Not on file   Social Connections: Not on file   Intimate Partner Violence: Not on file   Housing Stability: Not on file             Family History:   reviewed         Allergies:   No Known Allergies          Medications:     Current Outpatient Medications   Medication Sig Dispense Refill     aspirin (ASA) 81 MG chewable tablet Take 1 tablet (81 mg) by mouth daily 100 tablet 4     Calcium Carbonate-Vitamin D (CALCIUM-D PO) Take by mouth daily Liquid  Calcium 1200 mg  & Vit D 800 iU       Coenzyme Q10 (COQ10 PO) Take 100 mg by mouth.       hydrochlorothiazide (HYDRODIURIL) 12.5 MG tablet Take 1 tablet (12.5 mg) by mouth daily 90 tablet 3     losartan (COZAAR) 25 MG tablet Take 1 tablet (25 mg) by mouth 2 times daily 180 tablet 3     memantine (NAMENDA) 10 MG tablet Take 1 tablet (10 mg) by mouth 2 times daily for 360 days 180 tablet 3     ORDER FOR DME Use your CPAP device as directed by your provider.       rosuvastatin (CRESTOR) 5 MG tablet Take 1 tablet (5 mg) by mouth daily 90 tablet 3     vitamin B-12 (CYANOCOBALAMIN) 1000 MCG tablet Take 1,000 mcg by mouth daily                 Review of  "Systems:     The 10 point Review of Systems is negative other than noted in the HPI           Physical Exam:   Blood pressure 122/78, pulse 64, temperature 97.7  F (36.5  C), temperature source Temporal, resp. rate 16, height 1.727 m (5' 8\"), weight 74.8 kg (165 lb), SpO2 96 %, not currently breastfeeding.    Exam:  Constitutional: healthy appearing, alert and in no distress  Heent: Normocephalic. Head without obvious masses or lesions. PERRLDC, EOMI. Mouth exam within normal limits: tongue, mucous membranes, posterior pharynx all normal, no lesions or abnormalities seen.  Tm's and canals within normal limits bilaterally. Neck supple, no nuchal rigidity or masses. No supraclavicular, or cervical adenopathy. Thyroid symmetric, no masses.  Cardiovascular: Regular rate and rhythm, no murmer, rub or gallops.  JVP not elevated, no edema.  Carotids within normal limits bilaterally, no bruits.  Respiratory: Normal respiratory effort.  Lungs clear, normal flow, no wheezing or crackles.  Gastrointestinal: Normal active bowel sounds.   Soft, not tender, no masses, guarding or rebound.  No hepatosplenomegaly.   Musculoskeletal: extremities normal, no gross deformities noted.  Skin: no suspicious lesions or rashes   Neurologic: Mental status within normal limits.  Speech fluent.  No gross motor abnormalities and gait intact.  Psychiatric: mentation appears normal and affect normal.         Data:   Labs sent, some done 4/23        Assessment:   Normal complete physical exam  Fatigue, doubt pathologic  Endomet ca, kaylie  Mci, follow up neuro  Depression, doing well  Hypertension, doing well  Elevated cholesterol on statin  hcm         Plan:   Flu shot  Exercise  Letter with labs  Follow up 4 months or prn      Guzman Bruner M.D.                Are you in the first 12 months of your Medicare coverage?      Healthy Habits:     In general, how would you rate your overall health?  Good    Frequency of exercise:  6-7 days/week    " "Duration of exercise:  30-45 minutes    Do you usually eat at least 4 servings of fruit and vegetables a day, include whole grains    & fiber and avoid regularly eating high fat or \"junk\" foods?  No    Taking medications regularly:  Yes    Medication side effects:  None    Ability to successfully perform activities of daily living:  No assistance needed    Home Safety:  No safety concerns identified    Hearing Impairment:  No hearing concerns    In the past 6 months, have you been bothered by leaking of urine?  No    In general, how would you rate your overall mental or emotional health?  Excellent    Additional concerns today:  No        Have you ever done Advance Care Planning? (For example, a Health Directive, POLST, or a discussion with a medical provider or your loved ones about your wishes): Yes, advance care planning is on file.       Fall risk       Cognitive Screening   1) Repeat 3 items (Leader, Season, Table)    2) Clock draw: ABNORMAL 11:50  3) 3 item recall: Recalls 2 objects   Results: ABNORMAL clock, 1-2 items recalled: PROBABLE COGNITIVE IMPAIRMENT, **INFORM PROVIDER**    Mini-CogTM Copyright ALYSSIA Webb. Licensed by the author for use in [facility]; reprinted with permission (soob@[facility]). All rights reserved.      Do you have sleep apnea, excessive snoring or daytime drowsiness? : no    Reviewed and updated as needed this visit by clinical staff                  Reviewed and updated as needed this visit by Provider                 Social History     Tobacco Use     Smoking status: Former     Packs/day: 1.50     Years: 12.00     Pack years: 18.00     Types: Cigarettes     Start date: 1966     Quit date: 3/17/1974     Years since quittin.5     Smokeless tobacco: Never     Tobacco comments:     enjoyed it!   Substance Use Topics     Alcohol use: Yes     Comment: Minimal             2023    10:22 AM   Alcohol Use   Prescreen: >3 drinks/day or >7 drinks/week? No         2021 "     2:22 PM   AUDIT - Alcohol Use Disorders Identification Test - Reproduced from the World Health Organization Audit 2001 (Second Edition)   1.  How often do you have a drink containing alcohol? 4 or more times a week   2.  How many drinks containing alcohol do you have on a typical day when you are drinking? 1 or 2   3.  How often do you have five or more drinks on one occasion? Never   4.  How often during the last year have you found that you were not able to stop drinking once you had started? Never   5.  How often during the last year have you failed to do what was normally expected of you because of drinking? Never   6.  How often during the last year have you needed a first drink in the morning to get yourself going after a heavy drinking session? Never   7.  How often during the last year have you had a feeling of guilt or remorse after drinking? Never   8.  How often during the last year have you been unable to remember what happened the night before because of your drinking? Never   9.  Have you or someone else been injured because of your drinking? No   10. Has a relative, friend, doctor or other health care worker been concerned about your drinking or suggested you cut down? No   TOTAL SCORE 4     Do you have a current opioid prescription? No  Do you use any other controlled substances or medications that are not prescribed by a provider? None              Current providers sharing in care for this patient include:   Patient Care Team:  Guzman Bruner MD as PCP - General (Internal Medicine)  Guzman Bruner MD as Assigned PCP  Mohinder Lyles MD as Assigned Neuroscience Provider  Radha Hernandez AuD as Audiologist (Audiology)  Franck Mccauley, PhD as Assigned Behavioral Health Provider    The following health maintenance items are reviewed in Epic and correct as of today:  Health Maintenance   Topic Date Due     ANNUAL REVIEW OF HM ORDERS  Never done     DEPRESSION ACTION  "PLAN  Never done     COVID-19 Vaccine (6 - Moderna series) 01/28/2023     MEDICARE ANNUAL WELLNESS VISIT  03/17/2023     INFLUENZA VACCINE (1) 09/01/2023     PHQ-9  03/07/2024     FALL RISK ASSESSMENT  04/07/2024     ADVANCE CARE PLANNING  03/17/2027     DTAP/TDAP/TD IMMUNIZATION (3 - Td or Tdap) 03/04/2033     DEXA  02/23/2037     Pneumococcal Vaccine: 65+ Years  Completed     ZOSTER IMMUNIZATION  Completed     IPV IMMUNIZATION  Aged Out     HPV IMMUNIZATION  Aged Out     MENINGITIS IMMUNIZATION  Aged Out     MAMMO SCREENING  Discontinued             Pertinent mammograms are reviewed under the imaging tab.    Review of Systems      OBJECTIVE:   There were no vitals taken for this visit. Estimated body mass index is 26 kg/m  as calculated from the following:    Height as of 5/11/23: 1.727 m (5' 8\").    Weight as of 5/11/23: 77.6 kg (171 lb).  Physical Exam          ASSESSMENT / PLAN:             COUNSELING:  Reviewed preventive health counseling, as reflected in patient instructions       Regular exercise      BMI:   Estimated body mass index is 26 kg/m  as calculated from the following:    Height as of 5/11/23: 1.727 m (5' 8\").    Weight as of 5/11/23: 77.6 kg (171 lb).         She reports that she quit smoking about 49 years ago. Her smoking use included cigarettes. She started smoking about 57 years ago. She has a 18.00 pack-year smoking history. She has never used smokeless tobacco.      Appropriate preventive services were discussed with this patient, including applicable screening as appropriate for cardiovascular disease, diabetes, osteopenia/osteoporosis, and glaucoma.  As appropriate for age/gender, discussed screening for colorectal cancer, prostate cancer, breast cancer, and cervical cancer. Checklist reviewing preventive services available has been given to the patient.    Reviewed patients plan of care and provided an AVS. The Basic Care Plan (routine screening as documented in Health Maintenance) for " Lizzette meets the Care Plan requirement. This Care Plan has been established and reviewed with the Patient and friend .          Guzman Bruner MD  [facility]    Identified Health Risks:  I have reviewed Opioid Use Disorder and Substance Use Disorder risk factors and made any needed referrals. Answers submitted by the patient for this visit:  Patient Health Questionnaire (Submitted on 9/7/2023)  If you checked off any problems, how difficult have these problems made it for you to do your work, take care of things at home, or get along with other people?: Not difficult at all  PHQ9 TOTAL SCORE: 2  Annual Preventive Visit (Submitted on 9/7/2023)  Chief Complaint: Annual Exam:

## 2023-09-07 NOTE — RESULT ENCOUNTER NOTE
Jessika,    It was very nice to see you today.  Your chemistry panel looks fine and the creatinine or kidney test is improved.    Please let me know if you have questions.    Guzman

## 2023-09-22 DIAGNOSIS — G31.84 MCI (MILD COGNITIVE IMPAIRMENT) WITH MEMORY LOSS: ICD-10-CM

## 2023-09-22 RX ORDER — MEMANTINE HYDROCHLORIDE 10 MG/1
10 TABLET ORAL 2 TIMES DAILY
Qty: 180 TABLET | Refills: 0 | Status: SHIPPED | OUTPATIENT
Start: 2023-09-22 | End: 2023-12-07

## 2023-09-25 DIAGNOSIS — G31.84 MCI (MILD COGNITIVE IMPAIRMENT) WITH MEMORY LOSS: ICD-10-CM

## 2023-09-26 RX ORDER — MEMANTINE HYDROCHLORIDE 10 MG/1
10 TABLET ORAL 2 TIMES DAILY
Qty: 180 TABLET | Refills: 0 | OUTPATIENT
Start: 2023-09-26

## 2023-12-07 ENCOUNTER — OFFICE VISIT (OUTPATIENT)
Dept: NEUROLOGY | Facility: CLINIC | Age: 83
End: 2023-12-07
Payer: COMMERCIAL

## 2023-12-07 VITALS
WEIGHT: 159 LBS | SYSTOLIC BLOOD PRESSURE: 126 MMHG | HEART RATE: 72 BPM | BODY MASS INDEX: 24.18 KG/M2 | DIASTOLIC BLOOD PRESSURE: 69 MMHG | OXYGEN SATURATION: 94 %

## 2023-12-07 DIAGNOSIS — G60.9 IDIOPATHIC PERIPHERAL NEUROPATHY: ICD-10-CM

## 2023-12-07 DIAGNOSIS — D32.0 MENINGIOMA, CEREBRAL (H): ICD-10-CM

## 2023-12-07 DIAGNOSIS — G31.84 MCI (MILD COGNITIVE IMPAIRMENT) WITH MEMORY LOSS: ICD-10-CM

## 2023-12-07 DIAGNOSIS — M48.02 SPINAL STENOSIS IN CERVICAL REGION: Primary | ICD-10-CM

## 2023-12-07 DIAGNOSIS — G14 POST-POLIO SYNDROME (H): ICD-10-CM

## 2023-12-07 PROCEDURE — 99215 OFFICE O/P EST HI 40 MIN: CPT | Performed by: PSYCHIATRY & NEUROLOGY

## 2023-12-07 RX ORDER — MEMANTINE HYDROCHLORIDE 10 MG/1
10 TABLET ORAL 2 TIMES DAILY
Qty: 180 TABLET | Refills: 3 | Status: SHIPPED | OUTPATIENT
Start: 2023-12-07 | End: 2024-09-12

## 2023-12-07 NOTE — PROGRESS NOTES
[facility]    Lizzette Chanel MRN# [id number]   Age: 83 year old YOB: 1940     Requesting physician: No ref. provider found  Guzman Bruner            Assessment and Plan:   Assessment:   Cervical spinal stenosis  Mild cognitive impairment  Post polio syndrome  Peripheral neuropathy  Cerebral meningioma     Plan:   Will ask Dr. Banuelos to follow the cervical cord compression risk as well as the meningioma.  Both are stable now but she will need follow up pendimg my shelter  Continue memantine 10 mg twice daily    At this time, pending my shelter, I will turn over the surveillance of her meningioma and her cervical cord compression to Dr. Banuelos and his staff.  We will reimage the cervical cord this coming March and she can meet with him in April.  Will continue the memantine for her mild cognitive impairment.  She does not require any further review of her postpolio syndrome nor her neuropathy both of which are stable.             History of Present Illness:   CC:  I met with Jessika, we met for 45 minutes.  She was accompanied by a friend, Ev mendoza of some, to review her neurological issues which I have evaluated over several decades.  I initially met her when she had increasing weakness in her upper extremities which was of obscure origin and ultimately diagnosed as a postpolio syndrome.  We managed this by seeking to limit her upper extremity use and exercise which has been quite successful in improving her power and limiting any further deterioration.    She subsequently had difficulty with gait and balance and was found to have a sensory neuropathy in her lower extremities as well as a cerebral meningioma.  Neither the neuropathy nor the meningioma have progressed or caused her any more problems.  She continues to ambulate well with a single end cane and has not had any falling spells.    She was also evaluated with MR imaging of the cervical spine in June 2022 disclosing spinal  stenosis with cord compression.  There was however no evidence of myelopathy on her imaging nor any clinical signs providing her with a threat and thus conservative management and observation were recommended.  She was seen in July 2022 by Dr. Banuelos who concurred with observation of the cervical cord syndrome as well as a 1 year follow-up of the meningioma which was recently accomplished and discloses no appreciable change.    Her most recent issue has dealt with deterioration in her recent memory producing cognitive interference which on neuropsych assessment appears to represent a mild cognitive impairment.  She has been started on and remains on memantine 10 mg twice daily.  Last April we tried to add donepezil however that provided her with some hallucinatory experiences and has subsequently been discontinued.    She presents today for follow-up indicating that she is doing well.  She has moved to an independent living setting at [facility] where she resides with her small dog.  She is independent in most activities.  Because of her memory however she does not necessarily recall all of her medications but states that she is doing well, has not fallen, and is not bothered by any physical complaints.  She has been using a single and cane.  Her activity level has been sustainable such that she is able to go up to her cabin with friends and as long as she receives minimal supervision she is quite safe and independent.               Physical Exam:   Her current neurological examination interestingly enough discloses some improvement in her upper body strength which is better than her previous status following her postpolio diagnosis.  She does not have any radicular findings on strength and reflex testing in the upper or lower extremities but does have bilateral upgoing toe signs which I believe may emanate from her cervical cord.  Her reflexes are normal and symmetrical she has absent ankle reflexes  (related to her peripheral neuropathy).  Her recent memory is of course flawed but she is otherwise cogent and appropriate.         Data:   All laboratory data reviewed  All imaging studies reviewed by me             DATA for DOCUMENTATION:         Past Medical History:     Patient Active Problem List   Diagnosis    Peripheral neuropathy    Hx of colonoscopy    Hyperlipidemia LDL goal <130    CAITLYN (obstructive sleep apnea)  CPAP pressures set at:  LPL of 5 and UPL of 14    Osteopenia    Advanced directives, counseling/discussion    Spinal stenosis in cervical region    Post-polio syndrome (H28)    Adenocarcinoma of endometrium (H)    Fatigue    Essential hypertension    Osteoporosis of lumbar spine    Meningioma, cerebral (H)    MCI (mild cognitive impairment) with memory loss    Major depressive disorder, single episode, mild (H24)    CKD (chronic kidney disease) stage 3, GFR 30-59 ml/min (H)     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    had vaginal bleeding, surgery done 2021    Arthritis mild in knees, shoulders    Cancer (H) skin--Basil Cell, [place]    Chest pain 2003    neg est thallium    Chest pain 04/2016    nl est echo and cxr    CKD (chronic kidney disease) stage 3, GFR 30-59 ml/min (H)     Complication of anesthesia     BP maddie to over 200 in PACU 1 month ago-after D&C    Essential hypertension 07/2021    Fatigue 08/25/2021    Hx of colonoscopy 2003    incomplete, be nl    Hx of colonoscopy 06/03/2011    nl    Hypercholesteremia 2011    aches with zocor    Major depressive disorder, single episode, mild (H24) 04/07/2023    MCI (mild cognitive impairment) with memory loss 2022    namenda added by Dr. Lyles 10/22, did worse with prozac, stopped lexapro on her own 2023    CAITLYN (obstructive sleep apnea) 12/2012    done [place], using dental device, added cpap 2014     Osteopenia  2013    Dr. Taylor    Osteoporosis of lumbar spine 07/2020    added fosamax, she went off it 2023    Peripheral neuropathy     Dr. 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    SOB (shortness of breath) 08/2021    elevated d dimer, ct no pe but signs of chf, then echo nl 9/21    Statin intolerance     zocor and one other caused aches    Subdural hematoma (H) 01/2016    traumatic, fu ct 3/16 resolved    Uncomplicated asthma        Also see scanned health assessment forms.       Past Surgical History:     Past Surgical History:   Procedure Laterality Date    ABDOMEN SURGERY  1960    APPENDECTOMY  60's    BIOPSY  2010    inside upper lip    DAVINCI HYSTERECTOMY TOTAL, BILATERAL SALPINGO-OOPHORECTOMY, NODE DISSECTION, COMBINED Bilateral 7/8/2021    Procedure: ROBOTIC ASSISTED TOTAL LAPAROSCOPIC HYSTERECTOMY, BILATERAL SALPINGO-OOPHORECTOMY, WASHINGS, INTRA-OPERATIVE SENTINEL LYMPH NODE MAPPING, BILATERAL PELVIC AND PARA-AORTIC LYMPHADENECTOMY, REPAIR VAGINAL TEAR;  Surgeon: Dianne Garland MD;  Location:  OR    DILATION AND CURETTAGE, HYSTEROSCOPY WITH ULTRASOUND GUIDANCE N/A 5/11/2021    Procedure: HYSTEROSCOPY UNDER ULTRASOUND GUIDANCE, DILATION AND CURETTAGE OF UTERUS;  Surgeon: Isabel Ferro MD;  Location:  OR    OPERATIVE HYSTEROSCOPY WITH MORCELLATOR N/A 5/11/2021    Procedure: MYOSURE MORCELLATOR;  Surgeon: Isabel Ferro MD;  Location:  OR    PHACOEMULSIFICATION CLEAR CORNEA WITH STANDARD INTRAOCULAR LENS IMPLANT  3/21/2012    Procedure:PHACOEMULSIFICATION CLEAR CORNEA WITH STANDARD INTRAOCULAR LENS IMPLANT; RIGHT PHACOEMULSIFICATION CLEAR CORNEA WITH STANDARD INTRAOCULAR LENS IMPLANT ; Surgeon:CHANDRAKANT HERNANDEZ; Location: [facility]    PHACOEMULSIFICATION CLEAR CORNEA WITH STANDARD INTRAOCULAR LENS IMPLANT  3/28/2012    Procedure:PHACOEMULSIFICATION CLEAR CORNEA WITH STANDARD INTRAOCULAR LENS IMPLANT; LEFT PHACOEMULSIFICATION CLEAR  CORNEA WITH STANDARD INTRAOCULAR LENS IMPLANT ; Surgeon:CHANDRAKANT HERNANDEZ Location: [facility]    SHOULDER SURGERY      twice    SOFT TISSUE SURGERY  R&L Rotator cuff repairs    TONSILLECTOMY  child            Social History:     Social History     Socioeconomic History    Marital status: Single     Spouse name: Not on file    Number of children: 0    Years of education: Not on file    Highest education level: Not on file   Occupational History    Occupation: physical therapy     Employer: RETIRED   Tobacco Use    Smoking status: Former     Packs/day: 1.50     Years: 12.00     Additional pack years: 0.00     Total pack years: 18.00     Types: Cigarettes     Start date: 1966     Quit date: 3/17/1974     Years since quittin.7    Smokeless tobacco: Never    Tobacco comments:     enjoyed it!   Substance and Sexual Activity    Alcohol use: Yes     Comment: Minimal    Drug use: No    Sexual activity: Not Currently     Partners: Male     Birth control/protection: None   Other Topics Concern    Parent/sibling w/ CABG, MI or angioplasty before 65F 55M? Yes     Comment: Brother  of heart attack at 49 yrs old   Social History Narrative    Single, no kids    Has a cabin    Has godchildren that can help out     Social Determinants of Health     Financial Resource Strain: Not on file   Food Insecurity: Not on file   Transportation Needs: Not on file   Physical Activity: Not on file   Stress: Not on file   Social Connections: Not on file   Interpersonal Safety: Not on file   Housing Stability: Not on file              Family History:     Family History   Problem Relation Age of Onset    C.A.D. Father     Heart Disease Father     Heart Disease Mother 97    Alzheimer Disease Mother     Cerebrovascular Disease Mother         mild. Still was very functional    Substance Abuse Mother         Significant treatment over 15 yrs but ended with very good results    Osteoporosis Mother     C.A.D. Brother     Asthma Brother              Medications:     Current Outpatient Medications   Medication Sig    memantine (NAMENDA) 10 MG tablet Take 1 tablet (10 mg) by mouth 2 times daily for 360 days    aspirin (ASA) 81 MG chewable tablet Take 1 tablet (81 mg) by mouth daily    Calcium Carbonate-Vitamin D (CALCIUM-D PO) Take by mouth daily Liquid  Calcium 1200 mg  & Vit D 800 iU    Coenzyme Q10 (COQ10 PO) Take 100 mg by mouth.    hydrochlorothiazide (HYDRODIURIL) 12.5 MG tablet Take 1 tablet (12.5 mg) by mouth daily    losartan (COZAAR) 25 MG tablet Take 1 tablet (25 mg) by mouth 2 times daily    ORDER FOR DME Use your CPAP device as directed by your provider.    rosuvastatin (CRESTOR) 5 MG tablet Take 1 tablet (5 mg) by mouth daily    vitamin B-12 (CYANOCOBALAMIN) 1000 MCG tablet Take 1,000 mcg by mouth daily     No current facility-administered medications for this visit.              Review of Systems:   A comprehensive 10 point review of systems (constitutional, ENT, cardiac, peripheral vascular, lymphatic, respiratory, GI, , Musculoskeletal, skin, Neurological) was performed and found to be negative except as described in this note.     See intake form completed by patient

## 2023-12-07 NOTE — LETTER
12/7/2023         RE: Lizzette Chanel  [address]  [place] [address]        Dear Colleague,    Thank you for referring your patient, Lizzette Chanel, to the [facility]. Please see a copy of my visit note below.        [facility]    Lizzette Chanel MRN# [id number]   Age: 83 year old YOB: 1940     Requesting physician: No ref. provider found  Guzman Bruner            Assessment and Plan:   Assessment:   Cervical spinal stenosis  Mild cognitive impairment  Post polio syndrome  Peripheral neuropathy  Cerebral meningioma     Plan:   Will ask Dr. Banuelos to follow the cervical cord compression risk as well as the meningioma.  Both are stable now but she will need follow up pendimg my long-term  Continue memantine 10 mg twice daily    At this time, pending my long-term, I will turn over the surveillance of her meningioma and her cervical cord compression to Dr. Banuelos and his staff.  We will reimage the cervical cord this coming March and she can meet with him in April.  Will continue the memantine for her mild cognitive impairment.  She does not require any further review of her postpolio syndrome nor her neuropathy both of which are stable.             History of Present Illness:   CC:  I met with Jessika, we met for 45 minutes.  She was accompanied by a friend, Ev mendoza of some, to review her neurological issues which I have evaluated over several decades.  I initially met her when she had increasing weakness in her upper extremities which was of obscure origin and ultimately diagnosed as a postpolio syndrome.  We managed this by seeking to limit her upper extremity use and exercise which has been quite successful in improving her power and limiting any further deterioration.    She subsequently had difficulty with gait and balance and was found to have a sensory neuropathy in her lower extremities as well as a cerebral meningioma.  Neither  the neuropathy nor the meningioma have progressed or caused her any more problems.  She continues to ambulate well with a single end cane and has not had any falling spells.    She was also evaluated with MR imaging of the cervical spine in June 2022 disclosing spinal stenosis with cord compression.  There was however no evidence of myelopathy on her imaging nor any clinical signs providing her with a threat and thus conservative management and observation were recommended.  She was seen in July 2022 by Dr. Banuelos who concurred with observation of the cervical cord syndrome as well as a 1 year follow-up of the meningioma which was recently accomplished and discloses no appreciable change.    Her most recent issue has dealt with deterioration in her recent memory producing cognitive interference which on neuropsych assessment appears to represent a mild cognitive impairment.  She has been started on and remains on memantine 10 mg twice daily.  Last April we tried to add donepezil however that provided her with some hallucinatory experiences and has subsequently been discontinued.    She presents today for follow-up indicating that she is doing well.  She has moved to an independent living setting at [facility] where she resides with her small dog.  She is independent in most activities.  Because of her memory however she does not necessarily recall all of her medications but states that she is doing well, has not fallen, and is not bothered by any physical complaints.  She has been using a single and cane.  Her activity level has been sustainable such that she is able to go up to her cabin with friends and as long as she receives minimal supervision she is quite safe and independent.               Physical Exam:   Her current neurological examination interestingly enough discloses some improvement in her upper body strength which is better than her previous status following her postpolio diagnosis.   She does not have any radicular findings on strength and reflex testing in the upper or lower extremities but does have bilateral upgoing toe signs which I believe may emanate from her cervical cord.  Her reflexes are normal and symmetrical she has absent ankle reflexes (related to her peripheral neuropathy).  Her recent memory is of course flawed but she is otherwise cogent and appropriate.         Data:   All laboratory data reviewed  All imaging studies reviewed by me             DATA for DOCUMENTATION:         Past Medical History:     Patient Active Problem List   Diagnosis     Peripheral neuropathy     Hx of colonoscopy     Hyperlipidemia LDL goal <130     CAITLYN (obstructive sleep apnea)  CPAP pressures set at:  LPL of 5 and UPL of 14     Osteopenia     Advanced directives, counseling/discussion     Spinal stenosis in cervical region     Post-polio syndrome (H28)     Adenocarcinoma of endometrium (H)     Fatigue     Essential hypertension     Osteoporosis of lumbar spine     Meningioma, cerebral (H)     MCI (mild cognitive impairment) with memory loss     Major depressive disorder, single episode, mild (H24)     CKD (chronic kidney disease) stage 3, GFR 30-59 ml/min (H)     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    had vaginal bleeding, surgery done 2021     Arthritis mild in knees, shoulders     Cancer (H) skin--Basil Cell, [place]     Chest pain 2003    neg est thallium     Chest pain 04/2016    nl est echo and cxr     CKD (chronic kidney disease) stage 3, GFR 30-59 ml/min (H)      Complication of anesthesia     BP maddie to over 200 in PACU 1 month ago-after D&C     Essential hypertension 07/2021     Fatigue 08/25/2021     Hx of colonoscopy 2003    incomplete, be nl     Hx of colonoscopy 06/03/2011    nl     Hypercholesteremia 2011    aches with zocor     Major depressive disorder,  single episode, mild (H24) 04/07/2023     MCI (mild cognitive impairment) with memory loss 2022    namenda added by Dr. Lyles 10/22, did worse with prozac, stopped lexapro on her own 2023     CAITLYN (obstructive sleep apnea) 12/2012    done [place], using dental device, added cpap 2014      Osteopenia 2013    Dr. Taylor     Osteoporosis of lumbar spine 07/2020    added fosamax, she went off it 2023     Peripheral neuropathy     Dr. 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       Past Surgical History:     Past Surgical History:   Procedure Laterality Date     ABDOMEN SURGERY  1960     APPENDECTOMY  60's     BIOPSY  2010    inside upper lip     DAVINCI HYSTERECTOMY TOTAL, BILATERAL SALPINGO-OOPHORECTOMY, NODE DISSECTION, COMBINED Bilateral 7/8/2021    Procedure: ROBOTIC ASSISTED TOTAL LAPAROSCOPIC HYSTERECTOMY, BILATERAL SALPINGO-OOPHORECTOMY, WASHINGS, INTRA-OPERATIVE SENTINEL LYMPH NODE MAPPING, BILATERAL PELVIC AND PARA-AORTIC LYMPHADENECTOMY, REPAIR VAGINAL TEAR;  Surgeon: Dianne Garland MD;  Location:  OR     DILATION AND CURETTAGE, HYSTEROSCOPY WITH ULTRASOUND GUIDANCE N/A 5/11/2021    Procedure: HYSTEROSCOPY UNDER ULTRASOUND GUIDANCE, DILATION AND CURETTAGE OF UTERUS;  Surgeon: Isabel Ferro MD;  Location:  OR     OPERATIVE HYSTEROSCOPY WITH MORCELLATOR N/A 5/11/2021    Procedure: MYOSURE MORCELLATOR;  Surgeon: Isabel Ferro MD;  Location:  OR     PHACOEMULSIFICATION CLEAR CORNEA WITH STANDARD INTRAOCULAR LENS IMPLANT  3/21/2012    Procedure:PHACOEMULSIFICATION CLEAR CORNEA WITH STANDARD INTRAOCULAR LENS IMPLANT; RIGHT  PHACOEMULSIFICATION CLEAR CORNEA WITH STANDARD INTRAOCULAR LENS IMPLANT ; Surgeon:CHANDRAKANT HERNANDEZ; Location[facility]     PHACOEMULSIFICATION CLEAR CORNEA WITH STANDARD INTRAOCULAR LENS IMPLANT  3/28/2012    Procedure:PHACOEMULSIFICATION CLEAR CORNEA WITH STANDARD INTRAOCULAR LENS IMPLANT; LEFT PHACOEMULSIFICATION CLEAR CORNEA WITH STANDARD INTRAOCULAR LENS IMPLANT ; Surgeon:CHANDRAKANT HERNANDEZ; Location: [facility]     SHOULDER SURGERY      twice     SOFT TISSUE SURGERY  R&L Rotator cuff repairs     TONSILLECTOMY  child            Social History:     Social History     Socioeconomic History     Marital status: Single     Spouse name: Not on file     Number of children: 0     Years of education: Not on file     Highest education level: Not on file   Occupational History     Occupation: physical therapy     Employer: RETIRED   Tobacco Use     Smoking status: Former     Packs/day: 1.50     Years: 12.00     Additional pack years: 0.00     Total pack years: 18.00     Types: Cigarettes     Start date: 1966     Quit date: 3/17/1974     Years since quittin.7     Smokeless tobacco: Never     Tobacco comments:     enjoyed it!   Substance and Sexual Activity     Alcohol use: Yes     Comment: Minimal     Drug use: No     Sexual activity: Not Currently     Partners: Male     Birth control/protection: None   Other Topics Concern     Parent/sibling w/ CABG, MI or angioplasty before 65F 55M? Yes     Comment: Brother  of heart attack at 49 yrs old   Social History Narrative    Single, no kids    Has a cabin    Has godchildren that can help out     Social Determinants of Health     Financial Resource Strain: Not on file   Food Insecurity: Not on file   Transportation Needs: Not on file   Physical Activity: Not on file   Stress: Not on file   Social Connections: Not on file   Interpersonal Safety: Not on file   Housing Stability: Not on file              Family History:     Family History   Problem Relation Age of Onset      RADHAALINDA. Father      Heart Disease Father      Heart Disease Mother 97     Alzheimer Disease Mother      Cerebrovascular Disease Mother         mild. Still was very functional     Substance Abuse Mother         Significant treatment over 15 yrs but ended with very good results     Osteoporosis Mother      C.A.D. Brother      Asthma Brother             Medications:     Current Outpatient Medications   Medication Sig     memantine (NAMENDA) 10 MG tablet Take 1 tablet (10 mg) by mouth 2 times daily for 360 days     aspirin (ASA) 81 MG chewable tablet Take 1 tablet (81 mg) by mouth daily     Calcium Carbonate-Vitamin D (CALCIUM-D PO) Take by mouth daily Liquid  Calcium 1200 mg  & Vit D 800 iU     Coenzyme Q10 (COQ10 PO) Take 100 mg by mouth.     hydrochlorothiazide (HYDRODIURIL) 12.5 MG tablet Take 1 tablet (12.5 mg) by mouth daily     losartan (COZAAR) 25 MG tablet Take 1 tablet (25 mg) by mouth 2 times daily     ORDER FOR DME Use your CPAP device as directed by your provider.     rosuvastatin (CRESTOR) 5 MG tablet Take 1 tablet (5 mg) by mouth daily     vitamin B-12 (CYANOCOBALAMIN) 1000 MCG tablet Take 1,000 mcg by mouth daily     No current facility-administered medications for this visit.              Review of Systems:   A comprehensive 10 point review of systems (constitutional, ENT, cardiac, peripheral vascular, lymphatic, respiratory, GI, , Musculoskeletal, skin, Neurological) was performed and found to be negative except as described in this note.     See intake form completed by patient      Again, thank you for allowing me to participate in the care of your patient.        Sincerely,        Mohinder Lyles MD, MD

## 2023-12-07 NOTE — NURSING NOTE
"Lizzette Chanel is a 83 year old female who presents for:  Chief Complaint   Patient presents with    RECHECK     Spinal stenosis  MCI        Initial Vitals:  /69   Pulse 72   Wt 72.1 kg (159 lb)   SpO2 94%   BMI 24.18 kg/m   Estimated body mass index is 24.18 kg/m  as calculated from the following:    Height as of 9/7/23: 1.727 m (5' 8\").    Weight as of this encounter: 72.1 kg (159 lb).. Body surface area is 1.86 meters squared. BP completed using cuff size: celina Kinsey    "

## 2023-12-08 ENCOUNTER — MYC MEDICAL ADVICE (OUTPATIENT)
Dept: FAMILY MEDICINE | Facility: CLINIC | Age: 83
End: 2023-12-08
Payer: COMMERCIAL

## 2023-12-08 NOTE — TELEPHONE ENCOUNTER
Patient Quality Outreach    Patient is due for the following:   Depression  -  PHQ-9 needed    Next Steps:   No follow up needed at this time.    Type of outreach:    Sent [employer] message.      Questions for provider review:    None           Amrita Wetzel MA

## 2024-02-07 ENCOUNTER — OFFICE VISIT (OUTPATIENT)
Dept: URGENT CARE | Facility: URGENT CARE | Age: 84
End: 2024-02-07
Payer: COMMERCIAL

## 2024-02-07 ENCOUNTER — OFFICE VISIT (OUTPATIENT)
Dept: PEDIATRICS | Facility: CLINIC | Age: 84
End: 2024-02-07
Attending: PHYSICIAN ASSISTANT
Payer: COMMERCIAL

## 2024-02-07 VITALS
HEART RATE: 74 BPM | WEIGHT: 156 LBS | SYSTOLIC BLOOD PRESSURE: 92 MMHG | TEMPERATURE: 97.7 F | BODY MASS INDEX: 23.72 KG/M2 | DIASTOLIC BLOOD PRESSURE: 47 MMHG | OXYGEN SATURATION: 98 %

## 2024-02-07 VITALS
OXYGEN SATURATION: 98 % | HEIGHT: 68 IN | WEIGHT: 157 LBS | DIASTOLIC BLOOD PRESSURE: 62 MMHG | SYSTOLIC BLOOD PRESSURE: 170 MMHG | BODY MASS INDEX: 23.79 KG/M2 | TEMPERATURE: 97.4 F | RESPIRATION RATE: 20 BRPM | HEART RATE: 72 BPM

## 2024-02-07 DIAGNOSIS — N39.0 URINARY TRACT INFECTION WITH HEMATURIA, SITE UNSPECIFIED: ICD-10-CM

## 2024-02-07 DIAGNOSIS — N30.00 ACUTE CYSTITIS WITHOUT HEMATURIA: ICD-10-CM

## 2024-02-07 DIAGNOSIS — E86.0 DEHYDRATION: ICD-10-CM

## 2024-02-07 DIAGNOSIS — R31.9 URINARY TRACT INFECTION WITH HEMATURIA, SITE UNSPECIFIED: Primary | ICD-10-CM

## 2024-02-07 DIAGNOSIS — E86.0 DEHYDRATION: Primary | ICD-10-CM

## 2024-02-07 DIAGNOSIS — R31.9 URINARY TRACT INFECTION WITH HEMATURIA, SITE UNSPECIFIED: ICD-10-CM

## 2024-02-07 DIAGNOSIS — R19.7 DIARRHEA, UNSPECIFIED TYPE: ICD-10-CM

## 2024-02-07 DIAGNOSIS — N39.0 URINARY TRACT INFECTION WITH HEMATURIA, SITE UNSPECIFIED: Primary | ICD-10-CM

## 2024-02-07 DIAGNOSIS — N18.30 STAGE 3 CHRONIC KIDNEY DISEASE, UNSPECIFIED WHETHER STAGE 3A OR 3B CKD (H): ICD-10-CM

## 2024-02-07 DIAGNOSIS — N17.9 AKI (ACUTE KIDNEY INJURY) (H): ICD-10-CM

## 2024-02-07 LAB
ALBUMIN UR-MCNC: NEGATIVE MG/DL
ANION GAP SERPL CALCULATED.3IONS-SCNC: 12 MMOL/L (ref 7–15)
APPEARANCE UR: CLEAR
BACTERIA #/AREA URNS HPF: ABNORMAL /HPF
BILIRUB UR QL STRIP: NEGATIVE
BUN SERPL-MCNC: 25.7 MG/DL (ref 8–23)
CALCIUM SERPL-MCNC: 8.5 MG/DL (ref 8.8–10.2)
CHLORIDE SERPL-SCNC: 98 MMOL/L (ref 98–107)
COLOR UR AUTO: YELLOW
CREAT SERPL-MCNC: 1.37 MG/DL (ref 0.51–0.95)
DEPRECATED HCO3 PLAS-SCNC: 21 MMOL/L (ref 22–29)
EGFRCR SERPLBLD CKD-EPI 2021: 38 ML/MIN/1.73M2
GLUCOSE SERPL-MCNC: 87 MG/DL (ref 70–99)
GLUCOSE UR STRIP-MCNC: NEGATIVE MG/DL
HGB UR QL STRIP: NEGATIVE
KETONES UR STRIP-MCNC: NEGATIVE MG/DL
LEUKOCYTE ESTERASE UR QL STRIP: ABNORMAL
NITRATE UR QL: POSITIVE
PH UR STRIP: 6 [PH] (ref 5–7)
POTASSIUM SERPL-SCNC: 4.1 MMOL/L (ref 3.4–5.3)
RBC #/AREA URNS AUTO: ABNORMAL /HPF
SODIUM SERPL-SCNC: 131 MMOL/L (ref 135–145)
SP GR UR STRIP: 1.01 (ref 1–1.03)
UROBILINOGEN UR STRIP-ACNC: 0.2 E.U./DL
WBC #/AREA URNS AUTO: ABNORMAL /HPF

## 2024-02-07 PROCEDURE — 87086 URINE CULTURE/COLONY COUNT: CPT | Performed by: PHYSICIAN ASSISTANT

## 2024-02-07 PROCEDURE — 96360 HYDRATION IV INFUSION INIT: CPT | Performed by: FAMILY MEDICINE

## 2024-02-07 PROCEDURE — 99214 OFFICE O/P EST MOD 30 MIN: CPT | Mod: 25 | Performed by: FAMILY MEDICINE

## 2024-02-07 PROCEDURE — 81001 URINALYSIS AUTO W/SCOPE: CPT | Performed by: PHYSICIAN ASSISTANT

## 2024-02-07 PROCEDURE — 99207 REFERRAL TO ACUTE AND DIAGNOSTIC SERVICES: CPT | Performed by: PHYSICIAN ASSISTANT

## 2024-02-07 PROCEDURE — 80048 BASIC METABOLIC PNL TOTAL CA: CPT | Performed by: FAMILY MEDICINE

## 2024-02-07 PROCEDURE — 36415 COLL VENOUS BLD VENIPUNCTURE: CPT | Performed by: FAMILY MEDICINE

## 2024-02-07 RX ORDER — SULFAMETHOXAZOLE/TRIMETHOPRIM 800-160 MG
1 TABLET ORAL 2 TIMES DAILY
Qty: 6 TABLET | Refills: 0 | Status: SHIPPED | OUTPATIENT
Start: 2024-02-07 | End: 2024-02-10

## 2024-02-07 RX ADMIN — Medication 1000 ML: at 16:25

## 2024-02-07 ASSESSMENT — PAIN SCALES - GENERAL: PAINLEVEL: NO PAIN (0)

## 2024-02-07 NOTE — PROGRESS NOTES
Assessment & Plan     Urinary tract infection with hematuria, site unspecified    You have been diagnosed with a UTI.  This is one of the most common infections in women because women have a shorter urethra than men. Bacteria have a shorter distance to travel to reach the bladder.. Women who have gone through menopause also lose the protection from estrogen that lowers the chance of getting a UTI. And some women are at higher risk because of their genes. The most common cause of bladder infections is bacteria from the bowels. The bacteria get onto the skin around the opening of the urethra. From there, they can get into the urine. Then they travel up to the bladder, causing inflammation and infection.  Make sure you urinate after sex, drink plenty of fluids and do not hold in your urine.  We have started you on antibiotics for infection and we are awaiting urine culture results, if there is antibiotic resistance on the culture we will call you and switch antibiotics.       - UA Macroscopic with reflex to Microscopic and Culture - Clinic Collect  - Urine Microscopic Exam  - Urine Culture  - Referral to Acute and Diagnostic Services (Day of diagnostic / First order acute); Future    Dehydration    Referral to ADS for IV fluids  - Referral to Acute and Diagnostic Services (Day of diagnostic / First order acute); Future    Diarrhea, unspecified type    Referral to ADS for blood work and stool cutues  - Referral to Acute and Diagnostic Services (Day of diagnostic / First order acute); Future    Stage 3 chronic kidney disease, unspecified whether stage 3a or 3b CKD (H)    Estimated Creatinine Clearance: 43 mL/min (A) (based on SCr of 1.07 mg/dL (H)).      Review of external notes as documented elsewhere in note      CONSULTATION/REFERRAL to ADS    No follow-ups on file.    Megan Rosen is a 83 year old, presenting for the following health issues:  Diarrhea (X 3-4 days decrease appetite, body weakness, possible  UTI,)    HPI     Review of Systems  Constitutional, neuro, ENT, endocrine, pulmonary, cardiac, gastrointestinal, genitourinary, musculoskeletal, integument and psychiatric systems are negative, except as otherwise noted.      Objective    BP 92/47   Pulse 74   Temp 97.7  F (36.5  C) (Tympanic)   Wt 70.8 kg (156 lb)   SpO2 98%   BMI 23.72 kg/m    Body mass index is 23.72 kg/m .  Physical Exam   GENERAL: alert and no distress  EYES: Eyes grossly normal to inspection, PERRL and conjunctivae and sclerae normal  MS: no gross musculoskeletal defects noted, no edema  SKIN: no suspicious lesions or rashes  NEURO: Normal strength and tone, mentation intact and speech normal  PSYCH: mentation appears normal, affect normal/bright          Results for orders placed or performed in visit on 02/07/24   UA Macroscopic with reflex to Microscopic and Culture - Clinic Collect     Status: Abnormal    Specimen: Urine, Catheter   Result Value Ref Range    Color Urine Yellow Colorless, Straw, Light Yellow, Yellow    Appearance Urine Clear Clear    Glucose Urine Negative Negative mg/dL    Bilirubin Urine Negative Negative    Ketones Urine Negative Negative mg/dL    Specific Gravity Urine 1.010 1.003 - 1.035    Blood Urine Negative Negative    pH Urine 6.0 5.0 - 7.0    Protein Albumin Urine Negative Negative mg/dL    Urobilinogen Urine 0.2 0.2, 1.0 E.U./dL    Nitrite Urine Positive (A) Negative    Leukocyte Esterase Urine Small (A) Negative   Urine Microscopic Exam     Status: Abnormal   Result Value Ref Range    Bacteria Urine Few (A) None Seen /HPF    RBC Urine 0-2 0-2 /HPF /HPF    WBC Urine 5-10 (A) 0-5 /HPF /HPF         Signed Electronically by: Nba Valdovinos, [facility], [place]  [email or web address]

## 2024-02-07 NOTE — PATIENT INSTRUCTIONS
Drink 70 ounces of water/fluid a day  -- you can drink diluted apple juice or gatorade ( mixing the liquids half and half with water)       You were diagnosed today with a bladder infection  Please take bactrim/trimethoprim antibiotic twice a day for the next 3 days      If your symptoms get worse seek medical attention right away ( diarrhea, lightheadedness, new fever abdominal pain)

## 2024-02-07 NOTE — PROGRESS NOTES
Assessment & Plan     Urinary tract infection with hematuria, site unspecified  - Referral to Acute and Diagnostic Services (Day of diagnostic / First order acute)    Diarrhea, unspecified type  - Referral to Acute and Diagnostic Services (Day of diagnostic / First order acute)    Dehydration  - Referral to Acute and Diagnostic Services (Day of diagnostic / First order acute)  - sodium chloride 0.9% BOLUS 1,000 mL  - Basic metabolic panel  - sodium chloride (PF) 0.9% PF flush 3 mL  - Basic metabolic panel    DANIEL (acute kidney injury) (H24)    Acute cystitis without hematuria  - sulfamethoxazole-trimethoprim (BACTRIM DS) 800-160 MG tablet  Dispense: 6 tablet; Refill: 0     Patient received 1L of IV fluids her blood pressure drastically improved.  She looks well at this time and has a huge appetite was able to eat an entire [employer] hamburger.  Given the UA findings we will go ahead and proceed with 3-day course of Bactrim.  Additionally given her weight I have recommended approximately 70 ounces of fluids a day.  Renal function did show acute kidney injury consistent with the reported dehydration.     Suspicion for pyelonephritis is low.     See AVS summary for additional recommendations reviewed with patient during this visit.         Isaac Trejo MD   [place] [facility]    Megan Rosen is a 83 year old female who presents to clinic today for the following health issues:  Chief Complaint   Patient presents with    Dehydration     HPI  Patient is referred to acute diagnostic services today from urgent care for evaluation of dehydration in the setting of recent onset of diarrhea.  There is also concerns for possible developing urinary tract infection as a result of her symptoms.  She is brought and accompanied by her friend Ev today.    Patient has not had any fevers.  She reports a few days of decreased appetite in the setting of feeling weak and now has possible UTI as her 2 friends noticed slight  changes to her behavior.     The diarrhea has not been persistent and she has not had the urge to go nor has she soiled herself during the UC and visit here in clinic. No vomiting has occurred. No recent use of antibiotics or hospitalizations    Information today is mostly provided by her friend who has been around the patient and who has collected information from another person close to duty.    Patient Active Problem List    Diagnosis Date Noted    CKD (chronic kidney disease) stage 3, GFR 30-59 ml/min (H) 09/07/2023     Priority: Medium    Major depressive disorder, single episode, mild (H24) 04/07/2023     Priority: Medium    Meningioma, cerebral (H) 07/29/2022     Priority: Medium    MCI (mild cognitive impairment) with memory loss 2022     Priority: Medium     namenda added by Dr. 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  Lizzette GABRIEL Chanel has no plan or code status on file however states presence of ACP document. Copy requested. Confirmed code status reflects current choices pending receipt of document/advance care plan review. Confirmed/documented designated decision maker(s). See permanent comments section of demographics in clinical tab.   Added by Marine Fuller on 7/22/2013              Osteopenia      Priority: Medium     Dr. Taylor      CAITLYN (obstructive sleep apnea)  CPAP pressures set at:  LPL of 5 and UPL of 14      Priority: Medium     done [place]       "Hyperlipidemia LDL goal <130 03/17/2012     Priority: Medium    Peripheral neuropathy      Priority: Medium     Dr. Jeffery      Hx of colonoscopy      Priority: Medium     nl         Current Outpatient Medications   Medication    aspirin (ASA) 81 MG chewable tablet    Calcium Carbonate-Vitamin D (CALCIUM-D PO)    Coenzyme Q10 (COQ10 PO)    hydrochlorothiazide (HYDRODIURIL) 12.5 MG tablet    losartan (COZAAR) 25 MG tablet    memantine (NAMENDA) 10 MG tablet    ORDER FOR DME    rosuvastatin (CRESTOR) 5 MG tablet    sulfamethoxazole-trimethoprim (BACTRIM DS) 800-160 MG tablet    vitamin B-12 (CYANOCOBALAMIN) 1000 MCG tablet     Current Facility-Administered Medications   Medication    sodium chloride (PF) 0.9% PF flush 3 mL           Objective    BP (!) 170/62 (Patient Position: Sitting)   Pulse 72   Temp 97.4  F (36.3  C) (Oral)   Resp 20   Ht 1.727 m (5' 8\")   Wt 71.2 kg (157 lb)   SpO2 98%   BMI 23.87 kg/m    Physical Exam       GEN: NAD, smiling  CV: HDS  Pulm: non-labored  Results for orders placed or performed in visit on 02/07/24   Basic metabolic panel     Status: Abnormal   Result Value Ref Range    Sodium 131 (L) 135 - 145 mmol/L    Potassium 4.1 3.4 - 5.3 mmol/L    Chloride 98 98 - 107 mmol/L    Carbon Dioxide (CO2) 21 (L) 22 - 29 mmol/L    Anion Gap 12 7 - 15 mmol/L    Urea Nitrogen 25.7 (H) 8.0 - 23.0 mg/dL    Creatinine 1.37 (H) 0.51 - 0.95 mg/dL    GFR Estimate 38 (L) >60 mL/min/1.73m2    Calcium 8.5 (L) 8.8 - 10.2 mg/dL    Glucose 87 70 - 99 mg/dL   Results for orders placed or performed in visit on 02/07/24   UA Macroscopic with reflex to Microscopic and Culture - Clinic Collect     Status: Abnormal    Specimen: Urine, Catheter   Result Value Ref Range    Color Urine Yellow Colorless, Straw, Light Yellow, Yellow    Appearance Urine Clear Clear    Glucose Urine Negative Negative mg/dL    Bilirubin Urine Negative Negative    Ketones Urine Negative Negative mg/dL    Specific Gravity Urine 1.010 " 1.003 - 1.035    Blood Urine Negative Negative    pH Urine 6.0 5.0 - 7.0    Protein Albumin Urine Negative Negative mg/dL    Urobilinogen Urine 0.2 0.2, 1.0 E.U./dL    Nitrite Urine Positive (A) Negative    Leukocyte Esterase Urine Small (A) Negative   Urine Microscopic Exam     Status: Abnormal   Result Value Ref Range    Bacteria Urine Few (A) None Seen /HPF    RBC Urine 0-2 0-2 /HPF /HPF    WBC Urine 5-10 (A) 0-5 /HPF /HPF                     The use of Dragon/[employer] services may have been used to construct the content in this note; any grammatical or spelling errors are non-intentional. Please contact the author of this note directly if you are in need of any clarification.

## 2024-02-08 ENCOUNTER — TELEPHONE (OUTPATIENT)
Dept: FAMILY MEDICINE | Facility: CLINIC | Age: 84
End: 2024-02-08
Payer: COMMERCIAL

## 2024-02-08 LAB
BACTERIA UR CULT: ABNORMAL

## 2024-02-08 NOTE — TELEPHONE ENCOUNTER
TO PCP    Pt's friend Janis calling via CTC.     Pt was seen in UC and ADS yesterday for dehydration and UTI and received fluids and abx.     Janis states today pt has decreased fluid and food intake, fatigued and weak, unsteady gait, pale face. Pt taking abx as directed.     Please advise, do you want her to be seen in ADS again? PCP visit?    Triage, please call pt's friend Janis back at [phone]        WILLIE COOPER RN on 2/8/2024 at 4:46 PM

## 2024-02-09 NOTE — TELEPHONE ENCOUNTER
Called Janis back. Gave PCP message. She verbalized understanding. She states patient is doing much better since the phone call. She is drinking a lot now and feeling better. She will call again if anything is needed.

## 2024-02-12 ENCOUNTER — TELEPHONE (OUTPATIENT)
Dept: FAMILY MEDICINE | Facility: CLINIC | Age: 84
End: 2024-02-12
Payer: COMMERCIAL

## 2024-02-12 NOTE — TELEPHONE ENCOUNTER
Reason for Call:  Appointment Request    Patient requesting this type of appt:  FOLLOW UP APPT    Requested provider: Guzman Bruner    Reason patient unable to be scheduled: Not within requested timeframe    When does patient want to be seen/preferred time: THURSDAY AFTERNOON OR FRIDAY THIS WEEK, ASAP    Comments: PT WENT TO [facility], FU PT HAD A URINARY TRACT INFECTION, SYMPTOMS STILL THERE SUCH AS FATIGUE AND DECREASED APPETITE    Could [facility] or would you prefer to receive a phone call?:   Patient would prefer a phone call   Okay to leave a detailed message?: Yes at Other phone number:  [phone] SHERRY RICHARDSON    Call taken on 2/12/2024 at 12:28 PM by Judy Clements

## 2024-02-13 NOTE — TELEPHONE ENCOUNTER
Only VIRTUAL slots available Friday. Ok to use for in person? Or virtual visit is okay? Pls advise.    Amrita Ward, CMA

## 2024-02-16 ENCOUNTER — OFFICE VISIT (OUTPATIENT)
Dept: FAMILY MEDICINE | Facility: CLINIC | Age: 84
End: 2024-02-16
Payer: COMMERCIAL

## 2024-02-16 VITALS
OXYGEN SATURATION: 98 % | TEMPERATURE: 97.5 F | DIASTOLIC BLOOD PRESSURE: 75 MMHG | HEART RATE: 61 BPM | HEIGHT: 68 IN | WEIGHT: 155 LBS | BODY MASS INDEX: 23.49 KG/M2 | RESPIRATION RATE: 16 BRPM | SYSTOLIC BLOOD PRESSURE: 150 MMHG

## 2024-02-16 DIAGNOSIS — F32.0 MAJOR DEPRESSIVE DISORDER, SINGLE EPISODE, MILD (H): Primary | ICD-10-CM

## 2024-02-16 DIAGNOSIS — N17.9 ACUTE RENAL FAILURE, UNSPECIFIED ACUTE RENAL FAILURE TYPE (H): ICD-10-CM

## 2024-02-16 DIAGNOSIS — R53.83 OTHER FATIGUE: ICD-10-CM

## 2024-02-16 DIAGNOSIS — I10 ESSENTIAL HYPERTENSION: ICD-10-CM

## 2024-02-16 DIAGNOSIS — G31.84 MCI (MILD COGNITIVE IMPAIRMENT) WITH MEMORY LOSS: ICD-10-CM

## 2024-02-16 LAB
ANION GAP SERPL CALCULATED.3IONS-SCNC: 11 MMOL/L (ref 7–15)
BUN SERPL-MCNC: 34.9 MG/DL (ref 8–23)
CALCIUM SERPL-MCNC: 9.6 MG/DL (ref 8.8–10.2)
CHLORIDE SERPL-SCNC: 106 MMOL/L (ref 98–107)
CREAT SERPL-MCNC: 1.26 MG/DL (ref 0.51–0.95)
DEPRECATED HCO3 PLAS-SCNC: 24 MMOL/L (ref 22–29)
EGFRCR SERPLBLD CKD-EPI 2021: 42 ML/MIN/1.73M2
ERYTHROCYTE [DISTWIDTH] IN BLOOD BY AUTOMATED COUNT: 13.2 % (ref 10–15)
GLUCOSE SERPL-MCNC: 88 MG/DL (ref 70–99)
HCT VFR BLD AUTO: 37.4 % (ref 35–47)
HGB BLD-MCNC: 11.8 G/DL (ref 11.7–15.7)
MCH RBC QN AUTO: 29.1 PG (ref 26.5–33)
MCHC RBC AUTO-ENTMCNC: 31.6 G/DL (ref 31.5–36.5)
MCV RBC AUTO: 92 FL (ref 78–100)
PLATELET # BLD AUTO: 263 10E3/UL (ref 150–450)
POTASSIUM SERPL-SCNC: 4.2 MMOL/L (ref 3.4–5.3)
RBC # BLD AUTO: 4.06 10E6/UL (ref 3.8–5.2)
SODIUM SERPL-SCNC: 141 MMOL/L (ref 135–145)
TSH SERPL DL<=0.005 MIU/L-ACNC: 2.12 UIU/ML (ref 0.3–4.2)
WBC # BLD AUTO: 7.9 10E3/UL (ref 4–11)

## 2024-02-16 PROCEDURE — 85027 COMPLETE CBC AUTOMATED: CPT | Performed by: INTERNAL MEDICINE

## 2024-02-16 PROCEDURE — 99214 OFFICE O/P EST MOD 30 MIN: CPT | Performed by: INTERNAL MEDICINE

## 2024-02-16 PROCEDURE — 84443 ASSAY THYROID STIM HORMONE: CPT | Performed by: INTERNAL MEDICINE

## 2024-02-16 PROCEDURE — 36415 COLL VENOUS BLD VENIPUNCTURE: CPT | Performed by: INTERNAL MEDICINE

## 2024-02-16 PROCEDURE — 80048 BASIC METABOLIC PNL TOTAL CA: CPT | Performed by: INTERNAL MEDICINE

## 2024-02-16 RX ORDER — ESCITALOPRAM OXALATE 5 MG/1
5 TABLET ORAL DAILY
Qty: 50 TABLET | Refills: 1 | Status: SHIPPED | OUTPATIENT
Start: 2024-02-16 | End: 2024-03-21

## 2024-02-16 RX ORDER — HYDROCHLOROTHIAZIDE 12.5 MG/1
12.5 TABLET ORAL DAILY
Qty: 90 TABLET | Refills: 3 | Status: SHIPPED | OUTPATIENT
Start: 2024-02-16 | End: 2024-09-12

## 2024-02-16 ASSESSMENT — PAIN SCALES - GENERAL: PAINLEVEL: NO PAIN (0)

## 2024-02-16 ASSESSMENT — PATIENT HEALTH QUESTIONNAIRE - PHQ9: SUM OF ALL RESPONSES TO PHQ QUESTIONS 1-9: 6

## 2024-02-16 NOTE — RESULT ENCOUNTER NOTE
It was nice to see you.  Your should be able to view the lab results.    Your blood tests are very stable including your blood count, blood salts and kidney test as well as thyroid test.  Please let me know if you have questions.    Guzman Bruner M.D.

## 2024-02-16 NOTE — PROGRESS NOTES
This is a very pleasant 83-year-old who presents with her friend for follow-up.  She has known mild cognitive impairment with memory loss.  She has had fatigue for quite some time, she has hypertension.  As noted and reviewed approximately a week ago she was seen in urgent care for dehydration with a UTI.  Her creatinine was also elevated at that time.  With antibiotics and IV fluid she has done much better.  However, she is still not feeling great with significant fatigue and some mild depression.  She has had depression in the past manifested often is fatigued.  She was on Lexapro and still had fatigue so we tried Prozac but that did not help and she had side effects so she has been off meds for quite some time for that.  She is not having chest pain or shortness of breath and no GI issues although her weight is down.  She was having diarrhea and this is resolved.    She does feel a bit down.  Not suicidal.  She is getting out but not as much as she would like to.    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    had vaginal bleeding, surgery done 2021    Arthritis mild in knees, shoulders    Cancer (H) skin--Basil Cell, [place]    Chest pain 2003    neg est thallium    Chest pain 04/2016    nl est echo and cxr    CKD (chronic kidney disease) stage 3, GFR 30-59 ml/min (H)     Complication of anesthesia     BP maddie to over 200 in PACU 1 month ago-after D&C    Essential hypertension 07/2021    Fatigue 08/25/2021    Hx of colonoscopy 2003    incomplete, be nl    Hx of colonoscopy 06/03/2011    nl    Hypercholesteremia 2011    aches with zocor    Major depressive disorder, single episode, mild (H24) 04/07/2023    MCI (mild cognitive impairment) with memory loss 2022    namenda added by Dr. Lyles 10/22, did worse with prozac, stopped lexapro on her own 2023    CAITLYN (obstructive sleep apnea)  12/2012    done [place], using dental device, added cpap 2014     Osteopenia 2013    Dr. Taylor    Osteoporosis of lumbar spine 07/2020    added fosamax, she went off it 2023    Peripheral neuropathy     Dr. 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    SOB (shortness of breath) 08/2021    elevated d dimer, ct no pe but signs of chf, then echo nl 9/21    Statin intolerance     zocor and one other caused aches    Subdural hematoma (H) 01/2016    traumatic, fu ct 3/16 resolved    Uncomplicated asthma      Past Surgical History:   Procedure Laterality Date    ABDOMEN SURGERY  1960    APPENDECTOMY  60's    BIOPSY  2010    inside upper lip    DAVINCI HYSTERECTOMY TOTAL, BILATERAL SALPINGO-OOPHORECTOMY, NODE DISSECTION, COMBINED Bilateral 7/8/2021    Procedure: ROBOTIC ASSISTED TOTAL LAPAROSCOPIC HYSTERECTOMY, BILATERAL SALPINGO-OOPHORECTOMY, WASHINGS, INTRA-OPERATIVE SENTINEL LYMPH NODE MAPPING, BILATERAL PELVIC AND PARA-AORTIC LYMPHADENECTOMY, REPAIR VAGINAL TEAR;  Surgeon: Dianne Garland MD;  Location:  OR    DILATION AND CURETTAGE, HYSTEROSCOPY WITH ULTRASOUND GUIDANCE N/A 5/11/2021    Procedure: HYSTEROSCOPY UNDER ULTRASOUND GUIDANCE, DILATION AND CURETTAGE OF UTERUS;  Surgeon: Isabel Ferro MD;  Location:  OR    OPERATIVE HYSTEROSCOPY WITH MORCELLATOR N/A 5/11/2021    Procedure: MYOSURE MORCELLATOR;  Surgeon: Isabel Ferro MD;  Location:  OR    PHACOEMULSIFICATION CLEAR CORNEA WITH STANDARD INTRAOCULAR LENS IMPLANT  3/21/2012    Procedure:PHACOEMULSIFICATION CLEAR CORNEA WITH STANDARD INTRAOCULAR LENS IMPLANT; RIGHT PHACOEMULSIFICATION CLEAR CORNEA WITH STANDARD INTRAOCULAR LENS IMPLANT ; Surgeon:CHANDRAKANT HERNANDEZ; Location: [facility]    PHACOEMULSIFICATION CLEAR CORNEA WITH STANDARD INTRAOCULAR LENS IMPLANT  3/28/2012    Procedure:PHACOEMULSIFICATION CLEAR CORNEA WITH STANDARD INTRAOCULAR LENS IMPLANT; LEFT PHACOEMULSIFICATION CLEAR  CORNEA WITH STANDARD INTRAOCULAR LENS IMPLANT ; Surgeon:CHANDRAKANT HERNANDEZ; Location: [facility]    SHOULDER SURGERY      twice    SOFT TISSUE SURGERY  R&L Rotator cuff repairs    TONSILLECTOMY  child     Social History     Socioeconomic History    Marital status: Single     Spouse name: Not on file    Number of children: 0    Years of education: Not on file    Highest education level: Not on file   Occupational History    Occupation: physical therapy     Employer: RETIRED   Tobacco Use    Smoking status: Former     Packs/day: 1.50     Years: 12.00     Additional pack years: 0.00     Total pack years: 18.00     Types: Cigarettes     Start date: 1966     Quit date: 3/17/1974     Years since quittin.9    Smokeless tobacco: Never    Tobacco comments:     enjoyed it!   Substance and Sexual Activity    Alcohol use: Yes     Comment: Minimal    Drug use: No    Sexual activity: Not Currently     Partners: Male     Birth control/protection: None   Other Topics Concern    Parent/sibling w/ CABG, MI or angioplasty before 65F 55M? Yes     Comment: Brother  of heart attack at 49 yrs old   Social History Narrative    Single, no kids    Has a cabin    Has godchildren that can help out     Social Determinants of Health     Financial Resource Strain: Not on file   Food Insecurity: Not on file   Transportation Needs: Not on file   Physical Activity: Not on file   Stress: Not on file   Social Connections: Not on file   Interpersonal Safety: Not on file   Housing Stability: Not on file     Current Outpatient Medications   Medication Sig Dispense Refill    aspirin (ASA) 81 MG chewable tablet Take 1 tablet (81 mg) by mouth daily 100 tablet 4    Calcium Carbonate-Vitamin D (CALCIUM-D PO) Take by mouth daily Liquid  Calcium 1200 mg  & Vit D 800 iU      Coenzyme Q10 (COQ10 PO) Take 100 mg by mouth.      escitalopram (LEXAPRO) 5 MG tablet Take 1 tablet (5 mg) by mouth daily 50 tablet 1    hydroCHLOROthiazide (HYDRODIURIL) 12.5 MG  "tablet Take 1 tablet (12.5 mg) by mouth daily 90 tablet 3    losartan (COZAAR) 25 MG tablet Take 1 tablet (25 mg) by mouth 2 times daily 180 tablet 3    memantine (NAMENDA) 10 MG tablet Take 1 tablet (10 mg) by mouth 2 times daily for 360 days 180 tablet 3    ORDER FOR DME Use your CPAP device as directed by your provider.      rosuvastatin (CRESTOR) 5 MG tablet Take 1 tablet (5 mg) by mouth daily 90 tablet 3    vitamin B-12 (CYANOCOBALAMIN) 1000 MCG tablet Take 1,000 mcg by mouth daily       No Known Allergies  FAMILY HISTORY NOTED AND REVIEWED    REVIEW OF SYSTEMS: above    PHYSICAL EXAM    BP (!) 150/75 (BP Location: Left arm, Patient Position: Sitting)   Pulse 61   Temp 97.5  F (36.4  C) (Temporal)   Resp 16   Ht 1.727 m (5' 8\")   Wt 70.3 kg (155 lb)   SpO2 98%   BMI 23.57 kg/m      Patient appears non toxic  Mouth - tongue midline and within normal limits, mucous membranes and posterior pharynx within normal limits, no lesions seen.  Neck - no masses, lesions or tenderness  Nodes - no supraclavicular, cervical or axially adenopathy .  Lungs - clear, normal flow  Cardiovascular - regular rate and rhythm, no murmer, rub or gallop, no jvp or edema, carotids within normal limits, no bruits.  Abdomen - normal active bowel sounds, soft, non tender, no masses, guarding or rebound, no hepatosplenomegaly      Labs sent    ASSESSMENT:  Mild depression in the setting of mild cognitive impairment.  Will try her back on Lexapro.  I doubt other causes for her fatigue  Fatigue, has been for quite some time, will check labs.  Mild cognitive impairment  Acute kidney injury, due to dehydration    PLAN:  Labs now  Add Lexapro 5 mg  Call if worsens  Follow-up by video visit in 5 weeks      Guzman Bruner M.D.                "

## 2024-03-06 NOTE — TELEPHONE ENCOUNTER
SPINE PATIENTS - NEW PROTOCOL PREVISIT    RECORDS RECEIVED FROM: Referred by Dr. Mohinder Lyles to see Dr. Banuelos   REASON FOR VISIT: Spinal stenosis in cervical region   PROVIDER: Dr. Banuelos   DATE OF APPT: 04/12/2024   NOTES (FOR ALL VISITS) STATUS DETAILS   OFFICE NOTE from referring provider Internal Referral and notes in chart   OFFICE NOTE from other specialist N/A    DISCHARGE SUMMARY from hospital N/A    DISCHARGE REPORT from ER N/A    OPERATIVE REPORT N/A    EMG REPORT N/A    MEDICATION LIST N/A    IMAGING  (FOR ALL VISITS)     MRI (HEAD, NECK, SPINE) In process  In chart MRI scheduled for 03/12/2024     MRI cervical 06/13/2022   XRAY (SPINE) *NEUROSURGERY* N/A    CT (HEAD, NECK, SPINE) N/A

## 2024-03-12 ENCOUNTER — PRE VISIT (OUTPATIENT)
Dept: NEUROSURGERY | Facility: CLINIC | Age: 84
End: 2024-03-12
Payer: COMMERCIAL

## 2024-03-12 ENCOUNTER — ANCILLARY PROCEDURE (OUTPATIENT)
Dept: MRI IMAGING | Facility: CLINIC | Age: 84
End: 2024-03-12
Attending: PSYCHIATRY & NEUROLOGY
Payer: COMMERCIAL

## 2024-03-12 DIAGNOSIS — M48.02 SPINAL STENOSIS IN CERVICAL REGION: ICD-10-CM

## 2024-03-12 PROCEDURE — 72141 MRI NECK SPINE W/O DYE: CPT

## 2024-03-14 NOTE — NURSING NOTE
"Chief Complaint   Patient presents with     ER Follow up     head injury w laceration, Concussion       Initial /56  Pulse 105  Temp 98  F (36.7  C) (Oral)  Ht 5' 8\" (1.727 m)  Wt 162 lb 6.4 oz (73.7 kg)  SpO2 95%  Breastfeeding? No  BMI 24.69 kg/m2 Estimated body mass index is 24.69 kg/(m^2) as calculated from the following:    Height as of this encounter: 5' 8\" (1.727 m).    Weight as of this encounter: 162 lb 6.4 oz (73.7 kg).  Medication Reconciliation: complete     Galen Contreras, DARLEEN     "
97

## 2024-03-21 ENCOUNTER — VIRTUAL VISIT (OUTPATIENT)
Dept: FAMILY MEDICINE | Facility: CLINIC | Age: 84
End: 2024-03-21
Payer: COMMERCIAL

## 2024-03-21 DIAGNOSIS — R53.83 OTHER FATIGUE: ICD-10-CM

## 2024-03-21 DIAGNOSIS — F32.0 MAJOR DEPRESSIVE DISORDER, SINGLE EPISODE, MILD (H): Primary | ICD-10-CM

## 2024-03-21 DIAGNOSIS — G47.33 OSA (OBSTRUCTIVE SLEEP APNEA): ICD-10-CM

## 2024-03-21 PROCEDURE — 99442 PR PHYSICIAN TELEPHONE EVALUATION 11-20 MIN: CPT | Mod: 93 | Performed by: INTERNAL MEDICINE

## 2024-03-21 RX ORDER — ESCITALOPRAM OXALATE 5 MG/1
5 TABLET ORAL DAILY
Qty: 50 TABLET | Refills: 5 | Status: SHIPPED | OUTPATIENT
Start: 2024-03-21 | End: 2024-09-12

## 2024-03-21 NOTE — PROGRESS NOTES
Jessika is a 83 year old who is being evaluated via a billable telephone visit.    What phone number would you like to be contacted at? [phone]  How would you like to obtain your AVS? Marc Valentino Location (pt. Location): Home    Distant Location (provider location):  On-site    This is a phone call with the patient and her friend/helper.  As noted and reviewed I last saw her in the office on February 16.  She has known mild cognitive impairment and at that visit she noted not feeling great with significant fatigue and some mild depression.  She has had depression in the past manifested often is fatigued.  She had been on Lexapro but was having ongoing fatigue so we tried Prozac but this was not successful and she had side effects.  At the February 16 visit I noted that she been off meds for quite some time.  She noted she was not getting out as much as she would like to.  At that visit I checked labs which were all fine including a CBC and thyroid test and I added back the Lexapro to 5 mg.  This is a follow-up to that.    The patient notes that since that office visit she is doing pretty well.  She is taking the lexapro and not having side effects.  She notes she does not have the energy she normally has.  She is walking the dog 3x a day for at least 15 minutes.  She is staying busy.  She thinks the lexapro is helping some.  She sleeps well, can also nap.  She is not using cpap, has not for 1 to 2 years.  She is not feeling down or sad.  She is not nervous or anxious.  She is socializing.  Her biggest issue is ongoing fatigue.    ASSESSMENT:  Depression, stable  [place], not using cpap  Fatigue, suspect combo of above, age, event changes, mci, etc.    I do wonder if her sleep apnea not being treated is also causing quite a bit of fatigue.  At this point we will leave the Lexapro to 5 mg and she will call if worsens but have asked her to resume the CPAP.  If this does not help we could consider higher dose  Lexapro.  She will call if she is not doing well.    Guzman Bruner M.D.  12 minutes on the day of the encounter doing chart review, history and exam, documentation and further activities as noted above.

## 2024-04-12 ENCOUNTER — OFFICE VISIT (OUTPATIENT)
Dept: NEUROSURGERY | Facility: CLINIC | Age: 84
End: 2024-04-12
Payer: COMMERCIAL

## 2024-04-12 VITALS — HEART RATE: 76 BPM | OXYGEN SATURATION: 97 % | DIASTOLIC BLOOD PRESSURE: 59 MMHG | SYSTOLIC BLOOD PRESSURE: 92 MMHG

## 2024-04-12 DIAGNOSIS — D32.0 BENIGN NEOPLASM OF CEREBRAL MENINGES (H): Primary | ICD-10-CM

## 2024-04-12 DIAGNOSIS — M48.02 SPINAL STENOSIS IN CERVICAL REGION: ICD-10-CM

## 2024-04-12 PROCEDURE — G2211 COMPLEX E/M VISIT ADD ON: HCPCS | Performed by: NEUROLOGICAL SURGERY

## 2024-04-12 PROCEDURE — 99213 OFFICE O/P EST LOW 20 MIN: CPT | Performed by: NEUROLOGICAL SURGERY

## 2024-04-12 ASSESSMENT — PAIN SCALES - GENERAL: PAINLEVEL: NO PAIN (0)

## 2024-04-12 NOTE — LETTER
"    4/12/2024         RE: Lizzette Chanel  [address] [facility] [address]        Dear Colleague,    Thank you for referring your patient, Lizzette Chanel, to the [facility] NEUROLOGY [facility]. Please see a copy of my visit note below.    It was a pleasure to see Lizzette Chanel today in Neurosurgery Clinic. She is a 83 year old female who is previously seen by me in 2022 for meningioma and cervical stenosis without myelopathy.  She has been seen by Dr. Lyles in the meantime, who is now retired.  She is here today for routine follow-up.    She denies any symptoms related to myelopathy or to her meningioma.  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    Vitals:    04/12/24 1114   BP: 92/59   Pulse: 76   SpO2: 97%     Estimated body mass index is 23.57 kg/m  as calculated from the following:    Height as of 2/16/24: 1.727 m (5' 8\").    Weight as of 2/16/24: 70.3 kg (155 lb).  No Pain (0)    Bilateral upper and lower extremity strength is 5-5 in all muscle groups except for chronic left hand weakness.  Reflexes symmetric and normal.    Imaging: She has a new MRI of the cervical spine from last month which was compared to her MRI from 2022.  These here stable without signs of myelomalacia.    Her last brain MRI is from July 2023 which was stable compared with a year prior.    The imaging was reviewed with the patient shown to the patient in clinic today.    Assessment: 1.  Cervical stenosis without myelopathy.  2.  Meningioma, stable.    Plan: I have recommended symptomatic monitoring for her cervical stenosis at this point.  Additionally I recommended a follow-up early next year with repeat MRI to reevaluate her meningioma.         Again, thank you for allowing me to participate in the care of your patient.        Sincerely,        Leif Banuelos MD  "

## 2024-04-12 NOTE — PROGRESS NOTES
"It was a pleasure to see Lizzette Chanel today in Neurosurgery Clinic. She is a 83 year old female who is previously seen by me in 2022 for meningioma and cervical stenosis without myelopathy.  She has been seen by Dr. Lyles in the meantime, who is now retired.  She is here today for routine follow-up.    She denies any symptoms related to myelopathy or to her meningioma.  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    Vitals:    04/12/24 1114   BP: 92/59   Pulse: 76   SpO2: 97%     Estimated body mass index is 23.57 kg/m  as calculated from the following:    Height as of 2/16/24: 1.727 m (5' 8\").    Weight as of 2/16/24: 70.3 kg (155 lb).  No Pain (0)    Bilateral upper and lower extremity strength is 5-5 in all muscle groups except for chronic left hand weakness.  Reflexes symmetric and normal.    Imaging: She has a new MRI of the cervical spine from last month which was compared to her MRI from 2022.  These here stable without signs of myelomalacia.    Her last brain MRI is from July 2023 which was stable compared with a year prior.    The imaging was reviewed with the patient shown to the patient in clinic today.    Assessment: 1.  Cervical stenosis without myelopathy.  2.  Meningioma, stable.    Plan: I have recommended symptomatic monitoring for her cervical stenosis at this point.  Additionally I recommended a follow-up early next year with repeat MRI to reevaluate her meningioma.     "

## 2024-04-12 NOTE — PROGRESS NOTES
89/51 74 HR    Pt states she feels okay right now, friend states she had wooziness this am. Pt can't remember if she took her medications this AM.     She lives at assisted living.

## 2024-04-12 NOTE — NURSING NOTE
"Lizzette Chanel is a 83 year old female who presents for:  Chief Complaint   Patient presents with    Consult     Image review, no pain.        Initial Vitals:  BP 92/59   Pulse 76   SpO2 97%  Estimated body mass index is 23.57 kg/m  as calculated from the following:    Height as of 2/16/24: 5' 8\" (1.727 m).    Weight as of 2/16/24: 155 lb (70.3 kg).. There is no height or weight on file to calculate BSA. BP completed using cuff size: regular  No Pain (0)        Parul Leblanc"

## 2024-04-12 NOTE — NURSING NOTE
Pt BP at 89/51 74 HR.      Pt states she feels okay right now, friend states she had wooziness this am when walking to care. Pt can't remember if she took her medications this AM. Has fuzzy memory.      She lives at assisted living. There are LPNs there to assist if needed. Updated that if pt symptomatic and low BP, should go into UC today. Pt states she would rather get checked out at home. Updated friend and pt from there if still hypotensive and symptomatic, to go in. They will also contact pts PCP to see if an adjustment of medications are needed. Pt and friend verbalized understanding. Updated pt that her TCU can always call us and we can discuss.

## 2024-04-17 ENCOUNTER — TELEPHONE (OUTPATIENT)
Dept: FAMILY MEDICINE | Facility: CLINIC | Age: 84
End: 2024-04-17
Payer: COMMERCIAL
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
## 2024-04-22 ENCOUNTER — OFFICE VISIT (OUTPATIENT)
Dept: FAMILY MEDICINE | Facility: CLINIC | Age: 84
End: 2024-04-22
Payer: COMMERCIAL

## 2024-04-22 VITALS
HEIGHT: 68 IN | RESPIRATION RATE: 16 BRPM | OXYGEN SATURATION: 96 % | TEMPERATURE: 97.5 F | WEIGHT: 163.3 LBS | SYSTOLIC BLOOD PRESSURE: 98 MMHG | BODY MASS INDEX: 24.75 KG/M2 | HEART RATE: 75 BPM | DIASTOLIC BLOOD PRESSURE: 61 MMHG

## 2024-04-22 DIAGNOSIS — Z48.02 VISIT FOR SUTURE REMOVAL: Primary | ICD-10-CM

## 2024-04-22 PROCEDURE — 99213 OFFICE O/P EST LOW 20 MIN: CPT | Performed by: NURSE PRACTITIONER

## 2024-04-22 RX ORDER — RESPIRATORY SYNCYTIAL VIRUS VACCINE 120MCG/0.5
0.5 KIT INTRAMUSCULAR ONCE
Qty: 1 EACH | Refills: 0 | Status: CANCELLED | OUTPATIENT
Start: 2024-04-22 | End: 2024-04-22

## 2024-04-22 ASSESSMENT — PATIENT HEALTH QUESTIONNAIRE - PHQ9
SUM OF ALL RESPONSES TO PHQ QUESTIONS 1-9: 1
10. IF YOU CHECKED OFF ANY PROBLEMS, HOW DIFFICULT HAVE THESE PROBLEMS MADE IT FOR YOU TO DO YOUR WORK, TAKE CARE OF THINGS AT HOME, OR GET ALONG WITH OTHER PEOPLE: NOT DIFFICULT AT ALL
SUM OF ALL RESPONSES TO PHQ QUESTIONS 1-9: 1

## 2024-04-22 ASSESSMENT — PAIN SCALES - GENERAL: PAINLEVEL: NO PAIN (0)
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
## 2024-06-06 ENCOUNTER — TRANSFERRED RECORDS (OUTPATIENT)
Dept: HEALTH INFORMATION MANAGEMENT | Facility: CLINIC | Age: 84
End: 2024-06-06
Payer: COMMERCIAL

## 2024-07-01 NOTE — PROGRESS NOTES
06/20/23 0500   Appointment Info   Signing clinician's name / credentials Jamie Reis DPT   Visits Used 1/10 [employer]   Medical Diagnosis Left Knee Osteoarthritis, Vestibular Physical Therapy   PT Tx Diagnosis Balance and Gait Impairment   Precautions/Limitations fall precautions   Quick Adds Certification   Progress Note/Certification   Start of Care Date 06/20/23   Onset of illness/injury or Date of Surgery 05/15/23  (date of order used, onset vague/chronic over past couple years)   Therapy Frequency 1x/wk reducing to e/o wk   Predicted Duration 90 days   Certification date from 06/20/23   Certification date to 09/18/23   Progress Note Due Date 09/18/23   PT Goal 1   Goal Identifier 5xSTS   Goal Description Patient will demonstrate ability to perform 5xSTS without UE support in < 40'' indicating significant improvment in strength, mobility status, and reduced risk for falls.   Rationale to maximize safety and independence with performance of ADLs and functional tasks   Goal Progress Baseline 76''   Target Date 09/18/23   PT Goal 2   Goal Identifier Gait Speed   Goal Description Patient will demonstrate self selected gait speed >0.9 m/sec indicating significant improvement in mobility status, reduced fall risk, and improved safety/tolerance navigating community environments.   Rationale to maximize safety and independence within the community   Goal Progress Baseline 0.80 m/sec - no AD   Target Date 09/18/23   PT Goal 3   Goal Identifier DGI   Goal Description Patient will demonstrate score of 19/24 or greater on the DGI indicating improvement in dynamic gait stability and reduced fall risk.   Rationale to maximize safety and independence with performance of ADLs and functional tasks;to maximize safety and independence within the home;to maximize safety and independence within the community   Goal Progress Baseline 4-item DGI score 8/12   Target Date 09/18/23   PT Goal 4   Goal Identifier HEP    Goal Description Patient will demonstrate independent understanding and carryover of HEP for longterm management of condition.   Rationale to maximize safety and independence with performance of ADLs and functional tasks;to maximize safety and independence within the home;to maximize safety and independence within the community;to maximize safety and independence with self cares   Target Date 09/18/23   Subjective Report   Subjective Report See Eval   Therapeutic Procedure/Exercise   Therapeutic Procedures: strength, endurance, ROM, flexibility minutes (78884) 10   Ther Proc 1 STS   Ther Proc 1 - Details Instructed pt in unsupported repeated STS from slightly elevated plinth/bed surface, hands out in front of body x 10 reps today.   Skilled Intervention cues/demo, ed on safe HEP carryover   Patient Response/Progress tolerating well, working toward all goals with progressive POC/HEP.  Addressing balance deficits, strength/deconditioning, and safe gait activity.   Ther Proc 2 Ankle Strengthening   Ther Proc 2 - Details Instructed pt in standing B heel/toe raises for improved strength, stability, and ankle strategy response.   Gait Training   Gait 1 AD   Gait 1 - Details Advised pt to use SEC with outdoor ambulation to reduce fall risk - will practice more with SEC vs trekking pole next session   Skilled Intervention education on AD/fall risk   Patient Response/Progress addressing goals 2,3,4   Eval/Assessments   PT Eval, Low Complexity Minutes (78063) 66   Education   Learner/Method Patient;Demonstration;Pictures/Video;Listening   Education Comments Eval findings, POC, HEP, fall risk, AD recommendations   Plan   Home program daily walking, SEC, STS, heel/toe raises   Plan for next session review HEP if needed, progress strength/balance: stepping drills, SI training, wt shifting, training with SEC vs trekking pole, foam, hip ABD strengthening, perturbation training   Total Session Time   Timed Code Treatment Minutes  10   Total Treatment Time (sum of timed and untimed services) 45         DISCHARGE  Reason for Discharge: Patient has failed to schedule further appointments.  Evaluation and treatment completed 6/20/23 to address balance condition.  POC established, although patient did not schedule/attend any future appts.  All goals note met - discharge.    Equipment Issued: none    Discharge Plan: Patient to continue home program.    Referring Provider:  Guzman Bruner

## 2024-07-31 ENCOUNTER — NURSE TRIAGE (OUTPATIENT)
Dept: FAMILY MEDICINE | Facility: CLINIC | Age: 84
End: 2024-07-31
Payer: COMMERCIAL

## 2024-07-31 NOTE — TELEPHONE ENCOUNTER
"Janis, patient's friend (on c2c), contacts clinic expressing concern regarding laceration of hand from 7/13. Patient was seen at [facility] ED on 7/13/24 and received wound care and x-ray.    Janis states that there are small bumps that \"look like blisters\" at the site of her wound and requests that a provider assess the affected site.    Patient states that hand feels \"uncomfortable\", but denies pain.    Patient denies pain, drainage, redness, warmth    Patient scheduled for OV with available provider per triage protocol.    Reason for Disposition   Patient wants to be seen    Additional Information   Negative: Major bleeding (e.g., actively dripping or spurting) and can't be stopped   Negative: Amputation   Negative: Shock suspected (e.g., cold/pale/clammy skin, too weak to stand, low BP, rapid pulse)   Negative: Knife wound (or other possibly deep cut) and to chest, abdomen, back, neck, or head   Negative: Self-injury (e.g., cutting, self-harm) and suicidal or out-of-control   Negative: Sounds like a life-threatening emergency to the triager   Negative: Animal bite and broken skin   Negative: Human bite and broken skin   Negative: Puncture wound   Negative: Bleeding and won't stop after 10 minutes of direct pressure (using correct technique)   Negative: Skin is split open or gaping (or length > 1/2 inch or 12 mm on the skin, 1/4 inch or 6 mm on the face)   Negative: Deep cut and can see bone or tendons   Negative: Cut over knuckle (MCP joint)   Negative: Sensation of something in the wound (i.e., retained object in wound)   Negative: Dirt in the wound and not removed with 15 minutes of scrubbing   Negative: Wound causes numbness (i.e., loss of sensation)   Negative: Wound causes weakness (i.e., decreased ability to move hand, finger, toe)   Negative: SEVERE pain and not improved 2 hours after pain medicine   Negative: Looks infected and large red area (> 2 inches or 5 cm) or streak   Negative: Fever and " "spreading red area or streak   Negative: Suspicious history for the injury   Negative: HIV positive or severe immunodeficiency (severely weak immune system) and DIRTY cut   Negative: No prior tetanus shots (or is not fully vaccinated)   Negative: Looks infected (spreading redness, pus) and no fever    Answer Assessment - Initial Assessment Questions  1. APPEARANCE of INJURY: \"What does the injury look like?\"       Swelling present; denies new redness          2. SIZE: \"How large is the cut?\"       *No Answer*  3. BLEEDING: \"Is it bleeding now?\" If Yes, ask: \"Is it difficult to stop?\"       *No Answer*  4. LOCATION: \"Where is the injury located?\"       *No Answer*  5. ONSET: \"How long ago did the injury occur?\"       *No Answer*  6. MECHANISM: \"Tell me how it happened.\"       *No Answer*  7. TETANUS: \"When was the last tetanus booster?\"      *No Answer*    Protocols used: Cuts and [id number]    "

## 2024-08-02 ENCOUNTER — OFFICE VISIT (OUTPATIENT)
Dept: FAMILY MEDICINE | Facility: CLINIC | Age: 84
End: 2024-08-02
Payer: COMMERCIAL

## 2024-08-02 VITALS
OXYGEN SATURATION: 94 % | DIASTOLIC BLOOD PRESSURE: 71 MMHG | RESPIRATION RATE: 16 BRPM | SYSTOLIC BLOOD PRESSURE: 123 MMHG | HEIGHT: 67 IN | WEIGHT: 157.8 LBS | BODY MASS INDEX: 24.77 KG/M2 | HEART RATE: 72 BPM

## 2024-08-02 DIAGNOSIS — T14.8XXD WOUND HEALING, DELAYED: Primary | ICD-10-CM

## 2024-08-02 PROCEDURE — 99213 OFFICE O/P EST LOW 20 MIN: CPT | Performed by: PHYSICIAN ASSISTANT

## 2024-08-02 ASSESSMENT — PAIN SCALES - GENERAL: PAINLEVEL: MODERATE PAIN (4)

## 2024-08-02 NOTE — PROGRESS NOTES
Assessment and Plan:     (T14.8XXD) Wound healing, delayed  (primary encounter diagnosis)  Comment: sustained 3 lacerations to dorsum of left hand on 7/14/24 while at cabin, wounds are healing slowly but healing seemed to plateau a few days ago, she has been applying bacitracin and non-stick dressing daily, no e/o infection on exam  Plan: Wound Care Referral        Stop bacitracin  Clean wound gently every am and pat dry  Apply thin layer aquaphor and adaptic daily with ace  Open to air a couple hours per day  Cover at bedtime  Monitor for signs of infection, discussed today, reviewed when to be seen promptly   To wound if does not heal with above, referral placed today    [facility] Same Day Provider         Subjective   Jessika is a 83 year old, presenting for the following health issues:  Wound Check (7/31/2024- 3 cuts on left hand, the wounds are not healing properly.)    History of Present Illness       Reason for visit:  Check on hand wound  Symptom onset:  3-7 days ago  Symptom intensity:  Mild  Symptom progression:  Staying the same  Had these symptoms before:  No  What makes it worse:  Swelling in hand    She eats 0-1 servings of fruits and vegetables daily.She consumes 1 sweetened beverage(s) daily.She exercises with enough effort to increase her heart rate 10 to 19 minutes per day.  She exercises with enough effort to increase her heart rate 3 or less days per week. She is missing 1 dose(s) of medications per week.  She is not taking prescribed medications regularly due to remembering to take.     Jessika sustained 3 laceration/skin tears on dorsal aspect of left hand while at her cabin two weeks ago  She cut the hand on a piece of metal  Her tetanus is UTD    Since then the wound has started to heal but seemed to plateau in recent days  She is here with her friend who is a retired RN  They have been applying bacitracin and non-stick dressing daily    She denies increased pain/swelling,  "purulent drainage or increased erythema        Objective    /71   Pulse 72   Resp 16   Ht 1.697 m (5' 6.81\")   Wt 71.6 kg (157 lb 12.8 oz)   SpO2 94%   BMI 24.86 kg/m    Body mass index is 24.86 kg/m .    Physical Exam   GENERAL: healthy, alert and no distress, very pleasant  SKIN: dorsum left hand with three healing wounds with granulation tissue, very moist, no purulence, minimal swelling, minimal tenderness, CR<2s moves all fingers without restriction           Signed Electronically by: Betty Olivier PA-C    "

## 2024-08-02 NOTE — PATIENT INSTRUCTIONS
Clean area daily     Apply aquaphor daily    Keep dressed during the day    Cover at night    Open to air a few hours per day

## 2024-08-05 ENCOUNTER — TELEPHONE (OUTPATIENT)
Dept: WOUND CARE | Facility: CLINIC | Age: 84
End: 2024-08-05
Payer: COMMERCIAL

## 2024-08-05 NOTE — TELEPHONE ENCOUNTER
Consult received via Workqueue from     Betty Rai PA-C in  [facility]/IM    for wound of the hand.    Does the patient have an open and draining wound? Yes    Please schedule with Mercedes Her NP, Isaac Agarwal M.D., Rian Land, BRIGETTE, or Nba Busby M.D. at [facility] [place] for next available appointment.    **For all providers, Lazara Lyons PA-C, Dr. Murray, Mercedes Her NP or Dr. Busby, please schedule a follow up 4 weeks after initial appointment.**  --If unable to schedule within 2-3 weeks then please place on cancellation list--    Is the patient able to make their own medical decisions? Yes    Can the patient be scheduled on a Wednesday (Qi) or Thursday ([place])? Yes    Is patient a RADHA lift? PLEASE INQUIRE WHEN MAKING THE APPOINTMENT AND PUT IN APPOINTMENT NOTES    Routing to  Wound Healing Scheduling.

## 2024-08-14 DIAGNOSIS — E78.5 HYPERLIPIDEMIA LDL GOAL <130: ICD-10-CM

## 2024-08-14 RX ORDER — ROSUVASTATIN CALCIUM 5 MG/1
5 TABLET, COATED ORAL DAILY
Qty: 90 TABLET | Refills: 3 | Status: SHIPPED | OUTPATIENT
Start: 2024-08-14 | End: 2024-09-12

## 2024-09-12 ENCOUNTER — OFFICE VISIT (OUTPATIENT)
Dept: FAMILY MEDICINE | Facility: CLINIC | Age: 84
End: 2024-09-12
Payer: COMMERCIAL

## 2024-09-12 VITALS
TEMPERATURE: 97.9 F | RESPIRATION RATE: 16 BRPM | SYSTOLIC BLOOD PRESSURE: 104 MMHG | HEIGHT: 66 IN | WEIGHT: 161 LBS | HEART RATE: 69 BPM | BODY MASS INDEX: 25.88 KG/M2 | DIASTOLIC BLOOD PRESSURE: 61 MMHG | OXYGEN SATURATION: 96 %

## 2024-09-12 DIAGNOSIS — G60.9 IDIOPATHIC PERIPHERAL NEUROPATHY: ICD-10-CM

## 2024-09-12 DIAGNOSIS — M81.0 OSTEOPOROSIS OF LUMBAR SPINE: ICD-10-CM

## 2024-09-12 DIAGNOSIS — E78.5 HYPERLIPIDEMIA LDL GOAL <130: ICD-10-CM

## 2024-09-12 DIAGNOSIS — C54.1 ADENOCARCINOMA OF ENDOMETRIUM (H): ICD-10-CM

## 2024-09-12 DIAGNOSIS — D32.0 MENINGIOMA, CEREBRAL (H): ICD-10-CM

## 2024-09-12 DIAGNOSIS — G31.84 MCI (MILD COGNITIVE IMPAIRMENT) WITH MEMORY LOSS: ICD-10-CM

## 2024-09-12 DIAGNOSIS — Z00.00 ENCOUNTER FOR MEDICARE ANNUAL WELLNESS EXAM: Primary | ICD-10-CM

## 2024-09-12 DIAGNOSIS — I10 ESSENTIAL HYPERTENSION: ICD-10-CM

## 2024-09-12 DIAGNOSIS — N18.30 STAGE 3 CHRONIC KIDNEY DISEASE, UNSPECIFIED WHETHER STAGE 3A OR 3B CKD (H): ICD-10-CM

## 2024-09-12 DIAGNOSIS — F32.0 MAJOR DEPRESSIVE DISORDER, SINGLE EPISODE, MILD (H): ICD-10-CM

## 2024-09-12 DIAGNOSIS — G47.33 OSA (OBSTRUCTIVE SLEEP APNEA): ICD-10-CM

## 2024-09-12 PROBLEM — G14 POST-POLIO SYNDROME (H): Status: RESOLVED | Noted: 2018-01-18 | Resolved: 2024-09-12

## 2024-09-12 LAB
ALBUMIN SERPL BCG-MCNC: 4.1 G/DL (ref 3.5–5.2)
ALP SERPL-CCNC: 79 U/L (ref 40–150)
ALT SERPL W P-5'-P-CCNC: 22 U/L (ref 0–50)
ANION GAP SERPL CALCULATED.3IONS-SCNC: 13 MMOL/L (ref 7–15)
AST SERPL W P-5'-P-CCNC: 35 U/L (ref 0–45)
BILIRUB SERPL-MCNC: 0.3 MG/DL
BUN SERPL-MCNC: 19.6 MG/DL (ref 8–23)
CALCIUM SERPL-MCNC: 9.4 MG/DL (ref 8.8–10.4)
CHLORIDE SERPL-SCNC: 104 MMOL/L (ref 98–107)
CHOLEST SERPL-MCNC: 153 MG/DL
CREAT SERPL-MCNC: 1.02 MG/DL (ref 0.51–0.95)
EGFRCR SERPLBLD CKD-EPI 2021: 54 ML/MIN/1.73M2
ERYTHROCYTE [DISTWIDTH] IN BLOOD BY AUTOMATED COUNT: 13.7 % (ref 10–15)
FASTING STATUS PATIENT QL REPORTED: NO
FASTING STATUS PATIENT QL REPORTED: NO
GLUCOSE SERPL-MCNC: 102 MG/DL (ref 70–99)
HCO3 SERPL-SCNC: 26 MMOL/L (ref 22–29)
HCT VFR BLD AUTO: 39.1 % (ref 35–47)
HDLC SERPL-MCNC: 63 MG/DL
HGB BLD-MCNC: 12.2 G/DL (ref 11.7–15.7)
LDLC SERPL CALC-MCNC: 67 MG/DL
MCH RBC QN AUTO: 29 PG (ref 26.5–33)
MCHC RBC AUTO-ENTMCNC: 31.2 G/DL (ref 31.5–36.5)
MCV RBC AUTO: 93 FL (ref 78–100)
NONHDLC SERPL-MCNC: 90 MG/DL
PLATELET # BLD AUTO: 236 10E3/UL (ref 150–450)
POTASSIUM SERPL-SCNC: 4.2 MMOL/L (ref 3.4–5.3)
PROT SERPL-MCNC: 6.9 G/DL (ref 6.4–8.3)
RBC # BLD AUTO: 4.2 10E6/UL (ref 3.8–5.2)
SODIUM SERPL-SCNC: 143 MMOL/L (ref 135–145)
TRIGL SERPL-MCNC: 117 MG/DL
WBC # BLD AUTO: 5.7 10E3/UL (ref 4–11)

## 2024-09-12 PROCEDURE — 85027 COMPLETE CBC AUTOMATED: CPT | Performed by: INTERNAL MEDICINE

## 2024-09-12 PROCEDURE — 36415 COLL VENOUS BLD VENIPUNCTURE: CPT | Performed by: INTERNAL MEDICINE

## 2024-09-12 PROCEDURE — G0439 PPPS, SUBSEQ VISIT: HCPCS | Performed by: INTERNAL MEDICINE

## 2024-09-12 PROCEDURE — 80061 LIPID PANEL: CPT | Performed by: INTERNAL MEDICINE

## 2024-09-12 PROCEDURE — 99214 OFFICE O/P EST MOD 30 MIN: CPT | Mod: 25 | Performed by: INTERNAL MEDICINE

## 2024-09-12 PROCEDURE — 80053 COMPREHEN METABOLIC PANEL: CPT | Performed by: INTERNAL MEDICINE

## 2024-09-12 RX ORDER — MEMANTINE HYDROCHLORIDE 10 MG/1
10 TABLET ORAL 2 TIMES DAILY
Qty: 180 TABLET | Refills: 3 | Status: SHIPPED | OUTPATIENT
Start: 2024-09-12

## 2024-09-12 RX ORDER — ESCITALOPRAM OXALATE 5 MG/1
5 TABLET ORAL DAILY
Qty: 90 TABLET | Refills: 3 | Status: SHIPPED | OUTPATIENT
Start: 2024-09-12

## 2024-09-12 RX ORDER — ROSUVASTATIN CALCIUM 5 MG/1
5 TABLET, COATED ORAL DAILY
Qty: 90 TABLET | Refills: 3 | Status: SHIPPED | OUTPATIENT
Start: 2024-09-12

## 2024-09-12 RX ORDER — HYDROCHLOROTHIAZIDE 12.5 MG/1
12.5 TABLET ORAL DAILY
Qty: 90 TABLET | Refills: 3 | Status: SHIPPED | OUTPATIENT
Start: 2024-09-12 | End: 2024-09-12

## 2024-09-12 ASSESSMENT — PAIN SCALES - GENERAL: PAINLEVEL: NO PAIN (0)

## 2024-09-12 NOTE — PATIENT INSTRUCTIONS
I would get the covid and flu shot at your pharmacy now, and the rsv shot in about 1 to 2 months.    Stop the hydrochlorothiazide.  Monitor your blood pressure and let me know if it is staying over 150/90Patient Education   Preventive Care Advice   This is general advice given by our system to help you stay healthy. However, your care team may have specific advice just for you. Please talk to your care team about your preventive care needs.  Nutrition  Eat 5 or more servings of fruits and vegetables each day.  Try wheat bread, brown rice and whole grain pasta (instead of white bread, rice, and pasta).  Get enough calcium and vitamin D. Check the label on foods and aim for 100% of the RDA (recommended daily allowance).  Lifestyle  Exercise at least 150 minutes each week  (30 minutes a day, 5 days a week).  Do muscle strengthening activities 2 days a week. These help control your weight and prevent disease.  No smoking.  Wear sunscreen to prevent skin cancer.  Have a dental exam and cleaning every 6 months.  Yearly exams  See your health care team every year to talk about:  Any changes in your health.  Any medicines your care team has prescribed.  Preventive care, family planning, and ways to prevent chronic diseases.  Shots (vaccines)   HPV shots (up to age 26), if you've never had them before.  Hepatitis B shots (up to age 59), if you've never had them before.  COVID-19 shot: Get this shot when it's due.  Flu shot: Get a flu shot every year.  Tetanus shot: Get a tetanus shot every 10 years.  Pneumococcal, hepatitis A, and RSV shots: Ask your care team if you need these based on your risk.  Shingles shot (for age 50 and up)  General health tests  Diabetes screening:  Starting at age 35, Get screened for diabetes at least every 3 years.  If you are younger than age 35, ask your care team if you should be screened for diabetes.  Cholesterol test: At age 39, start having a cholesterol test every 5 years, or more often  if advised.  Bone density scan (DEXA): At age 50, ask your care team if you should have this scan for osteoporosis (brittle bones).  Hepatitis C: Get tested at least once in your life.  STIs (sexually transmitted infections)  Before age 24: Ask your care team if you should be screened for STIs.  After age 24: Get screened for STIs if you're at risk. You are at risk for STIs (including HIV) if:  You are sexually active with more than one person.  You don't use condoms every time.  You or a partner was diagnosed with a sexually transmitted infection.  If you are at risk for HIV, ask about PrEP medicine to prevent HIV.  Get tested for HIV at least once in your life, whether you are at risk for HIV or not.  Cancer screening tests  Cervical cancer screening: If you have a cervix, begin getting regular cervical cancer screening tests starting at age 21.  Breast cancer scan (mammogram): If you've ever had breasts, begin having regular mammograms starting at age 40. This is a scan to check for breast cancer.  Colon cancer screening: It is important to start screening for colon cancer at age 45.  Have a colonoscopy test every 10 years (or more often if you're at risk) Or, ask your provider about stool tests like a FIT test every year or Cologuard test every 3 years.  To learn more about your testing options, visit:   .  For help making a decision, visit:   https://bit.ly/gw94145.  Prostate cancer screening test: If you have a prostate, ask your care team if a prostate cancer screening test (PSA) at age 55 is right for you.  Lung cancer screening: If you are a current or former smoker ages 50 to 80, ask your care team if ongoing lung cancer screenings are right for you.  For informational purposes only. Not to replace the advice of your health care provider. Copyright   2023 [employer]. All rights reserved. Clinically reviewed by the [facility]itions [facility]. MovieLine 092550 - REV 01/24.  Learning  About Sleeping Well  What does sleeping well mean?     Sleeping well means getting enough sleep to feel good and stay healthy. How much sleep is enough varies among people.  The number of hours you sleep and how you feel when you wake up are both important. If you do not feel refreshed, you probably need more sleep. Another sign of not getting enough sleep is feeling tired during the day.  Experts recommend that adults get at least 7 or more hours of sleep per day. Children and older adults need more sleep.  Why is getting enough sleep important?  Getting enough quality sleep is a basic part of good health. When your sleep suffers, your physical health, mood, and your thoughts can suffer too. You may find yourself feeling more grumpy or stressed. Not getting enough sleep also can lead to serious problems, including injury, accidents, anxiety, and depression.  What might cause poor sleeping?  Many things can cause sleep problems, including:  Changes to your sleep schedule.  Stress. Stress can be caused by fear about a single event, such as giving a speech. Or you may have ongoing stress, such as worry about work or school.  Depression, anxiety, and other mental or emotional conditions.  Changes in your sleep habits or surroundings. This includes changes that happen where you sleep, such as noise, light, or sleeping in a different bed. It also includes changes in your sleep pattern, such as having jet lag or working a late shift.  Health problems, such as pain, breathing problems, and restless legs syndrome.  Lack of regular exercise.  Using alcohol, nicotine, or caffeine before bed.  How can you help yourself?  Here are some tips that may help you sleep more soundly and wake up feeling more refreshed.  Your sleeping area   Use your bedroom only for sleeping and sex. A bit of light reading may help you fall asleep. But if it doesn't, do your reading elsewhere in the house. Try not to use your TV, computer, smartphone,  "or tablet while you are in bed.  Be sure your bed is big enough to stretch out comfortably, especially if you have a sleep partner.  Keep your bedroom quiet, dark, and cool. Use curtains, blinds, or a sleep mask to block out light. To block out noise, use earplugs, soothing music, or a \"white noise\" machine.  Your evening and bedtime routine   Create a relaxing bedtime routine. You might want to take a warm shower or bath, or listen to soothing music.  Go to bed at the same time every night. And get up at the same time every morning, even if you feel tired.  What to avoid   Limit caffeine (coffee, tea, caffeinated sodas) during the day, and don't have any for at least 6 hours before bedtime.  Avoid drinking alcohol before bedtime. Alcohol can cause you to wake up more often during the night.  Try not to smoke or use tobacco, especially in the evening. Nicotine can keep you awake.  Limit naps during the day, especially close to bedtime.  Avoid lying in bed awake for too long. If you can't fall asleep or if you wake up in the middle of the night and can't get back to sleep within about 20 minutes, get out of bed and go to another room until you feel sleepy.  Avoid taking medicine right before bed that may keep you awake or make you feel hyper or energized. Your doctor can tell you if your medicine may do this and if you can take it earlier in the day.  If you can't sleep   Imagine yourself in a peaceful, pleasant scene. Focus on the details and feelings of being in a place that is relaxing.  Get up and do a quiet or boring activity until you feel sleepy.  Avoid drinking any liquids before going to bed to help prevent waking up often to use the bathroom.  Where can you learn more?  Go to https://www.[facility].net/patiented  Enter J942 in the search box to learn more about \"Learning About Sleeping Well.\"  Current as of: July 10, 2023  Content Version: 14.1    5044-4331 [employer].   Care instructions " adapted under license by your healthcare professional. If you have questions about a medical condition or this instruction, always ask your healthcare professional. [employer] disclaims any warranty or liability for your use of this information.

## 2024-09-12 NOTE — RESULT ENCOUNTER NOTE
It was a pleasure seeing you for your physical examination.  I wanted to get back to you with your test results. You should be able to view them.    I am happy to report that your cbc or complete blood count is normal with no signs of anemia, leukemia or platelet abnormalities. Your chemistry panel shows no diabetes.  Your blood salts, kidney tests, liver tests, and proteins are all fine.    Your total cholesterol is 153 with the normal range being below 200.  Your HDL or good cholesterol is 63 with the normal range being above 50.  Your LDL or bad cholesterol is 67 with the normal range being below 130.  These numbers are very good.    I am happy to bring you this overall excellent report.  If you have any questions please call me.    Guzman Bruner M.D.

## 2024-09-12 NOTE — PROGRESS NOTES
Preventive Care Visit  [facility] GUY Bruner MD, Internal Medicine  Sep 12, 2024          Megan Rosen is a 83 year old, presenting for the following:    The patient presents with her friend.  She is really doing quite well.  She does exercise.  She has ongoing fatigue which is not new.  No other issues.  Her depression is doing well.  She is not falling.               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    had vaginal bleeding, surgery done 2021    Arthritis mild in knees, shoulders    Cancer (H) skin--Basil Cell, [place]    Chest pain 2003    neg est thallium    Chest pain 04/2016    nl est echo and cxr    CKD (chronic kidney disease) stage 3, GFR 30-59 ml/min (H)     Complication of anesthesia     BP maddie to over 200 in PACU 1 month ago-after D&C    Essential hypertension 07/2021    Fatigue 08/25/2021    Hx of colonoscopy 2003    incomplete, be nl    Hx of colonoscopy 06/03/2011    nl    Hypercholesteremia 2011    aches with zocor    Major depressive disorder, single episode, mild (H24) 04/07/2023    MCI (mild cognitive impairment) with memory loss 2022    namenda added by Dr. Lyles 10/22, did worse with prozac, stopped lexapro on her own 2023    CAITLYN (obstructive sleep apnea) 12/2012    done [employer], using dental device, added cpap 2014     Osteopenia 2013    Dr. Taylor    Osteoporosis of lumbar spine 07/2020    added fosamax, she went off it 2023    Peripheral neuropathy     Dr. 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    SOB (shortness of breath) 08/2021    elevated d dimer, ct no pe but signs of chf, then echo nl 9/21    Statin intolerance     zocor and one other caused aches    Subdural hematoma (H) 01/2016    traumatic, fu ct 3/16 resolved    Uncomplicated asthma               Past Surgical History:      Past Surgical History:   Procedure Laterality Date    ABDOMEN SURGERY  1960    APPENDECTOMY  60's    BIOPSY  2010    inside upper lip    DAVINCI HYSTERECTOMY TOTAL, BILATERAL SALPINGO-OOPHORECTOMY, NODE DISSECTION, COMBINED Bilateral 7/8/2021    Procedure: ROBOTIC ASSISTED TOTAL LAPAROSCOPIC HYSTERECTOMY, BILATERAL SALPINGO-OOPHORECTOMY, WASHINGS, INTRA-OPERATIVE SENTINEL LYMPH NODE MAPPING, BILATERAL PELVIC AND PARA-AORTIC LYMPHADENECTOMY, REPAIR VAGINAL TEAR;  Surgeon: Dianne Garland MD;  Location:  OR    DILATION AND CURETTAGE, HYSTEROSCOPY WITH ULTRASOUND GUIDANCE N/A 5/11/2021    Procedure: HYSTEROSCOPY UNDER ULTRASOUND GUIDANCE, DILATION AND CURETTAGE OF UTERUS;  Surgeon: Isabel Ferro MD;  Location:  OR    OPERATIVE HYSTEROSCOPY WITH MORCELLATOR N/A 5/11/2021    Procedure: MYOSURE MORCELLATOR;  Surgeon: Isabel Ferro MD;  Location:  OR    PHACOEMULSIFICATION CLEAR CORNEA WITH STANDARD INTRAOCULAR LENS IMPLANT  3/21/2012    Procedure:PHACOEMULSIFICATION CLEAR CORNEA WITH STANDARD INTRAOCULAR LENS IMPLANT; RIGHT PHACOEMULSIFICATION CLEAR CORNEA WITH STANDARD INTRAOCULAR LENS IMPLANT ; Surgeon:CHANDRAKANT HERNANDEZ; Location: [facility]    PHACOEMULSIFICATION CLEAR CORNEA WITH STANDARD INTRAOCULAR LENS IMPLANT  3/28/2012    Procedure:PHACOEMULSIFICATION CLEAR CORNEA WITH STANDARD INTRAOCULAR LENS IMPLANT; LEFT PHACOEMULSIFICATION CLEAR CORNEA WITH STANDARD INTRAOCULAR LENS IMPLANT ; Surgeon:CHANDRAKANT HERNANDEZ; Location: [facility]    SHOULDER SURGERY  1990's    twice    SOFT TISSUE SURGERY  R&L Rotator cuff repairs    TONSILLECTOMY  child             Social History:     Social History     Socioeconomic History    Marital status: Single     Spouse name: Not on file    Number of children: 0    Years of education: Not on file    Highest education level: Not on file   Occupational History    Occupation: physical therapy     Employer: RETIRED   Tobacco Use     Smoking status: Former     Current packs/day: 0.00     Average packs/day: 1.5 packs/day for 12.0 years (18.0 ttl pk-yrs)     Types: Cigarettes     Start date: 1966     Quit date: 3/17/1974     Years since quittin.5    Smokeless tobacco: Never    Tobacco comments:     enjoyed it!   Vaping Use    Vaping status: Never Used   Substance and Sexual Activity    Alcohol use: Yes     Comment: Minimal    Drug use: No    Sexual activity: Not Currently     Partners: Male     Birth control/protection: None   Other Topics Concern    Parent/sibling w/ CABG, MI or angioplasty before 65F 55M? Yes     Comment: Brother  of heart attack at 49 yrs old   Social History Narrative    Single, no kids    Has a cabin    Has godchildren that can help out     Social Determinants of Health     Financial Resource Strain: Low Risk  (2024)    Financial Resource Strain     Within the past 12 months, have you or your family members you live with been unable to get utilities (heat, electricity) when it was really needed?: No   Food Insecurity: Low Risk  (2024)    Food Insecurity     Within the past 12 months, did you worry that your food would run out before you got money to buy more?: No     Within the past 12 months, did the food you bought just not last and you didn t have money to get more?: No   Transportation Needs: Low Risk  (2024)    Transportation Needs     Within the past 12 months, has lack of transportation kept you from medical appointments, getting your medicines, non-medical meetings or appointments, work, or from getting things that you need?: No   Physical Activity: Sufficiently Active (2024)    Exercise Vital Sign     Days of Exercise per Week: 7 days     Minutes of Exercise per Session: 30 min   Stress: No Stress Concern Present (2024)    [employer] [place] [employer] - Occupational Stress Questionnaire     Feeling of Stress : Not at all   Social Connections: Unknown (2024)     "Social Connection and Isolation Panel [NHANES]     Frequency of Communication with Friends and Family: Not on file     Frequency of Social Gatherings with Friends and Family: Three times a week     Attends Spiritism Services: Not on file     Active Member of Clubs or Organizations: Not on file     Attends Club or Organization Meetings: Not on file     Marital Status: Not on file   Interpersonal Safety: Not At Risk (7/13/2024)    Received [facility] and [facility]     IP Custom IPV     Do you feel UNSAFE in any of your personal relationships with your family members or any other acquaintances?: No   Housing Stability: Low Risk  (9/12/2024)    Housing Stability     Do you have housing? : Yes     Are you worried about losing your housing?: No             Family History:   reviewed         Allergies:   No Known Allergies          Medications:     Current Outpatient Medications   Medication Sig Dispense Refill    Calcium Carbonate-Vitamin D (CALCIUM-D PO) Take by mouth daily Liquid  Calcium 1200 mg  & Vit D 800 iU      Coenzyme Q10 (COQ10 PO) Take 100 mg by mouth.      escitalopram (LEXAPRO) 5 MG tablet Take 1 tablet (5 mg) by mouth daily. 90 tablet 3    memantine (NAMENDA) 10 MG tablet Take 1 tablet (10 mg) by mouth 2 times daily. 180 tablet 3    ORDER FOR DME Use your CPAP device as directed by your provider.      rosuvastatin (CRESTOR) 5 MG tablet Take 1 tablet (5 mg) by mouth daily. 90 tablet 3    vitamin B-12 (CYANOCOBALAMIN) 1000 MCG tablet Take 1,000 mcg by mouth daily                 Review of Systems:     The 10 point Review of Systems is negative other than noted in the HPI           Physical Exam:   Blood pressure 104/61, pulse 69, temperature 97.9  F (36.6  C), temperature source Temporal, resp. rate 16, height 1.67 m (5' 5.75\"), weight 73 kg (161 lb), SpO2 96%, not currently breastfeeding.    Exam:  Constitutional: healthy appearing, alert and in no distress  Heent: Normocephalic. " Head without obvious masses or lesions. PERRLDC, EOMI. Mouth exam within normal limits: tongue, mucous membranes, posterior pharynx all normal, no lesions or abnormalities seen.  Tm's and canals within normal limits bilaterally. Neck supple, no nuchal rigidity or masses. No supraclavicular, or cervical adenopathy. Thyroid symmetric, no masses.  Cardiovascular: Regular rate and rhythm, no murmer, rub or gallops.  JVP not elevated, no edema.  Carotids within normal limits bilaterally, no bruits.  Respiratory: Normal respiratory effort.  Lungs clear, normal flow, no wheezing or crackles.  Gastrointestinal: Normal active bowel sounds.   Soft, not tender, no masses, guarding or rebound.  No hepatosplenomegaly.   Musculoskeletal: extremities normal, no gross deformities noted.  Skin: no suspicious lesions or rashes   Neurologic: Mental status within normal limits.  Speech fluent.  No gross motor abnormalities and gait intact.  Psychiatric: mentation appears normal and affect normal.         Data:   Labs sent        Assessment:   Normal complete physical exam  CKD, follow-up labs  Hypertension, blood pressure on lower side, given this and the risk of falls we will stop the hydrochlorothiazide.  They will monitor blood pressure and call follow-up  Neuropathy, no treatment  Hyperlipidemia, on Crestor  Sleep apnea  Osteoporosis, off Fosamax since 2023, consider DEXA next year  Mild cognitive impairment  Depression, doing well  Cerebral meningioma, follow-up neurosurgery  Adenocarcinoma of endometrium, no issues  Healthcare maintenance         Plan:   Stop hydrochlorothiazide, monitor blood pressure and call if up  Letter with labs  Immunizations at pharmacy  Exercise and diet      Guzman Bruner M.D.            No chief complaint on file.          Health Care Directive  Patient has a Health Care Directive on file  Advance care planning document is on file and is current.    HPI              9/12/2024   General Health   How  would you rate your overall physical health? Excellent   Feel stress (tense, anxious, or unable to sleep) Not at all            9/12/2024   Nutrition   Diet: Regular (no restrictions)            9/12/2024   Exercise   Days per week of moderate/strenous exercise 7 days   Average minutes spent exercising at this level 30 min            9/12/2024   Social Factors   Frequency of gathering with friends or relatives Three times a week   Worry food won't last until get money to buy more No   Food not last or not have enough money for food? No   Do you have housing? (Housing is defined as stable permanent housing and does not include staying ouside in a car, in a tent, in an abandoned building, in an overnight shelter, or couch-surfing.) Yes   Are you worried about losing your housing? No   Lack of transportation? No   Unable to get utilities (heat,electricity)? No            9/12/2024   Fall Risk   Fallen 2 or more times in the past year? No   Trouble with walking or balance? No             9/12/2024   Activities of Daily Living- Home Safety   Needs help with the following daily activites None of the above   Safety concerns in the home None of the above            9/12/2024   Dental   Dentist two times every year? (!) NO            9/12/2024   Hearing Screening   Hearing concerns? None of the above            9/12/2024   Driving Risk Screening   Patient/family members have concerns about driving No            9/12/2024   General Alertness/Fatigue Screening   Have you been more tired than usual lately? (!) YES            9/12/2024   Urinary Incontinence Screening   Bothered by leaking urine in past 6 months No            9/12/2024   TB Screening   Were you born outside of the [place]? No                    9/12/2024   Substance Use   Alcohol more than 3/day or more than 7/wk No   Do you have a current opioid prescription? No   How severe/bad is pain from 1 to 10? 0/10 (No Pain)   Do you use any other substances recreationally?  "No        Social History     Tobacco Use    Smoking status: Former     Current packs/day: 0.00     Average packs/day: 1.5 packs/day for 12.0 years (18.0 ttl pk-yrs)     Types: Cigarettes     Start date: 1966     Quit date: 3/17/1974     Years since quittin.5    Smokeless tobacco: Never    Tobacco comments:     enjoyed it!   Vaping Use    Vaping status: Never Used   Substance Use Topics    Alcohol use: Yes     Comment: Minimal    Drug use: No                        Reviewed and updated as needed this visit by Provider                      Current providers sharing in care for this patient include:  Patient Care Team:  Guzman Bruner MD as PCP - General (Internal Medicine)  Guzman Bruner MD as Assigned PCP  Radha Hernandez AuD as Audiologist (Audiology)  Leif Banuelos MD as Assigned Neuroscience Provider    The following health maintenance items are reviewed in Epic and correct as of today:  Health Maintenance   Topic Date Due    MICROALBUMIN  Never done    ANNUAL REVIEW OF HM ORDERS  Never done    DEPRESSION ACTION PLAN  Never done    RSV VACCINE (1 - 1-dose 75+ series) Never done    LIPID  2024    INFLUENZA VACCINE (1) 2024    COVID-19 Vaccine ( season) 2024    MEDICARE ANNUAL WELLNESS VISIT  2024    PHQ-9  10/22/2024    BMP  2025    HEMOGLOBIN  2025    FALL RISK ASSESSMENT  2025    ADVANCE CARE PLANNING  2028    DTAP/TDAP/TD IMMUNIZATION (4 - Td or Tdap) 2034    DEXA  2037    Pneumococcal Vaccine: 65+ Years  Completed    URINALYSIS  Completed    ZOSTER IMMUNIZATION  Completed    HPV IMMUNIZATION  Aged Out    MENINGITIS IMMUNIZATION  Aged Out    RSV MONOCLONAL ANTIBODY  Aged Out    MAMMO SCREENING  Discontinued            Objective    Exam  There were no vitals taken for this visit.   Estimated body mass index is 24.86 kg/m  as calculated from the following:    Height as of 24: 1.697 m (5' 6.81\").    " Weight as of 8/2/24: 71.6 kg (157 lb 12.8 oz).    Physical Exam          9/12/2024   Mini Cog   Clock Draw Score 0 Abnormal   3 Item Recall 0 objects recalled   Mini Cog Total Score 0                 Signed Electronically by: Guzman Bruner MD

## 2025-01-10 ENCOUNTER — TELEPHONE (OUTPATIENT)
Dept: FAMILY MEDICINE | Facility: CLINIC | Age: 85
End: 2025-01-10
Payer: COMMERCIAL

## 2025-01-10 NOTE — TELEPHONE ENCOUNTER
Forms/Letter Request    Type of form/letter: POLST      Do we have the form/letter: Yes: Placed on Dr. Bruner's desk    Who is the form from? Patient    Where did/will the form come from? form was mailed in    When is form/letter needed by:     How would you like the form/letter returned: Mail  Is this the correct address?: No -  Mail in envelope provided  Patient Notified form requests are processed in 5-7 business days:No    Could we send this information to you in [employer] or would you prefer to receive a phone call?:   Patient would prefer a phone call   Okay to leave a detailed message?: Yes at Home number on file [phone] (home)

## 2025-01-10 NOTE — TELEPHONE ENCOUNTER
Spoke with Madina, she agrees with PCP recommendations. She is going to talk to the patient more about this. She asked if patient is still adamant about being full code she would like to schedule an appointment with you to discuss this further with the patient present.  If the patient does change to DNR/DNI she will fill out a new form and send to you.    Amy Tracy RN

## 2025-01-10 NOTE — TELEPHONE ENCOUNTER
Please call her healthcare agent Madina Spencer.  I have some concerns about her being full code given her significant health history including dementia.  I am afraid that this may not be in her best interest.  If this is something Jessika truly wants then I can sign it.    Guzman Bruner M.D.

## 2025-01-29 ENCOUNTER — TELEPHONE (OUTPATIENT)
Dept: FAMILY MEDICINE | Facility: CLINIC | Age: 85
End: 2025-01-29
Payer: COMMERCIAL

## 2025-01-29 NOTE — TELEPHONE ENCOUNTER
Forms/Letter Request    Type of form/letter: POLST      Do we have the form/letter: Yes:     Who is the form from? Patient    Where did/will the form come from? form was mailed in    When is form/letter needed by: ASAP    How would you like the form/letter returned: Mail  Is this the correct address?: Yes  [address] [facility] [address]    Patient Notified form requests are processed in 5-7 business days:No    Could we send this information to you in [facility] or would you prefer to receive a phone call?:   Patient would prefer a phone call   Okay to leave a detailed message?: Yes at Cell number on file:    Telephone Information:   Mobile [phone]

## 2025-01-30 ENCOUNTER — DOCUMENTATION ONLY (OUTPATIENT)
Dept: OTHER | Facility: CLINIC | Age: 85
End: 2025-01-30
Payer: COMMERCIAL

## 2025-01-30 NOTE — TELEPHONE ENCOUNTER
POLST mailed back in envelope provided by Pt.  Copy sent to [employer] and placed in [employer] accordion file

## 2025-02-05 NOTE — NURSING NOTE
"Lizzette Chanel is a 81 year old female who presents for:  Chief Complaint   Patient presents with     Follow Up     MRI review          Initial Vitals:  /67   Pulse 71   Ht 1.727 m (5' 8\")   Wt 70.3 kg (155 lb)   SpO2 97%   BMI 23.57 kg/m   Estimated body mass index is 23.57 kg/m  as calculated from the following:    Height as of this encounter: 1.727 m (5' 8\").    Weight as of this encounter: 70.3 kg (155 lb).. Body surface area is 1.84 meters squared. BP completed using cuff size: regular    Nursing Comments:     Arabella Kinsey    "
none

## 2025-05-06 ENCOUNTER — ANCILLARY PROCEDURE (OUTPATIENT)
Dept: GENERAL RADIOLOGY | Facility: CLINIC | Age: 85
End: 2025-05-06
Attending: NURSE PRACTITIONER
Payer: COMMERCIAL

## 2025-05-06 ENCOUNTER — OFFICE VISIT (OUTPATIENT)
Dept: URGENT CARE | Facility: URGENT CARE | Age: 85
End: 2025-05-06
Payer: COMMERCIAL

## 2025-05-06 VITALS
WEIGHT: 163 LBS | OXYGEN SATURATION: 95 % | BODY MASS INDEX: 26.51 KG/M2 | TEMPERATURE: 98 F | DIASTOLIC BLOOD PRESSURE: 76 MMHG | HEART RATE: 70 BPM | SYSTOLIC BLOOD PRESSURE: 130 MMHG | RESPIRATION RATE: 17 BRPM

## 2025-05-06 DIAGNOSIS — M79.642 PAIN OF LEFT HAND: ICD-10-CM

## 2025-05-06 DIAGNOSIS — S81.812A LACERATION OF LEG, LEFT, INITIAL ENCOUNTER: ICD-10-CM
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
## 2025-05-06 DIAGNOSIS — S80.212A KNEE ABRASION, LEFT, INITIAL ENCOUNTER: ICD-10-CM

## 2025-05-06 PROCEDURE — 3078F DIAST BP <80 MM HG: CPT | Performed by: NURSE PRACTITIONER

## 2025-05-06 PROCEDURE — 73130 X-RAY EXAM OF HAND: CPT | Mod: TC | Performed by: STUDENT IN AN ORGANIZED HEALTH CARE EDUCATION/TRAINING PROGRAM

## 2025-05-06 PROCEDURE — 99214 OFFICE O/P EST MOD 30 MIN: CPT | Performed by: NURSE PRACTITIONER

## 2025-05-06 PROCEDURE — 3075F SYST BP GE 130 - 139MM HG: CPT | Performed by: NURSE PRACTITIONER

## 2025-05-06 NOTE — PROGRESS NOTES
Chief Complaint   Patient presents with    Urgent Care    Laceration     Pt presents a cut on L lower leg and swelling and bruising on L palm, pt fell 2-3 hours ago. Last Tdap was given 4/11/2024.         ICD-10-CM    1. Fall, initial encounter  W19.XXXA XR Hand Left G/E 3 Views     CANCELED: XR Hand Left G/E 3 Views      2. Knee abrasion, left, initial encounter  S80.212A       3. Laceration of leg, left, initial encounter  S81.812A       4. Pain of left hand  M79.642 XR Hand Left G/E 3 Views     CANCELED: XR Hand Left G/E 3 Views      Wounds were cleansed.  I do not believe the laceration would benefit from sutures or that sutures would hold through her very thin skin.  Silvadene and new dressing applied to leg and simple bandage was applied to the abrasion.  Tylenol as needed for pain.  Ice and elevation for the hand.    Red flag warning signs and when to go to the emergency room discussed.  Reviewed potential adverse reactions to medications.    33 minutes spent reviewing patient's chart, completing history and physical exam, establishing plan of care and completing documentation.    Xray - Reviewed and interpreted by me.  Left hand shows no acute fractures or dislocations, there is significant arthritis present.    Subjective     Lizzette Chanel is an 84 year old female who presents to clinic today for fall that occurred a couple hours prior to arrival.  She was out walking her dog which she does 3 times a day, caught her foot on an uneven part of sidewalk and fell forward onto her hands.  She did not hit her hands and the left leg and knee.  She now has a cut on the leg and a scrape just below the knee as well as pain in the left hand.    She denies any dizziness, lightheadedness, syncope or pain in other parts of her body.      Objective    /76   Pulse 70   Temp 98  F (36.7  C) (Tympanic)   Resp 17   Wt 73.9 kg (163 lb)   SpO2 95%   BMI 26.51 kg/m    Nurses notes and VS have been  reviewed.    Physical Exam       GENERAL APPEARANCE: healthy appearing, alert     EYES: PERRL, EOMI, sclera non-icteric     HENT: oral exam benign, mucus membranes intact, without ulcers or lesions     NECK: no adenopathy or asymmetry, thyroid normal to palpation     CV: 3+ bilateral radial pulses     ABDOMEN:  soft, nontender, no HSM or masses and bowel sounds normal     MS: extremities normal- no gross deformities noted; normal muscle tone, except for tenderness over the left palm near the first digit.  There is no tenderness over any extremity, pelvis, neck or back.     SKIN: 4 cm superficial laceration to left lower extremity, skin is paper thin and already adhered, no bleeding, she also has a quarter size superficial abrasion just under the left knee     NEURO: Normal strength and tone, mentation intact and speech normal     PSYCH: normal thought process; no significant mood disturbance      CARLEY Andrade, CNP  [facility] Provider    The use of Dragon/[employer] dictation services may have been used to construct the content in this note; any grammatical or spelling errors are non-intentional. Please contact the author of this note directly if you are in need of any clarification.

## 2025-05-06 NOTE — PATIENT INSTRUCTIONS
Leave initial dressing on for the next 24 hours.  After 24 hours you may get in the shower and allow the water to run over the interior dressing and gently remove.     Cleanse with an antibacterial soap then rinse thoroughly.    Pat dry with clean gauze or washcloth and apply a small amount of Neosporin then a Telfa pad and rewrap with the brown Coban.    Use the antibiotic ointment for the next 5 days then discontinue but keep covered with a dressing until a scab has formed over the entire open area.      Clean the abrasion just below the knee in the same way.  May cover with a simple bandage until a complete scab has formed    Apply ice to left hand , over a dry washcloth for 20 minutes 3 times a day for the next several days.  Keep hand elevated when possible.  Use acetaminophen as needed for pain.

## 2025-05-06 NOTE — PROGRESS NOTES
Urgent Care Clinic Visit    Chief Complaint   Patient presents with    Urgent Care    Laceration     Pt presents a cut on L lower leg and swelling and bruising on L palm, pt fell 2-3 hours ago. Last Tdap was given 4/11/2024.           Review of last Tetanus vaccine: Tetanus vaccine has been given within past 5 years          5/6/2025     1:31 PM   Additional Questions   Roomed by Lenka Collado   Accompanied by Ev(friend)

## 2025-05-13 ENCOUNTER — OFFICE VISIT (OUTPATIENT)
Dept: URGENT CARE | Facility: URGENT CARE | Age: 85
End: 2025-05-13
Payer: COMMERCIAL

## 2025-05-13 VITALS
RESPIRATION RATE: 16 BRPM | HEART RATE: 72 BPM | BODY MASS INDEX: 26.84 KG/M2 | WEIGHT: 165 LBS | TEMPERATURE: 99.1 F | OXYGEN SATURATION: 96 % | DIASTOLIC BLOOD PRESSURE: 78 MMHG | SYSTOLIC BLOOD PRESSURE: 133 MMHG

## 2025-05-13 DIAGNOSIS — L03.116 LEFT LEG CELLULITIS: Primary | ICD-10-CM

## 2025-05-13 PROCEDURE — 96372 THER/PROPH/DIAG INJ SC/IM: CPT | Performed by: NURSE PRACTITIONER

## 2025-05-13 PROCEDURE — 3078F DIAST BP <80 MM HG: CPT | Performed by: NURSE PRACTITIONER

## 2025-05-13 PROCEDURE — 99214 OFFICE O/P EST MOD 30 MIN: CPT | Mod: 25 | Performed by: NURSE PRACTITIONER

## 2025-05-13 PROCEDURE — 3075F SYST BP GE 130 - 139MM HG: CPT | Performed by: NURSE PRACTITIONER

## 2025-05-13 RX ORDER — CEFTRIAXONE SODIUM 1 G
1 VIAL (EA) INJECTION ONCE
Status: COMPLETED | OUTPATIENT
Start: 2025-05-13 | End: 2025-05-13

## 2025-05-13 RX ADMIN — Medication 1 G: at 12:41

## 2025-05-13 NOTE — PROGRESS NOTES
Chief Complaint   Patient presents with    Urgent Care    Fall     Pt presents L leg wound, swelling, pt was here 5/6, concerned about it not healing.          ICD-10-CM    1. Left leg cellulitis  L03.116 cefTRIAXone (ROCEPHIN) in lidocaine 1% for IM administration 1 g     amoxicillin-clavulanate (AUGMENTIN) 875-125 MG tablet      Ceftriaxone injection given and there was no allergic reaction after 25 minutes.  Patient is discharged with Rx for Augmentin that she will start in the morning.  Instructed patient and friend if the erythema has extended beyond the outline in 24 hours they should go to the emergency room.     Reviewed potential adverse reactions to medications.    Subjective     Lizzette Chanel is an 84 year old female who presents to clinic today for redness and swelling of the left lower leg.  She fell about a week ago, sustained a skin tear and was seen here at the urgent care.  They have been keeping it clean and using Neosporin ointment.  Yesterday friend and caregiver noted she had yellow purulent drainage present and some redness had started to develop around the wound.  The redness was worse today, so they presented for evaluation.    She denies any fever or chills.  There has been no further drainage from the wound.      Objective    /78   Pulse 72   Temp 99.1  F (37.3  C) (Tympanic)   Resp 16   Wt 74.8 kg (165 lb)   SpO2 96%   BMI 26.84 kg/m    Nurses notes and VS have been reviewed.    Physical Exam     Alert and oriented, no apparent distress, right lower leg is swollen from just above the ankle to a couple of inches below the knee, mid shin there is a 3 cm x 2 cm area of eschar with no drainage present, there is a large area of erythema surrounding this.    CARLEY Andrade, CNP  [facility] Provider    The use of Dragon/[employer] dictation services may have been used to construct the content in this note; any grammatical or spelling errors are non-intentional.  Please contact the author of this note directly if you are in need of any clarification.

## 2025-05-13 NOTE — PATIENT INSTRUCTIONS
Ok to stop antibiotics ointment.    Cover with dressing daily until there  is no more drainage. May then leave open to the air.

## 2025-05-13 NOTE — PROGRESS NOTES
Urgent Care Clinic Visit    Chief Complaint   Patient presents with    Urgent Care    Fall     Pt presents L leg wound, swelling, pt was here 5/6, concerned about it not healing.                5/13/2025    11:55 AM   Additional Questions   Roomed by Lenka Collado   Accompanied by Ev(friend)

## 2025-05-14 ENCOUNTER — HOSPITAL ENCOUNTER (EMERGENCY)
Facility: CLINIC | Age: 85
Discharge: HOME | End: 2025-05-14
Payer: COMMERCIAL

## 2025-05-14 VITALS
SYSTOLIC BLOOD PRESSURE: 182 MMHG | RESPIRATION RATE: 18 BRPM | BODY MASS INDEX: 24.25 KG/M2 | HEIGHT: 68 IN | OXYGEN SATURATION: 95 % | WEIGHT: 160 LBS | HEART RATE: 66 BPM | TEMPERATURE: 97.5 F | DIASTOLIC BLOOD PRESSURE: 73 MMHG

## 2025-05-14 DIAGNOSIS — L03.116 CELLULITIS OF LEFT LOWER LEG: ICD-10-CM

## 2025-05-14 PROCEDURE — 99283 EMERGENCY DEPT VISIT LOW MDM: CPT

## 2025-05-14 RX ORDER — CEPHALEXIN 500 MG/1
500 CAPSULE ORAL 4 TIMES DAILY
Qty: 28 CAPSULE | Refills: 0 | Status: SHIPPED | OUTPATIENT
Start: 2025-05-14 | End: 2025-05-21

## 2025-05-14 ASSESSMENT — COLUMBIA-SUICIDE SEVERITY RATING SCALE - C-SSRS
2. HAVE YOU ACTUALLY HAD ANY THOUGHTS OF KILLING YOURSELF IN THE PAST MONTH?: NO
6. HAVE YOU EVER DONE ANYTHING, STARTED TO DO ANYTHING, OR PREPARED TO DO ANYTHING TO END YOUR LIFE?: NO
1. IN THE PAST MONTH, HAVE YOU WISHED YOU WERE DEAD OR WISHED YOU COULD GO TO SLEEP AND NOT WAKE UP?: NO

## 2025-05-14 ASSESSMENT — ACTIVITIES OF DAILY LIVING (ADL)
ADLS_ACUITY_SCORE: 41
ADLS_ACUITY_SCORE: 41

## 2025-05-14 NOTE — ED PROVIDER NOTES
Emergency Department Note      History of Present Illness     Chief Complaint   Fall and Wound Check      HPI   Lizzette Chanel is a 84 year old female history of peripheral neuropathy, hypertension, CKD, and mild cognitive impairment who presents for evaluation of laceration on her left lower leg.  Patient's friend states laceration on left lower leg initially occurred on 5/6/2025, went to urgent care at that time, decision made to have wound heal by secondary intent.  Patient went to urgent care on 5/13/2025 when her extremity developed redness, she was given 1 IM dose of Rocephin and a prescription for Augmentin.  A skin marker was used at that time to draw around the area of redness.  Patient's friend notes redness has not spread beyond the marker however appreciated red today.  Patient denies fever or chills, increased pain, nausea or vomiting, and paresthesia in the left foot    Independent Historian   Friend as detailed above.    Review of External Notes   5/06/2025 urgent care note reviewed, laceration left lower leg from a fall.  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    Arthritis mild in knees, shoulders    Cancer (H) skin--Basil Cell, [place]    Chest pain 2003    Chest pain 04/2016    CKD (chronic kidney disease) stage 3, GFR 30-59 ml/min (H)     Complication of anesthesia     Essential hypertension 07/2021    Fatigue 08/25/2021    Hx of colonoscopy 2003    Hx of colonoscopy 06/03/2011    Hypercholesteremia 2011    Major depressive disorder, single episode, mild 04/07/2023    MCI (mild cognitive impairment) with memory loss 2022    CAITLYN (obstructive sleep apnea) 12/2012    Osteopenia 2013    Osteoporosis of  lumbar spine 07/2020    Peripheral neuropathy     Polio 1956    Skin cancer     SOB (shortness of breath) 08/2021    Statin intolerance     Subdural hematoma (H) 01/2016    Uncomplicated asthma        Medications   cephALEXin (KEFLEX) 500 MG capsule  amoxicillin-clavulanate (AUGMENTIN) 875-125 MG tablet  Calcium Carbonate-Vitamin D (CALCIUM-D PO)  Coenzyme Q10 (COQ10 PO)  escitalopram (LEXAPRO) 5 MG tablet  memantine (NAMENDA) 10 MG tablet  ORDER FOR DME  rosuvastatin (CRESTOR) 5 MG tablet  vitamin B-12 (CYANOCOBALAMIN) 1000 MCG tablet        Surgical History   Past Surgical History:   Procedure Laterality Date    ABDOMEN SURGERY  1960    APPENDECTOMY  60's    BIOPSY  2010    inside upper lip    DAVINCI HYSTERECTOMY TOTAL, BILATERAL SALPINGO-OOPHORECTOMY, NODE DISSECTION, COMBINED Bilateral 7/8/2021    Procedure: ROBOTIC ASSISTED TOTAL LAPAROSCOPIC HYSTERECTOMY, BILATERAL SALPINGO-OOPHORECTOMY, WASHINGS, INTRA-OPERATIVE SENTINEL LYMPH NODE MAPPING, BILATERAL PELVIC AND PARA-AORTIC LYMPHADENECTOMY, REPAIR VAGINAL TEAR;  Surgeon: Dianne Garland MD;  Location:  OR    DILATION AND CURETTAGE, HYSTEROSCOPY WITH ULTRASOUND GUIDANCE N/A 5/11/2021    Procedure: HYSTEROSCOPY UNDER ULTRASOUND GUIDANCE, DILATION AND CURETTAGE OF UTERUS;  Surgeon: Isabel Ferro MD;  Location:  OR    OPERATIVE HYSTEROSCOPY WITH MORCELLATOR N/A 5/11/2021    Procedure: MYOSURE MORCELLATOR;  Surgeon: Isabel Ferro MD;  Location:  OR    PHACOEMULSIFICATION CLEAR CORNEA WITH STANDARD INTRAOCULAR LENS IMPLANT  3/21/2012    Procedure:PHACOEMULSIFICATION CLEAR CORNEA WITH STANDARD INTRAOCULAR LENS IMPLANT; RIGHT PHACOEMULSIFICATION CLEAR CORNEA WITH STANDARD INTRAOCULAR LENS IMPLANT ; Surgeon:CHANDRAKANT HERNANDEZ; Location: [facility]    PHACOEMULSIFICATION CLEAR CORNEA WITH STANDARD INTRAOCULAR LENS IMPLANT  3/28/2012    Procedure:PHACOEMULSIFICATION CLEAR CORNEA WITH STANDARD INTRAOCULAR LENS IMPLANT; LEFT PHACOEMULSIFICATION  "CLEAR CORNEA WITH STANDARD INTRAOCULAR LENS IMPLANT ; Surgeon:CHANDRAKANT HERNANDEZ; Location:[facility]    SHOULDER SURGERY  1990's    twice    SOFT TISSUE SURGERY  R&L Rotator cuff repairs    TONSILLECTOMY  child       Physical Exam     Patient Vitals for the past 24 hrs:   BP Temp Temp src Pulse Resp SpO2 Height Weight   05/14/25 1710 (!) 182/73 97.5  F (36.4  C) Temporal 66 18 95 % 1.727 m (5' 8\") 72.6 kg (160 lb)     Physical Exam  Constitutional: Alert, attentive, GCS 15   HENT:   Eyes: EOM are normal.    CV: regular rate and rhythm; 2+ DP and PT pulses, brisk distal cap refill   Chest: Effort normal and breath sounds normal.   GI: No distention.  MSK: Left anterior lower leg has a well a 3 cm area of eschar.  In advance state of healing, there is surrounding erythema that does not extend beyond the lines of a skin marker, mildly tender to palpation, no spreading fingers of erythema.  Neurological: 5/5 strength to the DF, PF, EHL and FHL motor functions bilaterally; sensation intact to the DP, SP, T, S and S distributions bilaterally  Skin: Skin is warm and dry.      Diagnostics     Lab Results   Labs Ordered and Resulted from Time of ED Arrival to Time of ED Departure - No data to display    Imaging   No orders to display         Independent Interpretation   None    ED Course      Medications Administered   Medications - No data to display    Procedures   Procedures     Discussion of Management   None    ED Course        Additional Documentation  None    Medical Decision Making / Diagnosis     CMS Diagnoses: None    MIPS   None               MDM   Lizzette Chanel is a 84 year old female presents for reevaluation of left leg wound.  Differential considered includes but is not limited to cellulitis, erysipelas, and systemic infection among many others.  Vitals reassuring without fever, tachycardia, hypoxia.  On physical exam, patient did have an area of eschar in left anterior lower leg that appears to be healing well, " there was surrounding erythema however erythema was well within bounds of skin marker.  Patient had been given IM Rocephin at an urgent care visit yesterday as well as a prescription for Augmentin.  There are no signs of systemic infection today such as fever, chills, nausea, spreading redness, or increased pain.  I will change prescription to cephalexin for improved staph and strep coverage.  Also provided patient and friend with any supplies and recommended follow-up with PCP to establish better wound care follow-up.  They plan to call PCP tomorrow to obtain appointment.  Thoroughly discussed return precautions including fever, spreading redness, or increased pain among others.  Patient and friend state they understand and agree.  Patient was discharged.    Disposition   The patient was discharged.     Diagnosis     ICD-10-CM    1. Cellulitis of left lower leg  L03.116            Discharge Medications   Discharge Medication List as of 5/14/2025  7:25 PM        START taking these medications    Details   cephALEXin (KEFLEX) 500 MG capsule Take 1 capsule (500 mg) by mouth 4 times daily for 7 days., Disp-28 capsule, R-0, E-Prescribe               EDINSON Jacome John, PA-C  05/14/25 1180

## 2025-05-14 NOTE — ED TRIAGE NOTES
Pt reports she fell and sustained an open wound to LLE one week ago. Pt was seen at  yesterday with concern for infection. Pt was given IM antibiotic dose and area was outlined. Pt has had one dose of oral abx today. Pt denies fevers, redness has not moved outside of outline but friend is concerned that wound itself looks more red today.    Triage Assessment (Adult)       Row Name 05/14/25 7604          Triage Assessment    Airway WDL WDL        Respiratory WDL    Respiratory WDL WDL        Cardiac WDL    Cardiac WDL WDL        Peripheral/Neurovascular WDL    Peripheral Neurovascular WDL WDL        Cognitive/Neuro/Behavioral WDL    Cognitive/Neuro/Behavioral WDL WDL

## 2025-05-15 NOTE — ED NOTES
Writer RN went into room to give discharge paperwork, and patient not in room. Unable to obtain discharge vitals or conduct discharge teaching.

## 2025-05-15 NOTE — DISCHARGE INSTRUCTIONS
Thank you for coming to [facility] emergency department.  Please stop taking Augmentin and start taking Keflex.  Please change bandage daily, clean wound with each bandage change.  Please return to the emergency department if you develop fever, nausea, or redness spreading out from the wound and fingers.  Please follow-up with your PCP for ongoing wound care.

## 2025-05-19 ENCOUNTER — TELEPHONE (OUTPATIENT)
Dept: FAMILY MEDICINE | Facility: CLINIC | Age: 85
End: 2025-05-19
Payer: COMMERCIAL

## 2025-05-19 NOTE — TELEPHONE ENCOUNTER
Reason for Call:  Appointment Request    Patient requesting this type of appt:  Hospital/ED Follow-Up     Requested provider: Guzman Bruner    Reason patient unable to be scheduled: Not within requested timeframe    When does patient want to be seen/preferred time: 3-7 days    Comments: F/U from hospital due to fall and cut on leg. It is infection but is on antibiotics     Could we send this information to you [facility] or would you prefer to receive a phone call?:   Patient would prefer a phone call   Okay to leave a detailed message?: Yes at Other phone number:  [phone]    Call taken on 5/19/2025 at 9:56 AM by Alanis Markham

## 2025-05-20 ENCOUNTER — OFFICE VISIT (OUTPATIENT)
Dept: FAMILY MEDICINE | Facility: CLINIC | Age: 85
End: 2025-05-20
Payer: COMMERCIAL

## 2025-05-20 VITALS
BODY MASS INDEX: 24.73 KG/M2 | HEIGHT: 69 IN | DIASTOLIC BLOOD PRESSURE: 75 MMHG | WEIGHT: 167 LBS | OXYGEN SATURATION: 96 % | SYSTOLIC BLOOD PRESSURE: 129 MMHG | HEART RATE: 65 BPM | TEMPERATURE: 96.6 F | RESPIRATION RATE: 16 BRPM

## 2025-05-20 DIAGNOSIS — L03.116 CELLULITIS OF LEFT LOWER EXTREMITY: Primary | ICD-10-CM

## 2025-05-20 PROCEDURE — 3078F DIAST BP <80 MM HG: CPT | Performed by: PHYSICIAN ASSISTANT

## 2025-05-20 PROCEDURE — G2211 COMPLEX E/M VISIT ADD ON: HCPCS | Performed by: PHYSICIAN ASSISTANT

## 2025-05-20 PROCEDURE — 3074F SYST BP LT 130 MM HG: CPT | Performed by: PHYSICIAN ASSISTANT

## 2025-05-20 PROCEDURE — 1125F AMNT PAIN NOTED PAIN PRSNT: CPT | Performed by: PHYSICIAN ASSISTANT

## 2025-05-20 PROCEDURE — 99213 OFFICE O/P EST LOW 20 MIN: CPT | Performed by: PHYSICIAN ASSISTANT

## 2025-05-20 RX ORDER — CEPHALEXIN 500 MG/1
500 CAPSULE ORAL 2 TIMES DAILY
Qty: 10 CAPSULE | Refills: 0 | Status: SHIPPED | OUTPATIENT
Start: 2025-05-20 | End: 2025-05-20

## 2025-05-20 RX ORDER — CEPHALEXIN 500 MG/1
500 CAPSULE ORAL 4 TIMES DAILY
Qty: 20 CAPSULE | Refills: 0 | Status: SHIPPED | OUTPATIENT
Start: 2025-05-20 | End: 2025-05-25

## 2025-05-20 ASSESSMENT — PATIENT HEALTH QUESTIONNAIRE - PHQ9
10. IF YOU CHECKED OFF ANY PROBLEMS, HOW DIFFICULT HAVE THESE PROBLEMS MADE IT FOR YOU TO DO YOUR WORK, TAKE CARE OF THINGS AT HOME, OR GET ALONG WITH OTHER PEOPLE: SOMEWHAT DIFFICULT
SUM OF ALL RESPONSES TO PHQ QUESTIONS 1-9: 4
SUM OF ALL RESPONSES TO PHQ QUESTIONS 1-9: 4

## 2025-05-20 ASSESSMENT — PAIN SCALES - GENERAL: PAINLEVEL_OUTOF10: MILD PAIN (1)

## 2025-05-20 NOTE — PROGRESS NOTES
Assessment & Plan     Cellulitis of left lower extremity  Extend Keflex for another 5 days as long as she is tolerating well.  Do not believe switching to a different antibiotic is warranted at this time given gradual improvement.  Improving cellulitis however ongoing erythema/warmth.  Discussed signs symptoms the meantime that warrant urgent/emergent reevaluation.  Could consider ADS for IV antibiotics next week if no improvement however does have appointment with PCP on 5/29/2025 which I encouraged her to keep.  They are comfortable with this plan.  - cephALEXin (KEFLEX) 500 MG capsule  Dispense: 20 capsule; Refill: 0      MED REC REQUIRED  Post Medication Reconciliation Status: discharge medications reconciled, continue medications without change      20 minutes spent by me on the date of the encounter doing chart review, review of test results, interpretation of tests, patient visit, and documentation     Subjective   Jessika is a very pleasant 84 year old, presenting for the following health issues:  ER F/U        5/20/2025     4:20 PM   Additional Questions   Roomed by Betty   Accompanied by friend Janis  (christianne RN)     Unfortunately, she suffered a fall on 5/6/2025.  Evaluated urgent care for a laceration of the left lower leg.  Wound was cleaned and bandaged.  Reassessed on 5/13/2025 for cellulitis and was given a ceftriaxone injection as well as the Augmentin prescription.  Reassessed again on 5/14/2025 and given additional dose of IM ceftriaxone and was switched to Keflex 500 mg 4 times daily.  Has been tolerating this without any problems.  Notes gradual improvement in cellulitis however still a small area of warmth surrounding initial left lower leg laceration.  She denies fever, chills, sweats.  Pain is minimal.  Ambulating without issue.  Accompanied by her friend Fabiola who is a retired RN.  She is bandaging with Telfa, gauze pad, Ace bandage.    ED/UC Followup:    Facility:  [facility]  "[facility] Emergency Dept      Date of visit: 05/14/2025  Reason for visit: Cellulitis of left lower leg   Current Status: Improved/stable      Review of Systems  Constitutional, neuro, ENT, endocrine, pulmonary, cardiac, gastrointestinal, genitourinary, musculoskeletal, integument and psychiatric systems are negative, except as otherwise noted.      Objective    /75 (BP Location: Right arm, Patient Position: Chair, Cuff Size: Adult Regular)   Pulse 65   Temp (!) 96.6  F (35.9  C) (Temporal)   Resp 16   Ht 1.753 m (5' 9\")   Wt 75.8 kg (167 lb)   SpO2 96%   BMI 24.66 kg/m    Body mass index is 24.66 kg/m .  Physical Exam   GENERAL: alert and no distress  EYES: Eyes grossly normal to inspection, PERRL and conjunctivae and sclerae normal  NECK: no adenopathy, no asymmetry, masses, or scars  RESP: lungs clear to auscultation - no rales, rhonchi or wheezes  CV: regular rate and rhythm, normal S1 S2, no S3 or S4, no murmur, click or rub, no peripheral edema  MS: no gross musculoskeletal defects noted, no edema  SKIN: See picture below.  Erythema/warmth surrounding skin laceration with continued improvement and no extension beyond skin marker border.  No tenderness to palpation.  No active drainage.  No additional suspicious lesions or rashes  NEURO: Normal strength and tone, mentation intact and speech normal  PSYCH: mentation appears normal, affect normal/bright    The likelihood of other entities in the differential is insufficient to justify any further testing for them at this time. This was explained to the patient. The patient was advised that persistent or worsening symptoms would require further evaluation. Patient advised to call the office and if unable to reach to go to the emergency room if they develop any new or worsening symptoms. Expressed understanding and agreement with above stated plan.           The likelihood of other entities in the differential is insufficient to justify any further " testing for them at this time. This was explained to the patient. The patient was advised that persistent or worsening symptoms would require further evaluation. Patient advised to call the office and if unable to reach to go to the emergency room if they develop any new or worsening symptoms. Expressed understanding and agreement with above stated plan.       Signed Electronically by: Zaid Mabry PA-C    Answers submitted by the patient for this visit:  Patient Health Questionnaire (Submitted on 5/20/2025)  If you checked off any problems, how difficult have these problems made it for you to do your work, take care of things at home, or get along with other people?: Somewhat difficult  PHQ9 TOTAL SCORE: 4

## 2025-05-29 ENCOUNTER — OFFICE VISIT (OUTPATIENT)
Dept: FAMILY MEDICINE | Facility: CLINIC | Age: 85
End: 2025-05-29
Payer: COMMERCIAL

## 2025-05-29 VITALS
RESPIRATION RATE: 16 BRPM | BODY MASS INDEX: 25.16 KG/M2 | HEIGHT: 68 IN | HEART RATE: 73 BPM | TEMPERATURE: 98 F | DIASTOLIC BLOOD PRESSURE: 73 MMHG | OXYGEN SATURATION: 94 % | WEIGHT: 166 LBS | SYSTOLIC BLOOD PRESSURE: 123 MMHG

## 2025-05-29 DIAGNOSIS — L03.116 CELLULITIS OF LEFT LOWER EXTREMITY: Primary | ICD-10-CM

## 2025-05-29 DIAGNOSIS — G31.84 MCI (MILD COGNITIVE IMPAIRMENT) WITH MEMORY LOSS: ICD-10-CM

## 2025-05-29 RX ORDER — MEMANTINE HYDROCHLORIDE 10 MG/1
10 TABLET ORAL DAILY
Qty: 180 TABLET | Refills: 3 | Status: SHIPPED | OUTPATIENT
Start: 2025-05-29

## 2025-05-29 ASSESSMENT — PAIN SCALES - GENERAL: PAINLEVEL_OUTOF10: NO PAIN (0)

## 2025-05-29 ASSESSMENT — PATIENT HEALTH QUESTIONNAIRE - PHQ9
SUM OF ALL RESPONSES TO PHQ QUESTIONS 1-9: 0
SUM OF ALL RESPONSES TO PHQ QUESTIONS 1-9: 0
10. IF YOU CHECKED OFF ANY PROBLEMS, HOW DIFFICULT HAVE THESE PROBLEMS MADE IT FOR YOU TO DO YOUR WORK, TAKE CARE OF THINGS AT HOME, OR GET ALONG WITH OTHER PEOPLE: NOT DIFFICULT AT ALL

## 2025-05-29 NOTE — PROGRESS NOTES
I am seeing this patient in follow-up for her cellulitis.  She presents with her friend.  As noted the patient currently lives in assisted living at [facility].    As noted and reviewed the patient was originally seen on May 6 at urgent care for left lower leg swelling.  This occurred after she caught her foot on an uneven part of the sidewalk and fell onto her hands.  No sutures were placed.  She is up-to-date on her tetanus shot.  She was then seen in follow-up by urgent care on May 13 because of yellow purulent drainage and redness from the wound.  She was felt to have cellulitis and given IM ceftriaxone and started on oral Augmentin.  She was then seen in the ER the following day at which time Augmentin was changed to cephalexin.  She was then here on May 20 and saw my partner for cellulitis who extended the cephalexin another 5 days.  I have reviewed all these notes.  I am now seeing her in follow-up.    She is off the antibiotics.  Her friend comes over twice a day to put a bandage on it and Ace wrap it.  Jessika feels fine.  She has not been ill.  The friend wonders about paring down medication as doing it twice a day can be somewhat difficult and she can forget at times to take it.  She is not having any fevers, GI symptoms.  The friend notes her memory is worse over the years.    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    had vaginal bleeding, surgery done 2021    Arthritis mild in knees, shoulders    Cancer (H) skin--Basil Cell, [place]    Chest pain 2003    neg est thallium    Chest pain 04/2016    nl est echo and cxr    CKD (chronic kidney disease) stage 3, GFR 30-59 ml/min (H)     Complication of anesthesia     BP maddie to over 200 in PACU 1 month ago-after D&C    Essential hypertension 07/2021    stopped hctz 9/2024    Fatigue 08/25/2021    Hx of colonoscopy 2003    incomplete,  be nl    Hx of colonoscopy 06/03/2011    nl    Hypercholesteremia 2011    aches with zocor    Major depressive disorder, single episode, mild 04/07/2023    MCI (mild cognitive impairment) with memory loss 2022    namenda added by Dr. Lyles 10/22, did worse with prozac, stopped lexapro on her own 2023    CAITLYN (obstructive sleep apnea) 12/2012    done [place], using dental device, added cpap 2014     Osteopenia 2013    Dr. Taylor    Osteoporosis of lumbar spine 07/2020    added fosamax, she went off it 2023    Peripheral neuropathy     Dr. 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    SOB (shortness of breath) 08/2021    elevated d dimer, ct no pe but signs of chf, then echo nl 9/21    Statin intolerance     zocor and one other caused aches    Subdural hematoma (H) 01/2016    traumatic, fu ct 3/16 resolved    Uncomplicated asthma      Past Surgical History:   Procedure Laterality Date    ABDOMEN SURGERY  1960    APPENDECTOMY  60's    BIOPSY  2010    inside upper lip    DAVINCI HYSTERECTOMY TOTAL, BILATERAL SALPINGO-OOPHORECTOMY, NODE DISSECTION, COMBINED Bilateral 7/8/2021    Procedure: ROBOTIC ASSISTED TOTAL LAPAROSCOPIC HYSTERECTOMY, BILATERAL SALPINGO-OOPHORECTOMY, WASHINGS, INTRA-OPERATIVE SENTINEL LYMPH NODE MAPPING, BILATERAL PELVIC AND PARA-AORTIC LYMPHADENECTOMY, REPAIR VAGINAL TEAR;  Surgeon: Dianne Garland MD;  Location:  OR    DILATION AND CURETTAGE, HYSTEROSCOPY WITH ULTRASOUND GUIDANCE N/A 5/11/2021    Procedure: HYSTEROSCOPY UNDER ULTRASOUND GUIDANCE, DILATION AND CURETTAGE OF UTERUS;  Surgeon: Isabel Ferro MD;  Location:  OR    OPERATIVE HYSTEROSCOPY WITH MORCELLATOR N/A 5/11/2021    Procedure: MYOSURE MORCELLATOR;  Surgeon: Isabel Ferro MD;  Location:  OR    PHACOEMULSIFICATION CLEAR CORNEA WITH STANDARD INTRAOCULAR LENS IMPLANT  3/21/2012    Procedure:PHACOEMULSIFICATION CLEAR CORNEA WITH STANDARD INTRAOCULAR  LENS IMPLANT; RIGHT PHACOEMULSIFICATION CLEAR CORNEA WITH STANDARD INTRAOCULAR LENS IMPLANT ; Surgeon:CHANDRAKANT HERNANDEZ; Location:[facility]    PHACOEMULSIFICATION CLEAR CORNEA WITH STANDARD INTRAOCULAR LENS IMPLANT  3/28/2012    Procedure:PHACOEMULSIFICATION CLEAR CORNEA WITH STANDARD INTRAOCULAR LENS IMPLANT; LEFT PHACOEMULSIFICATION CLEAR CORNEA WITH STANDARD INTRAOCULAR LENS IMPLANT ; Surgeon:CHANDRAKANT HERNANDEZ; Location: [facility]    SHOULDER SURGERY      twice    SOFT TISSUE SURGERY  R&L Rotator cuff repairs    TONSILLECTOMY  child     Social History     Socioeconomic History    Marital status: Single     Spouse name: Not on file    Number of children: 0    Years of education: Not on file    Highest education level: Not on file   Occupational History    Occupation: physical therapy     Employer: RETIRED   Tobacco Use    Smoking status: Former     Current packs/day: 0.00     Average packs/day: 1.5 packs/day for 12.0 years (18.0 ttl pk-yrs)     Types: Cigarettes     Start date: 1966     Quit date: 3/17/1974     Years since quittin.2    Smokeless tobacco: Never    Tobacco comments:     enjoyed it!   Vaping Use    Vaping status: Never Used   Substance and Sexual Activity    Alcohol use: Yes     Comment: Minimal    Drug use: No    Sexual activity: Not Currently     Partners: Male     Birth control/protection: None   Other Topics Concern    Parent/sibling w/ CABG, MI or angioplasty before 65F 55M? Yes     Comment: Brother  of heart attack at 49 yrs old   Social History Narrative    Single, no kids    Has a cabin    Has godchildren that can help out     Social Drivers of Health     Financial Resource Strain: Low Risk  (2024)    Financial Resource Strain     Within the past 12 months, have you or your family members you live with been unable to get utilities (heat, electricity) when it was really needed?: No   Food Insecurity: Low Risk  (2024)    Food Insecurity     Within the past 12 months, did you  worry that your food would run out before you got money to buy more?: No     Within the past 12 months, did the food you bought just not last and you didn t have money to get more?: No   Transportation Needs: Low Risk  (9/12/2024)    Transportation Needs     Within the past 12 months, has lack of transportation kept you from medical appointments, getting your medicines, non-medical meetings or appointments, work, or from getting things that you need?: No   Physical Activity: Sufficiently Active (9/12/2024)    Exercise Vital Sign     Days of Exercise per Week: 7 days     Minutes of Exercise per Session: 30 min   Stress: No Stress Concern Present (9/12/2024)    [place] citizen [employer] - Occupational Stress Questionnaire     Feeling of Stress : Not at all   Social Connections: Unknown (9/12/2024)    Social Connection and Isolation Panel [NHANES]     Frequency of Communication with Friends and Family: Not on file     Frequency of Social Gatherings with Friends and Family: Three times a week     Attends Buddhist Services: Not on file     Active Member of Clubs or Organizations: Not on file     Attends Club or Organization Meetings: Not on file     Marital Status: Not on file   Interpersonal Safety: Not At Risk (7/13/2024)    Received from [facility]     IP Custom IPV     Do you feel UNSAFE in any of your personal relationships with your family members or any other acquaintances?: No   Housing Stability: Low Risk  (9/12/2024)    Housing Stability     Do you have housing? : Yes     Are you worried about losing your housing?: No     Current Outpatient Medications   Medication Sig Dispense Refill    memantine (NAMENDA) 10 MG tablet Take 1 tablet (10 mg) by mouth daily. 180 tablet 3    Calcium Carbonate-Vitamin D (CALCIUM-D PO) Take by mouth daily Liquid  Calcium 1200 mg  & Vit D 800 iU      Coenzyme Q10 (COQ10 PO) Take 100 mg by mouth.      escitalopram (LEXAPRO) 5 MG  "tablet Take 1 tablet (5 mg) by mouth daily. 90 tablet 3    rosuvastatin (CRESTOR) 5 MG tablet Take 1 tablet (5 mg) by mouth daily. 90 tablet 3    vitamin B-12 (CYANOCOBALAMIN) 1000 MCG tablet Take 1,000 mcg by mouth daily       Allergies   Allergen Reactions    Bacitracin Rash     FAMILY HISTORY NOTED AND REVIEWED    REVIEW OF SYSTEMS: above    PHYSICAL EXAM    /73 (BP Location: Right arm, Patient Position: Sitting, Cuff Size: Adult Regular)   Pulse 73   Temp 98  F (36.7  C) (Temporal)   Resp 16   Ht 1.727 m (5' 8\")   Wt 75.3 kg (166 lb)   SpO2 94%   BMI 25.24 kg/m      Patient appears non toxic  Examination of her left lower extremity midway between the knee and the thigh reveals a well-healing skin tear with slight surrounding redness but no pus or streaking.  It is not tender.    ASSESSMENT:  Cellulitis, clinically resolving, no need for further antibiotics.  Leg elevation recommended although difficult for her to always remember.  Local wound cares are fine, will call if it worsens  Mild cognitive impairment, progressing, at some point she may need a higher level of care.  To make things easier, and because I am not entirely sure how much the Namenda is helping, will change it to once a day.    PLAN:  Above  Follow-up in September for her physical.    The longitudinal plan of care for the diagnosis(es)/condition(s) as documented were addressed during this visit. Due to the added complexity in care, I will continue to support Jessika in the subsequent management and with ongoing continuity of care.    Guzman Bruner M.D.            "

## 2025-06-25 DIAGNOSIS — F32.0 MAJOR DEPRESSIVE DISORDER, SINGLE EPISODE, MILD: ICD-10-CM

## 2025-06-25 RX ORDER — ESCITALOPRAM OXALATE 5 MG/1
5 TABLET ORAL DAILY
Qty: 90 TABLET | Refills: 3 | OUTPATIENT
Start: 2025-06-25

## (undated) DEVICE — DRSG STERI STRIP 1X5" R1548

## (undated) DEVICE — SEAL SET MYOSURE ROD LENS SCOPE SINGLE USE 40-902

## (undated) DEVICE — SU PDS II 0 CT-2 27" Z334H

## (undated) DEVICE — DAVINCI HOT SHEARS TIP COVER  400180

## (undated) DEVICE — KIT PATIENT POSITIONING PIGAZZI LATEX FREE 40580

## (undated) DEVICE — KIT PROCEDURE FLUENT IN/OUT FLOWPAK TISS TRAP FLT-112S

## (undated) DEVICE — DRAPE CV SPLIT II 147X106" 9158

## (undated) DEVICE — SOL WATER IRRIG 1000ML BOTTLE 2F7114

## (undated) DEVICE — PACK TVT HYSTEROSCOPY SMA15HYFSE

## (undated) DEVICE — ESU GROUND PAD UNIVERSAL W/O CORD

## (undated) DEVICE — LINEN TOWEL PACK X5 5464

## (undated) DEVICE — POUCH TISSUE RETRIEVAL 15MM 6.00" INTRO TRS190SB2

## (undated) DEVICE — GLOVE PROTEXIS W/NEU-THERA 6.5  2D73TE65

## (undated) DEVICE — GLOVE PROTEXIS BLUE W/NEU-THERA 6.5  2D73EB65

## (undated) DEVICE — SU VICRYL 0 CT-2 27" J334H

## (undated) DEVICE — SPONGE LAP 18X18" X8435

## (undated) DEVICE — GLOVE BIOGEL PI ULTRATOUCH G SZ 6.5 42165

## (undated) DEVICE — DAVINCI XI DRAPE ARM 470015

## (undated) DEVICE — DAVINCI XI OBTURATOR BLADELESS 8MM 470359

## (undated) DEVICE — NDL SPINAL 22GA 3.5" QUINCKE 405181

## (undated) DEVICE — SU MONOCRYL 4-0 PS-2 18" UND Y496G

## (undated) DEVICE — ENDO TROCAR CONMED AIRSEAL BLADELESS 08X120MM IAS8-120LP

## (undated) DEVICE — DAVINCI XI SEAL UNIVERSAL 5-8MM 470361

## (undated) DEVICE — POUCH TISSUE RETRIEVAL ROBOTIC 8MM 5.1" INTRO TRS-ROBO-8

## (undated) DEVICE — DAVINCI XI DRAPE COLUMN 470341

## (undated) DEVICE — SOL NACL 0.9% IRRIG 3000ML BAG 2B7477

## (undated) DEVICE — SUCTION CANISTER MEDIVAC LINER 3000ML W/LID 65651-530

## (undated) DEVICE — NDL INSUFFLATION 13GA 120MM C2201

## (undated) DEVICE — GLOVE PROTEXIS BLUE W/NEU-THERA 6.0  2D73EB60

## (undated) DEVICE — SOL ADH LIQUID BENZOIN SWAB 0.6ML C1544

## (undated) DEVICE — SU VICRYL 2-0 SH 27" J317H

## (undated) DEVICE — SOL NACL 0.9% IRRIG 1000ML BOTTLE 2F7124

## (undated) DEVICE — ESU LIGASURE LAPAROSCOPIC BLUNT TIP SEALER 5MMX37CM LF1837

## (undated) DEVICE — SUCTION IRR STRYKERFLOW II W/TIP 250-070-520

## (undated) DEVICE — PACK DAVINCI GYN SMA15GDFS1

## (undated) DEVICE — SYR 10ML FINGER CONTROL W/O NDL 309695

## (undated) DEVICE — TUBING CONMED AIRSEAL SMOKE EVAC INSUFFLATION ASM-EVAC

## (undated) DEVICE — SPONGE RAY-TEC 4X4" 7317

## (undated) DEVICE — GLOVE PROTEXIS MICRO 6.5  2D73PM65

## (undated) RX ORDER — HYDROMORPHONE HYDROCHLORIDE 1 MG/ML
INJECTION, SOLUTION INTRAMUSCULAR; INTRAVENOUS; SUBCUTANEOUS
Status: DISPENSED
Start: 2021-05-11

## (undated) RX ORDER — PROPOFOL 10 MG/ML
INJECTION, EMULSION INTRAVENOUS
Status: DISPENSED
Start: 2021-07-08

## (undated) RX ORDER — HYDRALAZINE HYDROCHLORIDE 20 MG/ML
INJECTION INTRAMUSCULAR; INTRAVENOUS
Status: DISPENSED
Start: 2021-07-08

## (undated) RX ORDER — ACETAMINOPHEN 500 MG
TABLET ORAL
Status: DISPENSED
Start: 2021-07-08

## (undated) RX ORDER — DEXAMETHASONE SODIUM PHOSPHATE 4 MG/ML
INJECTION, SOLUTION INTRA-ARTICULAR; INTRALESIONAL; INTRAMUSCULAR; INTRAVENOUS; SOFT TISSUE
Status: DISPENSED
Start: 2021-07-08

## (undated) RX ORDER — WATER 10 ML/10ML
INJECTION INTRAMUSCULAR; INTRAVENOUS; SUBCUTANEOUS
Status: DISPENSED
Start: 2021-07-08

## (undated) RX ORDER — FENTANYL CITRATE 50 UG/ML
INJECTION, SOLUTION INTRAMUSCULAR; INTRAVENOUS
Status: DISPENSED
Start: 2021-05-11

## (undated) RX ORDER — INDOCYANINE GREEN AND WATER 25 MG
KIT INJECTION
Status: DISPENSED
Start: 2021-07-08

## (undated) RX ORDER — PROPOFOL 10 MG/ML
INJECTION, EMULSION INTRAVENOUS
Status: DISPENSED
Start: 2021-05-11

## (undated) RX ORDER — LABETALOL HYDROCHLORIDE 5 MG/ML
INJECTION, SOLUTION INTRAVENOUS
Status: DISPENSED
Start: 2021-05-11

## (undated) RX ORDER — ONDANSETRON 2 MG/ML
INJECTION INTRAMUSCULAR; INTRAVENOUS
Status: DISPENSED
Start: 2021-07-08

## (undated) RX ORDER — FENTANYL CITRATE 50 UG/ML
INJECTION, SOLUTION INTRAMUSCULAR; INTRAVENOUS
Status: DISPENSED
Start: 2021-07-08

## (undated) RX ORDER — BUPIVACAINE HYDROCHLORIDE AND EPINEPHRINE 5; 5 MG/ML; UG/ML
INJECTION, SOLUTION EPIDURAL; INTRACAUDAL; PERINEURAL
Status: DISPENSED
Start: 2021-07-08

## (undated) RX ORDER — HYDROMORPHONE HYDROCHLORIDE 1 MG/ML
INJECTION, SOLUTION INTRAMUSCULAR; INTRAVENOUS; SUBCUTANEOUS
Status: DISPENSED
Start: 2021-07-08